# Patient Record
Sex: MALE | Race: WHITE | NOT HISPANIC OR LATINO | Employment: OTHER | URBAN - METROPOLITAN AREA
[De-identification: names, ages, dates, MRNs, and addresses within clinical notes are randomized per-mention and may not be internally consistent; named-entity substitution may affect disease eponyms.]

---

## 2018-12-07 ENCOUNTER — HOSPITAL ENCOUNTER (OUTPATIENT)
Dept: OTHER | Facility: HOSPITAL | Age: 63
Discharge: HOME | End: 2018-12-07
Attending: FAMILY MEDICINE | Admitting: FAMILY MEDICINE

## 2019-03-18 ENCOUNTER — CONVERSION ENCOUNTER (OUTPATIENT)
Dept: FAMILY MEDICINE CLINIC | Facility: CLINIC | Age: 64
End: 2019-03-18

## 2019-03-18 LAB
ALBUMIN SERPL-MCNC: 3.8 G/DL (ref 3.6–5.1)
ALP SERPL-CCNC: 101 U/L (ref 40–115)
ALT SERPL-CCNC: 34 U/L (ref 9–46)
AST SERPL-CCNC: 22 U/L (ref 10–35)
BASOPHILS # BLD AUTO: 96 CELLS/UL (ref 0–200)
BASOPHILS NFR BLD AUTO: 0.8 %
BILIRUB SERPL-MCNC: 0.3 MG/DL (ref 0.2–1.2)
BNP SERPL-MCNC: 44 PG/ML
BUN SERPL-MCNC: 11 MG/DL (ref 7–25)
BUN/CREAT SERPL: ABNORMAL (CALC) (ref 6–22)
CALCIUM SERPL-MCNC: 9.4 MG/DL (ref 8.6–10.3)
CHLORIDE SERPL-SCNC: 101 MMOL/L (ref 98–110)
CHOLEST SERPL-MCNC: 162 MG/DL
CHOLEST/HDLC SERPL: 4.5 (CALC)
CONV CO2: 25 MMOL/L (ref 20–32)
CONV TOTAL PROTEIN: 7.7 G/DL (ref 6.1–8.1)
CREAT UR-MCNC: 0.81 MG/DL (ref 0.7–1.25)
EOSINOPHIL # BLD AUTO: 2.8 %
EOSINOPHIL # BLD AUTO: 336 CELLS/UL (ref 15–500)
ERYTHROCYTE [DISTWIDTH] IN BLOOD BY AUTOMATED COUNT: 12.3 % (ref 11–15)
GLOBULIN UR ELPH-MCNC: 3.9 MG/DL (ref 1.9–3.7)
GLUCOSE UR QL: 92 MG/DL (ref 65–139)
HCT VFR BLD AUTO: 46.1 % (ref 38.5–50)
HCV AB S/CO SERPL IA: 31.8
HCV AB S/CO SERPL IA: REACTIVE
HCV RNA SERPL QL NAA+PROBE: 5.99 LOG IU/ML
HCV RNA SERPL QL NAA+PROBE: ABNORMAL IU/ML
HDLC SERPL-MCNC: 36 MG/DL
HGB BLD-MCNC: 15.8 G/DL (ref 13.2–17.1)
INSULIN SERPL-ACNC: 1 (CALC) (ref 1–2.5)
LDLC SERPL CALC-MCNC: 102 MG/DL
LYMPHOCYTES # BLD AUTO: 2652 CELLS/UL (ref 850–3900)
LYMPHOCYTES NFR BLD AUTO: 22.1 %
MCH RBC QN AUTO: 30.4 PG (ref 27–33)
MCHC RBC AUTO-ENTMCNC: 34.3 G/DL (ref 32–36)
MCV RBC AUTO: 88.7 FL (ref 80–100)
MONOCYTES # BLD AUTO: 912 CELLS/UL (ref 200–950)
MONOCYTES NFR BLD AUTO: 7.6 %
NEUTROPHILS # BLD AUTO: 8004 CELLS/UL (ref 1500–7800)
NEUTROPHILS NFR BLD AUTO: 66.7 %
NONHDLC SERPL-MCNC: 126 MG/DL
PLATELET # BLD AUTO: 574 10*3/UL (ref 140–400)
PMV BLD AUTO: 8.9 FL (ref 7.5–12.5)
POTASSIUM SERPL-SCNC: 5.2 MMOL/L (ref 3.5–5.3)
PSA SERPL-MCNC: 1.1 NG/ML
RBC # BLD AUTO: 5.2 MILLION/UL (ref 4.2–5.8)
SODIUM SERPL-SCNC: 136 MMOL/L (ref 135–146)
TRIGL SERPL-MCNC: 141 MG/DL
TROPONIN I SERPL-MCNC: <0.01 NG/ML
TSH SERPL-ACNC: 0.7 MIU/L (ref 0.4–4.5)
WBC # BLD AUTO: 12 10*3/UL (ref 3.8–10.8)

## 2019-03-21 ENCOUNTER — HOSPITAL ENCOUNTER (OUTPATIENT)
Dept: NUCLEAR MEDICINE | Facility: HOSPITAL | Age: 64
Discharge: HOME | End: 2019-03-21
Attending: FAMILY MEDICINE | Admitting: FAMILY MEDICINE

## 2019-03-26 ENCOUNTER — HOSPITAL ENCOUNTER (OUTPATIENT)
Dept: NUCLEAR MEDICINE | Facility: HOSPITAL | Age: 64
Discharge: HOME | End: 2019-03-26
Attending: FAMILY MEDICINE | Admitting: FAMILY MEDICINE

## 2019-04-03 ENCOUNTER — HOSPITAL ENCOUNTER (OUTPATIENT)
Dept: OTHER | Facility: HOSPITAL | Age: 64
Discharge: HOME | End: 2019-04-03
Attending: FAMILY MEDICINE | Admitting: FAMILY MEDICINE

## 2019-04-17 ENCOUNTER — OFFICE (OUTPATIENT)
Dept: URBAN - METROPOLITAN AREA CLINIC 64 | Facility: CLINIC | Age: 64
End: 2019-04-17

## 2019-04-17 VITALS
SYSTOLIC BLOOD PRESSURE: 137 MMHG | WEIGHT: 215 LBS | HEART RATE: 94 BPM | DIASTOLIC BLOOD PRESSURE: 75 MMHG | HEIGHT: 70 IN

## 2019-04-17 DIAGNOSIS — B18.2 CHRONIC VIRAL HEPATITIS C: ICD-10-CM

## 2019-04-17 PROCEDURE — 99203 OFFICE O/P NEW LOW 30 MIN: CPT | Performed by: NURSE PRACTITIONER

## 2019-04-24 ENCOUNTER — OFFICE (OUTPATIENT)
Dept: URBAN - METROPOLITAN AREA CLINIC 49 | Facility: CLINIC | Age: 64
End: 2019-04-24

## 2019-04-24 VITALS — HEIGHT: 70 IN

## 2019-04-24 DIAGNOSIS — K74.0 HEPATIC FIBROSIS: ICD-10-CM

## 2019-04-24 DIAGNOSIS — B18.2 CHRONIC VIRAL HEPATITIS C: ICD-10-CM

## 2019-04-24 PROCEDURE — 91200 LIVER ELASTOGRAPHY: CPT | Performed by: NURSE PRACTITIONER

## 2019-06-03 ENCOUNTER — OFFICE (OUTPATIENT)
Dept: URBAN - METROPOLITAN AREA CLINIC 64 | Facility: CLINIC | Age: 64
End: 2019-06-03

## 2019-06-03 VITALS
DIASTOLIC BLOOD PRESSURE: 85 MMHG | HEART RATE: 71 BPM | SYSTOLIC BLOOD PRESSURE: 154 MMHG | HEIGHT: 70 IN | WEIGHT: 221 LBS

## 2019-06-03 DIAGNOSIS — B18.2 CHRONIC VIRAL HEPATITIS C: ICD-10-CM

## 2019-06-03 PROCEDURE — 99213 OFFICE O/P EST LOW 20 MIN: CPT | Performed by: NURSE PRACTITIONER

## 2019-06-05 ENCOUNTER — CONVERSION ENCOUNTER (OUTPATIENT)
Dept: FAMILY MEDICINE CLINIC | Facility: CLINIC | Age: 64
End: 2019-06-05

## 2019-06-06 LAB
ALBUMIN SERPL-MCNC: 4.5 G/DL (ref 3.6–5.1)
ALP SERPL-CCNC: 93 U/L (ref 40–115)
ALT SERPL-CCNC: 37 U/L (ref 9–46)
ANA SER QL IA: NEGATIVE
AST SERPL-CCNC: 26 U/L (ref 10–35)
BASOPHILS # BLD AUTO: 80 CELLS/UL (ref 0–200)
BASOPHILS NFR BLD AUTO: 0.7 %
BILIRUB SERPL-MCNC: 0.7 MG/DL (ref 0.2–1.2)
BUN SERPL-MCNC: 24 MG/DL (ref 7–25)
BUN/CREAT SERPL: 18 (CALC) (ref 6–22)
CALCIUM SERPL-MCNC: 9.5 MG/DL (ref 8.6–10.3)
CHLORIDE SERPL-SCNC: 103 MMOL/L (ref 98–110)
CONV CO2: 26 MMOL/L (ref 20–32)
CONV TOTAL PROTEIN: 8.1 G/DL (ref 6.1–8.1)
CREAT UR-MCNC: 1.33 MG/DL (ref 0.7–1.25)
EOSINOPHIL # BLD AUTO: 4.4 %
EOSINOPHIL # BLD AUTO: 502 CELLS/UL (ref 15–500)
ERYTHROCYTE [DISTWIDTH] IN BLOOD BY AUTOMATED COUNT: 13.1 % (ref 11–15)
GLOBULIN UR ELPH-MCNC: 3.6 MG/DL (ref 1.9–3.7)
GLUCOSE UR QL: 98 MG/DL (ref 65–99)
HCT VFR BLD AUTO: 44.2 % (ref 38.5–50)
HGB BLD-MCNC: 15 G/DL (ref 13.2–17.1)
INSULIN SERPL-ACNC: 1.3 (CALC) (ref 1–2.5)
LYMPHOCYTES # BLD AUTO: 3078 CELLS/UL (ref 850–3900)
LYMPHOCYTES NFR BLD AUTO: 27 %
MCH RBC QN AUTO: 30.8 PG (ref 27–33)
MCHC RBC AUTO-ENTMCNC: 33.9 G/DL (ref 32–36)
MCV RBC AUTO: 90.8 FL (ref 80–100)
MONOCYTES # BLD AUTO: 832 CELLS/UL (ref 200–950)
MONOCYTES NFR BLD AUTO: 7.3 %
NEUTROPHILS # BLD AUTO: 6908 CELLS/UL (ref 1500–7800)
NEUTROPHILS NFR BLD AUTO: 60.6 %
PLATELET # BLD AUTO: 324 10*3/UL (ref 140–400)
PMV BLD AUTO: 9 FL (ref 7.5–12.5)
POTASSIUM SERPL-SCNC: 4.1 MMOL/L (ref 3.5–5.3)
RBC # BLD AUTO: 4.87 MILLION/UL (ref 4.2–5.8)
SODIUM SERPL-SCNC: 138 MMOL/L (ref 135–146)
WBC # BLD AUTO: 11.4 10*3/UL (ref 3.8–10.8)

## 2019-07-05 ENCOUNTER — OFFICE VISIT (OUTPATIENT)
Dept: FAMILY MEDICINE CLINIC | Facility: CLINIC | Age: 64
End: 2019-07-05

## 2019-07-05 VITALS
DIASTOLIC BLOOD PRESSURE: 88 MMHG | RESPIRATION RATE: 18 BRPM | HEIGHT: 70 IN | SYSTOLIC BLOOD PRESSURE: 160 MMHG | HEART RATE: 93 BPM | BODY MASS INDEX: 32.21 KG/M2 | OXYGEN SATURATION: 98 % | TEMPERATURE: 97.4 F | WEIGHT: 225 LBS

## 2019-07-05 DIAGNOSIS — I10 BENIGN ESSENTIAL HYPERTENSION: Primary | ICD-10-CM

## 2019-07-05 DIAGNOSIS — N28.9 ACUTE RENAL INSUFFICIENCY: ICD-10-CM

## 2019-07-05 DIAGNOSIS — F17.200 TOBACCO DEPENDENCE SYNDROME: ICD-10-CM

## 2019-07-05 DIAGNOSIS — L03.031 CELLULITIS OF TOE OF RIGHT FOOT: ICD-10-CM

## 2019-07-05 DIAGNOSIS — R14.0 ABDOMINAL BLOATING: ICD-10-CM

## 2019-07-05 PROBLEM — Z00.00 PHYSICAL EXAM: Status: ACTIVE | Noted: 2019-07-05

## 2019-07-05 PROBLEM — Z13.9 ENCOUNTER FOR SCREENING: Status: ACTIVE | Noted: 2019-07-05

## 2019-07-05 PROBLEM — H91.90 HEARING LOSS: Status: ACTIVE | Noted: 2019-07-05

## 2019-07-05 PROBLEM — I73.89 ACROCYANOSIS (HCC): Status: ACTIVE | Noted: 2019-07-05

## 2019-07-05 PROBLEM — Z02.4 ENCOUNTER FOR CDL (COMMERCIAL DRIVING LICENSE) EXAM: Status: ACTIVE | Noted: 2019-07-05

## 2019-07-05 PROBLEM — Z12.5 SCREENING PSA (PROSTATE SPECIFIC ANTIGEN): Status: ACTIVE | Noted: 2019-07-05

## 2019-07-05 PROBLEM — L60.0 INGROWN NAIL: Status: ACTIVE | Noted: 2019-07-05

## 2019-07-05 PROBLEM — E66.3 OVERWEIGHT: Status: ACTIVE | Noted: 2019-07-05

## 2019-07-05 PROBLEM — I25.10 CORONARY ARTERY DISEASE INVOLVING NATIVE CORONARY ARTERY OF NATIVE HEART WITHOUT ANGINA PECTORIS: Status: ACTIVE | Noted: 2019-07-05

## 2019-07-05 PROBLEM — R94.30 ABNORMAL RESULT OF CARDIOVASCULAR FUNCTION STUDY: Status: ACTIVE | Noted: 2019-04-03

## 2019-07-05 PROBLEM — Z23 NEED FOR IMMUNIZATION AGAINST INFLUENZA: Status: ACTIVE | Noted: 2019-07-05

## 2019-07-05 PROBLEM — Z12.11 SCREEN FOR COLON CANCER: Status: ACTIVE | Noted: 2019-07-05

## 2019-07-05 PROBLEM — B18.2 CHRONIC HEPATITIS C (HCC): Status: ACTIVE | Noted: 2019-07-05

## 2019-07-05 PROBLEM — J44.9 CHRONIC OBSTRUCTIVE PULMONARY DISEASE (HCC): Status: ACTIVE | Noted: 2019-04-03

## 2019-07-05 PROBLEM — R07.9 CHEST PAIN: Status: ACTIVE | Noted: 2019-07-05

## 2019-07-05 PROBLEM — Z13.220 SCREENING, LIPID: Status: ACTIVE | Noted: 2019-07-05

## 2019-07-05 PROBLEM — Z98.61 CORONARY ANGIOPLASTY STATUS: Status: ACTIVE | Noted: 2019-04-08

## 2019-07-05 PROCEDURE — 99214 OFFICE O/P EST MOD 30 MIN: CPT | Performed by: FAMILY MEDICINE

## 2019-07-05 RX ORDER — HYDROCODONE BITARTRATE AND ACETAMINOPHEN 5; 325 MG/1; MG/1
1 TABLET ORAL EVERY 6 HOURS PRN
Qty: 12 TABLET | Refills: 0 | Status: SHIPPED | OUTPATIENT
Start: 2019-07-05 | End: 2019-07-08

## 2019-07-05 RX ORDER — METOPROLOL SUCCINATE 25 MG/1
25 TABLET, EXTENDED RELEASE ORAL DAILY
Refills: 2 | COMMUNITY
Start: 2019-06-03 | End: 2020-03-18 | Stop reason: SDUPTHER

## 2019-07-05 RX ORDER — AMOXICILLIN AND CLAVULANATE POTASSIUM 500; 125 MG/1; MG/1
1 TABLET, FILM COATED ORAL 3 TIMES DAILY
Qty: 30 TABLET | Refills: 0 | Status: SHIPPED | OUTPATIENT
Start: 2019-07-05 | End: 2019-07-15

## 2019-07-05 RX ORDER — ASPIRIN 81 MG/1
1 TABLET ORAL EVERY 24 HOURS
COMMUNITY
Start: 2018-12-07

## 2019-07-05 RX ORDER — ATORVASTATIN CALCIUM 20 MG/1
1 TABLET, FILM COATED ORAL DAILY
COMMUNITY
Start: 2019-04-03 | End: 2020-05-18

## 2019-07-05 RX ORDER — IBUPROFEN 200 MG
5 TABLET ORAL 3 TIMES DAILY
COMMUNITY

## 2019-07-05 RX ORDER — CLOPIDOGREL BISULFATE 75 MG/1
1 TABLET ORAL DAILY
COMMUNITY
Start: 2019-04-26 | End: 2020-06-12 | Stop reason: SDUPTHER

## 2019-07-05 RX ORDER — ALBUTEROL SULFATE 90 UG/1
2 AEROSOL, METERED RESPIRATORY (INHALATION) AS NEEDED
COMMUNITY
Start: 2019-04-03 | End: 2020-03-18 | Stop reason: SDUPTHER

## 2019-07-05 NOTE — ASSESSMENT & PLAN NOTE
BP elevated today.  Possibly from pain.  Previous readings not as high as today.  Asymptomatic.  Monitor and adjust next visit if BP elevation continues.  Treat pain.

## 2019-07-05 NOTE — PATIENT INSTRUCTIONS
Smoking Tobacco Information, Adult  Smoking tobacco will very likely harm your health. Tobacco contains a poisonous (toxic), colorless chemical called nicotine. Nicotine affects the brain and makes tobacco addictive. This change in your brain can make it hard to stop smoking. Tobacco also has other toxic chemicals that can hurt your body and raise your risk of many cancers.  How can smoking tobacco affect me?  Smoking tobacco can increase your chances of having serious health conditions, such as:  · Cancer. Smoking is most commonly associated with lung cancer, but can lead to cancer in other parts of the body.  · Chronic obstructive pulmonary disease (COPD). This is a long-term lung condition that makes it hard to breathe. It also gets worse over time.  · High blood pressure (hypertension), heart disease, stroke, or heart attack.  · Lung infections, such as pneumonia.  · Cataracts. This is when the lenses in the eyes become clouded.  · Digestive problems. This may include peptic ulcers, heartburn, and gastroesophageal reflux disease (GERD).  · Oral health problems, such as gum disease and tooth loss.  · Loss of taste and smell.    Smoking can affect your appearance by causing:  · Wrinkles.  · Yellow or stained teeth, fingers, and fingernails.    Smoking tobacco can also affect your social life.  · Many workplaces, restaurants, hotels, and public places are tobacco-free. This means that you may experience challenges in finding places to smoke when away from home.  · The cost of a smoking habit can be expensive. Expenses for someone who smokes come in two ways:  ? You spend money on a regular basis to buy tobacco.  ? Your health care costs in the long-term are higher if you smoke.  · Tobacco smoke can also affect the health of those around you. Children of smokers have greater chances of:  ? Sudden infant death syndrome (SIDS).  ? Ear infections.  ? Lung infections.    What lifestyle changes can be made?  · Do not  start smoking. Quit if you already do.  · To quit smoking:  ? Make a plan to quit smoking and commit yourself to it. Look for programs to help you and ask your health care provider for recommendations and ideas.  ? Talk with your health care provider about using nicotine replacement medicines to help you quit. Medicine replacement medicines include gum, lozenges, patches, sprays, or pills.  ? Do not replace cigarette smoking with electronic cigarettes, which are commonly called e-cigarettes. The safety of e-cigarettes is not known, and some may contain harmful chemicals.  ? Avoid places, people, or situations that tempt you to smoke.  ? If you try to quit but return to smoking, don't give up hope. It is very common for people to try a number of times before they fully succeed. When you feel ready again, give it another try.  · Quitting smoking might affect the way you eat as well as your weight. Be prepared to monitor your eating habits. Get support in planning and following a healthy diet.  · Ask your health care provider about having regular tests (screenings) to check for cancer. This may include blood tests, imaging tests, and other tests.  · Exercise regularly. Consider taking walks, joining a gym, or doing yoga or exercise classes.  · Develop skills to manage your stress. These skills include meditation.  What are the benefits of quitting smoking?  By quitting smoking, you may:  · Lower your risk of getting cancer and other diseases caused by smoking.  · Live longer.  · Breathe better.  · Lower your blood pressure and heart rate.  · Stop your addiction to tobacco.  · Stop creating secondhand smoke that hurts other people.  · Improve your sense of taste and smell.  · Look better over time, due to having fewer wrinkles and less staining.    What can happen if changes are not made?  If you do not stop smoking, you may:  · Get cancer and other diseases.  · Develop COPD or other long-term (chronic) lung  conditions.  · Develop serious problems with your heart and blood vessels (cardiovascular system).  · Need more tests to screen for problems caused by smoking.  · Have higher, long-term healthcare costs from medicines or treatments related to smoking.  · Continue to have worsening changes in your lungs, mouth, and nose.    Where to find support  To get support to quit smoking, consider:  · Asking your health care provider for more information and resources.  · Taking classes to learn more about quitting smoking.  · Looking for local organizations that offer resources about quitting smoking.  · Joining a support group for people who want to quit smoking in your local community.    Where to find more information  You may find more information about quitting smoking from:  · HelpGuide.org: www.helpguide.org/articles/addictions/how-to-quit-smoking.htm  · Smokefree.gov: smokefree.gov  · American Lung Association: www.lung.org    Contact a health care provider if:  · You have problems breathing.  · Your lips, nose, or fingers turn blue.  · You have chest pain.  · You are coughing up blood.  · You feel faint or you pass out.  · You have other noticeable changes that cause you to worry.  Summary  · Smoking tobacco can negatively affect your health, the health of those around you, your finances, and your social life.  · Do not start smoking. Quit if you already do. If you need help quitting, ask your health care provider.  · Think about joining a support group for people who want to quit smoking in your local community. There are many effective programs that will help you to quit this behavior.  This information is not intended to replace advice given to you by your health care provider. Make sure you discuss any questions you have with your health care provider.  Document Released: 01/02/2018 Document Revised: 01/02/2018 Document Reviewed: 01/02/2018  Elsevier Interactive Patient Education © 2019 [employer]

## 2019-07-05 NOTE — PROGRESS NOTES
Chief Complaint   Patient presents with   • Ingrown Toenail   • Follow-up       Subjective   Santos Sullivan is a 63 y.o. male.     Patient is here today for a follow up from an ingrown toenail removal about a month ago.  He saw Dr Vadles and had a nail removal.  He states that he has had significant pain since removal and has been taking high doses of ibuprofen daily.  The nail bed is draining.  The toe is tender.  He has been treating with peroxide.      The lamictal for onychomycosis was discontinued.    Additionally, he is due for repeat labs today since last labs showed a slight decline in his renal function.    He has a new complaint of abdominal bloating after eating.  Symptoms present <1 mo.  Not associated with bloody stools or vomiting.  He admits to a diet high in fat.  He does not eat high fiber foods.  Recent imaging (liver u/s) did not comment on gallstones.      Ingrown Toenail   This is a recurrent problem. The current episode started 1 to 4 weeks ago. The problem occurs constantly. The problem has been unchanged. Associated symptoms include joint swelling (right great toe). Pertinent negatives include no chest pain, chills, fever, rash or vomiting. Nothing aggravates the symptoms. He has tried acetaminophen and NSAIDs for the symptoms. The treatment provided no relief.          I have reviewed and updated his medications, medical history and problem list during today's office visit.     Social History     Tobacco Use   • Smoking status: Current Every Day Smoker     Packs/day: 1.00     Types: Cigarettes   • Smokeless tobacco: Never Used   Substance Use Topics   • Alcohol use: No     Frequency: Never       Review of Systems   Constitutional: Negative for appetite change, chills and fever.   Respiratory: Negative for shortness of breath.    Cardiovascular: Negative for chest pain and leg swelling.   Gastrointestinal: Positive for abdominal distention. Negative for blood in stool, constipation, diarrhea,  "vomiting and indigestion.   Genitourinary: Negative for decreased urine volume.   Musculoskeletal: Positive for joint swelling (right great toe).   Skin: Positive for color change (redness). Negative for rash.        Swelling and redness of right great toe, with drainage - see HPI   Neurological: Negative for dizziness and syncope.   Hematological: Negative for adenopathy. Does not bruise/bleed easily.   Psychiatric/Behavioral: Negative for depressed mood.       Objective   Vitals:    07/05/19 0841   BP: 160/88   Pulse: 93   Resp: 18   Temp: 97.4 °F (36.3 °C)   TempSrc: Oral   SpO2: 98%   Weight: 102 kg (225 lb)   Height: 177.8 cm (70\")     There is no height or weight on file to calculate BMI.  Physical Exam   Constitutional: He is oriented to person, place, and time. He appears well-developed and well-nourished.   HENT:   Head: Normocephalic and atraumatic.   Eyes: Conjunctivae and EOM are normal. Pupils are equal, round, and reactive to light.   Neck: Normal range of motion. Neck supple.   Cardiovascular: Normal rate, regular rhythm and normal heart sounds.   Pulmonary/Chest: Effort normal and breath sounds normal. He has no wheezes. He has no rales.   Abdominal: Soft.   Musculoskeletal: He exhibits no edema.        Neurological: He is alert and oriented to person, place, and time.   Skin: Skin is warm and dry. He is not diaphoretic.   Psychiatric: He has a normal mood and affect.                Lab Results   Component Value Date    BUN 11 04/09/2019    CREATININE 0.9 04/09/2019     04/09/2019    K 4.2 04/09/2019     04/09/2019    CALCIUM 8.9 04/09/2019    WBC 10.0 04/09/2019    RBC 4.96 04/09/2019    HCT 45.9 04/09/2019    MCV 92.5 04/09/2019    MCH 30.4 04/09/2019    INR 1.0 04/08/2019      PHQ-2 Total Score: 0    Assessment/Plan       Diagnoses and all orders for this visit:    1. Benign essential hypertension (Primary)  Assessment & Plan:  BP elevated today.  Possibly from pain.  Previous readings " not as high as today.  Asymptomatic.  Monitor and adjust next visit if BP elevation continues.  Treat pain.      2. Acute renal insufficiency  Assessment & Plan:  Recheck labs.   Reduce ibuprofen usage.    Orders:  -     Comprehensive metabolic panel    3. Cellulitis of toe of right foot  Assessment & Plan:  Start Augmentin.  Take as instructed.  Wound care discussed.  Resume Epsom salt soaks BID, peroxide as needed.  If not improvement in the next 4-5 days, he was advised to call us or call Dr. Epps's office directly to make a follow-up appointment.    Orders:  -     amoxicillin-clavulanate (AUGMENTIN) 500-125 MG per tablet; Take 1 tablet by mouth 3 (Three) Times a Day for 10 days.  Dispense: 30 tablet; Refill: 0  -     HYDROcodone-acetaminophen (NORCO) 5-325 MG per tablet; Take 1 tablet by mouth Every 6 (Six) Hours As Needed for Moderate Pain  for up to 3 days.  Dispense: 12 tablet; Refill: 0    4. Abdominal bloating  Comments:  Reduce fat in diet.  Consider gallbladder u/s if symptoms continue.    5. Tobacco dependence syndrome  Assessment & Plan:  He is not interested in cessation.          Return if symptoms worsen or fail to improve.

## 2019-07-05 NOTE — ASSESSMENT & PLAN NOTE
Start Augmentin.  Take as instructed.  Wound care discussed.  Resume Epsom salt soaks BID, peroxide as needed.  If not improvement in the next 4-5 days, he was advised to call us or call Dr. Epps's office directly to make a follow-up appointment.

## 2019-07-06 LAB
ALBUMIN SERPL-MCNC: 4.4 G/DL (ref 3.6–4.8)
ALBUMIN/GLOB SERPL: 1.2 {RATIO} (ref 1.2–2.2)
ALP SERPL-CCNC: 87 IU/L (ref 39–117)
ALT SERPL-CCNC: 14 IU/L (ref 0–44)
AST SERPL-CCNC: 16 IU/L (ref 0–40)
BILIRUB SERPL-MCNC: 0.3 MG/DL (ref 0–1.2)
BUN SERPL-MCNC: 16 MG/DL (ref 8–27)
BUN/CREAT SERPL: 16 (ref 10–24)
CALCIUM SERPL-MCNC: 9.2 MG/DL (ref 8.6–10.2)
CHLORIDE SERPL-SCNC: 105 MMOL/L (ref 96–106)
CO2 SERPL-SCNC: 20 MMOL/L (ref 20–29)
CREAT SERPL-MCNC: 0.97 MG/DL (ref 0.76–1.27)
GLOBULIN SER CALC-MCNC: 3.6 G/DL (ref 1.5–4.5)
GLUCOSE SERPL-MCNC: 102 MG/DL (ref 65–99)
POTASSIUM SERPL-SCNC: 4.7 MMOL/L (ref 3.5–5.2)
PROT SERPL-MCNC: 8 G/DL (ref 6–8.5)
SODIUM SERPL-SCNC: 139 MMOL/L (ref 134–144)

## 2019-07-18 ENCOUNTER — OFFICE VISIT (OUTPATIENT)
Dept: FAMILY MEDICINE CLINIC | Facility: CLINIC | Age: 64
End: 2019-07-18

## 2019-07-18 VITALS
SYSTOLIC BLOOD PRESSURE: 138 MMHG | HEIGHT: 70 IN | OXYGEN SATURATION: 97 % | BODY MASS INDEX: 32.21 KG/M2 | TEMPERATURE: 98.7 F | HEART RATE: 93 BPM | RESPIRATION RATE: 18 BRPM | DIASTOLIC BLOOD PRESSURE: 88 MMHG | WEIGHT: 225 LBS

## 2019-07-18 DIAGNOSIS — L03.031 CELLULITIS OF TOE OF RIGHT FOOT: Primary | ICD-10-CM

## 2019-07-18 DIAGNOSIS — M79.674 PAIN OF TOE OF RIGHT FOOT: ICD-10-CM

## 2019-07-18 PROBLEM — J44.9 CHRONIC OBSTRUCTIVE PULMONARY DISEASE (HCC): Chronic | Status: ACTIVE | Noted: 2019-04-03

## 2019-07-18 PROBLEM — B18.2 CHRONIC HEPATITIS C (HCC): Chronic | Status: ACTIVE | Noted: 2019-07-05

## 2019-07-18 PROBLEM — I10 BENIGN ESSENTIAL HYPERTENSION: Chronic | Status: ACTIVE | Noted: 2019-07-05

## 2019-07-18 PROBLEM — I25.10 CORONARY ARTERY DISEASE INVOLVING NATIVE CORONARY ARTERY OF NATIVE HEART WITHOUT ANGINA PECTORIS: Chronic | Status: ACTIVE | Noted: 2019-07-05

## 2019-07-18 PROCEDURE — 99213 OFFICE O/P EST LOW 20 MIN: CPT | Performed by: FAMILY MEDICINE

## 2019-07-18 RX ORDER — CLINDAMYCIN HYDROCHLORIDE 300 MG/1
300 CAPSULE ORAL 3 TIMES DAILY
Qty: 30 CAPSULE | Refills: 0 | Status: SHIPPED | OUTPATIENT
Start: 2019-07-18 | End: 2019-07-28

## 2019-07-18 RX ORDER — HYDROCODONE BITARTRATE AND ACETAMINOPHEN 5; 325 MG/1; MG/1
1 TABLET ORAL EVERY 6 HOURS PRN
Qty: 12 TABLET | Refills: 0 | Status: SHIPPED | OUTPATIENT
Start: 2019-07-18 | End: 2020-03-18

## 2019-07-18 NOTE — PROGRESS NOTES
Chief Complaint   Patient presents with   • Nail Problem     toe nail removed 5 weeks ago, appears infected.        Subjective   Santos Sullivan is a 63 y.o. male.     Patient reports that 5 weeks ago, the toe nail from his right great toe was removed by Dr. Valdes. He came in here about 12 days ago and was given antibiotics (augmentin) and pain medicine (hydrocodone apap) to help. He reports that he has finished his antibiotics, which may have helped a little, and has returned to the office because his toe still looks infected and he has a considerable amount of pain.  Other treatments include peroxide and epsom salt soaks.         The following portions of the patient's history were reviewed and updated as appropriate: allergies, current medications, past family history, past medical history, past social history, past surgical history and problem list.      Social History     Tobacco Use   • Smoking status: Current Every Day Smoker     Packs/day: 1.00     Types: Cigarettes   • Smokeless tobacco: Never Used   Substance Use Topics   • Alcohol use: No     Frequency: Never       Review of Systems   Constitutional: Negative for appetite change, chills and fever.   Respiratory: Negative for shortness of breath.    Cardiovascular: Negative for chest pain and leg swelling.   Gastrointestinal: Negative for abdominal distention, blood in stool, constipation, diarrhea, vomiting and indigestion.   Genitourinary: Negative for decreased urine volume.   Musculoskeletal: Positive for joint swelling (right great toe).   Skin: Positive for color change (redness). Negative for rash.        Swelling and redness of right great toe, with drainage - see HPI   Neurological: Negative for dizziness and syncope.   Hematological: Negative for adenopathy. Does not bruise/bleed easily.   Psychiatric/Behavioral: Negative for depressed mood.       Objective   Vitals:    07/18/19 1416   BP: 138/88   Pulse: 93   Resp: 18   Temp: 98.7 °F (37.1 °C)  "  SpO2: 97%   Weight: 102 kg (225 lb)   Height: 177.8 cm (70\")     Body mass index is 32.28 kg/m².  Physical Exam   Constitutional: He is oriented to person, place, and time. He appears well-developed and well-nourished.   HENT:   Head: Normocephalic and atraumatic.   Eyes: Conjunctivae and EOM are normal. Pupils are equal, round, and reactive to light.   Neck: Normal range of motion. Neck supple.   Cardiovascular: Normal rate, regular rhythm and normal heart sounds.   Pulmonary/Chest: Effort normal and breath sounds normal. He has no wheezes. He has no rales.   Abdominal: Soft.   Musculoskeletal: He exhibits no edema.        Neurological: He is alert and oriented to person, place, and time.   Skin: Skin is warm and dry. He is not diaphoretic.   Psychiatric: He has a normal mood and affect.                  Lab Results   Component Value Date     (H) 07/05/2019    BUN 16 07/05/2019    CREATININE 0.97 07/05/2019    EGFRIFNONA 83 07/05/2019    EGFRIFAFRI 96 07/05/2019     07/05/2019    K 4.7 07/05/2019     07/05/2019    CALCIUM 9.2 07/05/2019    ALBUMIN 4.4 07/05/2019    BILITOT 0.3 07/05/2019    ALKPHOS 87 07/05/2019    AST 16 07/05/2019    ALT 14 07/05/2019          Assessment/Plan       Diagnoses and all orders for this visit:    1. Cellulitis of toe of right foot (Primary)  Comments:  Start new antibiotics.  Continue Epsom salt soaks and peroxide.  Take probiotic.  See Dr. Valdes on 7/31/19 at 9:15 am.  Orders:  -     clindamycin (CLEOCIN) 300 MG capsule; Take 1 capsule by mouth 3 (Three) Times a Day for 10 days.  Dispense: 30 capsule; Refill: 0  -     HYDROcodone-acetaminophen (NORCO) 5-325 MG per tablet; Take 1 tablet by mouth Every 6 (Six) Hours As Needed for Severe Pain .  Dispense: 12 tablet; Refill: 0  -     CBC Auto Differential  -     Sedimentation rate, automated  -     C-reactive protein  -     Uric Acid    2. Pain of toe of right foot          Return if symptoms worsen or fail to " improve.

## 2019-07-18 NOTE — PATIENT INSTRUCTIONS
Try Probiotics to help protect gut/intestines from antibiotic usage:    1) One-A-Day TruBiotics      Or    2) [facility]    See Dr. Valdes on July 31, 2019 at 9:15 am (be there by 9:00 am).

## 2019-07-19 NOTE — PROGRESS NOTES
Office Visit     Santos Sullivan  6/5/2019 8:07 AM  Location:  [facility]  Patient #:  [id number]    /  Language:  English  /  Race:  White  Male          History of Present Illness  The patient is a 63 year old male who presents with nail problems. Symptoms include curved nails. Lesion(s) are located in digit(s) of the right foot (Great Toe). Onset was gradual 5 month(s) ago. The nail changes are becoming more painful. Changes have   occurred over the last 2 days. Symptoms are mild. The patient is not currently being treated for this problem.    Additional Reason for Visit:    Transition (No)  is described as the following:  This is a(n) established patient. The patient has not received care from another provider or facility since their last visit.           Problem List/Past Medical:    Consulting Physicians ([id number])  [facility], cardiology (Dr. Watson)  Tobacco Use Disorder (F17.200)    Coronary artery disease involving native coronary artery of native heart without angina pectoris (I25.10)    COPD (chronic obstructive pulmonary disease) (J44.9)    Chronic hepatitis C (B18.2)    Benign essential hypertension (I10)      Allergies:    No Known Drug Allergies  [06/24/2015]:    Family History:    Pelvic cancer (C76.3)  Sister.  Throat cancer (C14.0)  Father.  Lung Cancer  Brother.  Coronary Artery Disease and/or Hypertension (Z82.49)  Mother. mother (NSTEMI, CAD, cardiac stents, afib)    Social History:    Living Situation  Lives alone.  Tobacco Use  Current every day smoker, Heavy tobacco smoker. 1.5 ppd  Caffeine Use  2-3 cups of coffee and 2-3 energy drinks a day  Occupation    Alcohol Use  Non Drinker / No Alcohol Use.    Medication History:    Spiriva HandiHaler  (18MCG Capsule, 1 Inhalation inhaled daily for COPD, Taken starting 06/03/2019) Active.  Ventolin HFA  (108 (90 Base)MCG/ACT Aerosol Soln, 2 (two) Inhalation every 4 - 6 hours as needed for shortness of breath (COPD), Taken starting  05/06/2019) Active. (Max 12 puffs/day.)  Aspirin Adult Low Dose  (81MG Tablet DR, 1 (one) Tablet Oral daily, Taken starting 12/07/2018) Active. (OTC)  Atorvastatin Calcium  (20MG Tablet, 1 (one) Oral at bedtime, Taken starting 05/06/2019) Active.  Metoprolol Succinate ER  (25MG Tablet ER 24HR, 1 (one) Tablet Oral daily, Taken starting 06/03/2019) Active.  Chantix  (1MG Tablet, 1 (one) Tablet Oral two times daily, Taken starting 04/29/2019) Active. (start after completing the 0.5 mg titration)  Clopidogrel Bisulfate  (75MG Tablet, 1 (one) Oral daily, Taken starting 06/03/2019) Active.  Ibuprofen  (200MG Tablet, 3 Oral two times daily as needed) Active.  Medications Reconciled      Past Surgical History:    None  [06/24/2015]:    Diagnostic Studies History:    Cardiovascular Stress Test  Abnormal.    Health Maintenance History:    [facility] Flu Vaccine  [12/07/2018]:  [facility] Pneumovax  [12/07/2018]:  PSA  [03/18/2018]: Normal.  [facility] Colonoscopy, Screening  green packet given 12/7/18           Review of Symptons  General    Not Present-  Chills     -    and  Fever     -    Skin    Present-  Nail Changes     +    Not Present-  Rash     -    HEENT    Not Present-  Headache     -    Neck    Not Present-  Neck Stiffness     -    Respiratory    Not Present-  Dyspnea     -    Cardiovascular    Present-  Blue or purplish discoloration of hands/feet     +    Not Present-  Chest Pain     -    and  Edema     -    Gastrointestinal    Not Present-  Abdominal Pain     -  Constipation     -  Diarrhea     -    and  Vomiting     -    Neurological    Not Present-  Dizziness     -    and  Visual Changes     -    Endocrine    Not Present-  Polydipsia     -    and  Polyuria     -           Physical Exam    General  Mental Status - Alert     Orientation - Oriented X3     Hydration - Well hydrated       Skin  General - Normal     Skin Surface - Right Lower Extremity -          Nails - Inspection -       Head and Neck  Head - Head Shape -      Normocephalic     Atraumatic     Neck - Global Assessment -     tender     No lymphadenopathy     Carotid Arteries -  Bilateral -     No jugular venous distention     Thyroid - Gland Characteristics -     no palpable enlargement         Eye  Sclera/Conjunctiva - Bilateral - clear     Pupil - Bilateral - PEARLA       ENMT  Mouth and Throat -      Oral Cavity/Oropharynx - Oral Mucosa -     dry       Chest and Lung Exam  Inspection - Shape -     Normal     Movements -     Symmetrical     Accessory muscles -     No use of accessory muscles in breathing     Auscultation -      Breath sounds - Decreased -     Right Lower Lobe (Posterior)     Left Lower Lobe (Posterior)            Adventitious sounds -   -     no rhonchi     no rales     no wheezes       Cardiovascular  Auscultation - Rhythm -     Regular     Tachycardic     Heart Sounds -     S1 WNL     S2 WNL     No S3     No S4          Murmurs & Other Heart Sounds - Auscultation of the heart reveals -     No Murmurs       Peripheral Vascular  Lower Extremity - Inspection:     Inspection:          Palpation - Tenderness -  Bilateral -     Non Tender     Edema -  Bilateral -     No edema       Neurologic  Cranial Nerves II - XII - Intact     Mental Status - Affect -     normal     Speech -     Normal     Thought content/perception -     Normal     Coordination - Normal     Gait - Normal       Neuropsychiatric  The patient's mood and affect are described as  - normal                     Assessment and Plan:  Onychomycosis of toenail (B35.1)            Started Terbinafine HCl 250MG, 1 (one) Tablet daily, #84, 84 days starting 06/05/2019, No Refill.  CBC w/ auto Diff (6399) (14545)  CMP (40942) (16540)    Acrocyanosis (I73.89) <[id number]>            Ankle-Brachial Index (MARY)  (90776) (25 Modifier on Office Visit)  ASHLEY (Antinuclear Antibody) Screen (58955)  Referred to Trav Valdes MD (Podiatry).  Routine. Provide referring physician with visit summaries. Assume management  for problem and return to primary caregiver at conclusion of care.  Ingrown nail (L60.0)    Refer.      Acute renal insufficiency (N28.9)    Recheck labs 2 weeks.          Future Plan:   6/21/2019: [facility] (36037) - - one time    Tobacco Use Disorder (F17.200)    He is interested in cessation.  Continue Chantix.        Pt Education - Smoking: Ways to Quit: quit smoking: discussed with patient and provided information.  Education about Importance of not smoking ()    Overweight >25 (E66.3)            BMI Higher than Parameter and Follow-up Plan Documented in Record ()  Pt Education - Weight Loss Diets *: losing weight: discussed with patient and provided information.                        Resulted Orders:        CBC w/ auto Diff (6399) (84022)  results abnormal?         true  11.4  4.87 Million/uL  15.0 g/dL  44.2 %  90.8 fL  30.8 pg  33.9 g/dL  13.1 %  324  9.0 fL  6908 cells/uL  3078 cells/uL  832 cells/uL  502 cells/uL  80 cells/uL  60.6 %  27.0 %  7.3 %  4.4 %  0.7 %    CMP (74863) (93350)  results abnormal?         true  98 mg/dL  24 mg/dL  1.33 mg/dL  57  66  18 (calc)  138 mmol/L  4.1 mmol/L  103 mmol/L  26 mmol/L  9.5 mg/dL  8.1 g/dL  4.5 g/dL  3.6  1.3 (calc)  0.7 mg/dL  93 U/L  26 U/L  37 U/L    ASHLEY (Antinuclear Antibody) Screen (29393)  results abnormal?         false  NEGATIVE      Signed By:      6/30/2019 11:23 PM     Leigh Ayon MD  Signed electronically by Leigh Ayon MD (6/30/2019 11:23 PM)  __________     Disclaimer: Converted Note may not contain all data elements that existed in the [employer] source system. Please see [employer] for the original note message details.

## 2019-07-20 LAB
BASOPHILS # BLD AUTO: 0 X10E3/UL (ref 0–0.2)
BASOPHILS NFR BLD AUTO: 0 %
CRP SERPL-MCNC: <1 MG/L (ref 0–10)
EOSINOPHIL # BLD AUTO: 0.4 X10E3/UL (ref 0–0.4)
EOSINOPHIL NFR BLD AUTO: 4 %
ERYTHROCYTE [DISTWIDTH] IN BLOOD BY AUTOMATED COUNT: 13.6 % (ref 12.3–15.4)
ERYTHROCYTE [SEDIMENTATION RATE] IN BLOOD BY WESTERGREN METHOD: 2 MM/HR (ref 0–30)
HCT VFR BLD AUTO: 41.1 % (ref 37.5–51)
HGB BLD-MCNC: 13.8 G/DL (ref 13–17.7)
IMM GRANULOCYTES # BLD AUTO: 0 X10E3/UL (ref 0–0.1)
IMM GRANULOCYTES NFR BLD AUTO: 0 %
LYMPHOCYTES # BLD AUTO: 2.4 X10E3/UL (ref 0.7–3.1)
LYMPHOCYTES NFR BLD AUTO: 27 %
MCH RBC QN AUTO: 30.7 PG (ref 26.6–33)
MCHC RBC AUTO-ENTMCNC: 33.6 G/DL (ref 31.5–35.7)
MCV RBC AUTO: 92 FL (ref 79–97)
MONOCYTES # BLD AUTO: 0.8 X10E3/UL (ref 0.1–0.9)
MONOCYTES NFR BLD AUTO: 8 %
NEUTROPHILS # BLD AUTO: 5.3 X10E3/UL (ref 1.4–7)
NEUTROPHILS NFR BLD AUTO: 61 %
PLATELET # BLD AUTO: 265 X10E3/UL (ref 150–450)
RBC # BLD AUTO: 4.49 X10E6/UL (ref 4.14–5.8)
URATE SERPL-MCNC: 6.3 MG/DL (ref 3.7–8.6)
WBC # BLD AUTO: 8.9 X10E3/UL (ref 3.4–10.8)

## 2019-07-22 VITALS
HEIGHT: 70 IN | SYSTOLIC BLOOD PRESSURE: 142 MMHG | DIASTOLIC BLOOD PRESSURE: 94 MMHG | OXYGEN SATURATION: 98 % | BODY MASS INDEX: 31.07 KG/M2 | WEIGHT: 217 LBS | HEART RATE: 100 BPM | RESPIRATION RATE: 18 BRPM

## 2019-07-31 ENCOUNTER — OFFICE VISIT (OUTPATIENT)
Dept: PODIATRY | Facility: CLINIC | Age: 64
End: 2019-07-31

## 2019-07-31 VITALS
BODY MASS INDEX: 32.21 KG/M2 | WEIGHT: 225 LBS | SYSTOLIC BLOOD PRESSURE: 162 MMHG | DIASTOLIC BLOOD PRESSURE: 102 MMHG | HEART RATE: 73 BPM | HEIGHT: 70 IN

## 2019-07-31 DIAGNOSIS — M79.674 GREAT TOE PAIN, RIGHT: Primary | ICD-10-CM

## 2019-07-31 PROCEDURE — 99213 OFFICE O/P EST LOW 20 MIN: CPT | Performed by: PODIATRIST

## 2019-07-31 RX ORDER — METHYLPREDNISOLONE 4 MG/1
TABLET ORAL
Qty: 21 TABLET | Refills: 0 | Status: SHIPPED | OUTPATIENT
Start: 2019-07-31 | End: 2020-03-18

## 2019-07-31 NOTE — PROGRESS NOTES
"07/31/2019  Foot and Ankle Surgery - Established Patient/Follow-up  Provider: Dr. Trav Valdes, KWAN  Location: [facility]    Subjective:  Santos Sullivan is a 63 y.o. male.     Chief Complaint   Patient presents with   • Right Foot - Follow-up       HPI: Patient returns for increased discomfort affecting the right great toe.  He states that he has discussed these issues with his primary care provider who has treated him with an oral antibiotic.  He continues to remain symptomatic after hallux nail removal and chemical matrixectomy.  He states that he has not been performing the soakings and continues his normal daily activity level.    No Known Allergies    Current Outpatient Medications on File Prior to Visit   Medication Sig Dispense Refill   • albuterol sulfate HFA (VENTOLIN HFA) 108 (90 Base) MCG/ACT inhaler Inhale 2 puffs As Needed.     • aspirin (ASPIR-LOW) 81 MG EC tablet Take 1 tablet by mouth Daily.     • atorvastatin (LIPITOR) 20 MG tablet Take 1 tablet by mouth Daily.     • clopidogrel (PLAVIX) 75 MG tablet Take 1 tablet by mouth Daily.     • Glecaprevir-Pibrentasvir 100-40 MG tablet Take 3 tablets by mouth Daily.     • HYDROcodone-acetaminophen (NORCO) 5-325 MG per tablet Take 1 tablet by mouth Every 6 (Six) Hours As Needed for Severe Pain . 12 tablet 0   • ibuprofen (ADVIL,MOTRIN) 200 MG tablet Take 5 tablets by mouth 3 (Three) Times a Day.     • metoprolol succinate XL (TOPROL-XL) 25 MG 24 hr tablet Take 25 mg by mouth Daily.  2   • tiotropium (SPIRIVA HANDIHALER) 18 MCG per inhalation capsule Place 1 puff into inhaler and inhale Daily.       No current facility-administered medications on file prior to visit.        Objective   BP (!) 162/102   Pulse 73   Ht 177.8 cm (70\")   Wt 102 kg (225 lb)   BMI 32.28 kg/m²     Podiatry Exam       General Appearance:  well nourished; well hydrated; no acute distress  Mental Status Exam        Judgement and Insight:  Intact       Orientation:  Oriented " to time, place, and person       Memory:  Intact for recent and remote events       Mood and Affect:  No depression, anxiety, or agitation  Cardiovascular (Bilateral)       Dorsalis Pedis Pulse (BL):  2/4       Posterior Tibialis Pulse (BL):  2/4       Capillary Filling Time (BL):  1-3 Seconds       Edema (BL):  No Edema       Telangiectasias (BL):  positive  Dermatological Exam       Temperature:  warm to warm       Skin Elasticity:  decreased elasticity  Skin: Right hallux nailbed is mildly macerated with overlying fibrotic film.  No significant erythema or purulent drainage.  No tunneling ulcer.  Neurological Exam (Bilateral)       Paresthesia (Bl):  negative       Achilles DTRs (Bl):  symmetric       Tinel over Tarsal Tunnel (Bl):  negative  Additional Neurological Findings    Sensation and motor function are intact  Hypertrophic Nail Description (Left)       Thickened:  1       >3mm:  1       Ridged:  1       Onychomycosis:  1       Painful Nail:  1     MusculoSkeletal Exam (Bilateral)       Gait and Stance (Bl):    Nonantalgic.  Unassisted       ROM (Bl):  no crepitus or pain       Muscle Strength (Bl):  symmetrical 5/5       Subluxed Digits (Bl):  no subluxation or laxity of joints       Dislocated Joints (Bl):  no dislocation of joints       Inflammed Joints (Bl):  no inflammation of joints       Hammertoe Deformity (Bl):  none       Claw Toe Deformity (Bl): none       Medial Turbicle Calc (Bl):  no pain       Gastroc soleus equinus (Bl):  Inability to dorsiflex past neutral position both straight legged and bent knee       Post tibial tendon (Bl):  no soreness noted       Peroneal Tendon (Bl):  no soreness noted       Capsulitis of the MPJs (Bl):  no soreness noted    Assessment/Plan   Santos was seen today for follow-up.    Diagnoses and all orders for this visit:    Great toe pain, right    Other orders  -     methylPREDNISolone (MEDROL, BRENDA,) 4 MG tablet; Take as directed    Patient's findings are  consistent with inflammation affecting the nailbed after the chemical matrixectomy.  No concerning features of infection are present.  I did discuss this with him at length.  Given his level of discomfort, I have recommended that we proceed with a Medrol Dosepak for symptom management.  I would like him to resume the soakings and clean the area on a daily basis.  I have recommended that he decrease his overall activity level as well.  Patient is to continue dressing on a daily basis and monitor.  I would like to see him in 4 weeks where I anticipate the wound to be well-healed.  No orders of the defined types were placed in this encounter.         Note is dictated utilizing voice recognition software. Unfortunately this leads to occasional typographical errors. I apologize in advance if the situation occurs. If questions occur please do not hesitate to call our office.

## 2019-08-15 ENCOUNTER — OFFICE (OUTPATIENT)
Dept: URBAN - METROPOLITAN AREA CLINIC 64 | Facility: CLINIC | Age: 64
End: 2019-08-15

## 2019-08-15 VITALS
SYSTOLIC BLOOD PRESSURE: 150 MMHG | HEIGHT: 70 IN | HEART RATE: 102 BPM | WEIGHT: 216 LBS | DIASTOLIC BLOOD PRESSURE: 97 MMHG

## 2019-08-15 DIAGNOSIS — Z12.11 ENCOUNTER FOR SCREENING FOR MALIGNANT NEOPLASM OF COLON: ICD-10-CM

## 2019-08-15 DIAGNOSIS — B18.2 CHRONIC VIRAL HEPATITIS C: ICD-10-CM

## 2019-08-15 DIAGNOSIS — Z79.02 LONG TERM (CURRENT) USE OF ANTITHROMBOTICS/ANTIPLATELETS: ICD-10-CM

## 2019-08-15 DIAGNOSIS — Z72.0 TOBACCO USE: ICD-10-CM

## 2019-08-15 PROCEDURE — 99213 OFFICE O/P EST LOW 20 MIN: CPT | Performed by: NURSE PRACTITIONER

## 2020-03-18 ENCOUNTER — TELEPHONE (OUTPATIENT)
Dept: FAMILY MEDICINE CLINIC | Facility: CLINIC | Age: 65
End: 2020-03-18

## 2020-03-18 DIAGNOSIS — I10 BENIGN ESSENTIAL HYPERTENSION: Chronic | ICD-10-CM

## 2020-03-18 DIAGNOSIS — J44.9 CHRONIC OBSTRUCTIVE PULMONARY DISEASE, UNSPECIFIED COPD TYPE (HCC): Primary | Chronic | ICD-10-CM

## 2020-03-18 RX ORDER — ALBUTEROL SULFATE 90 UG/1
2 AEROSOL, METERED RESPIRATORY (INHALATION) EVERY 4 HOURS PRN
Qty: 1 INHALER | Refills: 5 | Status: SHIPPED | OUTPATIENT
Start: 2020-03-18 | End: 2020-06-05 | Stop reason: SDUPTHER

## 2020-03-18 RX ORDER — METOPROLOL SUCCINATE 25 MG/1
25 TABLET, EXTENDED RELEASE ORAL DAILY
Qty: 90 TABLET | Refills: 2 | Status: SHIPPED | OUTPATIENT
Start: 2020-03-18 | End: 2020-12-16

## 2020-05-18 ENCOUNTER — OFFICE VISIT (OUTPATIENT)
Dept: FAMILY MEDICINE CLINIC | Facility: CLINIC | Age: 65
End: 2020-05-18

## 2020-05-18 ENCOUNTER — HOSPITAL ENCOUNTER (OUTPATIENT)
Dept: GENERAL RADIOLOGY | Facility: HOSPITAL | Age: 65
Discharge: HOME | End: 2020-05-18
Admitting: FAMILY MEDICINE

## 2020-05-18 VITALS
WEIGHT: 241.6 LBS | SYSTOLIC BLOOD PRESSURE: 150 MMHG | RESPIRATION RATE: 20 BRPM | OXYGEN SATURATION: 98 % | TEMPERATURE: 98 F | BODY MASS INDEX: 34.59 KG/M2 | HEART RATE: 90 BPM | DIASTOLIC BLOOD PRESSURE: 92 MMHG | HEIGHT: 70 IN

## 2020-05-18 DIAGNOSIS — I10 BENIGN ESSENTIAL HYPERTENSION: Primary | ICD-10-CM

## 2020-05-18 DIAGNOSIS — J44.9 CHRONIC OBSTRUCTIVE PULMONARY DISEASE, UNSPECIFIED COPD TYPE (HCC): Chronic | ICD-10-CM

## 2020-05-18 DIAGNOSIS — R60.0 LOWER EXTREMITY EDEMA: ICD-10-CM

## 2020-05-18 DIAGNOSIS — F17.200 TOBACCO DEPENDENCE SYNDROME: ICD-10-CM

## 2020-05-18 DIAGNOSIS — I25.10 CORONARY ARTERY DISEASE INVOLVING NATIVE CORONARY ARTERY OF NATIVE HEART WITHOUT ANGINA PECTORIS: ICD-10-CM

## 2020-05-18 DIAGNOSIS — R06.02 SHORT OF BREATH ON EXERTION: ICD-10-CM

## 2020-05-18 PROBLEM — Z23 NEED FOR IMMUNIZATION AGAINST INFLUENZA: Status: RESOLVED | Noted: 2019-07-05 | Resolved: 2020-05-18

## 2020-05-18 PROBLEM — Z13.9 ENCOUNTER FOR SCREENING: Status: RESOLVED | Noted: 2019-07-05 | Resolved: 2020-05-18

## 2020-05-18 PROBLEM — L60.0 INGROWN NAIL: Status: RESOLVED | Noted: 2019-07-05 | Resolved: 2020-05-18

## 2020-05-18 PROBLEM — L03.031 CELLULITIS OF TOE OF RIGHT FOOT: Status: RESOLVED | Noted: 2019-07-05 | Resolved: 2020-05-18

## 2020-05-18 PROCEDURE — 99214 OFFICE O/P EST MOD 30 MIN: CPT | Performed by: FAMILY MEDICINE

## 2020-05-18 PROCEDURE — 71046 X-RAY EXAM CHEST 2 VIEWS: CPT

## 2020-05-18 RX ORDER — TIOTROPIUM BROMIDE 18 UG/1
CAPSULE ORAL; RESPIRATORY (INHALATION)
Qty: 30 CAPSULE | Refills: 5 | Status: SHIPPED | OUTPATIENT
Start: 2020-05-18 | End: 2020-12-04

## 2020-05-18 RX ORDER — ATORVASTATIN CALCIUM 20 MG/1
20 TABLET, FILM COATED ORAL DAILY
Qty: 90 TABLET | Refills: 2 | Status: SHIPPED | OUTPATIENT
Start: 2020-05-18 | End: 2020-06-15 | Stop reason: DRUGHIGH

## 2020-05-18 RX ORDER — POTASSIUM CHLORIDE 20 MEQ/1
20 TABLET, EXTENDED RELEASE ORAL DAILY
Qty: 30 TABLET | Refills: 2 | Status: SHIPPED | OUTPATIENT
Start: 2020-05-18 | End: 2020-06-15 | Stop reason: ALTCHOICE

## 2020-05-18 RX ORDER — FUROSEMIDE 20 MG/1
20 TABLET ORAL DAILY
Qty: 30 TABLET | Refills: 2 | Status: SHIPPED | OUTPATIENT
Start: 2020-05-18 | End: 2020-06-05 | Stop reason: SDUPTHER

## 2020-05-18 NOTE — PROGRESS NOTES
Chief Complaint   Patient presents with   • Edema     bilateral hands and feet   • Shortness of Breath       Subjective   Santos Sullivan is a 64 y.o. male.     Shortness of Breath   This is a chronic problem. The current episode started more than 1 year ago. The problem occurs daily. The problem has been waxing and waning. Associated symptoms include leg swelling and wheezing. Pertinent negatives include no abdominal pain, chest pain, fever or vomiting. The symptoms are aggravated by any activity. Risk factors include smoking. He has tried ipratropium inhalers for the symptoms. The treatment provided moderate relief. His past medical history is significant for COPD.   Leg Swelling   This is a new problem. The current episode started in the past 7 days. The problem occurs daily. The problem has been unchanged. Associated symptoms include weakness (in his legs). Pertinent negatives include no abdominal pain, chest pain, chills, fever or vomiting. The symptoms are aggravated by standing, walking and exertion. He has tried nothing for the symptoms.      Patient had an abnormal stress test one year ago (3/2019) - inferior wall hypokinesis with EF 54%.  He went on to have a heart cath with Dr. Watson in April, showing Mid LAD with 60-70% stenosis with coronary calcification, RCA 40-50% stenosis.  Stent was placed in the LAD.  No cardiology f/u in 1 year (no show for appointment in October 2019).    Patient stopped taking atorvastatin due to leg pain/weakness.  Symptoms improved then returned while off medication.  He is seeking disability for this.    Patient also has COPD.  He reports wheezing and frequent rescue inhaler usage.    I have reviewed and updated his medications, medical history and problem list during today's office visit.       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/2019   • Hearing loss 07/05/2019   • Chronic hepatitis C (CMS/HCC) 07/05/2019   • Screen for colon cancer 07/05/2019   • Overweight 07/05/2019   • Physical exam 07/05/2019   • Screening PSA (prostate specific antigen) 07/05/2019   • Screening, lipid 07/05/2019   • Tobacco dependence syndrome 04/03/2019     Resolved Ambulatory Problems     Diagnosis Date Noted   • Ingrown nail 07/05/2019   • Need for immunization against influenza 07/05/2019   • Encounter for screening 07/05/2019   • Cellulitis of toe of right foot 07/05/2019     Past Medical History:   Diagnosis Date   • Abnormal stress test    • Claudication (CMS/HCC)    • COPD (chronic obstructive pulmonary disease) (CMS/HCC)    • Femur fracture (CMS/HCC)    • History of spinal cord injury    • Hyperlipidemia    • Left against medical advice    • Need for hepatitis C screening test    • Onychomycosis of toenail    • Overweight (BMI 25.0-29.9)    • Screening for depression    • Tobacco use disorder        Medication List:    Current Outpatient Medications:   •  albuterol sulfate HFA (Ventolin HFA) 108 (90 Base) MCG/ACT inhaler, Inhale 2 puffs Every 4 (Four) Hours As Needed for Wheezing., Disp: 1 inhaler, Rfl: 5  •  aspirin (ASPIR-LOW) 81 MG EC tablet, Take 1 tablet by mouth Daily., Disp: , Rfl:   •  atorvastatin (LIPITOR) 20 MG tablet, Take 1 tablet by mouth Daily., Disp: , Rfl:   •  clopidogrel (PLAVIX) 75 MG tablet, Take 1 tablet by mouth Daily., Disp: , Rfl:   •  Glecaprevir-Pibrentasvir 100-40 MG tablet, Take 3 tablets by mouth Daily., Disp: , Rfl:   •  ibuprofen (ADVIL,MOTRIN) 200 MG  "tablet, Take 5 tablets by mouth 3 (Three) Times a Day., Disp: , Rfl:   •  metoprolol succinate XL (TOPROL-XL) 25 MG 24 hr tablet, Take 1 tablet by mouth Daily., Disp: 90 tablet, Rfl: 2  •  tiotropium (SPIRIVA HANDIHALER) 18 MCG per inhalation capsule, Place 1 puff into inhaler and inhale Daily., Disp: , Rfl:       Social History     Tobacco Use   • Smoking status: Current Every Day Smoker     Packs/day: 0.50     Types: Cigarettes   • Smokeless tobacco: Never Used   Substance Use Topics   • Alcohol use: No     Frequency: Never       Review of Systems   Constitutional: Positive for unexpected weight gain (16 lbs since visit in July). Negative for chills and fever.   HENT: Positive for hearing loss (chronic). Negative for trouble swallowing.    Eyes: Negative for visual disturbance.   Respiratory: Positive for shortness of breath and wheezing.    Cardiovascular: Positive for leg swelling. Negative for chest pain and palpitations.   Gastrointestinal: Negative for abdominal pain, constipation, diarrhea and vomiting.   Endocrine: Negative for polydipsia.   Genitourinary: Negative for decreased urine volume and difficulty urinating.   Musculoskeletal: Positive for gait problem (legs feel weak).   Neurological: Positive for weakness (in his legs). Negative for dizziness, tremors and syncope.       I have reviewed and confirmed the accuracy of the ROS as documented by the MA/LPN/RN Leigh Ayon MD      Objective   Vitals:    05/18/20 0807   BP: 150/92   BP Location: Right arm   Patient Position: Sitting   Cuff Size: Large Adult   Pulse: 90   Resp: 20   Temp: 98 °F (36.7 °C)   TempSrc: Oral   SpO2: 98%   Weight: 110 kg (241 lb 9.6 oz)   Height: 177.8 cm (70\")     Body mass index is 34.67 kg/m².    Physical Exam   Constitutional: He is oriented to person, place, and time. He appears well-developed and well-nourished. No distress.   HENT:   Head: Normocephalic and atraumatic.   Mouth/Throat: Oropharynx is clear " and moist.   Eyes: Pupils are equal, round, and reactive to light. Conjunctivae and EOM are normal.   Neck: Normal range of motion. Neck supple. No JVD present.   Cardiovascular: Normal rate, regular rhythm and normal heart sounds.   No murmur heard.  Unable to palpate distal pulses due to LE edema (2+ at ankles).  There is mild swelling of the hands.  Jess sign negative bilaterally.   Pulmonary/Chest: Effort normal. He has wheezes in the left upper field. He has rhonchi in the right lower field.   Abdominal: Soft. Bowel sounds are normal.   Musculoskeletal: Normal range of motion. He exhibits no edema.   Lymphadenopathy:     He has no cervical adenopathy.   Neurological: He is alert and oriented to person, place, and time. No cranial nerve deficit.   Skin: Skin is warm and dry. Capillary refill takes less than 2 seconds. No rash noted.   Psychiatric: He has a normal mood and affect. His behavior is normal. Thought content normal.           Assessment/Plan     Diagnoses and all orders for this visit:    1. Benign essential hypertension (Primary)  Comments:  Uncontrolled. Add furosemide.  Orders:  -     Comprehensive Metabolic Panel  -     TSH  -     CBC & Differential  -     furosemide (LASIX) 20 MG tablet; Take 1 tablet by mouth Daily.  Dispense: 30 tablet; Refill: 2  -     potassium chloride (K-DUR,KLOR-CON) 20 MEQ CR tablet; Take 1 tablet by mouth Daily.  Dispense: 30 tablet; Refill: 2  -     Adult Transthoracic Echo Complete W/ Cont if Necessary Per Protocol    2. Coronary artery disease involving native coronary artery of native heart without angina pectoris  Comments:  S/P stent placement in LAD 1 year ago.  On clopidogrel.  Restart atorvastatin.  Orders:  -     Lipid Panel  -     Adult Transthoracic Echo Complete W/ Cont if Necessary Per Protocol  -     atorvastatin (LIPITOR) 20 MG tablet; Take 1 tablet by mouth Daily.  Dispense: 90 tablet; Refill: 2    3. Chronic obstructive pulmonary disease, unspecified  COPD type (CMS/HCC)  Comments:  Check CXR.    4. Short of breath on exertion  -     BNP  -     XR Chest PA & Lateral  -     furosemide (LASIX) 20 MG tablet; Take 1 tablet by mouth Daily.  Dispense: 30 tablet; Refill: 2  -     Adult Transthoracic Echo Complete W/ Cont if Necessary Per Protocol    5. Lower extremity edema  Comments:  Bilateral.  Cardiac vs pulmonary etiology suspected.  Check labs.  Recheck in office on Friday.    6. Tobacco dependence syndrome  Comments:  Continue to cut back.        Return in about 4 days (around 5/22/2020) for Recheck.         I wore protective equipment throughout this patient encounter to include mask and gloves. Hand hygiene was performed before donning protective equipment and after removal when leaving the room.  Patient also had a N95 mask over his mouth during the visit.

## 2020-05-19 LAB
ALBUMIN SERPL-MCNC: 4.3 G/DL (ref 3.8–4.8)
ALBUMIN/GLOB SERPL: 1.3 {RATIO} (ref 1.2–2.2)
ALP SERPL-CCNC: 97 IU/L (ref 39–117)
ALT SERPL-CCNC: 18 IU/L (ref 0–44)
AST SERPL-CCNC: 19 IU/L (ref 0–40)
BASOPHILS # BLD AUTO: 0.1 X10E3/UL (ref 0–0.2)
BASOPHILS NFR BLD AUTO: 1 %
BILIRUB SERPL-MCNC: 0.4 MG/DL (ref 0–1.2)
BNP SERPL-MCNC: 24.9 PG/ML (ref 0–100)
BUN SERPL-MCNC: 16 MG/DL (ref 8–27)
BUN/CREAT SERPL: 14 (ref 10–24)
CALCIUM SERPL-MCNC: 9.3 MG/DL (ref 8.6–10.2)
CHLORIDE SERPL-SCNC: 101 MMOL/L (ref 96–106)
CHOLEST SERPL-MCNC: 210 MG/DL (ref 100–199)
CO2 SERPL-SCNC: 23 MMOL/L (ref 20–29)
CREAT SERPL-MCNC: 1.18 MG/DL (ref 0.76–1.27)
EOSINOPHIL # BLD AUTO: 0.3 X10E3/UL (ref 0–0.4)
EOSINOPHIL NFR BLD AUTO: 4 %
ERYTHROCYTE [DISTWIDTH] IN BLOOD BY AUTOMATED COUNT: 12.4 % (ref 11.6–15.4)
GLOBULIN SER CALC-MCNC: 3.4 G/DL (ref 1.5–4.5)
GLUCOSE SERPL-MCNC: 102 MG/DL (ref 65–99)
HCT VFR BLD AUTO: 47.2 % (ref 37.5–51)
HDLC SERPL-MCNC: 34 MG/DL
HGB BLD-MCNC: 15.9 G/DL (ref 13–17.7)
IMM GRANULOCYTES # BLD AUTO: 0.1 X10E3/UL (ref 0–0.1)
IMM GRANULOCYTES NFR BLD AUTO: 1 %
LDLC SERPL CALC-MCNC: 154 MG/DL (ref 0–99)
LYMPHOCYTES # BLD AUTO: 2.5 X10E3/UL (ref 0.7–3.1)
LYMPHOCYTES NFR BLD AUTO: 28 %
MCH RBC QN AUTO: 31.2 PG (ref 26.6–33)
MCHC RBC AUTO-ENTMCNC: 33.7 G/DL (ref 31.5–35.7)
MCV RBC AUTO: 93 FL (ref 79–97)
MONOCYTES # BLD AUTO: 0.8 X10E3/UL (ref 0.1–0.9)
MONOCYTES NFR BLD AUTO: 9 %
NEUTROPHILS # BLD AUTO: 5.4 X10E3/UL (ref 1.4–7)
NEUTROPHILS NFR BLD AUTO: 57 %
PLATELET # BLD AUTO: 301 X10E3/UL (ref 150–450)
POTASSIUM SERPL-SCNC: 5.6 MMOL/L (ref 3.5–5.2)
PROT SERPL-MCNC: 7.7 G/DL (ref 6–8.5)
RBC # BLD AUTO: 5.09 X10E6/UL (ref 4.14–5.8)
SODIUM SERPL-SCNC: 139 MMOL/L (ref 134–144)
TRIGL SERPL-MCNC: 110 MG/DL (ref 0–149)
TSH SERPL DL<=0.005 MIU/L-ACNC: 1.37 UIU/ML (ref 0.45–4.5)
VLDLC SERPL CALC-MCNC: 22 MG/DL (ref 5–40)
WBC # BLD AUTO: 9.2 X10E3/UL (ref 3.4–10.8)

## 2020-05-22 ENCOUNTER — OFFICE VISIT (OUTPATIENT)
Dept: FAMILY MEDICINE CLINIC | Facility: CLINIC | Age: 65
End: 2020-05-22

## 2020-05-22 ENCOUNTER — TELEPHONE (OUTPATIENT)
Dept: FAMILY MEDICINE CLINIC | Facility: CLINIC | Age: 65
End: 2020-05-22

## 2020-05-22 VITALS
DIASTOLIC BLOOD PRESSURE: 70 MMHG | RESPIRATION RATE: 18 BRPM | HEART RATE: 84 BPM | OXYGEN SATURATION: 97 % | WEIGHT: 241.8 LBS | TEMPERATURE: 98.1 F | HEIGHT: 70 IN | BODY MASS INDEX: 34.62 KG/M2 | SYSTOLIC BLOOD PRESSURE: 118 MMHG

## 2020-05-22 DIAGNOSIS — F17.200 TOBACCO DEPENDENCE SYNDROME: ICD-10-CM

## 2020-05-22 DIAGNOSIS — J44.1 CHRONIC OBSTRUCTIVE PULMONARY DISEASE WITH ACUTE EXACERBATION (HCC): Primary | ICD-10-CM

## 2020-05-22 DIAGNOSIS — R60.0 LOWER EXTREMITY EDEMA: ICD-10-CM

## 2020-05-22 DIAGNOSIS — I10 BENIGN ESSENTIAL HYPERTENSION: Chronic | ICD-10-CM

## 2020-05-22 PROCEDURE — 99214 OFFICE O/P EST MOD 30 MIN: CPT | Performed by: FAMILY MEDICINE

## 2020-05-22 RX ORDER — BUDESONIDE AND FORMOTEROL FUMARATE DIHYDRATE 160; 4.5 UG/1; UG/1
2 AEROSOL RESPIRATORY (INHALATION)
Qty: 10.2 G | Refills: 5 | Status: SHIPPED | OUTPATIENT
Start: 2020-05-22 | End: 2020-12-16

## 2020-05-22 NOTE — PROGRESS NOTES
Chief Complaint   Patient presents with   • Edema     follow up   • Shortness of Breath     follow up       Subjective   Santos Sullivan is a 64 y.o. male.     Shortness of Breath   This is a chronic problem. The current episode started more than 1 year ago. The problem occurs daily. The problem has been waxing and waning. Associated symptoms include leg swelling (better) and wheezing. Pertinent negatives include no abdominal pain, chest pain, fever or vomiting. The symptoms are aggravated by any activity. Risk factors include smoking. He has tried ipratropium inhalers for the symptoms. The treatment provided moderate relief. His past medical history is significant for COPD.   Leg Swelling   This is a new problem. The current episode started in the past 7 days. The problem occurs daily. The problem has been unchanged. Associated symptoms include weakness (in his legs). Pertinent negatives include no abdominal pain, chest pain, chills, fever or vomiting. The symptoms are aggravated by standing, walking and exertion. He has tried nothing for the symptoms.     Recheck from Monday for LE swelling with shortness of breath.  Started on furosemide.  Labs showing normal BNP.  CXR no edema.  ECHO approved but not yet scheduled.    Patient also has COPD.  He reports wheezing and frequent rescue inhaler usage.    Edema is improved.  BP improved.  He is still SOA.    I have reviewed and updated his medications, medical history and problem list during today's office visit.       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   • Hearing loss 07/05/2019   • Chronic hepatitis C (CMS/HCC) 07/05/2019   • Screen for colon cancer 07/05/2019   • Overweight 07/05/2019   • Physical exam 07/05/2019   • Screening PSA (prostate specific antigen) 07/05/2019   • Screening, lipid 07/05/2019   • Tobacco dependence syndrome 04/03/2019     Resolved Ambulatory Problems     Diagnosis Date Noted   • Ingrown nail 07/05/2019   • Need for immunization against influenza 07/05/2019   • Encounter for screening 07/05/2019   • Cellulitis of toe of right foot 07/05/2019     Past Medical History:   Diagnosis Date   • Abnormal stress test    • Claudication (CMS/HCC)    • COPD (chronic obstructive pulmonary disease) (CMS/HCC)    • Femur fracture (CMS/HCC)    • History of spinal cord injury    • Hyperlipidemia    • Left against medical advice    • Need for hepatitis C screening test    • Onychomycosis of toenail    • Overweight (BMI 25.0-29.9)    • Screening for depression    • Tobacco use disorder        Medication List:    Current Outpatient Medications:   •  albuterol sulfate HFA (Ventolin HFA) 108 (90 Base) MCG/ACT inhaler, Inhale 2 puffs Every 4 (Four) Hours As Needed for Wheezing., Disp: 1 inhaler, Rfl: 5  •  aspirin (ASPIR-LOW) 81 MG EC tablet, Take 1 tablet by mouth Daily., Disp: , Rfl:   •  atorvastatin (LIPITOR) 20 MG tablet, Take 1 tablet by mouth Daily., Disp: 90 tablet, Rfl: 2  •  clopidogrel (PLAVIX) 75 MG tablet, Take 1 tablet by mouth Daily., Disp: , Rfl:   •  furosemide (LASIX) 20 MG tablet, Take 1 tablet by mouth Daily., Disp: 30 tablet, Rfl: 2  •  Glecaprevir-Pibrentasvir 100-40 MG tablet, Take 3 tablets by mouth Daily., Disp: , Rfl:   •  ibuprofen (ADVIL,MOTRIN) 200 MG tablet, Take 5 tablets by mouth 3 (Three) Times a Day., Disp: , Rfl:   •  metoprolol succinate XL (TOPROL-XL) 25 MG 24 hr tablet, Take 1 tablet by mouth Daily., Disp: 90  "tablet, Rfl: 2  •  potassium chloride (K-DUR,KLOR-CON) 20 MEQ CR tablet, Take 1 tablet by mouth Daily., Disp: 30 tablet, Rfl: 2  •  SPIRIVA HANDIHALER 18 MCG per inhalation capsule, INHALE 1 PUFF BY MOUTH ONCE DAILY FOR  COPD, Disp: 30 capsule, Rfl: 5    No Known Allergies      Social History     Tobacco Use   • Smoking status: Current Every Day Smoker     Packs/day: 0.50     Types: Cigarettes   • Smokeless tobacco: Never Used   Substance Use Topics   • Alcohol use: No     Frequency: Never       Review of Systems   Constitutional: Positive for unexpected weight gain (16 lbs since visit in July). Negative for chills and fever.        No change in weight from Monday.   HENT: Positive for hearing loss (chronic). Negative for trouble swallowing.    Eyes: Negative for visual disturbance.   Respiratory: Positive for shortness of breath and wheezing.    Cardiovascular: Positive for leg swelling (better). Negative for chest pain and palpitations.   Gastrointestinal: Negative for abdominal pain, constipation, diarrhea and vomiting.   Endocrine: Negative for polydipsia.   Genitourinary: Negative for decreased urine volume and difficulty urinating.   Musculoskeletal: Positive for gait problem (legs feel weak).   Neurological: Positive for weakness (in his legs). Negative for dizziness, tremors and syncope.       I have reviewed and confirmed the accuracy of the ROS as documented by the MA/LPN/RN Leigh Ayon MD      Objective   Vitals:    05/22/20 0807   BP: 118/70   BP Location: Right arm   Patient Position: Sitting   Cuff Size: Large Adult   Pulse: 84   Resp: 18   Temp: 98.1 °F (36.7 °C)   TempSrc: Oral   SpO2: 97%   Weight: 110 kg (241 lb 12.8 oz)   Height: 177.8 cm (70\")     Body mass index is 34.69 kg/m².    Physical Exam   Constitutional: He is oriented to person, place, and time. He appears well-developed and well-nourished. No distress.   HENT:   Head: Normocephalic and atraumatic.   Mouth/Throat: " Oropharynx is clear and moist.   Eyes: Pupils are equal, round, and reactive to light. Conjunctivae and EOM are normal.   Neck: Normal range of motion. Neck supple. No JVD present.   Cardiovascular: Normal rate, regular rhythm and normal heart sounds.   No murmur heard.  Unable to palpate distal pulses due to LE edema (1+ at left ankle, trace at right ankle).  There is mild swelling of the hands.  Jess sign negative bilaterally.   Pulmonary/Chest: Effort normal. He has no wheezes. He has no rhonchi.   Abdominal: Soft. Bowel sounds are normal.   Musculoskeletal: Normal range of motion. He exhibits no edema.   Neurological: He is alert and oriented to person, place, and time. No cranial nerve deficit.   Skin: Skin is warm and dry. Capillary refill takes less than 2 seconds. No rash noted.   Psychiatric: He has a normal mood and affect. His behavior is normal. Thought content normal.       Xr Chest Pa & Lateral    Result Date: 5/18/2020  Stable probable scarring at the left lung base. No definite acute cardiopulmonary abnormality or significant change.   Electronically Signed By-Lauryn Haile On:5/18/2020 9:33 AM This report was finalized on [id number] by  Lauryn Haile, .    Recent Results (from the past 168 hour(s))   Comprehensive Metabolic Panel    Collection Time: 05/18/20  9:10 AM   Result Value Ref Range    Glucose 102 (H) 65 - 99 mg/dL    BUN 16 8 - 27 mg/dL    Creatinine 1.18 0.76 - 1.27 mg/dL    eGFR Non African Am 65 >59 mL/min/1.73    eGFR African Am 75 >59 mL/min/1.73    BUN/Creatinine Ratio 14 10 - 24    Sodium 139 134 - 144 mmol/L    Potassium 5.6 (H) 3.5 - 5.2 mmol/L    Chloride 101 96 - 106 mmol/L    Total CO2 23 20 - 29 mmol/L    Calcium 9.3 8.6 - 10.2 mg/dL    Total Protein 7.7 6.0 - 8.5 g/dL    Albumin 4.3 3.8 - 4.8 g/dL    Globulin 3.4 1.5 - 4.5 g/dL    A/G Ratio 1.3 1.2 - 2.2    Total Bilirubin 0.4 0.0 - 1.2 mg/dL    Alkaline Phosphatase 97 39 - 117 IU/L    AST (SGOT) 19 0 - 40 IU/L    ALT  (SGPT) 18 0 - 44 IU/L   Lipid Panel    Collection Time: 05/18/20  9:10 AM   Result Value Ref Range    Total Cholesterol 210 (H) 100 - 199 mg/dL    Triglycerides 110 0 - 149 mg/dL    HDL Cholesterol 34 (L) >39 mg/dL    VLDL Cholesterol 22 5 - 40 mg/dL    LDL Cholesterol  154 (H) 0 - 99 mg/dL   TSH    Collection Time: 05/18/20  9:10 AM   Result Value Ref Range    TSH 1.370 0.450 - 4.500 uIU/mL   CBC & Differential    Collection Time: 05/18/20  9:10 AM   Result Value Ref Range    WBC 9.2 3.4 - 10.8 x10E3/uL    RBC 5.09 4.14 - 5.80 x10E6/uL    Hemoglobin 15.9 13.0 - 17.7 g/dL    Hematocrit 47.2 37.5 - 51.0 %    MCV 93 79 - 97 fL    MCH 31.2 26.6 - 33.0 pg    MCHC 33.7 31.5 - 35.7 g/dL    RDW 12.4 11.6 - 15.4 %    Platelets 301 150 - 450 x10E3/uL    Neutrophil Rel % 57 Not Estab. %    Lymphocyte Rel % 28 Not Estab. %    Monocyte Rel % 9 Not Estab. %    Eosinophil Rel % 4 Not Estab. %    Basophil Rel % 1 Not Estab. %    Neutrophils Absolute 5.4 1.4 - 7.0 x10E3/uL    Lymphocytes Absolute 2.5 0.7 - 3.1 x10E3/uL    Monocytes Absolute 0.8 0.1 - 0.9 x10E3/uL    Eosinophils Absolute 0.3 0.0 - 0.4 x10E3/uL    Basophils Absolute 0.1 0.0 - 0.2 x10E3/uL    Immature Granulocyte Rel % 1 Not Estab. %    Immature Grans Absolute 0.1 0.0 - 0.1 x10E3/uL   BNP    Collection Time: 05/18/20  9:10 AM   Result Value Ref Range    BNP 24.9 0.0 - 100.0 pg/mL         Assessment/Plan     Diagnoses and all orders for this visit:    1. Chronic obstructive pulmonary disease with acute exacerbation (CMS/HCC) (Primary)  Comments:  Add additional inhaler.  Recheck 2 weeks.  Needs PFT at future visit.  Orders:  -     budesonide-formoterol (SYMBICORT) 160-4.5 MCG/ACT inhaler; Inhale 2 puffs 2 (Two) Times a Day.  Dispense: 10.2 g; Refill: 5    2. Lower extremity edema  Comments:  Improving.  H/O reduced MARY of left leg on testing done one year ago.  Repeat in 2 weeks at next f/u visit.  Continue diuretic.  Watch for dehydration.    3. Benign essential  hypertension  Comments:  Improved with diuretic.  Continue.  Watch for hypotension/orthostasis.  Recheck labs in 2 weeks.    4. Tobacco dependence syndrome  Comments:  Smoking cessation has been encouraged.  Patient is not ready to quit.        Return in about 2 weeks (around 6/5/2020) for Recheck, MARY, labs.  Proceed with echo to check RV and pulm pressures.       I wore protective equipment throughout this patient encounter to include mask and gloves. Hand hygiene was performed before donning protective equipment and after removal when leaving the room.  Patient also had a N95 mask over his mouth during the visit.

## 2020-05-22 NOTE — TELEPHONE ENCOUNTER
----- Message from Leigh Ayon MD sent at 5/20/2020  4:37 PM EDT -----  Please let patient know that his labs and x-ray are not showing heart failure.  Take medication as prescribed and we'll discuss in more detail at his visit on Friday.

## 2020-05-29 ENCOUNTER — HOSPITAL ENCOUNTER (OUTPATIENT)
Dept: CARDIOLOGY | Facility: HOSPITAL | Age: 65
Discharge: HOME | End: 2020-05-29
Admitting: FAMILY MEDICINE

## 2020-05-29 VITALS — HEIGHT: 70 IN | WEIGHT: 241 LBS | BODY MASS INDEX: 34.5 KG/M2

## 2020-05-29 PROCEDURE — 93306 TTE W/DOPPLER COMPLETE: CPT

## 2020-05-31 LAB
BH CV ECHO MEAS - ACS: 2.1 CM
BH CV ECHO MEAS - AI DEC SLOPE: 165 CM/SEC^2
BH CV ECHO MEAS - AI DEC TIME: 2.4 SEC
BH CV ECHO MEAS - AI MAX PG: 60.5 MMHG
BH CV ECHO MEAS - AI MAX VEL: 388.9 CM/SEC
BH CV ECHO MEAS - AI P1/2T: 690.2 MSEC
BH CV ECHO MEAS - AO MAX PG (FULL): 1.4 MMHG
BH CV ECHO MEAS - AO MAX PG: 7.2 MMHG
BH CV ECHO MEAS - AO MEAN PG (FULL): 0.77 MMHG
BH CV ECHO MEAS - AO MEAN PG: 3.4 MMHG
BH CV ECHO MEAS - AO ROOT AREA (BSA CORRECTED): 1.9
BH CV ECHO MEAS - AO ROOT AREA: 14.9 CM^2
BH CV ECHO MEAS - AO ROOT DIAM: 4.3 CM
BH CV ECHO MEAS - AO V2 MAX: 134.1 CM/SEC
BH CV ECHO MEAS - AO V2 MEAN: 86.1 CM/SEC
BH CV ECHO MEAS - AO V2 VTI: 25.3 CM
BH CV ECHO MEAS - AVA(I,A): 3.8 CM^2
BH CV ECHO MEAS - AVA(I,D): 3.8 CM^2
BH CV ECHO MEAS - AVA(V,A): 3.5 CM^2
BH CV ECHO MEAS - AVA(V,D): 3.5 CM^2
BH CV ECHO MEAS - BSA(HAYCOCK): 2.4 M^2
BH CV ECHO MEAS - BSA: 2.3 M^2
BH CV ECHO MEAS - BZI_BMI: 34.6 KILOGRAMS/M^2
BH CV ECHO MEAS - BZI_METRIC_HEIGHT: 177.8 CM
BH CV ECHO MEAS - BZI_METRIC_WEIGHT: 109.3 KG
BH CV ECHO MEAS - EDV(CUBED): 111.3 ML
BH CV ECHO MEAS - EDV(MOD-SP4): 95.3 ML
BH CV ECHO MEAS - EDV(TEICH): 108 ML
BH CV ECHO MEAS - EF(CUBED): 71.8 %
BH CV ECHO MEAS - EF(MOD-BP): 65 %
BH CV ECHO MEAS - EF(MOD-SP4): 65.4 %
BH CV ECHO MEAS - EF(TEICH): 63.4 %
BH CV ECHO MEAS - ESV(CUBED): 31.4 ML
BH CV ECHO MEAS - ESV(MOD-SP4): 32.9 ML
BH CV ECHO MEAS - ESV(TEICH): 39.6 ML
BH CV ECHO MEAS - FS: 34.4 %
BH CV ECHO MEAS - IVS/LVPW: 0.96
BH CV ECHO MEAS - IVSD: 1.3 CM
BH CV ECHO MEAS - LA DIMENSION: 3 CM
BH CV ECHO MEAS - LA/AO: 0.68
BH CV ECHO MEAS - LV DIASTOLIC VOL/BSA (35-75): 42.2 ML/M^2
BH CV ECHO MEAS - LV MASS(C)D: 243.9 GRAMS
BH CV ECHO MEAS - LV MASS(C)DI: 107.9 GRAMS/M^2
BH CV ECHO MEAS - LV MAX PG: 5.8 MMHG
BH CV ECHO MEAS - LV MEAN PG: 2.6 MMHG
BH CV ECHO MEAS - LV SYSTOLIC VOL/BSA (12-30): 14.6 ML/M^2
BH CV ECHO MEAS - LV V1 MAX: 120.7 CM/SEC
BH CV ECHO MEAS - LV V1 MEAN: 74.7 CM/SEC
BH CV ECHO MEAS - LV V1 VTI: 25 CM
BH CV ECHO MEAS - LVIDD: 4.8 CM
BH CV ECHO MEAS - LVIDS: 3.2 CM
BH CV ECHO MEAS - LVOT AREA: 3.9 CM^2
BH CV ECHO MEAS - LVOT DIAM: 2.2 CM
BH CV ECHO MEAS - LVPWD: 1.3 CM
BH CV ECHO MEAS - MR MAX PG: 41.6 MMHG
BH CV ECHO MEAS - MR MAX VEL: 322.7 CM/SEC
BH CV ECHO MEAS - MV A MAX VEL: 71.2 CM/SEC
BH CV ECHO MEAS - MV E MAX VEL: 73 CM/SEC
BH CV ECHO MEAS - MV E/A: 1
BH CV ECHO MEAS - MV MAX PG: 1.9 MMHG
BH CV ECHO MEAS - MV MEAN PG: 1 MMHG
BH CV ECHO MEAS - MV V2 MAX: 69.1 CM/SEC
BH CV ECHO MEAS - MV V2 MEAN: 48.3 CM/SEC
BH CV ECHO MEAS - MV V2 VTI: 19.1 CM
BH CV ECHO MEAS - MVA(VTI): 5.1 CM^2
BH CV ECHO MEAS - PA ACC TIME: 0.05 SEC
BH CV ECHO MEAS - PA MAX PG: 2.9 MMHG
BH CV ECHO MEAS - PA PR(ACCEL): 54.6 MMHG
BH CV ECHO MEAS - PA V2 MAX: 85.8 CM/SEC
BH CV ECHO MEAS - PI END-D VEL: 96.2 CM/SEC
BH CV ECHO MEAS - RAP SYSTOLE: 10 MMHG
BH CV ECHO MEAS - RVDD(2D): 1.9 CM
BH CV ECHO MEAS - RVDD: 1.9 CM
BH CV ECHO MEAS - RVSP: 37.8 MMHG
BH CV ECHO MEAS - SI(AO): 166.5 ML/M^2
BH CV ECHO MEAS - SI(CUBED): 35.3 ML/M^2
BH CV ECHO MEAS - SI(LVOT): 43.1 ML/M^2
BH CV ECHO MEAS - SI(MOD-SP4): 27.6 ML/M^2
BH CV ECHO MEAS - SI(TEICH): 30.3 ML/M^2
BH CV ECHO MEAS - SV(AO): 376.2 ML
BH CV ECHO MEAS - SV(CUBED): 79.9 ML
BH CV ECHO MEAS - SV(LVOT): 97.3 ML
BH CV ECHO MEAS - SV(MOD-SP4): 62.4 ML
BH CV ECHO MEAS - SV(TEICH): 68.5 ML
BH CV ECHO MEAS - TR MAX VEL: 226 CM/SEC
MAXIMAL PREDICTED HEART RATE: 157 BPM
STRESS TARGET HR: 133 BPM

## 2020-05-31 PROCEDURE — 93306 TTE W/DOPPLER COMPLETE: CPT | Performed by: INTERNAL MEDICINE

## 2020-06-05 ENCOUNTER — PROCEDURE VISIT (OUTPATIENT)
Dept: FAMILY MEDICINE CLINIC | Facility: CLINIC | Age: 65
End: 2020-06-05

## 2020-06-05 VITALS
TEMPERATURE: 97.7 F | HEIGHT: 70 IN | SYSTOLIC BLOOD PRESSURE: 140 MMHG | DIASTOLIC BLOOD PRESSURE: 74 MMHG | RESPIRATION RATE: 20 BRPM | HEART RATE: 93 BPM | BODY MASS INDEX: 35.24 KG/M2 | OXYGEN SATURATION: 94 % | WEIGHT: 246.2 LBS

## 2020-06-05 DIAGNOSIS — I25.10 CORONARY ARTERY DISEASE INVOLVING NATIVE CORONARY ARTERY OF NATIVE HEART WITHOUT ANGINA PECTORIS: Chronic | ICD-10-CM

## 2020-06-05 DIAGNOSIS — I77.810 AORTIC ROOT DILATATION (HCC): ICD-10-CM

## 2020-06-05 DIAGNOSIS — R06.02 SHORT OF BREATH ON EXERTION: ICD-10-CM

## 2020-06-05 DIAGNOSIS — F17.200 TOBACCO DEPENDENCE SYNDROME: ICD-10-CM

## 2020-06-05 DIAGNOSIS — R60.0 LOWER EXTREMITY EDEMA: ICD-10-CM

## 2020-06-05 DIAGNOSIS — I10 BENIGN ESSENTIAL HYPERTENSION: Chronic | ICD-10-CM

## 2020-06-05 DIAGNOSIS — J44.9 CHRONIC OBSTRUCTIVE PULMONARY DISEASE, UNSPECIFIED COPD TYPE (HCC): Primary | Chronic | ICD-10-CM

## 2020-06-05 DIAGNOSIS — R68.89 ABNORMAL ANKLE BRACHIAL INDEX (ABI): ICD-10-CM

## 2020-06-05 PROCEDURE — 99214 OFFICE O/P EST MOD 30 MIN: CPT | Performed by: FAMILY MEDICINE

## 2020-06-05 RX ORDER — NICOTINE 21 MG/24HR
1 PATCH, TRANSDERMAL 24 HOURS TRANSDERMAL EVERY 24 HOURS
Qty: 30 PATCH | Refills: 5 | Status: SHIPPED | OUTPATIENT
Start: 2020-06-05 | End: 2021-06-11

## 2020-06-05 RX ORDER — ALBUTEROL SULFATE 90 UG/1
2 AEROSOL, METERED RESPIRATORY (INHALATION) EVERY 4 HOURS PRN
Qty: 1 INHALER | Refills: 5 | Status: SHIPPED | OUTPATIENT
Start: 2020-06-05 | End: 2021-04-05

## 2020-06-05 RX ORDER — FUROSEMIDE 40 MG/1
40 TABLET ORAL DAILY
Qty: 90 TABLET | Refills: 2 | Status: SHIPPED | OUTPATIENT
Start: 2020-06-05 | End: 2022-03-09 | Stop reason: SDUPTHER

## 2020-06-05 RX ORDER — ALBUTEROL SULFATE 2.5 MG/3ML
2.5 SOLUTION RESPIRATORY (INHALATION) EVERY 6 HOURS PRN
Qty: 150 ML | Refills: 12 | Status: SHIPPED | OUTPATIENT
Start: 2020-06-05 | End: 2021-06-21

## 2020-06-05 NOTE — PATIENT INSTRUCTIONS
"DASH Eating Plan  DASH stands for \"Dietary Approaches to Stop Hypertension.\" The DASH eating plan is a healthy eating plan that has been shown to reduce high blood pressure (hypertension). It may also reduce your risk for type 2 diabetes, heart disease, and stroke. The DASH eating plan may also help with weight loss.  What are tips for following this plan?    General guidelines  · Avoid eating more than 2,300 mg (milligrams) of salt (sodium) a day. If you have hypertension, you may need to reduce your sodium intake to 1,500 mg a day.  · Limit alcohol intake to no more than 1 drink a day for nonpregnant women and 2 drinks a day for men. One drink equals 12 oz of beer, 5 oz of wine, or 1½ oz of hard liquor.  · Work with your health care provider to maintain a healthy body weight or to lose weight. Ask what an ideal weight is for you.  · Get at least 30 minutes of exercise that causes your heart to beat faster (aerobic exercise) most days of the week. Activities may include walking, swimming, or biking.  · Work with your health care provider or diet and nutrition specialist (dietitian) to adjust your eating plan to your individual calorie needs.  Reading food labels    · Check food labels for the amount of sodium per serving. Choose foods with less than 5 percent of the Daily Value of sodium. Generally, foods with less than 300 mg of sodium per serving fit into this eating plan.  · To find whole grains, look for the word \"whole\" as the first word in the ingredient list.  Shopping  · Buy products labeled as \"low-sodium\" or \"no salt added.\"  · Buy fresh foods. Avoid canned foods and premade or frozen meals.  Cooking  · Avoid adding salt when cooking. Use salt-free seasonings or herbs instead of table salt or sea salt. Check with your health care provider or pharmacist before using salt substitutes.  · Do not high foods. Cook foods using healthy methods such as baking, boiling, grilling, and broiling instead.  · Cook with " heart-healthy oils, such as olive, canola, soybean, or sunflower oil.  Meal planning  · Eat a balanced diet that includes:  ? 5 or more servings of fruits and vegetables each day. At each meal, try to fill half of your plate with fruits and vegetables.  ? Up to 6-8 servings of whole grains each day.  ? Less than 6 oz of lean meat, poultry, or fish each day. A 3-oz serving of meat is about the same size as a deck of cards. One egg equals 1 oz.  ? 2 servings of low-fat dairy each day.  ? A serving of nuts, seeds, or beans 5 times each week.  ? Heart-healthy fats. Healthy fats called Omega-3 fatty acids are found in foods such as flaxseeds and coldwater fish, like sardines, salmon, and mackerel.  · Limit how much you eat of the following:  ? Canned or prepackaged foods.  ? Food that is high in trans fat, such as fried foods.  ? Food that is high in saturated fat, such as fatty meat.  ? Sweets, desserts, sugary drinks, and other foods with added sugar.  ? Full-fat dairy products.  · Do not salt foods before eating.  · Try to eat at least 2 vegetarian meals each week.  · Eat more home-cooked food and less restaurant, buffet, and fast food.  · When eating at a restaurant, ask that your food be prepared with less salt or no salt, if possible.  What foods are recommended?  The items listed may not be a complete list. Talk with your dietitian about what dietary choices are best for you.  Grains  Whole-grain or whole-wheat bread. Whole-grain or whole-wheat pasta. Brown rice. Oatmeal. Quinoa. Bulgur. Whole-grain and low-sodium cereals. Perri bread. Low-fat, low-sodium crackers. Whole-wheat flour tortillas.  Vegetables  Fresh or frozen vegetables (raw, steamed, roasted, or grilled). Low-sodium or reduced-sodium tomato and vegetable juice. Low-sodium or reduced-sodium tomato sauce and tomato paste. Low-sodium or reduced-sodium canned vegetables.  Fruits  All fresh, dried, or frozen fruit. Canned fruit in natural juice (without  added sugar).  Meat and other protein foods  Skinless chicken or turkey. Ground chicken or turkey. Pork with fat trimmed off. Fish and seafood. Egg whites. Dried beans, peas, or lentils. Unsalted nuts, nut butters, and seeds. Unsalted canned beans. Lean cuts of beef with fat trimmed off. Low-sodium, lean deli meat.  Dairy  Low-fat (1%) or fat-free (skim) milk. Fat-free, low-fat, or reduced-fat cheeses. Nonfat, low-sodium ricotta or cottage cheese. Low-fat or nonfat yogurt. Low-fat, low-sodium cheese.  Fats and oils  Soft margarine without trans fats. Vegetable oil. Low-fat, reduced-fat, or light mayonnaise and salad dressings (reduced-sodium). Canola, safflower, olive, soybean, and sunflower oils. Avocado.  Seasoning and other foods  Herbs. Spices. Seasoning mixes without salt. Unsalted popcorn and pretzels. Fat-free sweets.  What foods are not recommended?  The items listed may not be a complete list. Talk with your dietitian about what dietary choices are best for you.  Grains  Baked goods made with fat, such as croissants, muffins, or some breads. Dry pasta or rice meal packs.  Vegetables  Creamed or fried vegetables. Vegetables in a cheese sauce. Regular canned vegetables (not low-sodium or reduced-sodium). Regular canned tomato sauce and paste (not low-sodium or reduced-sodium). Regular tomato and vegetable juice (not low-sodium or reduced-sodium). Pickles. Olives.  Fruits  Canned fruit in a light or heavy syrup. Fried fruit. Fruit in cream or butter sauce.  Meat and other protein foods  Fatty cuts of meat. Ribs. Fried meat. Mancilla. Sausage. Bologna and other processed lunch meats. Salami. Fatback. Hotdogs. Bratwurst. Salted nuts and seeds. Canned beans with added salt. Canned or smoked fish. Whole eggs or egg yolks. Chicken or turkey with skin.  Dairy  Whole or 2% milk, cream, and half-and-half. Whole or full-fat cream cheese. Whole-fat or sweetened yogurt. Full-fat cheese. Nondairy creamers. Whipped toppings.  Processed cheese and cheese spreads.  Fats and oils  Butter. Stick margarine. Lard. Shortening. Ghee. Mancilla fat. Tropical oils, such as coconut, palm kernel, or palm oil.  Seasoning and other foods  Salted popcorn and pretzels. Onion salt, garlic salt, seasoned salt, table salt, and sea salt. Worcestershire sauce. Tartar sauce. Barbecue sauce. Teriyaki sauce. Soy sauce, including reduced-sodium. Steak sauce. Canned and packaged gravies. Fish sauce. Oyster sauce. Cocktail sauce. Horseradish that you find on the shelf. Ketchup. Mustard. Meat flavorings and tenderizers. Bouillon cubes. Hot sauce and Tabasco sauce. Premade or packaged marinades. Premade or packaged taco seasonings. Relishes. Regular salad dressings.  Where to find more information:  · National Heart, Lung, and [employer] [place]: www.nhlbi.nih.gov  · American Heart Association: www.heart.org  Summary  · The DASH eating plan is a healthy eating plan that has been shown to reduce high blood pressure (hypertension). It may also reduce your risk for type 2 diabetes, heart disease, and stroke.  · With the DASH eating plan, you should limit salt (sodium) intake to 2,300 mg a day. If you have hypertension, you may need to reduce your sodium intake to 1,500 mg a day.  · When on the DASH eating plan, aim to eat more fresh fruits and vegetables, whole grains, lean proteins, low-fat dairy, and heart-healthy fats.  · Work with your health care provider or diet and nutrition specialist (dietitian) to adjust your eating plan to your individual calorie needs.    This information is not intended to replace advice given to you by your health care provider. Make sure you discuss any questions you have with your health care provider.  Document Released: 12/06/2012 Document Revised: 11/30/2018 Document Reviewed: 12/11/2017  Elsevier Patient Education © 2020 [employer]

## 2020-06-12 ENCOUNTER — OFFICE VISIT (OUTPATIENT)
Dept: CARDIOLOGY | Facility: CLINIC | Age: 65
End: 2020-06-12

## 2020-06-12 VITALS
HEART RATE: 79 BPM | WEIGHT: 245.5 LBS | OXYGEN SATURATION: 96 % | DIASTOLIC BLOOD PRESSURE: 85 MMHG | SYSTOLIC BLOOD PRESSURE: 144 MMHG | HEIGHT: 70 IN | BODY MASS INDEX: 35.15 KG/M2

## 2020-06-12 DIAGNOSIS — I25.10 CORONARY ARTERY DISEASE INVOLVING NATIVE CORONARY ARTERY OF NATIVE HEART WITHOUT ANGINA PECTORIS: Primary | ICD-10-CM

## 2020-06-12 DIAGNOSIS — F17.200 TOBACCO DEPENDENCE SYNDROME: ICD-10-CM

## 2020-06-12 DIAGNOSIS — E78.2 MIXED HYPERLIPIDEMIA: ICD-10-CM

## 2020-06-12 DIAGNOSIS — B18.2 CHRONIC HEPATITIS C WITHOUT HEPATIC COMA (HCC): Chronic | ICD-10-CM

## 2020-06-12 DIAGNOSIS — I10 ESSENTIAL HYPERTENSION: ICD-10-CM

## 2020-06-12 DIAGNOSIS — E87.5 HYPERKALEMIA: ICD-10-CM

## 2020-06-12 PROCEDURE — 99214 OFFICE O/P EST MOD 30 MIN: CPT | Performed by: INTERNAL MEDICINE

## 2020-06-12 PROCEDURE — 93000 ELECTROCARDIOGRAM COMPLETE: CPT | Performed by: INTERNAL MEDICINE

## 2020-06-12 RX ORDER — CLOPIDOGREL BISULFATE 75 MG/1
75 TABLET ORAL DAILY
Qty: 90 TABLET | Refills: 3 | Status: SHIPPED | OUTPATIENT
Start: 2020-06-12 | End: 2021-06-11 | Stop reason: SDUPTHER

## 2020-06-12 NOTE — PROGRESS NOTES
Subjective:     Encounter Date:06/12/2020      Patient ID: Santos Sullivan is a 64 y.o. male.    Visit Type:  Follow-up Visit  Primary Care Provider:  Gabo ALDRIDGE MD     Chief Complaint:  Follow-Up after PCI.     History of Present Illness:      63-year-old white male patient with a known history of hypertension it is smoking COPD history of hepatitis-C now and history of CAD comes back for follow-up         a stress Myoview done recently March 2019 showed large inferior wall defect with inferior wall hypokinesis     advise patient to stop smoking modify cardiac risk factors aggressively  Cardiac catheterization showed LAD stenosis and FFR of 0.74 so patient underwent PCI to the LAD with a 3.5 x 15 mm drug-eluting stent  Patient denies of any active symptoms of chest pain patient EKG today normal sinus rhythm borderline first-degree AV block normal axis no changes compared to the last EKG   patient appears to be stable from the cardiology standpoint I advised him to stop smoking modify cardiac risk factors aggressively   Patient recent labs showed potassium 5.6 so advised him to stop the potassium completely  His LDL is 154  Patient was advised to increase the Lipitor to 40 mg  Follow-up in 3 months with labs  The following portions of the patient's history were reviewed and updated as appropriate: Allergies current medications past family history past medical history past social history past surgical history problem list and review of systems  Past Medical History:   Diagnosis Date   • Abnormal stress test     Impression: He was advised to present to the ER for any additional chest pain.   • Acrocyanosis (CMS/HCC)    • Acute renal insufficiency     Impression: Recheck labs 2 weeks.   • Benign essential hypertension    • Chronic hepatitis C (CMS/HCC)     Impression: Managed by [facility].   • Claudication (CMS/HCC)     Impression: Right leg. MARY showing mild disease on right. Start aspirin 81 mg daily. He will  work on smoking cessation and return for labs.   • COPD (chronic obstructive pulmonary disease) (CMS/[facility])     Impression: PFTs performed at previous visit. Moderate, nonreversible airway obstruction (worse than previous). He is now symptomatic.   • Coronary artery disease involving native coronary artery of native heart without angina pectoris     Impression: Continue asa, plavix and statin.   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 See scanned documents of same date. Vision and hearing screen done. Qualifies for 1 year certificate. Return as needed.   • Femur fracture (CMS/[facility])     30+ years ago   • Hearing loss     Impression: Audiogram adequate for CDL.   • History of spinal cord injury    • Hyperlipidemia    • Ingrown nail     Impression: Refer.   • Left against medical advice     Impression: He reports not being able to stay because he needed to go home to repair a busted water line. I discussed the risk of him leaving. I cannot exclude unstable angina and told patient there was a change this was cardiac and his symptoms could indicate heart disease/MI. He understands this and opts to leave the office. I urged him to present to an emergency department of the symptoms recur   • Need for hepatitis C screening test     Impression: He will return tomorrow for labs.   • Onychomycosis of toenail    • Overweight (BMI 25.0-29.9)    • Physical exam     Impression: Questions and concerns addressed today. Immunizations reviewed and updated today. He will return for fasting labs. Age appropriate screening discussed.   • Screen for colon cancer     Impression: Green packet given.   • Screening for depression    • Screening PSA (prostate specific antigen)     Impression: JYOTSNA normal in Dec. He will return tomorrow for labs.   • Screening, lipid     Impression: He will return tomorrow for labs.   • Tobacco use disorder     Impression: He is interested in cessation.  "Continue Chantix.     Past Surgical History:   Procedure Laterality Date   • CARDIAC CATHETERIZATION       /85 (BP Location: Left arm, Patient Position: Sitting, Cuff Size: Large Adult)   Pulse 79   Ht 177.8 cm (70\")   Wt 111 kg (245 lb 8 oz)   SpO2 96%   BMI 35.23 kg/m²   Family History   Problem Relation Age of Onset   • Hypertension Mother    • Coronary artery disease Mother    • Cancer Father         Throat   • Cancer Sister         pelvic   • Cancer Brother         Lung       Current Outpatient Medications:   •  albuterol (PROVENTIL) (2.5 MG/3ML) 0.083% nebulizer solution, Take 2.5 mg by nebulization Every 6 (Six) Hours As Needed for Wheezing or Shortness of Air., Disp: 150 mL, Rfl: 12  •  albuterol sulfate HFA (Ventolin HFA) 108 (90 Base) MCG/ACT inhaler, Inhale 2 puffs Every 4 (Four) Hours As Needed for Wheezing., Disp: 1 inhaler, Rfl: 5  •  aspirin (ASPIR-LOW) 81 MG EC tablet, Take 1 tablet by mouth Daily., Disp: , Rfl:   •  atorvastatin (LIPITOR) 20 MG tablet, Take 1 tablet by mouth Daily., Disp: 90 tablet, Rfl: 2  •  budesonide-formoterol (SYMBICORT) 160-4.5 MCG/ACT inhaler, Inhale 2 puffs 2 (Two) Times a Day., Disp: 10.2 g, Rfl: 5  •  furosemide (LASIX) 40 MG tablet, Take 1 tablet by mouth Daily., Disp: 90 tablet, Rfl: 2  •  Glecaprevir-Pibrentasvir 100-40 MG tablet, Take 3 tablets by mouth Daily., Disp: , Rfl:   •  ibuprofen (ADVIL,MOTRIN) 200 MG tablet, Take 5 tablets by mouth 3 (Three) Times a Day., Disp: , Rfl:   •  metoprolol succinate XL (TOPROL-XL) 25 MG 24 hr tablet, Take 1 tablet by mouth Daily., Disp: 90 tablet, Rfl: 2  •  nicotine (Nicoderm CQ) 21 MG/24HR patch, Place 1 patch on the skin as directed by provider Daily., Disp: 30 patch, Rfl: 5  •  potassium chloride (K-DUR,KLOR-CON) 20 MEQ CR tablet, Take 1 tablet by mouth Daily., Disp: 30 tablet, Rfl: 2  •  SPIRIVA HANDIHALER 18 MCG per inhalation capsule, INHALE 1 PUFF BY MOUTH ONCE DAILY FOR  COPD, Disp: 30 capsule, Rfl: 5  •  " clopidogrel (PLAVIX) 75 MG tablet, Take 1 tablet by mouth Daily., Disp: 90 tablet, Rfl: 3  Social History     Socioeconomic History   • Marital status:      Spouse name: Not on file   • Number of children: Not on file   • Years of education: Not on file   • Highest education level: Not on file   Tobacco Use   • Smoking status: Current Every Day Smoker     Packs/day: 0.50     Types: Cigarettes   • Smokeless tobacco: Never Used   Substance and Sexual Activity   • Alcohol use: Yes     Frequency: Never     Comment: rare   • Drug use: No   • Sexual activity: Defer     No Known Allergies  Review of Systems   Constitution: Negative for malaise/fatigue.   Cardiovascular: Positive for leg swelling (feet and hands). Negative for chest pain, palpitations and syncope.   Respiratory: Positive for shortness of breath (at times).    Skin: Negative for rash.   Gastrointestinal: Negative for nausea and vomiting.   Neurological: Positive for dizziness (when coughing). Negative for light-headedness and numbness.              Objective:     Physical Exam   Constitutional: He is oriented to person, place, and time. He appears well-developed and well-nourished. He is cooperative.   HENT:   Head: Normocephalic and atraumatic.   Mouth/Throat: Uvula is midline and oropharynx is clear and moist. No oral lesions.   Eyes: Conjunctivae are normal. No scleral icterus.   Neck: Trachea normal. Neck supple. No JVD present. Carotid bruit is not present. No thyromegaly present.   Cardiovascular: Normal rate, regular rhythm, S1 normal, S2 normal, normal heart sounds, intact distal pulses and normal pulses. PMI is not displaced. Exam reveals no gallop and no friction rub.   No murmur heard.  Pulmonary/Chest: Effort normal and breath sounds normal.   Abdominal: Soft. Bowel sounds are normal.   Musculoskeletal: Normal range of motion.   Neurological: He is alert and oriented to person, place, and time. He has normal strength.   No focal deficits    Skin: Skin is warm. No cyanosis.   Psychiatric: He has a normal mood and affect.         ECG 12 Lead  Date/Time: 6/12/2020 3:10 PM  Performed by: Bridger Brizeula MD  Authorized by: Bridger Brizuela MD   Comments:  EKG today normal sinus rhythm borderline first-degree AV block normal axis no changes compared to the last EKG              Lab Review:       Assessment:          Diagnosis Plan   1. Coronary artery disease involving native coronary artery of native heart without angina pectoris  Basic Metabolic Panel   2. Essential hypertension  Basic Metabolic Panel   3. Chronic hepatitis C without hepatic coma (CMS/HCC)     4. Tobacco dependence syndrome     5. Mixed hyperlipidemia     6. Hyperkalemia            Plan:       Will stop the potassium patient was advised to modify cardiac risk factors aggressively stop smoking increase the Lipitor follow-up in 3 months with electrolytes lipids

## 2020-06-14 PROBLEM — E78.2 MIXED HYPERLIPIDEMIA: Status: ACTIVE | Noted: 2020-06-14

## 2020-06-15 ENCOUNTER — TELEPHONE (OUTPATIENT)
Dept: CARDIOLOGY | Facility: CLINIC | Age: 65
End: 2020-06-15

## 2020-06-15 RX ORDER — ATORVASTATIN CALCIUM 40 MG/1
40 TABLET, FILM COATED ORAL DAILY
Qty: 90 TABLET | Refills: 1 | Status: SHIPPED | OUTPATIENT
Start: 2020-06-15 | End: 2021-01-11

## 2020-06-15 NOTE — TELEPHONE ENCOUNTER
Per , patient is to increase Lipitor to 40mg and he is to stop the potassium.     I called the patient to advise of this information but there was no answer and his voicemail has not been set up so I was unable to leave a message.

## 2020-06-16 NOTE — TELEPHONE ENCOUNTER
Pt called, left message that he missed 2 calls and wanted to know what we needed. Attempted to call back, no answer, VM not

## 2020-08-03 ENCOUNTER — TELEPHONE (OUTPATIENT)
Dept: FAMILY MEDICINE CLINIC | Facility: CLINIC | Age: 65
End: 2020-08-03

## 2020-12-04 RX ORDER — TIOTROPIUM BROMIDE 18 UG/1
CAPSULE ORAL; RESPIRATORY (INHALATION)
Qty: 30 CAPSULE | Refills: 5 | Status: SHIPPED | OUTPATIENT
Start: 2020-12-04 | End: 2021-11-05

## 2020-12-15 DIAGNOSIS — I10 BENIGN ESSENTIAL HYPERTENSION: Chronic | ICD-10-CM

## 2020-12-15 DIAGNOSIS — J44.1 CHRONIC OBSTRUCTIVE PULMONARY DISEASE WITH ACUTE EXACERBATION (HCC): ICD-10-CM

## 2020-12-16 RX ORDER — BUDESONIDE AND FORMOTEROL FUMARATE DIHYDRATE 160; 4.5 UG/1; UG/1
AEROSOL RESPIRATORY (INHALATION)
Qty: 11 G | Refills: 5 | Status: SHIPPED | OUTPATIENT
Start: 2020-12-16 | End: 2020-12-17 | Stop reason: SDUPTHER

## 2020-12-16 RX ORDER — METOPROLOL SUCCINATE 25 MG/1
TABLET, EXTENDED RELEASE ORAL
Qty: 90 TABLET | Refills: 2 | Status: SHIPPED | OUTPATIENT
Start: 2020-12-16 | End: 2020-12-17 | Stop reason: SDUPTHER

## 2020-12-17 ENCOUNTER — OFFICE VISIT (OUTPATIENT)
Dept: FAMILY MEDICINE CLINIC | Facility: CLINIC | Age: 65
End: 2020-12-17

## 2020-12-17 VITALS
RESPIRATION RATE: 18 BRPM | BODY MASS INDEX: 35.99 KG/M2 | HEIGHT: 70 IN | DIASTOLIC BLOOD PRESSURE: 88 MMHG | WEIGHT: 251.4 LBS | SYSTOLIC BLOOD PRESSURE: 134 MMHG | TEMPERATURE: 97.7 F | OXYGEN SATURATION: 98 % | HEART RATE: 97 BPM

## 2020-12-17 DIAGNOSIS — E66.01 MORBIDLY OBESE (HCC): ICD-10-CM

## 2020-12-17 DIAGNOSIS — E78.2 MIXED HYPERLIPIDEMIA: ICD-10-CM

## 2020-12-17 DIAGNOSIS — I10 ESSENTIAL HYPERTENSION: Primary | Chronic | ICD-10-CM

## 2020-12-17 DIAGNOSIS — I25.10 CORONARY ARTERY DISEASE INVOLVING NATIVE CORONARY ARTERY OF NATIVE HEART WITHOUT ANGINA PECTORIS: Chronic | ICD-10-CM

## 2020-12-17 DIAGNOSIS — J44.9 CHRONIC OBSTRUCTIVE PULMONARY DISEASE, UNSPECIFIED COPD TYPE (HCC): Chronic | ICD-10-CM

## 2020-12-17 DIAGNOSIS — I73.9 CLAUDICATION (HCC): ICD-10-CM

## 2020-12-17 PROCEDURE — 99214 OFFICE O/P EST MOD 30 MIN: CPT | Performed by: FAMILY MEDICINE

## 2020-12-17 RX ORDER — METOPROLOL SUCCINATE 25 MG/1
25 TABLET, EXTENDED RELEASE ORAL DAILY
Qty: 90 TABLET | Refills: 3 | Status: SHIPPED | OUTPATIENT
Start: 2020-12-17 | End: 2022-01-03

## 2020-12-17 RX ORDER — BUDESONIDE AND FORMOTEROL FUMARATE DIHYDRATE 160; 4.5 UG/1; UG/1
2 AEROSOL RESPIRATORY (INHALATION) 2 TIMES DAILY
Qty: 11 G | Refills: 11 | Status: SHIPPED | OUTPATIENT
Start: 2020-12-17 | End: 2020-12-31

## 2020-12-20 LAB
ALBUMIN SERPL-MCNC: 4.4 G/DL (ref 3.8–4.8)
ALBUMIN/GLOB SERPL: 1.3 {RATIO} (ref 1.2–2.2)
ALP SERPL-CCNC: 114 IU/L (ref 39–117)
ALT SERPL-CCNC: 19 IU/L (ref 0–44)
AST SERPL-CCNC: 16 IU/L (ref 0–40)
BASOPHILS # BLD AUTO: 0.1 X10E3/UL (ref 0–0.2)
BASOPHILS NFR BLD AUTO: 1 %
BILIRUB SERPL-MCNC: 0.3 MG/DL (ref 0–1.2)
BUN SERPL-MCNC: 12 MG/DL (ref 8–27)
BUN/CREAT SERPL: 11 (ref 10–24)
CALCIUM SERPL-MCNC: 9.6 MG/DL (ref 8.6–10.2)
CHLORIDE SERPL-SCNC: 101 MMOL/L (ref 96–106)
CHOLEST SERPL-MCNC: 143 MG/DL (ref 100–199)
CO2 SERPL-SCNC: 23 MMOL/L (ref 20–29)
CREAT SERPL-MCNC: 1.06 MG/DL (ref 0.76–1.27)
EOSINOPHIL # BLD AUTO: 0.4 X10E3/UL (ref 0–0.4)
EOSINOPHIL NFR BLD AUTO: 3 %
ERYTHROCYTE [DISTWIDTH] IN BLOOD BY AUTOMATED COUNT: 12.2 % (ref 11.6–15.4)
GLOBULIN SER CALC-MCNC: 3.4 G/DL (ref 1.5–4.5)
GLUCOSE SERPL-MCNC: 99 MG/DL (ref 65–99)
HCT VFR BLD AUTO: 46.7 % (ref 37.5–51)
HDLC SERPL-MCNC: 27 MG/DL
HGB BLD-MCNC: 16.6 G/DL (ref 13–17.7)
IMM GRANULOCYTES # BLD AUTO: 0.1 X10E3/UL (ref 0–0.1)
IMM GRANULOCYTES NFR BLD AUTO: 1 %
LDLC SERPL CALC-MCNC: 83 MG/DL (ref 0–99)
LYMPHOCYTES # BLD AUTO: 2.6 X10E3/UL (ref 0.7–3.1)
LYMPHOCYTES NFR BLD AUTO: 24 %
MCH RBC QN AUTO: 32.2 PG (ref 26.6–33)
MCHC RBC AUTO-ENTMCNC: 35.5 G/DL (ref 31.5–35.7)
MCV RBC AUTO: 91 FL (ref 79–97)
MONOCYTES # BLD AUTO: 0.9 X10E3/UL (ref 0.1–0.9)
MONOCYTES NFR BLD AUTO: 8 %
NEUTROPHILS # BLD AUTO: 6.9 X10E3/UL (ref 1.4–7)
NEUTROPHILS NFR BLD AUTO: 63 %
PLATELET # BLD AUTO: 347 X10E3/UL (ref 150–450)
POTASSIUM SERPL-SCNC: 5.2 MMOL/L (ref 3.5–5.2)
PROT SERPL-MCNC: 7.8 G/DL (ref 6–8.5)
RBC # BLD AUTO: 5.16 X10E6/UL (ref 4.14–5.8)
SODIUM SERPL-SCNC: 138 MMOL/L (ref 134–144)
TRIGL SERPL-MCNC: 191 MG/DL (ref 0–149)
VLDLC SERPL CALC-MCNC: 33 MG/DL (ref 5–40)
WBC # BLD AUTO: 10.9 X10E3/UL (ref 3.4–10.8)

## 2020-12-21 ENCOUNTER — OFFICE VISIT (OUTPATIENT)
Dept: CARDIOLOGY | Facility: CLINIC | Age: 65
End: 2020-12-21

## 2020-12-21 VITALS
WEIGHT: 248 LBS | SYSTOLIC BLOOD PRESSURE: 136 MMHG | DIASTOLIC BLOOD PRESSURE: 84 MMHG | TEMPERATURE: 97.4 F | BODY MASS INDEX: 35.5 KG/M2 | OXYGEN SATURATION: 97 % | HEIGHT: 70 IN | HEART RATE: 72 BPM

## 2020-12-21 DIAGNOSIS — I25.10 CORONARY ARTERY DISEASE INVOLVING NATIVE CORONARY ARTERY OF NATIVE HEART WITHOUT ANGINA PECTORIS: Chronic | ICD-10-CM

## 2020-12-21 DIAGNOSIS — J44.9 CHRONIC OBSTRUCTIVE PULMONARY DISEASE, UNSPECIFIED COPD TYPE (HCC): Chronic | ICD-10-CM

## 2020-12-21 DIAGNOSIS — I10 ESSENTIAL HYPERTENSION: Primary | Chronic | ICD-10-CM

## 2020-12-21 DIAGNOSIS — E78.2 MIXED HYPERLIPIDEMIA: ICD-10-CM

## 2020-12-21 PROCEDURE — 99214 OFFICE O/P EST MOD 30 MIN: CPT | Performed by: INTERNAL MEDICINE

## 2020-12-21 NOTE — PROGRESS NOTES
Subjective:     Encounter Date:12/21/2020      Patient ID: Santos Sullivan is a 65 y.o. male.    Chief Complaint: Follow-up CAD  History of Present Illness     65-year-old white male patient with a known history of hypertension  smoking COPD history of hepatitis-C , and history of CAD comes back for follow-up          stress Myoview done  March 2019 showed large inferior wall defect with inferior wall hypokinesis      advise patient to stop smoking modify cardiac risk factors aggressively  Cardiac catheterization showed LAD stenosis and FFR of 0.74 so patient underwent PCI to the LAD with a 3.5 x 15 mm drug-eluting stent  Patient tolerating both aspirin and Plavix and would like to continue both of them for now    Patient denies of any active symptoms of chest pain      patient appears to be stable from the cardiology standpoint I advised him to stop smoking modify cardiac risk factors aggressively   Patient started Lipitor recently recent labs showed lipids well controlled  Potassium is down to 5.2  Follow-up in 6 months with labs EKG    The following portions of the patient's history were reviewed and updated as appropriate: Allergies current medications past family history past medical history past social history past surgical history problem list and review of systems  Past Medical History:   Diagnosis Date   • Abnormal stress test     Impression: He was advised to present to the ER for any additional chest pain.   • Acrocyanosis (CMS/HCC)    • Acute renal insufficiency     Impression: Recheck labs 2 weeks.   • Benign essential hypertension    • Chronic hepatitis C (CMS/HCC)     Impression: Managed by [facility].   • Claudication (CMS/HCC)     Impression: Right leg. MARY showing mild disease on right. Start aspirin 81 mg daily. He will work on smoking cessation and return for labs.   • COPD (chronic obstructive pulmonary disease) (CMS/HCC)     Impression: PFTs performed at previous visit. Moderate, nonreversible airway  obstruction (worse than previous). He is now symptomatic.   • Coronary artery disease involving native coronary artery of native heart without angina pectoris     Impression: Continue asa, plavix and statin.   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 See scanned documents of same date. Vision and hearing screen done. Qualifies for 1 year certificate. Return as needed.   • Femur fracture (CMS/HCC)     30+ years ago   • Hearing loss     Impression: Audiogram adequate for CDL.   • History of spinal cord injury    • Hyperlipidemia    • Ingrown nail     Impression: Refer.   • Left against medical advice     Impression: He reports not being able to stay because he needed to go home to repair a busted water line. I discussed the risk of him leaving. I cannot exclude unstable angina and told patient there was a change this was cardiac and his symptoms could indicate heart disease/MI. He understands this and opts to leave the office. I urged him to present to an emergency department of the symptoms recur   • Need for hepatitis C screening test     Impression: He will return tomorrow for labs.   • Onychomycosis of toenail    • Overweight (BMI 25.0-29.9)    • Physical exam     Impression: Questions and concerns addressed today. Immunizations reviewed and updated today. He will return for fasting labs. Age appropriate screening discussed.   • Screen for colon cancer     Impression: Green packet given.   • Screening for depression    • Screening PSA (prostate specific antigen)     Impression: JYOTSNA normal in Dec. He will return tomorrow for labs.   • Screening, lipid     Impression: He will return tomorrow for labs.   • Tobacco use disorder     Impression: He is interested in cessation. Continue Chantix.     Past Surgical History:   Procedure Laterality Date   • CARDIAC CATHETERIZATION       /84 (BP Location: Left arm, Patient Position: Sitting, Cuff Size: Adult)    "Pulse 72   Temp 97.4 °F (36.3 °C) (Infrared)   Ht 177.8 cm (70\")   Wt 112 kg (248 lb)   SpO2 97%   BMI 35.58 kg/m²   Family History   Problem Relation Age of Onset   • Hypertension Mother    • Coronary artery disease Mother    • Cancer Father         Throat   • Cancer Sister         pelvic   • Cancer Brother         Lung       Current Outpatient Medications:   •  albuterol (PROVENTIL) (2.5 MG/3ML) 0.083% nebulizer solution, Take 2.5 mg by nebulization Every 6 (Six) Hours As Needed for Wheezing or Shortness of Air., Disp: 150 mL, Rfl: 12  •  albuterol sulfate HFA (Ventolin HFA) 108 (90 Base) MCG/ACT inhaler, Inhale 2 puffs Every 4 (Four) Hours As Needed for Wheezing., Disp: 1 inhaler, Rfl: 5  •  aspirin (ASPIR-LOW) 81 MG EC tablet, Take 1 tablet by mouth Daily., Disp: , Rfl:   •  atorvastatin (LIPITOR) 40 MG tablet, Take 1 tablet by mouth Daily., Disp: 90 tablet, Rfl: 1  •  budesonide-formoterol (SYMBICORT) 160-4.5 MCG/ACT inhaler, Inhale 2 puffs 2 (Two) Times a Day., Disp: 11 g, Rfl: 11  •  clopidogrel (PLAVIX) 75 MG tablet, Take 1 tablet by mouth Daily., Disp: 90 tablet, Rfl: 3  •  furosemide (LASIX) 40 MG tablet, Take 1 tablet by mouth Daily., Disp: 90 tablet, Rfl: 2  •  ibuprofen (ADVIL,MOTRIN) 200 MG tablet, Take 5 tablets by mouth 3 (Three) Times a Day., Disp: , Rfl:   •  metoprolol succinate XL (TOPROL-XL) 25 MG 24 hr tablet, Take 1 tablet by mouth Daily., Disp: 90 tablet, Rfl: 3  •  nicotine (Nicoderm CQ) 21 MG/24HR patch, Place 1 patch on the skin as directed by provider Daily., Disp: 30 patch, Rfl: 5  •  Spiriva HandiHaler 18 MCG per inhalation capsule, Inhale 1 puff by mouth once daily, Disp: 30 capsule, Rfl: 5  Social History     Socioeconomic History   • Marital status:      Spouse name: Not on file   • Number of children: Not on file   • Years of education: Not on file   • Highest education level: Not on file   Tobacco Use   • Smoking status: Current Every Day Smoker     Packs/day: 0.50     " Types: Cigarettes   • Smokeless tobacco: Never Used   Substance and Sexual Activity   • Alcohol use: Yes     Frequency: Never     Comment: rare   • Drug use: No   • Sexual activity: Defer     No Known Allergies  Review of Systems   Constitution: Negative for fever and malaise/fatigue.   HENT: Negative for congestion and hearing loss.    Eyes: Negative for double vision and visual disturbance.   Cardiovascular: Positive for leg swelling. Negative for chest pain, claudication, dyspnea on exertion and syncope.   Respiratory: Positive for shortness of breath (COPD). Negative for cough.    Endocrine: Negative for cold intolerance.   Skin: Negative for color change and rash.   Musculoskeletal: Negative for arthritis and joint pain.   Gastrointestinal: Negative for abdominal pain and heartburn.   Genitourinary: Negative for hematuria.   Neurological: Positive for numbness (finger). Negative for excessive daytime sleepiness and dizziness.   Psychiatric/Behavioral: Negative for depression. The patient is not nervous/anxious.    All other systems reviewed and are negative.             Objective:     Constitutional:       Appearance: Well-developed.   Eyes:      General: No scleral icterus.     Conjunctiva/sclera: Conjunctivae normal.   HENT:      Head: Normocephalic and atraumatic.    Mouth/Throat:      Mouth: No oral lesions.      Pharynx: Uvula midline.   Neck:      Musculoskeletal: Neck supple.      Thyroid: No thyromegaly.      Vascular: No carotid bruit or JVD.      Trachea: Trachea normal.   Pulmonary:      Effort: Pulmonary effort is normal.      Breath sounds: Normal breath sounds.   Cardiovascular:      Normal rate. Regular rhythm.      No gallop.   Pulses:     Intact distal pulses.   Abdominal:      General: Bowel sounds are normal.      Palpations: Abdomen is soft.   Musculoskeletal: Normal range of motion.   Skin:     General: Skin is warm. There is no cyanosis.   Neurological:      Mental Status: Alert and  oriented to person, place, and time.      Comments: No focal deficits   Psychiatric:         Behavior: Behavior is cooperative.         Procedures    Lab Review:       Assessment:          Diagnosis Plan   1. Essential hypertension  Lipid Panel    Comprehensive Metabolic Panel    CK   2. Coronary artery disease involving native coronary artery of native heart without angina pectoris  Lipid Panel    Comprehensive Metabolic Panel    CK   3. Mixed hyperlipidemia  Lipid Panel    Comprehensive Metabolic Panel    CK   4. Chronic obstructive pulmonary disease, unspecified COPD type (CMS/HCC)  Lipid Panel    Comprehensive Metabolic Panel    CK          Plan:       Continue aggressive control of hypertension dyslipidemia modify cardiac risk factors  COPD with shortness of breath patient needs to stop smoking  CAD status post PCI stable would like to continue both aspirin Plavix stable

## 2020-12-31 DIAGNOSIS — J44.9 CHRONIC OBSTRUCTIVE PULMONARY DISEASE, UNSPECIFIED COPD TYPE (HCC): Primary | ICD-10-CM

## 2020-12-31 RX ORDER — MOMETASONE FUROATE AND FORMOTEROL FUMARATE DIHYDRATE 200; 5 UG/1; UG/1
2 AEROSOL RESPIRATORY (INHALATION)
Qty: 1 INHALER | Refills: 5 | Status: SHIPPED | OUTPATIENT
Start: 2020-12-31 | End: 2021-06-09

## 2021-01-02 PROBLEM — E66.01 MORBIDLY OBESE (HCC): Status: ACTIVE | Noted: 2021-01-02

## 2021-01-11 RX ORDER — ATORVASTATIN CALCIUM 40 MG/1
TABLET, FILM COATED ORAL
Qty: 90 TABLET | Refills: 0 | Status: SHIPPED | OUTPATIENT
Start: 2021-01-11 | End: 2021-04-22

## 2021-04-05 DIAGNOSIS — J44.9 CHRONIC OBSTRUCTIVE PULMONARY DISEASE, UNSPECIFIED COPD TYPE (HCC): Chronic | ICD-10-CM

## 2021-04-05 RX ORDER — ALBUTEROL SULFATE 90 UG/1
AEROSOL, METERED RESPIRATORY (INHALATION)
Qty: 8 G | Refills: 5 | Status: SHIPPED | OUTPATIENT
Start: 2021-04-05 | End: 2022-04-15

## 2021-04-22 RX ORDER — ATORVASTATIN CALCIUM 40 MG/1
TABLET, FILM COATED ORAL
Qty: 90 TABLET | Refills: 0 | Status: SHIPPED | OUTPATIENT
Start: 2021-04-22 | End: 2021-08-02

## 2021-06-03 ENCOUNTER — TELEPHONE (OUTPATIENT)
Dept: FAMILY MEDICINE CLINIC | Facility: CLINIC | Age: 66
End: 2021-06-03

## 2021-06-03 NOTE — TELEPHONE ENCOUNTER
Caller: Santos Sullivan    Relationship: Self    Best call back number: [phone]    What medications are you currently taking:   Current Outpatient Medications on File Prior to Visit   Medication Sig Dispense Refill   • albuterol (PROVENTIL) (2.5 MG/3ML) 0.083% nebulizer solution Take 2.5 mg by nebulization Every 6 (Six) Hours As Needed for Wheezing or Shortness of Air. 150 mL 12   • albuterol sulfate  (90 Base) MCG/ACT inhaler INHALE 2 PUFFS BY MOUTH EVERY 4 HOURS AS NEEDED FOR WHEEZING 8 g 5   • aspirin (ASPIR-LOW) 81 MG EC tablet Take 1 tablet by mouth Daily.     • atorvastatin (LIPITOR) 40 MG tablet Take 1 tablet by mouth once daily 90 tablet 0   • clopidogrel (PLAVIX) 75 MG tablet Take 1 tablet by mouth Daily. 90 tablet 3   • furosemide (LASIX) 40 MG tablet Take 1 tablet by mouth Daily. 90 tablet 2   • ibuprofen (ADVIL,MOTRIN) 200 MG tablet Take 5 tablets by mouth 3 (Three) Times a Day.     • metoprolol succinate XL (TOPROL-XL) 25 MG 24 hr tablet Take 1 tablet by mouth Daily. 90 tablet 3   • mometasone-formoterol (Dulera) 200-5 MCG/ACT inhaler Inhale 2 puffs 2 (Two) Times a Day. 1 inhaler 5   • nicotine (Nicoderm CQ) 21 MG/24HR patch Place 1 patch on the skin as directed by provider Daily. 30 patch 5   • Spiriva HandiHaler 18 MCG per inhalation capsule Inhale 1 puff by mouth once daily 30 capsule 5     No current facility-administered medications on file prior to visit.          Which medication are you concerned about:mometasone-formoterol (Dulera) 200-5 MCG/ACT inhaler    Who prescribed you this medication:      What are your concerns: INSURANCE WILL NOT COVER MEDICATION ANY LONGER.

## 2021-06-11 ENCOUNTER — OFFICE VISIT (OUTPATIENT)
Dept: FAMILY MEDICINE CLINIC | Facility: CLINIC | Age: 66
End: 2021-06-11

## 2021-06-11 ENCOUNTER — HOSPITAL ENCOUNTER (OUTPATIENT)
Dept: GENERAL RADIOLOGY | Facility: HOSPITAL | Age: 66
Discharge: HOME | End: 2021-06-11

## 2021-06-11 ENCOUNTER — TELEPHONE (OUTPATIENT)
Dept: FAMILY MEDICINE CLINIC | Facility: CLINIC | Age: 66
End: 2021-06-11

## 2021-06-11 VITALS
DIASTOLIC BLOOD PRESSURE: 60 MMHG | HEART RATE: 61 BPM | RESPIRATION RATE: 20 BRPM | HEIGHT: 70 IN | SYSTOLIC BLOOD PRESSURE: 138 MMHG | OXYGEN SATURATION: 99 % | WEIGHT: 256.8 LBS | TEMPERATURE: 97.5 F | BODY MASS INDEX: 36.76 KG/M2

## 2021-06-11 DIAGNOSIS — R14.0 ABDOMINAL DISTENSION: ICD-10-CM

## 2021-06-11 DIAGNOSIS — J44.1 CHRONIC OBSTRUCTIVE PULMONARY DISEASE WITH ACUTE EXACERBATION (HCC): Primary | Chronic | ICD-10-CM

## 2021-06-11 DIAGNOSIS — Z13.1 SCREENING FOR DIABETES MELLITUS: ICD-10-CM

## 2021-06-11 DIAGNOSIS — Z12.11 COLON CANCER SCREENING: ICD-10-CM

## 2021-06-11 DIAGNOSIS — E66.01 MORBIDLY OBESE (HCC): ICD-10-CM

## 2021-06-11 DIAGNOSIS — I25.10 CORONARY ARTERY DISEASE INVOLVING NATIVE CORONARY ARTERY OF NATIVE HEART WITHOUT ANGINA PECTORIS: ICD-10-CM

## 2021-06-11 DIAGNOSIS — Z98.61 CORONARY ANGIOPLASTY STATUS: ICD-10-CM

## 2021-06-11 DIAGNOSIS — F17.200 TOBACCO DEPENDENCE SYNDROME: ICD-10-CM

## 2021-06-11 PROCEDURE — 99214 OFFICE O/P EST MOD 30 MIN: CPT | Performed by: FAMILY MEDICINE

## 2021-06-11 PROCEDURE — 74018 RADEX ABDOMEN 1 VIEW: CPT

## 2021-06-11 PROCEDURE — 71046 X-RAY EXAM CHEST 2 VIEWS: CPT

## 2021-06-11 RX ORDER — CLOPIDOGREL BISULFATE 75 MG/1
75 TABLET ORAL DAILY
Qty: 90 TABLET | Refills: 3 | Status: SHIPPED | OUTPATIENT
Start: 2021-06-11 | End: 2022-06-24

## 2021-06-11 RX ORDER — METHYLPREDNISOLONE 4 MG/1
TABLET ORAL
Qty: 21 TABLET | Refills: 0 | Status: SHIPPED | OUTPATIENT
Start: 2021-06-11 | End: 2021-06-17

## 2021-06-11 NOTE — PROGRESS NOTES
Chief Complaint   Patient presents with   • Hypertension   • Hyperlipidemia   • COPD       Subjective   Santos Sullivan is a 65 y.o. male.     Hypertension  This is a chronic problem. The current episode started more than 1 year ago. The problem is unchanged. The problem is controlled. Associated symptoms include chest pain and shortness of breath. Pertinent negatives include no palpitations. There are no associated agents to hypertension. Risk factors for coronary artery disease include male gender, obesity, sedentary lifestyle and smoking/tobacco exposure. Current antihypertension treatment includes beta blockers. The current treatment provides moderate improvement. There are no compliance problems.    Hyperlipidemia  This is a chronic problem. The current episode started more than 1 year ago. The problem is controlled. Recent lipid tests were reviewed and are variable. Exacerbating diseases include liver disease. There are no known factors aggravating his hyperlipidemia. Associated symptoms include chest pain and shortness of breath. Pertinent negatives include no myalgias. Current antihyperlipidemic treatment includes statins. The current treatment provides moderate improvement of lipids. There are no compliance problems.  Risk factors for coronary artery disease include hypertension, male sex, obesity and a sedentary lifestyle.   COPD  He complains of chest tightness, cough (white), difficulty breathing, shortness of breath, sputum production and wheezing. This is a chronic problem. The current episode started more than 1 year ago. The problem occurs constantly. The problem has been gradually worsening. The cough is productive of sputum. Associated symptoms include chest pain and dyspnea on exertion. Pertinent negatives include no myalgias or weight loss. His symptoms are aggravated by any activity. His symptoms are alleviated by ipratropium and rest. He reports moderate improvement on treatment. Risk factors for  lung disease include smoking/tobacco exposure. His past medical history is significant for COPD.   GI Problem  Primary symptoms do not include weight loss, fatigue, nausea, vomiting, diarrhea, melena, hematemesis, hematochezia or myalgias. Primary symptoms comment: abdominal distention. Episode onset: unknown. The onset was gradual. The problem has not changed since onset.  The illness is also significant for bloating. The illness does not include anorexia, dysphagia or constipation. Significant associated medical issues include liver disease. Associated medical issues comments: h/o Hep C (treated).     COPD symptoms worsened after 2nd COVID shot.  He has also been w/o his Dulera (insurance no longer covering).  Breo sent to pharmacy earlier this week, but patient has not yet started/picked-up.    GI symptoms do not seem to be associated with any specific food.       I have reviewed relevant past medical, family, social and surgical history for this patient.  Medications review is done by myself, with patient.      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   • Hearing loss 07/05/2019   • Chronic hepatitis C (CMS/HCC) 07/05/2019   • Screen for colon cancer 07/05/2019   • Overweight 07/05/2019   • Physical exam 07/05/2019   • Screening PSA (prostate specific antigen) 07/05/2019   • Screening, lipid 07/05/2019   • Tobacco dependence syndrome 04/03/2019   • Mixed hyperlipidemia 06/14/2020   • Morbidly  obese (CMS/HCC) 01/02/2021     Resolved Ambulatory Problems     Diagnosis Date Noted   • Ingrown nail 07/05/2019   • Need for immunization against influenza 07/05/2019   • Encounter for screening 07/05/2019   • Cellulitis of toe of right foot 07/05/2019     Past Medical History:   Diagnosis Date   • Abnormal stress test    • Benign essential hypertension    • Claudication (CMS/[facility])    • COPD (chronic obstructive pulmonary disease) (CMS/[facility])    • Femur fracture (CMS/[facility])    • History of spinal cord injury    • Hyperlipidemia    • Left against medical advice    • Need for hepatitis C screening test    • Onychomycosis of toenail    • Overweight (BMI 25.0-29.9)    • Screening for depression    • Tobacco use disorder        Medication List:    Current Outpatient Medications:   •  albuterol (PROVENTIL) (2.5 MG/3ML) 0.083% nebulizer solution, Take 2.5 mg by nebulization Every 6 (Six) Hours As Needed for Wheezing or Shortness of Air., Disp: 150 mL, Rfl: 12  •  albuterol sulfate  (90 Base) MCG/ACT inhaler, INHALE 2 PUFFS BY MOUTH EVERY 4 HOURS AS NEEDED FOR WHEEZING, Disp: 8 g, Rfl: 5  •  aspirin (ASPIR-LOW) 81 MG EC tablet, Take 1 tablet by mouth Daily., Disp: , Rfl:   •  atorvastatin (LIPITOR) 40 MG tablet, Take 1 tablet by mouth once daily, Disp: 90 tablet, Rfl: 0  •  clopidogrel (PLAVIX) 75 MG tablet, Take 1 tablet by mouth Daily., Disp: 90 tablet, Rfl: 3  •  Fluticasone Furoate-Vilanterol (BREO ELLIPTA) 100-25 MCG/INH inhaler, Inhale 1 puff Daily., Disp: 28 each, Rfl: 5 - HE HAS NOT STARTED YET  •  furosemide (LASIX) 40 MG tablet, Take 1 tablet by mouth Daily., Disp: 90 tablet, Rfl: 2  •  ibuprofen (ADVIL,MOTRIN) 200 MG tablet, Take 5 tablets by mouth 3 (Three) Times a Day., Disp: , Rfl:   •  metoprolol succinate XL (TOPROL-XL) 25 MG 24 hr tablet, Take 1 tablet by mouth Daily., Disp: 90 tablet, Rfl: 3  •  Spiriva HandiHaler 18 MCG per inhalation capsule, Inhale 1 puff by mouth once daily, Disp: 30 capsule, Rfl:  "5      No Known Allergies    Social History     Tobacco Use   • Smoking status: Current Every Day Smoker     Packs/day: 0.50     Types: Cigarettes   • Smokeless tobacco: Never Used   Substance Use Topics   • Alcohol use: Yes     Comment: rare     Immunization History   Administered Date(s) Administered   • COVID-19 (MODERNA) 03/26/2021, 04/27/2021   • Flu Vaccine Intradermal Quad 18-64YR 12/07/2018, 09/09/2019   • [place]ock Quad =>18yrs 09/09/2019, 09/17/2020   • Hepatitis B 04/01/2019, 04/29/2019   • Influenza TIV (IM) 10/24/2014   • Pneumococcal Polysaccharide (PPSV23) 12/07/2018         Review of Systems   Constitutional: Negative for fatigue, unexpected weight gain and unexpected weight loss.   Respiratory: Positive for cough (white), sputum production, shortness of breath and wheezing.    Cardiovascular: Positive for chest pain and dyspnea on exertion. Negative for palpitations and leg swelling.   Gastrointestinal: Positive for bloating. Negative for anorexia, constipation, diarrhea, dysphagia, hematemesis, hematochezia, melena, nausea and vomiting.   Musculoskeletal: Negative for myalgias.   Skin: Negative for dry skin.   Neurological: Negative for headache.         Objective   Vitals:    06/11/21 0827   BP: 138/60   BP Location: Right arm   Patient Position: Sitting   Cuff Size: Large Adult   Pulse: 61   Resp: 20   Temp: 97.5 °F (36.4 °C)   TempSrc: Skin   SpO2: 99%   Weight: 116 kg (256 lb 12.8 oz)   Height: 177.8 cm (70\")     Body mass index is 36.85 kg/m².    Physical Exam  Constitutional:       General: He is not in acute distress.     Appearance: Normal appearance. He is well-developed. He is obese. He is not ill-appearing.   HENT:      Head: Normocephalic and atraumatic.   Eyes:      General: No scleral icterus.        Right eye: No discharge.         Left eye: No discharge.      Extraocular Movements: Extraocular movements intact.      Conjunctiva/sclera: Conjunctivae normal.   Cardiovascular:      Rate " and Rhythm: Normal rate and regular rhythm.      Heart sounds: Normal heart sounds. No murmur heard.     Pulmonary:      Effort: Pulmonary effort is normal.      Breath sounds: Wheezing (faint, bilateral upper lobes) and rhonchi (in bases, clears with deep breathing) present.   Abdominal:      General: There is distension.      Palpations: Abdomen is soft.      Tenderness: There is abdominal tenderness (mild, generalized). There is no guarding or rebound.   Musculoskeletal:         General: No swelling.      Cervical back: Neck supple.      Right lower leg: No edema.      Left lower leg: No edema.   Skin:     General: Skin is warm.      Capillary Refill: Capillary refill takes less than 2 seconds.      Coloration: Skin is not jaundiced.      Findings: No rash.   Neurological:      Mental Status: He is alert. Mental status is at baseline.      Cranial Nerves: No cranial nerve deficit.   Psychiatric:         Mood and Affect: Mood normal.         Behavior: Behavior normal.         Thought Content: Thought content normal.         XR Chest 2 View    Result Date: 6/11/2021  Impression: No acute process.  Electronically Signed By-Jeromy Redmond MD On:6/11/2021 10:23 AM This report was finalized on [id number] by  Jeromy Redmond MD.    XR Abdomen KUB    Result Date: 6/11/2021  Impression: Nonspecific, nonobstructive bowel gas pattern.  Electronically Signed By-Jeromy Redmond MD On:6/11/2021 10:24 AM This report was finalized on [id number] by  Jeromy Redmond MD.              Assessment/Plan     Diagnoses and all orders for this visit:    1. Chronic obstructive pulmonary disease with acute exacerbation (CMS/HCC) (Primary)  Comments:  Oral steroids prescribed.  He was asked to follow-up with his pharmacy regarding the prescription for Breo recently sent.  Orders:  -     XR Chest 2 View  -     methylPREDNISolone (MEDROL) 4 MG dose pack; Take as directed on package instructions.  Dispense: 21 tablet; Refill: 0    2. Coronary  artery disease involving native coronary artery of native heart without angina pectoris  -     clopidogrel (PLAVIX) 75 MG tablet; Take 1 tablet by mouth Daily.  Dispense: 90 tablet; Refill: 3  -     Comprehensive Metabolic Panel  -     Lipid Panel  -     CBC & Differential    3. Coronary angioplasty status    4. Abdominal distension  Comments:  Proceed with KUB.  He was encouraged to look for foods in his diet that may be aggravating symptoms.  Consider ultrasound or CT if symptoms persist.  Orders:  -     XR Abdomen KUB    5. Colon cancer screening  Comments:  He has not had colon cancer screening.  He is not currently interested in colonoscopy but is agreeable to a Cologuard.  Orders:  -     Cologuard - Stool, Per Rectum; Future    6. Screening for diabetes mellitus  -     Hemoglobin A1c    7. Tobacco dependence syndrome  Comments:  He is not interested in smoking cessation aids at this time.  He is working to cut back on cigarette use.    8. Morbidly obese (CMS/HCC)  -     Vitamin D 25 hydroxy    Medication and medication adverse effects discussed.  Drug education given and explained to patient. Patient verbalized understanding.  All questions presented by patient addressed.    Form for handicap placard completed.    Form for PCP change completed.    Return in about 6 months (around 12/11/2021) for Recheck.       Patient was given instructions and counseling regarding her condition or for health maintenance advice. Please see specific information pulled into the AVS if appropriate.     I wore protective equipment throughout this patient encounter to include mask. Hand hygiene was performed before donning protective equipment and after removal when leaving the room.  Patient did not wear a mask.

## 2021-06-11 NOTE — PATIENT INSTRUCTIONS
Smoking Tobacco Information, Adult    Smoking tobacco can be harmful to your health. Tobacco contains a poisonous (toxic), colorless chemical called nicotine. Nicotine is addictive. It changes the brain and can make it hard to stop smoking. Tobacco also has other toxic chemicals that can hurt your body and raise your risk of many cancers.  How can smoking tobacco affect me?  Smoking tobacco puts you at risk for:  · Cancer. Smoking is most commonly associated with lung cancer, but can also lead to cancer in other parts of the body.  · Chronic obstructive pulmonary disease (COPD). This is a long-term lung condition that makes it hard to breathe. It also gets worse over time.  · High blood pressure (hypertension), heart disease, stroke, or heart attack.  · Lung infections, such as pneumonia.  · Cataracts. This is when the lenses in the eyes become clouded.  · Digestive problems. This may include peptic ulcers, heartburn, and gastroesophageal reflux disease (GERD).  · Oral health problems, such as gum disease and tooth loss.  · Loss of taste and smell.  Smoking can affect your appearance by causing:  · Wrinkles.  · Yellow or stained teeth, fingers, and fingernails.  Smoking tobacco can also affect your social life, because:  · It may be challenging to find places to smoke when away from home. Many workplaces, restaurants, hotels, and public places are tobacco-free.  · Smoking is expensive. This is due to the cost of tobacco and the long-term costs of treating health problems from smoking.  · Secondhand smoke may affect those around you. Secondhand smoke can cause lung cancer, breathing problems, and heart disease. Children of smokers have a higher risk for:  ? Sudden infant death syndrome (SIDS).  ? Ear infections.  ? Lung infections.  If you currently smoke tobacco, quitting now can help you:  · Lead a longer and healthier life.  · Look, smell, breathe, and feel better over time.  · Save money.  · Protect others from  the harms of secondhand smoke.  What actions can I take to prevent health problems?  Quit smoking    · Do not start smoking. Quit if you already do.  · Make a plan to quit smoking and commit to it. Look for programs to help you and ask your health care provider for recommendations and ideas.  · Set a date and write down all the reasons you want to quit.  · Let your friends and family know you are quitting so they can help and support you. Consider finding friends who also want to quit. It can be easier to quit with someone else, so that you can support each other.  · Talk with your health care provider about using nicotine replacement medicines to help you quit, such as gum, lozenges, patches, sprays, or pills.  · Do not replace cigarette smoking with electronic cigarettes, which are commonly called e-cigarettes. The safety of e-cigarettes is not known, and some may contain harmful chemicals.  · If you try to quit but return to smoking, stay positive. It is common to slip up when you first quit, so take it one day at a time.  · Be prepared for cravings. When you feel the urge to smoke, chew gum or suck on hard candy.  Lifestyle  · Stay busy and take care of your body.  · Drink enough fluid to keep your urine pale yellow.  · Get plenty of exercise and eat a healthy diet. This can help prevent weight gain after quitting.  · Monitor your eating habits. Quitting smoking can cause you to have a larger appetite than when you smoke.  · Find ways to relax. Go out with friends or family to a movie or a restaurant where people do not smoke.  · Ask your health care provider about having regular tests (screenings) to check for cancer. This may include blood tests, imaging tests, and other tests.  · Find ways to manage your stress, such as meditation, yoga, or exercise.  Where to find support  To get support to quit smoking, consider:  · Asking your health care provider for more information and resources.  · Taking classes to  learn more about quitting smoking.  · Looking for local organizations that offer resources about quitting smoking.  · Joining a support group for people who want to quit smoking in your local community.  · Calling the smokefree.gov counselor helpline: 1-800-Quit-Now (1-502.511.1291)  Where to find more information  You may find more information about quitting smoking from:  · HelpGuide.org: www.helpguide.org  · Smokefree.gov: smokefree.gov  · American Lung Association: www.lung.org  Contact a health care provider if you:  · Have problems breathing.  · Notice that your lips, nose, or fingers turn blue.  · Have chest pain.  · Are coughing up blood.  · Feel faint or you pass out.  · Have other health changes that cause you to worry.  Summary  · Smoking tobacco can negatively affect your health, the health of those around you, your finances, and your social life.  · Do not start smoking. Quit if you already do. If you need help quitting, ask your health care provider.  · Think about joining a support group for people who want to quit smoking in your local community. There are many effective programs that will help you to quit this behavior.  This information is not intended to replace advice given to you by your health care provider. Make sure you discuss any questions you have with your health care provider.  Document Revised: 09/11/2020 Document Reviewed: 01/02/2018  Elsevier Patient Education © 2021 [employer]

## 2021-06-13 LAB
25(OH)D3+25(OH)D2 SERPL-MCNC: 10 NG/ML (ref 30–100)
ALBUMIN SERPL-MCNC: 4.8 G/DL (ref 3.8–4.8)
ALBUMIN/GLOB SERPL: 1.4 {RATIO} (ref 1.2–2.2)
ALP SERPL-CCNC: 132 IU/L (ref 48–121)
ALT SERPL-CCNC: 15 IU/L (ref 0–44)
AST SERPL-CCNC: 16 IU/L (ref 0–40)
BASOPHILS # BLD AUTO: 0 X10E3/UL (ref 0–0.2)
BASOPHILS NFR BLD AUTO: 0 %
BILIRUB SERPL-MCNC: 0.2 MG/DL (ref 0–1.2)
BUN SERPL-MCNC: 16 MG/DL (ref 8–27)
BUN/CREAT SERPL: 15 (ref 10–24)
CALCIUM SERPL-MCNC: 9.6 MG/DL (ref 8.6–10.2)
CHLORIDE SERPL-SCNC: 99 MMOL/L (ref 96–106)
CHOLEST SERPL-MCNC: 156 MG/DL (ref 100–199)
CO2 SERPL-SCNC: 22 MMOL/L (ref 20–29)
CREAT SERPL-MCNC: 1.05 MG/DL (ref 0.76–1.27)
EOSINOPHIL # BLD AUTO: 0 X10E3/UL (ref 0–0.4)
EOSINOPHIL NFR BLD AUTO: 0 %
ERYTHROCYTE [DISTWIDTH] IN BLOOD BY AUTOMATED COUNT: 12.6 % (ref 11.6–15.4)
GLOBULIN SER CALC-MCNC: 3.5 G/DL (ref 1.5–4.5)
GLUCOSE SERPL-MCNC: 126 MG/DL (ref 65–99)
HBA1C MFR BLD: 6.4 % (ref 4.8–5.6)
HCT VFR BLD AUTO: 48.4 % (ref 37.5–51)
HDLC SERPL-MCNC: 35 MG/DL
HGB BLD-MCNC: 16.1 G/DL (ref 13–17.7)
IMM GRANULOCYTES # BLD AUTO: 0.1 X10E3/UL (ref 0–0.1)
IMM GRANULOCYTES NFR BLD AUTO: 1 %
LDLC SERPL CALC-MCNC: 102 MG/DL (ref 0–99)
LYMPHOCYTES # BLD AUTO: 1.3 X10E3/UL (ref 0.7–3.1)
LYMPHOCYTES NFR BLD AUTO: 9 %
MCH RBC QN AUTO: 31.1 PG (ref 26.6–33)
MCHC RBC AUTO-ENTMCNC: 33.3 G/DL (ref 31.5–35.7)
MCV RBC AUTO: 94 FL (ref 79–97)
MONOCYTES # BLD AUTO: 0.9 X10E3/UL (ref 0.1–0.9)
MONOCYTES NFR BLD AUTO: 6 %
NEUTROPHILS # BLD AUTO: 12.7 X10E3/UL (ref 1.4–7)
NEUTROPHILS NFR BLD AUTO: 84 %
PLATELET # BLD AUTO: 354 X10E3/UL (ref 150–450)
POTASSIUM SERPL-SCNC: 5.3 MMOL/L (ref 3.5–5.2)
PROT SERPL-MCNC: 8.3 G/DL (ref 6–8.5)
RBC # BLD AUTO: 5.17 X10E6/UL (ref 4.14–5.8)
SODIUM SERPL-SCNC: 136 MMOL/L (ref 134–144)
TRIGL SERPL-MCNC: 103 MG/DL (ref 0–149)
VLDLC SERPL CALC-MCNC: 19 MG/DL (ref 5–40)
WBC # BLD AUTO: 15 X10E3/UL (ref 3.4–10.8)

## 2021-06-14 ENCOUNTER — TELEPHONE (OUTPATIENT)
Dept: FAMILY MEDICINE CLINIC | Facility: CLINIC | Age: 66
End: 2021-06-14

## 2021-06-14 NOTE — TELEPHONE ENCOUNTER
----- Message from Leigh Ayon MD sent at 6/13/2021 10:08 AM EDT -----  Please let patient know that his labs are showing that his blood sugars are in the prediabetic range.  He needs to closely watch his intake of sugar and simple carbohydrates.  His cholesterol looks okay.  His vitamin D level is very low.  He needs to start taking OTC Vitamin D 2000 units once daily with a meal.      I will also type up a letter for him so that he can seen his numbers.

## 2021-06-14 NOTE — TELEPHONE ENCOUNTER
Spoke to pt 06/14/2021, 5:10am advised pt of lab results per Dr. Ayon and to purchase OTC Vitamin D 2000 units daily.

## 2021-06-14 NOTE — TELEPHONE ENCOUNTER
----- Message from Leigh Ayon MD sent at 6/12/2021  1:16 PM EDT -----  Clarification:.  His chest x-ray is normal.  His abdominal x-ray did show some stool in his colon that may need to be cleared out.  He had a complaint of abdominal distention.  The distention could be due to him needing to have better bowel movements.  I would recommend that he increase the amount of fiber he is getting in his diet.I had talked to him about doing additional imaging if his abdominal distention continues.  Let us have him try a fiber supplement like Metamucil or Benefiber for 2 to 3 weeks (he can follow the directions on the bottle/canister).  He also needs to note if there are any foods that make his symptoms worse.      If the distended abdomen continues after 2 to 3 weeks or fiber supplementation, I had like him to call me to let me know.  We will get an abdominal ultrasound set up.  ----- Message -----  From: Leigh Ayon MD  Sent: 6/12/2021   1:11 PM EDT  To: Tiffany Yeh MA    Please let Mr. Sullivan know that both his chest and abdominal x-rays are normal.

## 2021-06-14 NOTE — TELEPHONE ENCOUNTER
Spoke to pt 06/14/2021, 5:10pm advised pt per Dr. Ayon, of chest x-ray, abdominal x-ray results.  Advised to try OTC fiber supplement.  If no improvement follow up in 3 weeks.

## 2021-06-21 ENCOUNTER — TELEPHONE (OUTPATIENT)
Dept: FAMILY MEDICINE CLINIC | Facility: CLINIC | Age: 66
End: 2021-06-21

## 2021-06-21 DIAGNOSIS — J44.9 CHRONIC OBSTRUCTIVE PULMONARY DISEASE, UNSPECIFIED COPD TYPE (HCC): Chronic | ICD-10-CM

## 2021-06-21 RX ORDER — ALBUTEROL SULFATE 2.5 MG/3ML
SOLUTION RESPIRATORY (INHALATION)
Qty: 150 ML | Refills: 0 | Status: SHIPPED | OUTPATIENT
Start: 2021-06-21 | End: 2021-08-02

## 2021-06-21 NOTE — TELEPHONE ENCOUNTER
Caller: Santos Sullivan    Relationship: Self    Best call back number: [phone]    Medication needed:   Requested Prescriptions     Pending Prescriptions Disp Refills   • Fluticasone Furoate-Vilanterol (BREO ELLIPTA) 100-25 MCG/INH inhaler 28 each 5     Sig: Inhale 1 puff Daily.       When do you need the refill by: ASAP    What additional details did the patient provide when requesting the medication: [facility] DIDN'T HAVE THIS ON Friday WHEN HE WENT TO     Does the patient have less than a 3 day supply:  [x] Yes  [] No    What is the patient's preferred pharmacy: [facility] [address] [facility] [place] [facility] [address]  - [phone] [facility] [phone] FX

## 2021-06-22 ENCOUNTER — OFFICE VISIT (OUTPATIENT)
Dept: CARDIOLOGY | Facility: CLINIC | Age: 66
End: 2021-06-22

## 2021-06-22 VITALS
HEART RATE: 82 BPM | OXYGEN SATURATION: 98 % | WEIGHT: 261 LBS | HEIGHT: 70 IN | DIASTOLIC BLOOD PRESSURE: 95 MMHG | BODY MASS INDEX: 37.37 KG/M2 | SYSTOLIC BLOOD PRESSURE: 164 MMHG

## 2021-06-22 DIAGNOSIS — I25.10 CORONARY ARTERY DISEASE INVOLVING NATIVE CORONARY ARTERY OF NATIVE HEART WITHOUT ANGINA PECTORIS: Chronic | ICD-10-CM

## 2021-06-22 DIAGNOSIS — I10 ESSENTIAL HYPERTENSION: Primary | Chronic | ICD-10-CM

## 2021-06-22 DIAGNOSIS — N28.9 ACUTE RENAL INSUFFICIENCY: ICD-10-CM

## 2021-06-22 DIAGNOSIS — F17.200 TOBACCO DEPENDENCE SYNDROME: ICD-10-CM

## 2021-06-22 PROCEDURE — 99214 OFFICE O/P EST MOD 30 MIN: CPT | Performed by: INTERNAL MEDICINE

## 2021-06-22 PROCEDURE — 93000 ELECTROCARDIOGRAM COMPLETE: CPT | Performed by: INTERNAL MEDICINE

## 2021-06-22 RX ORDER — CHLORTHALIDONE 25 MG/1
25 TABLET ORAL DAILY
Qty: 30 TABLET | Refills: 5 | Status: SHIPPED | OUTPATIENT
Start: 2021-06-22 | End: 2022-04-15 | Stop reason: SDUPTHER

## 2021-06-22 NOTE — PROGRESS NOTES
Subjective:     Encounter Date:06/22/2021      Patient ID: Santos Sullivan is a 65 y.o. male.    Chief Complaint: 6 MO F/U HTN, CAD    History of Present Illness     65-year-old white male patient with a known history of hypertension  smoking COPD history of hepatitis-C , and history of CAD comes back for follow-up             stress Myoview done  March 2019 showed large inferior wall defect with inferior wall hypokinesis      advise patient to stop smoking modify cardiac risk factors aggressively  Cardiac catheterization showed LAD stenosis and FFR of 0.74 so patient underwent PCI to the LAD with a 3.5 x 15 mm drug-eluting stent  Patient tolerating both aspirin and Plavix and would like to continue both of them for now     Patient denies of any active symptoms of chest pain       patient appears to be stable from the cardiology standpoint I advised him to stop smoking modify cardiac risk factors aggressively   Patient started Lipitor recently recent labs showed lipids well controlled  Potassium is down to 5.2  Follow-up in 6 months with labs EKG      Patient comes back for follow-up EKG showed sinus rhythm first-degree AV block compared to the last EKG first-degree AV block now present  Patient blood pressure is elevated will start chlorthalidone 12.5 mg every day follow-up electrolytes in 3 months    The following portions of the patient's history were reviewed and updated as appropriate: Allergies current medications past family history past medical history past social history past surgical history problem list and review of systems  Past Medical History:   Diagnosis Date   • Abnormal stress test     Impression: He was advised to present to the ER for any additional chest pain.   • Acrocyanosis (CMS/HCC)    • Acute renal insufficiency     Impression: Recheck labs 2 weeks.   • Benign essential hypertension    • Chronic hepatitis C (CMS/HCC)     Impression: Managed by [facility].   • Claudication (CMS/HCC)     Impression:  Right leg. MARY showing mild disease on right. Start aspirin 81 mg daily. He will work on smoking cessation and return for labs.   • COPD (chronic obstructive pulmonary disease) (CMS/[facility])     Impression: PFTs performed at previous visit. Moderate, nonreversible airway obstruction (worse than previous). He is now symptomatic.   • Coronary artery disease involving native coronary artery of native heart without angina pectoris     Impression: Continue asa, plavix and statin.   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 See scanned documents of same date. Vision and hearing screen done. Qualifies for 1 year certificate. Return as needed.   • Femur fracture (CMS/[facility])     30+ years ago   • Hearing loss     Impression: Audiogram adequate for CDL.   • History of spinal cord injury    • Hyperlipidemia    • Ingrown nail     Impression: Refer.   • Left against medical advice     Impression: He reports not being able to stay because he needed to go home to repair a busted water line. I discussed the risk of him leaving. I cannot exclude unstable angina and told patient there was a change this was cardiac and his symptoms could indicate heart disease/MI. He understands this and opts to leave the office. I urged him to present to an emergency department of the symptoms recur   • Need for hepatitis C screening test     Impression: He will return tomorrow for labs.   • Onychomycosis of toenail    • Overweight (BMI 25.0-29.9)    • Physical exam     Impression: Questions and concerns addressed today. Immunizations reviewed and updated today. He will return for fasting labs. Age appropriate screening discussed.   • Screen for colon cancer     Impression: Green packet given.   • Screening for depression    • Screening PSA (prostate specific antigen)     Impression: JYOTSNA normal in Dec. He will return tomorrow for labs.   • Screening, lipid     Impression: He will return tomorrow  "for labs.   • Tobacco use disorder     Impression: He is interested in cessation. Continue Chantix.     Past Surgical History:   Procedure Laterality Date   • CARDIAC CATHETERIZATION       /95 (BP Location: Left arm, Patient Position: Sitting, Cuff Size: Adult)   Pulse 82   Ht 177.8 cm (70\")   Wt 118 kg (261 lb)   SpO2 98%   BMI 37.45 kg/m²   Family History   Problem Relation Age of Onset   • Hypertension Mother    • Coronary artery disease Mother    • Cancer Father         Throat   • Cancer Sister         pelvic   • Cancer Brother         Lung       Current Outpatient Medications:   •  albuterol (PROVENTIL) (2.5 MG/3ML) 0.083% nebulizer solution, USE 1 VIAL IN NEBULIZER EVERY 6 HOURS AS NEEDED FOR WHEEZING OR SHORTNESS OF BREATH, Disp: 150 mL, Rfl: 0  •  albuterol sulfate  (90 Base) MCG/ACT inhaler, INHALE 2 PUFFS BY MOUTH EVERY 4 HOURS AS NEEDED FOR WHEEZING, Disp: 8 g, Rfl: 5  •  aspirin (ASPIR-LOW) 81 MG EC tablet, Take 1 tablet by mouth Daily., Disp: , Rfl:   •  atorvastatin (LIPITOR) 40 MG tablet, Take 1 tablet by mouth once daily, Disp: 90 tablet, Rfl: 0  •  clopidogrel (PLAVIX) 75 MG tablet, Take 1 tablet by mouth Daily., Disp: 90 tablet, Rfl: 3  •  Fluticasone Furoate-Vilanterol (BREO ELLIPTA) 100-25 MCG/INH inhaler, Inhale 1 puff Daily., Disp: 28 each, Rfl: 5  •  furosemide (LASIX) 40 MG tablet, Take 1 tablet by mouth Daily. (Patient taking differently: Take 40 mg by mouth Daily As Needed (swelling).), Disp: 90 tablet, Rfl: 2  •  ibuprofen (ADVIL,MOTRIN) 200 MG tablet, Take 5 tablets by mouth 3 (Three) Times a Day., Disp: , Rfl:   •  metoprolol succinate XL (TOPROL-XL) 25 MG 24 hr tablet, Take 1 tablet by mouth Daily., Disp: 90 tablet, Rfl: 3  •  Spiriva HandiHaler 18 MCG per inhalation capsule, Inhale 1 puff by mouth once daily, Disp: 30 capsule, Rfl: 5  •  chlorthalidone (HYGROTON) 25 MG tablet, Take 1 tablet by mouth Daily., Disp: 30 tablet, Rfl: 5  Social History     Socioeconomic " History   • Marital status:      Spouse name: Not on file   • Number of children: Not on file   • Years of education: Not on file   • Highest education level: Not on file   Tobacco Use   • Smoking status: Current Every Day Smoker     Packs/day: 0.50     Types: Cigarettes   • Smokeless tobacco: Never Used   Vaping Use   • Vaping Use: Never used   Substance and Sexual Activity   • Alcohol use: Yes     Comment: rare   • Drug use: No   • Sexual activity: Defer     No Known Allergies  Review of Systems   Constitutional: Negative for chills, fever and malaise/fatigue.   Cardiovascular: Positive for dyspnea on exertion and leg swelling. Negative for chest pain, palpitations and syncope.   Respiratory: Positive for shortness of breath.    Skin: Negative for rash.   Neurological: Negative for dizziness, light-headedness and numbness.              Objective:     Physical Exam  Vitals reviewed blood pressure is elevated neck no JVP elevation lungs but and mostly clear heart sounds S1-S2 regular extremities no edema bilateral pulses present equal    ECG 12 Lead    Date/Time: 6/22/2021 1:15 PM  Performed by: Bridger Brizuela MD  Authorized by: Bridger Brizuela MD   Comments: Sinus rhythm with first-degree AV block compared to the last EKG first-degree AV block now present            Lab Review:       Assessment:          Diagnosis Plan   1. Essential hypertension  Basic Metabolic Panel   2. Coronary artery disease involving native coronary artery of native heart without angina pectoris  Basic Metabolic Panel   3. Acute renal insufficiency  Basic Metabolic Panel   4. Tobacco dependence syndrome  Basic Metabolic Panel          Plan:       MDM  Number of Diagnoses or Management Options  Acute renal insufficiency: established, improving  Coronary artery disease involving native coronary artery of native heart without angina pectoris: established, improving  Essential hypertension: established,  worsening  Tobacco dependence syndrome: established, worsening     Amount and/or Complexity of Data Reviewed  Clinical lab tests: ordered and reviewed  Review and summarize past medical records: yes    Risk of Complications, Morbidity, and/or Mortality  Presenting problems: moderate  Management options: moderate

## 2021-06-25 ENCOUNTER — TELEPHONE (OUTPATIENT)
Dept: FAMILY MEDICINE CLINIC | Facility: CLINIC | Age: 66
End: 2021-06-25

## 2021-06-25 NOTE — TELEPHONE ENCOUNTER
Pt's mother called office 0625/2021,  3:28pm advising pt's feet are swollen and purple, advised her I will talk to Dr. Ayon.    LM 06/25/2021, 5:09pm advising her per Dr. Ayon if diuretic is not working he needs to be evaluated in the ER.  Attempted to contact pt, no answer, VM not set up.

## 2021-06-30 ENCOUNTER — TELEPHONE (OUTPATIENT)
Dept: FAMILY MEDICINE CLINIC | Facility: CLINIC | Age: 66
End: 2021-06-30

## 2021-06-30 ENCOUNTER — OFFICE VISIT (OUTPATIENT)
Dept: FAMILY MEDICINE CLINIC | Facility: CLINIC | Age: 66
End: 2021-06-30

## 2021-06-30 VITALS
SYSTOLIC BLOOD PRESSURE: 136 MMHG | OXYGEN SATURATION: 99 % | TEMPERATURE: 98.2 F | BODY MASS INDEX: 35.19 KG/M2 | DIASTOLIC BLOOD PRESSURE: 80 MMHG | WEIGHT: 245.8 LBS | HEIGHT: 70 IN | HEART RATE: 72 BPM

## 2021-06-30 DIAGNOSIS — E55.9 VITAMIN D DEFICIENCY: ICD-10-CM

## 2021-06-30 DIAGNOSIS — F17.200 TOBACCO DEPENDENCE SYNDROME: ICD-10-CM

## 2021-06-30 DIAGNOSIS — R14.0 ABDOMINAL DISTENSION: ICD-10-CM

## 2021-06-30 DIAGNOSIS — J44.1 CHRONIC OBSTRUCTIVE PULMONARY DISEASE WITH ACUTE EXACERBATION (HCC): ICD-10-CM

## 2021-06-30 DIAGNOSIS — Z00.01 ENCOUNTER FOR ANNUAL GENERAL MEDICAL EXAMINATION WITH ABNORMAL FINDINGS IN ADULT: Primary | ICD-10-CM

## 2021-06-30 DIAGNOSIS — E78.2 MIXED HYPERLIPIDEMIA: ICD-10-CM

## 2021-06-30 DIAGNOSIS — I10 ESSENTIAL HYPERTENSION: Chronic | ICD-10-CM

## 2021-06-30 DIAGNOSIS — E66.01 MORBIDLY OBESE (HCC): ICD-10-CM

## 2021-06-30 DIAGNOSIS — R73.03 PREDIABETES: ICD-10-CM

## 2021-06-30 DIAGNOSIS — R19.5 POSITIVE COLORECTAL CANCER SCREENING USING COLOGUARD TEST: ICD-10-CM

## 2021-06-30 PROCEDURE — 99396 PREV VISIT EST AGE 40-64: CPT | Performed by: FAMILY MEDICINE

## 2021-06-30 PROCEDURE — 99214 OFFICE O/P EST MOD 30 MIN: CPT | Performed by: FAMILY MEDICINE

## 2021-06-30 RX ORDER — METHYLPREDNISOLONE 4 MG/1
TABLET ORAL
Qty: 21 TABLET | Refills: 0 | Status: SHIPPED | OUTPATIENT
Start: 2021-06-30 | End: 2021-07-06

## 2021-07-06 ENCOUNTER — TELEPHONE (OUTPATIENT)
Dept: CARDIOLOGY | Facility: CLINIC | Age: 66
End: 2021-07-06

## 2021-07-06 NOTE — TELEPHONE ENCOUNTER
DR. MEIER  PHONE [phone]  [phone] CONI  COLONOSCOPY 8/18/21  OK TO HOLD PLAVIX ?  LOV 6/22/21

## 2021-07-19 ENCOUNTER — TELEPHONE (OUTPATIENT)
Dept: FAMILY MEDICINE CLINIC | Facility: CLINIC | Age: 66
End: 2021-07-19

## 2021-07-19 NOTE — TELEPHONE ENCOUNTER
----- Message from Leigh Ayon MD sent at 7/16/2021  1:53 PM EDT -----  Can you please let patient know CT results.  He has some arthritis in his hips and a very small hernia at his belly button.  Otherwise, the scan looks good.

## 2021-08-02 DIAGNOSIS — J44.9 CHRONIC OBSTRUCTIVE PULMONARY DISEASE, UNSPECIFIED COPD TYPE (HCC): Chronic | ICD-10-CM

## 2021-08-02 RX ORDER — ALBUTEROL SULFATE 2.5 MG/3ML
SOLUTION RESPIRATORY (INHALATION)
Qty: 150 ML | Refills: 3 | Status: SHIPPED | OUTPATIENT
Start: 2021-08-02 | End: 2022-01-03

## 2021-08-02 RX ORDER — ATORVASTATIN CALCIUM 40 MG/1
TABLET, FILM COATED ORAL
Qty: 90 TABLET | Refills: 0 | Status: SHIPPED | OUTPATIENT
Start: 2021-08-02 | End: 2022-01-03 | Stop reason: SDUPTHER

## 2021-08-18 ENCOUNTER — ON CAMPUS - OUTPATIENT (OUTPATIENT)
Dept: RURAL HOSPITAL 3 | Facility: HOSPITAL | Age: 66
End: 2021-08-18

## 2021-08-18 DIAGNOSIS — D12.3 BENIGN NEOPLASM OF TRANSVERSE COLON: ICD-10-CM

## 2021-08-18 DIAGNOSIS — D12.0 BENIGN NEOPLASM OF CECUM: ICD-10-CM

## 2021-08-18 DIAGNOSIS — K64.1 SECOND DEGREE HEMORRHOIDS: ICD-10-CM

## 2021-08-18 DIAGNOSIS — K57.30 DIVERTICULOSIS OF LARGE INTESTINE WITHOUT PERFORATION OR ABS: ICD-10-CM

## 2021-08-18 DIAGNOSIS — D12.2 BENIGN NEOPLASM OF ASCENDING COLON: ICD-10-CM

## 2021-08-18 DIAGNOSIS — K63.5 POLYP OF COLON: ICD-10-CM

## 2021-08-18 DIAGNOSIS — R19.5 OTHER FECAL ABNORMALITIES: ICD-10-CM

## 2021-08-18 PROCEDURE — 45385 COLONOSCOPY W/LESION REMOVAL: CPT | Mod: PT | Performed by: INTERNAL MEDICINE

## 2021-08-18 PROCEDURE — 45380 COLONOSCOPY AND BIOPSY: CPT | Mod: 33,PT | Performed by: INTERNAL MEDICINE

## 2021-11-05 RX ORDER — TIOTROPIUM BROMIDE 18 UG/1
CAPSULE ORAL; RESPIRATORY (INHALATION)
Qty: 30 CAPSULE | Refills: 5 | Status: SHIPPED | OUTPATIENT
Start: 2021-11-05 | End: 2022-03-18

## 2022-01-03 DIAGNOSIS — J44.9 CHRONIC OBSTRUCTIVE PULMONARY DISEASE, UNSPECIFIED COPD TYPE: Chronic | ICD-10-CM

## 2022-01-03 DIAGNOSIS — I10 ESSENTIAL HYPERTENSION: Chronic | ICD-10-CM

## 2022-01-03 RX ORDER — ALBUTEROL SULFATE 2.5 MG/3ML
SOLUTION RESPIRATORY (INHALATION)
Qty: 180 ML | Refills: 5 | Status: SHIPPED | OUTPATIENT
Start: 2022-01-03 | End: 2022-03-09 | Stop reason: SDUPTHER

## 2022-01-03 RX ORDER — METOPROLOL SUCCINATE 25 MG/1
TABLET, EXTENDED RELEASE ORAL
Qty: 90 TABLET | Refills: 1 | Status: SHIPPED | OUTPATIENT
Start: 2022-01-03 | End: 2022-07-07 | Stop reason: SDUPTHER

## 2022-01-03 RX ORDER — ATORVASTATIN CALCIUM 40 MG/1
40 TABLET, FILM COATED ORAL DAILY
Qty: 90 TABLET | Refills: 1 | Status: SHIPPED | OUTPATIENT
Start: 2022-01-03 | End: 2022-07-08 | Stop reason: SDUPTHER

## 2022-01-20 ENCOUNTER — TELEPHONE (OUTPATIENT)
Dept: FAMILY MEDICINE CLINIC | Facility: CLINIC | Age: 67
End: 2022-01-20

## 2022-01-20 ENCOUNTER — OFFICE VISIT (OUTPATIENT)
Dept: FAMILY MEDICINE CLINIC | Facility: CLINIC | Age: 67
End: 2022-01-20

## 2022-01-20 VITALS
DIASTOLIC BLOOD PRESSURE: 70 MMHG | HEIGHT: 70 IN | BODY MASS INDEX: 38.2 KG/M2 | WEIGHT: 266.8 LBS | HEART RATE: 70 BPM | TEMPERATURE: 97.5 F | SYSTOLIC BLOOD PRESSURE: 134 MMHG | RESPIRATION RATE: 20 BRPM | OXYGEN SATURATION: 97 %

## 2022-01-20 DIAGNOSIS — F17.200 TOBACCO DEPENDENCE SYNDROME: ICD-10-CM

## 2022-01-20 DIAGNOSIS — R06.02 SHORTNESS OF BREATH: ICD-10-CM

## 2022-01-20 DIAGNOSIS — I25.10 CORONARY ARTERY DISEASE INVOLVING NATIVE CORONARY ARTERY OF NATIVE HEART WITHOUT ANGINA PECTORIS: Chronic | ICD-10-CM

## 2022-01-20 DIAGNOSIS — M79.89 LEG SWELLING: Primary | ICD-10-CM

## 2022-01-20 DIAGNOSIS — I80.01 THROMBOPHLEBITIS OF SUPERFICIAL VEINS OF RIGHT LOWER EXTREMITY: ICD-10-CM

## 2022-01-20 DIAGNOSIS — E66.01 MORBIDLY OBESE: ICD-10-CM

## 2022-01-20 DIAGNOSIS — J44.1 CHRONIC OBSTRUCTIVE PULMONARY DISEASE WITH ACUTE EXACERBATION: ICD-10-CM

## 2022-01-20 PROCEDURE — 99214 OFFICE O/P EST MOD 30 MIN: CPT | Performed by: FAMILY MEDICINE

## 2022-01-20 RX ORDER — PREDNISONE 20 MG/1
TABLET ORAL
Qty: 20 TABLET | Refills: 0 | Status: SHIPPED | OUTPATIENT
Start: 2022-01-20 | End: 2022-03-09

## 2022-01-20 NOTE — PROGRESS NOTES
Please let patient know that he does not have a blood clot in his deep vein.  The clot is confined to a superficial vessel.  His labs are negative for heart failure and his chest x-ray is clear.     I want him to increase his aspirin to 325 mg once to twice daily.  Watch for bleeding.  Apply warm compresses.  Take steroid for his lungs.  We'll discuss in more detail tomorrow.

## 2022-01-20 NOTE — TELEPHONE ENCOUNTER
----- Message from Leigh Ayon MD sent at 1/20/2022  4:39 PM EST -----  Please let patient know that he does not have a blood clot in his deep vein.  The clot is confined to a superficial vessel.  His labs are negative for heart failure and his chest x-ray is clear.     I want him to increase his aspirin to 325 mg once to twice daily.  Watch for bleeding.  Apply warm compresses.  Take steroid for his lungs.  We'll discuss in more detail tomorrow.

## 2022-01-20 NOTE — PROGRESS NOTES
Chief Complaint   Patient presents with   • Cough   • Mass     right inner thigh       Subjective   Santos Sullivan is a 66 y.o. male.     Mass:  Right inner thigh.  Symptoms started about 1 month ago.  No clear trigger.  No leg trauma.  Pt states mass is the size of a dime, slightly raised above the skin, tender upon palpation. Pain radiates to calf and groin.  No prior h/o DVT.    Cough  This is a new problem. Episode onset: about a month ago, when he ran out of [employer]. The problem has been gradually worsening. The problem occurs constantly. Cough characteristics: clear, yellow. Associated symptoms include shortness of breath and wheezing. Pertinent negatives include no chest pain, fever, heartburn, hemoptysis, nasal congestion or sore throat. Nothing aggravates the symptoms. He has tried a beta-agonist inhaler for the symptoms. The treatment provided mild (does not last long) relief. His past medical history is significant for COPD.   Leg Swelling  This is a new problem. The current episode started 1 to 4 weeks ago. The problem occurs constantly. The problem has been unchanged. Associated symptoms include coughing. Pertinent negatives include no chest pain, fever or sore throat. He has tried nothing for the symptoms. The treatment provided no relief.      I have reviewed relevant past medical, family, social and surgical history for this patient.  Medications review is done by myself, with patient.    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   • Hearing loss 07/05/2019   • Chronic hepatitis C (HCC) 07/05/2019   • Screen for colon cancer 07/05/2019   • Overweight 07/05/2019   • Physical exam 07/05/2019   • Screening PSA (prostate specific antigen) 07/05/2019   • Screening, lipid 07/05/2019   • Tobacco dependence syndrome 04/03/2019   • Mixed hyperlipidemia 06/14/2020   • Morbidly obese (HCC) 01/02/2021     Resolved Ambulatory Problems     Diagnosis Date Noted   • Ingrown nail 07/05/2019   • Need for immunization against influenza 07/05/2019   • Encounter for screening 07/05/2019   • Cellulitis of toe of right foot 07/05/2019     Past Medical History:   Diagnosis Date   • Abnormal stress test    • Benign essential hypertension    • Claudication (HCC)    • COPD (chronic obstructive pulmonary disease) (HCC)    • Femur fracture (HCC)    • History of spinal cord injury    • Hyperlipidemia    • Left against medical advice    • Need for hepatitis C screening test    • Onychomycosis of toenail    • Overweight (BMI 25.0-29.9)    • Screening for depression    • Tobacco use disorder        Medication List:    Current Outpatient Medications:   •  albuterol (PROVENTIL) (2.5 MG/3ML) 0.083% nebulizer solution, USE 1 VIAL IN NEBULIZER EVERY 6 HOURS AS NEEDED FOR WHEEZING FOR SHORTNESS OF BREATH, Disp: 180 mL, Rfl: 5  •  albuterol sulfate  (90 Base) MCG/ACT inhaler, INHALE 2 PUFFS BY MOUTH EVERY 4 HOURS AS NEEDED FOR WHEEZING, Disp: 8 g, Rfl: 5  •  aspirin (ASPIR-LOW) 81 MG EC tablet, Take 1 tablet by mouth Daily., Disp: , Rfl:   •  atorvastatin (LIPITOR) 40 MG tablet, Take 1 tablet by mouth Daily., Disp: 90 tablet, Rfl: 1  •  chlorthalidone (HYGROTON) 25 MG tablet, Take 1 tablet by mouth Daily., Disp: 30 tablet, Rfl: 5  •  clopidogrel (PLAVIX) 75 MG tablet, Take 1 tablet by mouth Daily., Disp: 90 tablet, Rfl: 3  •  fluticasone-salmeterol (Advair Diskus) 250-50 MCG/DOSE DISKUS, Inhale 1 puff 2 (Two) Times a Day., Disp: 30 each,  "Rfl: 12  •  furosemide (LASIX) 40 MG tablet, Take 1 tablet by mouth Daily. (Patient taking differently: Take 40 mg by mouth Daily As Needed (swelling).), Disp: 90 tablet, Rfl: 2  •  ibuprofen (ADVIL,MOTRIN) 200 MG tablet, Take 5 tablets by mouth 3 (Three) Times a Day., Disp: , Rfl:   •  metoprolol succinate XL (TOPROL-XL) 25 MG 24 hr tablet, Take 1 tablet by mouth once daily, Disp: 90 tablet, Rfl: 1  •  Spiriva HandiHaler 18 MCG per inhalation capsule, Inhale 1 puff by mouth once daily, Disp: 30 capsule, Rfl: 5    No Known Allergies    Social History     Tobacco Use   • Smoking status: Current Every Day Smoker     Packs/day: 0.50     Years: 25.00     Pack years: 12.50     Types: Cigarettes   • Smokeless tobacco: Never Used   Substance Use Topics   • Alcohol use: Yes     Comment: rare     Review of Systems   Constitutional: Negative for fever.   HENT: Negative for sore throat.    Respiratory: Positive for cough, chest tightness, shortness of breath and wheezing. Negative for hemoptysis.    Cardiovascular: Positive for leg swelling. Negative for chest pain and palpitations.   Gastrointestinal: Negative for blood in stool.   Genitourinary: Negative for decreased urine volume.         Objective   Vitals:    01/20/22 1042   BP: 134/70   BP Location: Right arm   Patient Position: Sitting   Cuff Size: Large Adult   Pulse: 70   Resp: 20   Temp: 97.5 °F (36.4 °C)   TempSrc: Skin   SpO2: 97%   Weight: 121 kg (266 lb 12.8 oz)   Height: 177.8 cm (70\")     Body mass index is 38.28 kg/m².    Physical Exam  Constitutional:       General: He is not in acute distress.     Appearance: Normal appearance. He is well-developed. He is obese. He is not ill-appearing.      Comments: 20 lb weight gain since last visit   HENT:      Head: Normocephalic and atraumatic.   Eyes:      General: No scleral icterus.        Right eye: No discharge.         Left eye: No discharge.      Extraocular Movements: Extraocular movements intact.      " Conjunctiva/sclera: Conjunctivae normal.   Cardiovascular:      Rate and Rhythm: Normal rate and regular rhythm.      Heart sounds: Normal heart sounds. No murmur heard.      Pulmonary:      Effort: Pulmonary effort is normal.      Breath sounds: Wheezing (faint) and rales present.      Comments: Short of breath with speaking  Musculoskeletal:         General: Swelling and tenderness present.      Cervical back: Neck supple.      Right lower leg: Edema (2+ to just below knee) present.      Left lower leg: Edema (2+ to just below knee) present.   Skin:     General: Skin is warm.      Capillary Refill: Capillary refill takes less than 2 seconds.      Coloration: Skin is not jaundiced.      Findings: No rash.             Comments: Many varicose veins of leg, most notable of the right inner thigh and calf.  Superficial thrombophlebitis noted right inner leg and calf.  No overlying erythema.  Mildly tender to palpation.  Jess sign negative bilaterally.   Neurological:      Mental Status: He is alert. Mental status is at baseline.      Cranial Nerves: No cranial nerve deficit.   Psychiatric:         Mood and Affect: Mood normal.         Behavior: Behavior normal.         Thought Content: Thought content normal.         Assessment/Plan     Diagnoses and all orders for this visit:    1. Leg swelling (Primary)  Comments:  Check doppler study to r/o DVT.  Orders:  -     XR Chest 2 View  -     US Venous Doppler Lower Extremity Right (duplex)    2. Thrombophlebitis of superficial veins of right lower extremity  Comments:  Apply warm compresses.  Continue aspirin.    Orders:  -     US Venous Doppler Lower Extremity Right (duplex)    3. Chronic obstructive pulmonary disease with acute exacerbation (HCC)  Comments:  Refill Advair.  Oral steroids.  Orders:  -     fluticasone-salmeterol (Advair Diskus) 250-50 MCG/DOSE DISKUS; Inhale 1 puff 2 (Two) Times a Day.  Dispense: 60 each; Refill: 5  -     predniSONE (DELTASONE) 20 MG  tablet; TID x 3 days, BID x 3 days, QD x 3 days, 1/2 tab daily x 4 days  Dispense: 20 tablet; Refill: 0  -     XR Chest 2 View    4. Shortness of breath  -     BNP  -     CBC w AUTO Differential  -     Basic metabolic panel    5. Coronary artery disease involving native coronary artery of native heart without angina pectoris  Comments:  He denies chest pain.  Labs and imaging negative for CHF (BNP normal, CXR clear).    6. Tobacco dependence syndrome    7. Morbidly obese (HCC)    He was sent to [facility] for doppler, CXR and labs.  W/U neg for CHF, RLE DVT.  Recheck in office tomorrow.    We were unable to reach patient by phone this evening with his test results.    Return in about 1 day (around 1/21/2022) for Recheck.       Patient was given instructions and counseling regarding his/her condition or for health maintenance advice. Please see specific information pulled into the AVS if appropriate.     I wore protective equipment throughout this patient encounter to include N95 mask. Hand hygiene was performed before donning protective equipment and after removal when leaving the room.

## 2022-01-21 ENCOUNTER — TELEPHONE (OUTPATIENT)
Dept: FAMILY MEDICINE CLINIC | Facility: CLINIC | Age: 67
End: 2022-01-21

## 2022-01-21 ENCOUNTER — OFFICE VISIT (OUTPATIENT)
Dept: FAMILY MEDICINE CLINIC | Facility: CLINIC | Age: 67
End: 2022-01-21

## 2022-01-21 VITALS
WEIGHT: 265.5 LBS | BODY MASS INDEX: 38.1 KG/M2 | SYSTOLIC BLOOD PRESSURE: 132 MMHG | DIASTOLIC BLOOD PRESSURE: 80 MMHG | TEMPERATURE: 97.8 F | HEART RATE: 103 BPM

## 2022-01-21 DIAGNOSIS — I80.01 THROMBOPHLEBITIS OF SUPERFICIAL VEINS OF RIGHT LOWER EXTREMITY: ICD-10-CM

## 2022-01-21 DIAGNOSIS — J44.1 CHRONIC OBSTRUCTIVE PULMONARY DISEASE WITH ACUTE EXACERBATION: Primary | Chronic | ICD-10-CM

## 2022-01-21 DIAGNOSIS — F17.200 TOBACCO DEPENDENCE SYNDROME: ICD-10-CM

## 2022-01-21 DIAGNOSIS — E66.01 MORBIDLY OBESE: ICD-10-CM

## 2022-01-21 PROCEDURE — 99214 OFFICE O/P EST MOD 30 MIN: CPT | Performed by: FAMILY MEDICINE

## 2022-01-21 NOTE — TELEPHONE ENCOUNTER
SENT LETTER TO MR CEDENO  PFTS  ARE SCHEDULED AT [facility] FOR  2/4/2022 AT 9;00am

## 2022-03-09 ENCOUNTER — OFFICE VISIT (OUTPATIENT)
Dept: FAMILY MEDICINE CLINIC | Facility: CLINIC | Age: 67
End: 2022-03-09

## 2022-03-09 VITALS
WEIGHT: 280.5 LBS | HEART RATE: 106 BPM | SYSTOLIC BLOOD PRESSURE: 162 MMHG | DIASTOLIC BLOOD PRESSURE: 102 MMHG | TEMPERATURE: 97.8 F | HEIGHT: 70 IN | OXYGEN SATURATION: 97 % | BODY MASS INDEX: 40.16 KG/M2 | RESPIRATION RATE: 16 BRPM

## 2022-03-09 DIAGNOSIS — I10 BENIGN ESSENTIAL HYPERTENSION: Chronic | ICD-10-CM

## 2022-03-09 DIAGNOSIS — J44.1 CHRONIC OBSTRUCTIVE PULMONARY DISEASE WITH ACUTE EXACERBATION: Primary | ICD-10-CM

## 2022-03-09 DIAGNOSIS — E66.01 MORBIDLY OBESE: ICD-10-CM

## 2022-03-09 DIAGNOSIS — R60.0 LOWER EXTREMITY EDEMA: ICD-10-CM

## 2022-03-09 DIAGNOSIS — R60.1 GENERALIZED EDEMA: ICD-10-CM

## 2022-03-09 DIAGNOSIS — R06.02 SHORTNESS OF BREATH: ICD-10-CM

## 2022-03-09 PROCEDURE — 96372 THER/PROPH/DIAG INJ SC/IM: CPT | Performed by: FAMILY MEDICINE

## 2022-03-09 PROCEDURE — 99214 OFFICE O/P EST MOD 30 MIN: CPT | Performed by: FAMILY MEDICINE

## 2022-03-09 RX ORDER — PREDNISONE 20 MG/1
TABLET ORAL
Qty: 20 TABLET | Refills: 0 | Status: SHIPPED | OUTPATIENT
Start: 2022-03-09 | End: 2022-05-04

## 2022-03-09 RX ORDER — DOXYCYCLINE HYCLATE 100 MG/1
100 CAPSULE ORAL 2 TIMES DAILY
Qty: 20 CAPSULE | Refills: 0 | Status: SHIPPED | OUTPATIENT
Start: 2022-03-09 | End: 2022-03-19

## 2022-03-09 RX ORDER — FUROSEMIDE 40 MG/1
40 TABLET ORAL DAILY
Qty: 90 TABLET | Refills: 2 | Status: SHIPPED | OUTPATIENT
Start: 2022-03-09 | End: 2022-07-08 | Stop reason: SDUPTHER

## 2022-03-09 RX ORDER — ALBUTEROL SULFATE 2.5 MG/3ML
2.5 SOLUTION RESPIRATORY (INHALATION) EVERY 4 HOURS PRN
Qty: 180 ML | Refills: 5 | Status: SHIPPED | OUTPATIENT
Start: 2022-03-09

## 2022-03-09 RX ORDER — IPRATROPIUM/ALBUTEROL SULFATE 20-100 MCG
1 MIST INHALER (GRAM) INHALATION 4 TIMES DAILY PRN
Qty: 4 G | Refills: 5 | Status: SHIPPED | OUTPATIENT
Start: 2022-03-09 | End: 2022-04-15 | Stop reason: SDUPTHER

## 2022-03-09 RX ORDER — DEXAMETHASONE SODIUM PHOSPHATE 10 MG/ML
10 INJECTION INTRAMUSCULAR; INTRAVENOUS ONCE
Status: COMPLETED | OUTPATIENT
Start: 2022-03-09 | End: 2022-03-09

## 2022-03-09 RX ADMIN — DEXAMETHASONE SODIUM PHOSPHATE 10 MG: 10 INJECTION INTRAMUSCULAR; INTRAVENOUS at 17:46

## 2022-03-09 NOTE — PROGRESS NOTES
Chief Complaint   Patient presents with   • Shortness of Breath       Subjective   Santos Sullivan is a 66 y.o. male.     Shortness of Breath  This is a new problem. The current episode started 1 to 4 weeks ago. The problem occurs daily. The problem has been unchanged. Associated symptoms include leg swelling and wheezing. Pertinent negatives include no abdominal pain, chest pain or fever. The symptoms are aggravated by any activity, smoke, exercise and lying flat. Associated symptoms comments: 15lb weight gain. Risk factors include smoking. He has tried body position changes, steroid inhalers and cool air for the symptoms. His past medical history is significant for COPD.        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   • Hearing loss 07/05/2019   • Chronic hepatitis C (HCC) 07/05/2019   • Screen for colon cancer 07/05/2019   • Overweight 07/05/2019   • Physical exam 07/05/2019   • Screening PSA (prostate specific antigen) 07/05/2019   • Screening, lipid 07/05/2019   • Tobacco dependence syndrome 04/03/2019   • Mixed hyperlipidemia 06/14/2020   • Morbidly obese (HCC) 01/02/2021     Resolved Ambulatory Problems     Diagnosis Date Noted   • Acrocyanosis (HCC) 07/05/2019   • Ingrown nail 07/05/2019   • Need for immunization against influenza 07/05/2019   • Encounter for screening 07/05/2019   • Cellulitis of toe of right foot 07/05/2019     Past Medical History:   Diagnosis Date   • Abnormal stress test    • Benign essential hypertension    •  Claudication (HCC)    • COPD (chronic obstructive pulmonary disease) (HCC)    • Femur fracture (HCC)    • History of spinal cord injury    • Hyperlipidemia    • Left against medical advice    • Need for hepatitis C screening test    • Onychomycosis of toenail    • Overweight (BMI 25.0-29.9)    • Screening for depression    • Tobacco use disorder        Medication List:    Current Outpatient Medications:   •  albuterol (PROVENTIL) (2.5 MG/3ML) 0.083% nebulizer solution, USE 1 VIAL IN NEBULIZER EVERY 6 HOURS AS NEEDED FOR WHEEZING FOR SHORTNESS OF BREATH, Disp: 180 mL, Rfl: 5  •  albuterol sulfate  (90 Base) MCG/ACT inhaler, INHALE 2 PUFFS BY MOUTH EVERY 4 HOURS AS NEEDED FOR WHEEZING, Disp: 8 g, Rfl: 5  •  aspirin (aspirin) 81 MG EC tablet, Take 1 tablet by mouth Daily., Disp: , Rfl:   •  atorvastatin (LIPITOR) 40 MG tablet, Take 1 tablet by mouth Daily., Disp: 90 tablet, Rfl: 1  •  chlorthalidone (HYGROTON) 25 MG tablet, Take 1 tablet by mouth Daily., Disp: 30 tablet, Rfl: 5  •  clopidogrel (PLAVIX) 75 MG tablet, Take 1 tablet by mouth Daily., Disp: 90 tablet, Rfl: 3  •  fluticasone-salmeterol (Advair Diskus) 250-50 MCG/DOSE DISKUS, Inhale 1 puff 2 (Two) Times a Day., Disp: 60 each, Rfl: 5  •  furosemide (LASIX) 40 MG tablet, Take 1 tablet by mouth Daily. (Patient taking differently: Take 40 mg by mouth Daily As Needed (swelling).), Disp: 90 tablet, Rfl: 2  •  ibuprofen (ADVIL,MOTRIN) 200 MG tablet, Take 5 tablets by mouth 3 (Three) Times a Day., Disp: , Rfl:   •  metoprolol succinate XL (TOPROL-XL) 25 MG 24 hr tablet, Take 1 tablet by mouth once daily, Disp: 90 tablet, Rfl: 1  •  predniSONE (DELTASONE) 20 MG tablet, TID x 3 days, BID x 3 days, QD x 3 days, 1/2 tab daily x 4 days, Disp: 20 tablet, Rfl: 0  •  Spiriva HandiHaler 18 MCG per inhalation capsule, Inhale 1 puff by mouth once daily, Disp: 30 capsule, Rfl: 5    No Known Allergies    Social History     Tobacco Use   • Smoking status: Current Every Day  "Smoker     Packs/day: 0.50     Years: 25.00     Pack years: 12.50     Types: Cigarettes   • Smokeless tobacco: Never Used   Substance Use Topics   • Alcohol use: Yes     Comment: rare       Review of Systems   Constitutional: Negative for fever.   Respiratory: Positive for chest tightness, shortness of breath and wheezing.    Cardiovascular: Positive for leg swelling. Negative for chest pain and palpitations.   Gastrointestinal: Negative for abdominal pain.   Neurological: Negative for syncope.         Objective   Vitals:    03/09/22 1632   BP: (!) 162/102   BP Location: Right arm   Patient Position: Sitting   Cuff Size: Adult   Pulse: 106   Resp: 16   Temp: 97.8 °F (36.6 °C)   TempSrc: Temporal   SpO2: 97%   Weight: 127 kg (280 lb 8 oz)   Height: 177.8 cm (70\")     Body mass index is 40.25 kg/m².    Physical Exam  Constitutional:       General: He is not in acute distress.     Appearance: Normal appearance. He is well-developed. He is obese. He is not ill-appearing.      Comments: 20 lb weight gain since last visit   HENT:      Head: Normocephalic and atraumatic.   Eyes:      General: No scleral icterus.        Right eye: No discharge.         Left eye: No discharge.      Extraocular Movements: Extraocular movements intact.      Conjunctiva/sclera: Conjunctivae normal.   Cardiovascular:      Rate and Rhythm: Normal rate and regular rhythm.      Heart sounds: Normal heart sounds. No murmur heard.  Pulmonary:      Effort: Pulmonary effort is normal.      Breath sounds: Wheezing and rales present.      Comments: Short of breath with speaking  Musculoskeletal:         General: Swelling and tenderness present.      Cervical back: Neck supple.      Right lower leg: Edema (2+ to just below knee) present.      Left lower leg: Edema (2+ to just below knee) present.   Skin:     General: Skin is warm.      Capillary Refill: Capillary refill takes less than 2 seconds.      Coloration: Skin is not jaundiced.      Findings: No " rash.      Comments: Many varicose veins of leg, most notable of the right inner thigh and calf.     Neurological:      Mental Status: He is alert. Mental status is at baseline.      Cranial Nerves: No cranial nerve deficit.   Psychiatric:         Mood and Affect: Mood normal.         Behavior: Behavior normal.         Thought Content: Thought content normal.         Lab Results   Component Value Date    BUN 15 03/09/2022    CREATININE 1.05 03/09/2022     03/09/2022    K 5.0 03/09/2022     03/09/2022    CALCIUM 8.7 03/09/2022    WBC 12.6 (H) 03/09/2022    RBC 4.82 03/09/2022    HCT 44.9 03/09/2022    MCV 93 03/09/2022    MCH 32.0 03/09/2022    TSH 1.690 03/09/2022          Assessment/Plan     Diagnoses and all orders for this visit:    1. Chronic obstructive pulmonary disease with acute exacerbation (HCC) (Primary)  -     albuterol (PROVENTIL) (2.5 MG/3ML) 0.083% nebulizer solution; Take 2.5 mg by nebulization Every 4 (Four) Hours As Needed for Wheezing.  Dispense: 180 mL; Refill: 5  -     ipratropium-albuterol (Combivent Respimat)  MCG/ACT inhaler; Inhale 1 puff 4 (Four) Times a Day As Needed for Wheezing or Shortness of Air.  Dispense: 4 g; Refill: 5  -     dexamethasone (DECADRON) injection 10 mg  -     predniSONE (DELTASONE) 20 MG tablet; TID x 3 days, BID x 3 days, QD x 3 days, 1/2 tab daily x 4 days  Dispense: 20 tablet; Refill: 0  -     doxycycline (VIBRAMYCIN) 100 MG capsule; Take 1 capsule by mouth 2 (Two) Times a Day for 10 days.  Dispense: 20 capsule; Refill: 0    2. Shortness of breath  -     Adult Transthoracic Echo Complete W/ Cont if Necessary Per Protocol    3. Generalized edema  -     CBC w AUTO Differential  -     TSH  -     Basic metabolic panel  -     Adult Transthoracic Echo Complete W/ Cont if Necessary Per Protocol    4. Benign essential hypertension  -     furosemide (LASIX) 40 MG tablet; Take 1 tablet by mouth Daily.  Dispense: 90 tablet; Refill: 2    5. Lower extremity  edema  -     furosemide (LASIX) 40 MG tablet; Take 1 tablet by mouth Daily.  Dispense: 90 tablet; Refill: 2    6. Morbidly obese (HCC)        Return in about 2 days (around 3/11/2022).       Patient was given instructions and counseling regarding his/her condition or for health maintenance advice. Please see specific information pulled into the AVS if appropriate.     I wore protective equipment throughout this patient encounter to include mask. Hand hygiene was performed before donning protective equipment and after removal when leaving the room.

## 2022-03-10 LAB
BASOPHILS # BLD AUTO: 0.1 X10E3/UL (ref 0–0.2)
BASOPHILS NFR BLD AUTO: 1 %
BUN SERPL-MCNC: 15 MG/DL (ref 8–27)
BUN/CREAT SERPL: 14 (ref 10–24)
CALCIUM SERPL-MCNC: 8.7 MG/DL (ref 8.6–10.2)
CHLORIDE SERPL-SCNC: 103 MMOL/L (ref 96–106)
CO2 SERPL-SCNC: 19 MMOL/L (ref 20–29)
CREAT SERPL-MCNC: 1.05 MG/DL (ref 0.76–1.27)
EGFR GENE MUT ANL BLD/T: 79 ML/MIN/1.73
EOSINOPHIL # BLD AUTO: 0.6 X10E3/UL (ref 0–0.4)
EOSINOPHIL NFR BLD AUTO: 5 %
ERYTHROCYTE [DISTWIDTH] IN BLOOD BY AUTOMATED COUNT: 13.1 % (ref 11.6–15.4)
GLUCOSE SERPL-MCNC: 91 MG/DL (ref 65–99)
HCT VFR BLD AUTO: 44.9 % (ref 37.5–51)
HGB BLD-MCNC: 15.4 G/DL (ref 13–17.7)
IMM GRANULOCYTES # BLD AUTO: 0.1 X10E3/UL (ref 0–0.1)
IMM GRANULOCYTES NFR BLD AUTO: 1 %
LYMPHOCYTES # BLD AUTO: 2.4 X10E3/UL (ref 0.7–3.1)
LYMPHOCYTES NFR BLD AUTO: 19 %
MCH RBC QN AUTO: 32 PG (ref 26.6–33)
MCHC RBC AUTO-ENTMCNC: 34.3 G/DL (ref 31.5–35.7)
MCV RBC AUTO: 93 FL (ref 79–97)
MONOCYTES # BLD AUTO: 1.1 X10E3/UL (ref 0.1–0.9)
MONOCYTES NFR BLD AUTO: 9 %
NEUTROPHILS # BLD AUTO: 8.3 X10E3/UL (ref 1.4–7)
NEUTROPHILS NFR BLD AUTO: 65 %
PLATELET # BLD AUTO: 323 X10E3/UL (ref 150–450)
POTASSIUM SERPL-SCNC: 5 MMOL/L (ref 3.5–5.2)
RBC # BLD AUTO: 4.82 X10E6/UL (ref 4.14–5.8)
SODIUM SERPL-SCNC: 138 MMOL/L (ref 134–144)
TSH SERPL DL<=0.005 MIU/L-ACNC: 1.69 UIU/ML (ref 0.45–4.5)
WBC # BLD AUTO: 12.6 X10E3/UL (ref 3.4–10.8)

## 2022-03-10 NOTE — PROGRESS NOTES
Chief Complaint   Patient presents with   • COPD       Subjective   Santos Sullivan is a 66 y.o. male.     COPD  He complains of chest tightness, cough, difficulty breathing, frequent throat clearing, hoarse voice, shortness of breath and wheezing. This is a chronic problem. The current episode started more than 1 year ago. The problem occurs intermittently. The cough is non-productive, hoarse and hacking. His symptoms are aggravated by any activity, climbing stairs, lying down, minimal activity and strenuous activity. His symptoms are alleviated by change in position, ipratropium, diuretics and oral steroids (doxycycline). He reports moderate improvement on treatment.      F/U from 3/9/22 for AECOPD, emphysema.  Treated with IM dexamethasone in office, started on oral steroids, doxycyline and furosemide.  Labs stable/normal.  PFTs at [facility] showing moderate obstruction, emphysema.  He is feeling better.  Cough nonproductive.  He continues to be swollen.    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/2019   • Hearing loss 07/05/2019   • Chronic hepatitis C (HCC) 07/05/2019   • Screen for colon cancer 07/05/2019   • Overweight 07/05/2019   • Physical exam 07/05/2019   • Screening PSA (prostate specific antigen) 07/05/2019   • Screening, lipid 07/05/2019   • Tobacco dependence syndrome 04/03/2019   • Mixed hyperlipidemia 06/14/2020   • Morbidly obese (HCC) 01/02/2021     Resolved Ambulatory Problems     Diagnosis Date Noted    • Ingrown nail 07/05/2019   • Need for immunization against influenza 07/05/2019   • Encounter for screening 07/05/2019   • Cellulitis of toe of right foot 07/05/2019     Past Medical History:   Diagnosis Date   • Abnormal stress test    • Benign essential hypertension    • Claudication (HCC)    • COPD (chronic obstructive pulmonary disease) (HCC)    • Femur fracture (HCC)    • History of spinal cord injury    • Hyperlipidemia    • Left against medical advice    • Need for hepatitis C screening test    • Onychomycosis of toenail    • Overweight (BMI 25.0-29.9)    • Screening for depression    • Tobacco use disorder        Medication List:    Current Outpatient Medications:   •  albuterol (PROVENTIL) (2.5 MG/3ML) 0.083% nebulizer solution, Take 2.5 mg by nebulization Every 4 (Four) Hours As Needed for Wheezing., Disp: 180 mL, Rfl: 5  •  albuterol sulfate  (90 Base) MCG/ACT inhaler, INHALE 2 PUFFS BY MOUTH EVERY 4 HOURS AS NEEDED FOR WHEEZING, Disp: 8 g, Rfl: 5  •  aspirin (aspirin) 81 MG EC tablet, Take 1 tablet by mouth Daily., Disp: , Rfl:   •  atorvastatin (LIPITOR) 40 MG tablet, Take 1 tablet by mouth Daily., Disp: 90 tablet, Rfl: 1  •  chlorthalidone (HYGROTON) 25 MG tablet, Take 1 tablet by mouth Daily., Disp: 30 tablet, Rfl: 5  •  clopidogrel (PLAVIX) 75 MG tablet, Take 1 tablet by mouth Daily., Disp: 90 tablet, Rfl: 3  •  doxycycline (VIBRAMYCIN) 100 MG capsule, Take 1 capsule by mouth 2 (Two) Times a Day for 10 days., Disp: 20 capsule, Rfl: 0  •  fluticasone-salmeterol (Advair Diskus) 250-50 MCG/DOSE DISKUS, Inhale 1 puff 2 (Two) Times a Day., Disp: 60 each, Rfl: 5  •  furosemide (LASIX) 40 MG tablet, Take 1 tablet by mouth Daily., Disp: 90 tablet, Rfl: 2  •  ibuprofen (ADVIL,MOTRIN) 200 MG tablet, Take 5 tablets by mouth 3 (Three) Times a Day., Disp: , Rfl:   •  ipratropium-albuterol (Combivent Respimat)  MCG/ACT inhaler, Inhale 1 puff 4 (Four) Times a Day As Needed for Wheezing or Shortness  "of Air., Disp: 4 g, Rfl: 5  •  metoprolol succinate XL (TOPROL-XL) 25 MG 24 hr tablet, Take 1 tablet by mouth once daily, Disp: 90 tablet, Rfl: 1  •  predniSONE (DELTASONE) 20 MG tablet, TID x 3 days, BID x 3 days, QD x 3 days, 1/2 tab daily x 4 days, Disp: 20 tablet, Rfl: 0  •  Spiriva HandiHaler 18 MCG per inhalation capsule, Inhale 1 puff by mouth once daily, Disp: 30 capsule, Rfl: 5    No Known Allergies    Social History     Tobacco Use   • Smoking status: Current Every Day Smoker     Packs/day: 0.50     Years: 25.00     Pack years: 12.50     Types: Cigarettes   • Smokeless tobacco: Never Used   Substance Use Topics   • Alcohol use: Yes     Comment: rare       Review of Systems   Constitutional: Positive for unexpected weight gain (now down 1 lb from visit 2 days ago).   HENT: Positive for hoarse voice.    Eyes: Negative for visual disturbance.   Respiratory: Positive for cough, chest tightness, shortness of breath and wheezing.    Cardiovascular: Positive for leg swelling (body swelling).   Gastrointestinal: Negative for diarrhea and vomiting.   Genitourinary: Negative for decreased urine volume and difficulty urinating.   Neurological: Negative for syncope.         Objective   Vitals:    03/11/22 0905   BP: 140/82   BP Location: Right arm   Patient Position: Sitting   Cuff Size: Adult   Pulse: 104   Resp: 16   Temp: 97.7 °F (36.5 °C)   TempSrc: Temporal   SpO2: 98%   Weight: 127 kg (279 lb)   Height: 177.8 cm (70\")     Body mass index is 40.03 kg/m².    Physical Exam  Constitutional:       General: He is not in acute distress.     Appearance: Normal appearance. He is well-developed. He is obese. He is not ill-appearing.      Comments: 20 lb weight gain since onset of COPD symptoms, down 1 lb   HENT:      Head: Normocephalic and atraumatic.   Eyes:      General: No scleral icterus.        Right eye: No discharge.         Left eye: No discharge.      Extraocular Movements: Extraocular movements intact.      " Conjunctiva/sclera: Conjunctivae normal.   Cardiovascular:      Rate and Rhythm: Normal rate and regular rhythm.      Heart sounds: No murmur heard.     Comments: Heart sounds distant  Pulmonary:      Effort: Pulmonary effort is normal.      Breath sounds: Wheezing and rhonchi present. No rales.      Comments: No longer short of breath with speaking.  More wheezing/rhonchi today but better air movement.  Musculoskeletal:         General: No swelling or tenderness.      Cervical back: Neck supple.      Right lower leg: Edema (2+ to just below knee) present.      Left lower leg: Edema (2+ to just below knee) present.   Skin:     General: Skin is warm.      Capillary Refill: Capillary refill takes less than 2 seconds.      Coloration: Skin is not jaundiced.      Findings: No erythema or rash.   Neurological:      Mental Status: He is alert. Mental status is at baseline.      Cranial Nerves: No cranial nerve deficit.   Psychiatric:         Mood and Affect: Mood normal.         Behavior: Behavior normal.         Thought Content: Thought content normal.           Lab Results   Component Value Date    BUN 15 03/09/2022    CREATININE 1.05 03/09/2022     03/09/2022    K 5.0 03/09/2022     03/09/2022    CALCIUM 8.7 03/09/2022    WBC 12.6 (H) 03/09/2022    RBC 4.82 03/09/2022    HCT 44.9 03/09/2022    MCV 93 03/09/2022    MCH 32.0 03/09/2022    TSH 1.690 03/09/2022          Assessment/Plan     Diagnoses and all orders for this visit:    1. Chronic obstructive pulmonary disease with acute exacerbation (HCC) (Primary)    2. Generalized edema    3. Benign essential hypertension    4. Tobacco dependence syndrome    5. Morbidly obese (HCC)    COPD, emphysema, acute exacerbation.  Continue with oral steroids, doxycycline.    PFTs 2/2022 [facility].  Last exacerbation 6 wks ago.  Refer to pulmonology.  He was given a sample Breo 200 inhaler due to increased use of Advair this month to avoid him running out of medication.    He  was scheduled for an ECHO on Monday.  Continue furosemide.  ? Pulmonary hypertension.    BP improved.  Continue furosemide 40 mg daily.  Potassium normal on labs 3/9.    Patient not interested in smoking cessation.    Return in about 1 week (around 3/18/2022) for Recheck.       Patient was given instructions and counseling regarding his/her condition or for health maintenance advice. Please see specific information pulled into the AVS if appropriate.     I wore protective equipment throughout this patient encounter to include mask. Hand hygiene was performed before donning protective equipment and after removal when leaving the room.

## 2022-03-11 ENCOUNTER — OFFICE VISIT (OUTPATIENT)
Dept: FAMILY MEDICINE CLINIC | Facility: CLINIC | Age: 67
End: 2022-03-11

## 2022-03-11 VITALS
BODY MASS INDEX: 39.94 KG/M2 | WEIGHT: 279 LBS | OXYGEN SATURATION: 98 % | DIASTOLIC BLOOD PRESSURE: 82 MMHG | SYSTOLIC BLOOD PRESSURE: 140 MMHG | HEIGHT: 70 IN | TEMPERATURE: 97.7 F | HEART RATE: 104 BPM | RESPIRATION RATE: 16 BRPM

## 2022-03-11 DIAGNOSIS — E66.01 MORBIDLY OBESE: ICD-10-CM

## 2022-03-11 DIAGNOSIS — J44.1 CHRONIC OBSTRUCTIVE PULMONARY DISEASE WITH ACUTE EXACERBATION: Primary | ICD-10-CM

## 2022-03-11 DIAGNOSIS — I10 BENIGN ESSENTIAL HYPERTENSION: ICD-10-CM

## 2022-03-11 DIAGNOSIS — R60.1 GENERALIZED EDEMA: ICD-10-CM

## 2022-03-11 DIAGNOSIS — F17.200 TOBACCO DEPENDENCE SYNDROME: ICD-10-CM

## 2022-03-11 PROCEDURE — 99214 OFFICE O/P EST MOD 30 MIN: CPT | Performed by: FAMILY MEDICINE

## 2022-03-14 ENCOUNTER — HOSPITAL ENCOUNTER (OUTPATIENT)
Dept: CARDIOLOGY | Facility: HOSPITAL | Age: 67
Discharge: HOME | End: 2022-03-14
Admitting: FAMILY MEDICINE

## 2022-03-14 VITALS
DIASTOLIC BLOOD PRESSURE: 100 MMHG | WEIGHT: 279.98 LBS | BODY MASS INDEX: 40.08 KG/M2 | HEIGHT: 70 IN | SYSTOLIC BLOOD PRESSURE: 163 MMHG

## 2022-03-14 PROCEDURE — 93306 TTE W/DOPPLER COMPLETE: CPT | Performed by: INTERNAL MEDICINE

## 2022-03-14 PROCEDURE — 93306 TTE W/DOPPLER COMPLETE: CPT

## 2022-03-18 ENCOUNTER — OFFICE VISIT (OUTPATIENT)
Dept: FAMILY MEDICINE CLINIC | Facility: CLINIC | Age: 67
End: 2022-03-18

## 2022-03-18 VITALS
RESPIRATION RATE: 16 BRPM | TEMPERATURE: 97.1 F | BODY MASS INDEX: 39.71 KG/M2 | HEART RATE: 99 BPM | DIASTOLIC BLOOD PRESSURE: 86 MMHG | SYSTOLIC BLOOD PRESSURE: 148 MMHG | OXYGEN SATURATION: 98 % | WEIGHT: 277.38 LBS | HEIGHT: 70 IN

## 2022-03-18 DIAGNOSIS — J44.1 CHRONIC OBSTRUCTIVE PULMONARY DISEASE WITH ACUTE EXACERBATION: Primary | ICD-10-CM

## 2022-03-18 DIAGNOSIS — F17.200 TOBACCO DEPENDENCE SYNDROME: ICD-10-CM

## 2022-03-18 DIAGNOSIS — I10 ESSENTIAL HYPERTENSION: Chronic | ICD-10-CM

## 2022-03-18 DIAGNOSIS — R60.1 GENERALIZED EDEMA: ICD-10-CM

## 2022-03-18 DIAGNOSIS — R06.02 SHORT OF BREATH ON EXERTION: ICD-10-CM

## 2022-03-18 DIAGNOSIS — E66.01 MORBIDLY OBESE: ICD-10-CM

## 2022-03-18 PROBLEM — I73.89 ACROCYANOSIS (HCC): Status: RESOLVED | Noted: 2019-07-05 | Resolved: 2022-03-18

## 2022-03-18 PROCEDURE — 99214 OFFICE O/P EST MOD 30 MIN: CPT | Performed by: FAMILY MEDICINE

## 2022-03-18 NOTE — PROGRESS NOTES
Chief Complaint   Patient presents with   • COPD       Subjective   Santos Sullivan is a 66 y.o. male.     COPD  He complains of chest tightness, cough, difficulty breathing, frequent throat clearing, hoarse voice and shortness of breath. This is a chronic problem. The current episode started more than 1 year ago. The problem occurs constantly. The problem has been gradually improving. The cough is productive. Associated symptoms include dyspnea on exertion. Pertinent negatives include no fever. His symptoms are aggravated by any activity, exercise and minimal activity. His symptoms are alleviated by oral steroids, diuretics, ipratropium, change in position and rest. He reports moderate improvement on treatment. Risk factors for lung disease include smoking/tobacco exposure. His past medical history is significant for COPD.      One week recheck of COPD exacerbation.  He reports he is breathing a little better.  He finds the Combivent more effective and has reduced nebulizer treatments.      ECHO done but results pending.  He continues to retain fluid.  Pulmonology consultation pending.      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   • Hearing loss 07/05/2019   • Chronic hepatitis C (HCC) 07/05/2019   • Screen for colon cancer 07/05/2019   • Overweight 07/05/2019   • Physical exam 07/05/2019   • Screening PSA (prostate specific antigen) 07/05/2019   • Screening, lipid 07/05/2019   • Tobacco dependence syndrome 04/03/2019   • Mixed  hyperlipidemia 06/14/2020   • Morbidly obese (HCC) 01/02/2021     Resolved Ambulatory Problems     Diagnosis Date Noted   • Acrocyanosis (HCC) 07/05/2019   • Ingrown nail 07/05/2019   • Need for immunization against influenza 07/05/2019   • Encounter for screening 07/05/2019   • Cellulitis of toe of right foot 07/05/2019     Past Medical History:   Diagnosis Date   • Abnormal stress test    • Benign essential hypertension    • Claudication (HCC)    • COPD (chronic obstructive pulmonary disease) (HCC)    • Femur fracture (HCC)    • History of spinal cord injury    • Hyperlipidemia    • Left against medical advice    • Need for hepatitis C screening test    • Onychomycosis of toenail    • Overweight (BMI 25.0-29.9)    • Screening for depression    • Tobacco use disorder        Medication List:    Current Outpatient Medications:   •  albuterol (PROVENTIL) (2.5 MG/3ML) 0.083% nebulizer solution, Take 2.5 mg by nebulization Every 4 (Four) Hours As Needed for Wheezing., Disp: 180 mL, Rfl: 5  •  albuterol sulfate  (90 Base) MCG/ACT inhaler, INHALE 2 PUFFS BY MOUTH EVERY 4 HOURS AS NEEDED FOR WHEEZING, Disp: 8 g, Rfl: 5  •  aspirin (aspirin) 81 MG EC tablet, Take 1 tablet by mouth Daily., Disp: , Rfl:   •  atorvastatin (LIPITOR) 40 MG tablet, Take 1 tablet by mouth Daily., Disp: 90 tablet, Rfl: 1  •  chlorthalidone (HYGROTON) 25 MG tablet, Take 1 tablet by mouth Daily., Disp: 30 tablet, Rfl: 5  •  clopidogrel (PLAVIX) 75 MG tablet, Take 1 tablet by mouth Daily., Disp: 90 tablet, Rfl: 3  •  doxycycline (VIBRAMYCIN) 100 MG capsule, Take 1 capsule by mouth 2 (Two) Times a Day for 10 days., Disp: 20 capsule, Rfl: 0  •  fluticasone-salmeterol (Advair Diskus) 250-50 MCG/DOSE DISKUS, Inhale 1 puff 2 (Two) Times a Day., Disp: 60 each, Rfl: 5  •  furosemide (LASIX) 40 MG tablet, Take 1 tablet by mouth Daily., Disp: 90 tablet, Rfl: 2  •  ibuprofen (ADVIL,MOTRIN) 200 MG tablet, Take 5 tablets by mouth 3 (Three) Times a Day.,  "Disp: , Rfl:   •  ipratropium-albuterol (Combivent Respimat)  MCG/ACT inhaler, Inhale 1 puff 4 (Four) Times a Day As Needed for Wheezing or Shortness of Air., Disp: 4 g, Rfl: 5  •  metoprolol succinate XL (TOPROL-XL) 25 MG 24 hr tablet, Take 1 tablet by mouth once daily, Disp: 90 tablet, Rfl: 1  •  predniSONE (DELTASONE) 20 MG tablet, TID x 3 days, BID x 3 days, QD x 3 days, 1/2 tab daily x 4 days, Disp: 20 tablet, Rfl: 0  •  Spiriva HandiHaler 18 MCG per inhalation capsule, Inhale 1 puff by mouth once daily, Disp: 30 capsule, Rfl: 5    No Known Allergies    Social History     Tobacco Use   • Smoking status: Current Every Day Smoker     Packs/day: 0.50     Years: 25.00     Pack years: 12.50     Types: Cigarettes   • Smokeless tobacco: Never Used   Substance Use Topics   • Alcohol use: Yes     Comment: rare           Review of Systems   Constitutional: Negative for fever and unexpected weight gain (down 2 lbs).   HENT: Positive for hoarse voice.    Respiratory: Positive for cough and shortness of breath.    Cardiovascular: Positive for dyspnea on exertion and leg swelling.         Objective   Vitals:    03/18/22 0829   BP: 148/86   BP Location: Right arm   Patient Position: Sitting   Cuff Size: Adult   Pulse: 99   Resp: 16   Temp: 97.1 °F (36.2 °C)   TempSrc: Temporal   SpO2: 98%   Weight: 126 kg (277 lb 6 oz)   Height: 177.8 cm (70\")     Body mass index is 39.8 kg/m².    Physical Exam  Constitutional:       General: He is not in acute distress.     Appearance: Normal appearance. He is well-developed. He is obese. He is not ill-appearing.      Comments: 20 lb weight gain since onset of COPD symptoms, down 1 lb   HENT:      Head: Normocephalic and atraumatic.   Eyes:      General: No scleral icterus.        Right eye: No discharge.         Left eye: No discharge.      Extraocular Movements: Extraocular movements intact.      Conjunctiva/sclera: Conjunctivae normal.   Cardiovascular:      Rate and Rhythm: Normal " rate and regular rhythm.      Comments: Barrel-chested, Heart sounds distant  Pulmonary:      Effort: Pulmonary effort is normal.      Breath sounds: Rhonchi (faint, diffuse) present. No wheezing or rales.      Comments: No longer short of breath with speaking.  More wheezing/rhonchi today but better air movement.  Musculoskeletal:         General: No swelling or tenderness.      Cervical back: Neck supple.      Right lower leg: Edema (2+ to just below knee) present.      Left lower leg: Edema (2+ to just below knee) present.   Skin:     General: Skin is warm.      Capillary Refill: Capillary refill takes less than 2 seconds.      Coloration: Skin is not jaundiced.      Findings: No erythema or rash.   Neurological:      Mental Status: He is alert. Mental status is at baseline.      Cranial Nerves: No cranial nerve deficit.   Psychiatric:         Mood and Affect: Mood normal.         Behavior: Behavior normal.         Thought Content: Thought content normal.         Lab Results   Component Value Date    BUN 15 03/09/2022    CREATININE 1.05 03/09/2022     03/09/2022    K 5.0 03/09/2022     03/09/2022    CALCIUM 8.7 03/09/2022    WBC 12.6 (H) 03/09/2022    RBC 4.82 03/09/2022    HCT 44.9 03/09/2022    MCV 93 03/09/2022    MCH 32.0 03/09/2022    TSH 1.690 03/09/2022          Assessment/Plan     Diagnoses and all orders for this visit:    1. Chronic obstructive pulmonary disease with acute exacerbation (HCC) (Primary)  -     Ambulatory Referral to Pulmonology  -     Fluticasone-Umeclidin-Vilant (Trelegy Ellipta) 200-62.5-25 MCG/INH inhaler; Inhale 1 puff Daily.  Dispense: 1 each; Refill: 5    2. Short of breath on exertion    3. Generalized edema    4. Essential hypertension    5. Tobacco dependence syndrome    6. Morbidly obese (HCC)      Change from Advair + Spiriva to Trelegy, if insurance allows.  Continue Combivent, neb treatments.  Consider theophylline or Daliresp.  Proceed with pulmonology referral  (Dr. Belcher).  Await ECHO results.  Continue furosemide 40 mg daily.  Consider increase if ECHO suggests pulmonary hypertension.    BP a little better today.  Recent labs stable (no ARF or hypokalemia).  Recheck 1 mo.    Return in about 4 weeks (around 4/15/2022) for Recheck.       Patient was given instructions and counseling regarding his/her condition or for health maintenance advice. Please see specific information pulled into the AVS if appropriate.     I wore protective equipment throughout this patient encounter to include mask. Hand hygiene was performed before donning protective equipment and after removal when leaving the room.

## 2022-03-20 LAB
BH CV ECHO MEAS - EF(MOD-BP): 57 %
BH CV ECHO MEAS - FS: 34 %
BH CV ECHO MEAS - IVSD: 1.3 CM
BH CV ECHO MEAS - LA DIMENSION: 3.1 CM
BH CV ECHO MEAS - LVIDD: 5 CM
BH CV ECHO MEAS - LVIDS: 3.3 CM
BH CV ECHO MEAS - LVOT DIAM: 2 CM
BH CV ECHO MEAS - LVPWD: 1.3 CM
BH CV ECHO MEAS - MV MAX PG: 2 MMHG
BH CV ECHO MEAS - MV MEAN PG: 1 MMHG
BH CV ECHO MEAS - MV P1/2T: 85 MSEC
BH CV ECHO MEAS - MV V2 VTI: 19 CM
BH CV ECHO MEAS - MVA(P1/2T): 2.6 CM2
BH CV ECHO MEAS - PAPD(PI EDV): 5 MMHG
BH CV ECHO MEAS - PULM S/D: 1.4
BH CV ECHO MEAS - RVSP: 27 MMHG
BH CV ECHO MEAS - TAPSE (>1.6): 2.3 CM
BH CV XLRA - RV BASE: 3.9 CM
BH CV XLRA - RV MID: 2.3 CM
LV EF 2D ECHO EST: 60 %
MAXIMAL PREDICTED HEART RATE: 155 BPM
STRESS TARGET HR: 132 BPM

## 2022-03-21 NOTE — PROGRESS NOTES
Please let patient know that his ECHO looks good.  We'll discuss the report in more detail at his visit on 4/15.  Needs to see pulmonology.

## 2022-04-11 ENCOUNTER — TELEPHONE (OUTPATIENT)
Dept: FAMILY MEDICINE CLINIC | Facility: CLINIC | Age: 67
End: 2022-04-11

## 2022-04-11 NOTE — TELEPHONE ENCOUNTER
Dr Belcher/[facility] office called, stated that they are unable to contact the patient (no answer/voicemail)    Please advise.

## 2022-04-15 ENCOUNTER — OFFICE VISIT (OUTPATIENT)
Dept: FAMILY MEDICINE CLINIC | Facility: CLINIC | Age: 67
End: 2022-04-15

## 2022-04-15 ENCOUNTER — HOSPITAL ENCOUNTER (OUTPATIENT)
Dept: GENERAL RADIOLOGY | Facility: HOSPITAL | Age: 67
Discharge: HOME | End: 2022-04-15
Admitting: INTERNAL MEDICINE

## 2022-04-15 VITALS
DIASTOLIC BLOOD PRESSURE: 70 MMHG | SYSTOLIC BLOOD PRESSURE: 132 MMHG | RESPIRATION RATE: 22 BRPM | BODY MASS INDEX: 39.37 KG/M2 | HEIGHT: 70 IN | TEMPERATURE: 97.2 F | HEART RATE: 86 BPM | OXYGEN SATURATION: 97 % | WEIGHT: 275 LBS

## 2022-04-15 DIAGNOSIS — J44.1 CHRONIC OBSTRUCTIVE PULMONARY DISEASE WITH ACUTE EXACERBATION: Primary | ICD-10-CM

## 2022-04-15 DIAGNOSIS — E66.01 MORBIDLY OBESE: ICD-10-CM

## 2022-04-15 DIAGNOSIS — R06.02 SHORT OF BREATH ON EXERTION: ICD-10-CM

## 2022-04-15 DIAGNOSIS — I10 ESSENTIAL HYPERTENSION: ICD-10-CM

## 2022-04-15 DIAGNOSIS — F17.200 TOBACCO DEPENDENCE SYNDROME: ICD-10-CM

## 2022-04-15 DIAGNOSIS — I25.10 CORONARY ARTERY DISEASE INVOLVING NATIVE CORONARY ARTERY OF NATIVE HEART WITHOUT ANGINA PECTORIS: Chronic | ICD-10-CM

## 2022-04-15 DIAGNOSIS — R60.1 GENERALIZED EDEMA: ICD-10-CM

## 2022-04-15 DIAGNOSIS — R05.3 CHRONIC COUGH: ICD-10-CM

## 2022-04-15 PROCEDURE — 71046 X-RAY EXAM CHEST 2 VIEWS: CPT

## 2022-04-15 PROCEDURE — 99214 OFFICE O/P EST MOD 30 MIN: CPT | Performed by: FAMILY MEDICINE

## 2022-04-15 RX ORDER — CEFUROXIME AXETIL 500 MG/1
TABLET ORAL
COMMUNITY
Start: 2022-04-14 | End: 2022-05-04

## 2022-04-15 RX ORDER — IPRATROPIUM/ALBUTEROL SULFATE 20-100 MCG
1 MIST INHALER (GRAM) INHALATION 4 TIMES DAILY PRN
Qty: 4 G | Refills: 5 | Status: SHIPPED | OUTPATIENT
Start: 2022-04-15

## 2022-04-15 RX ORDER — CHLORTHALIDONE 25 MG/1
25 TABLET ORAL DAILY
Qty: 30 TABLET | Refills: 5 | Status: SHIPPED | OUTPATIENT
Start: 2022-04-15 | End: 2022-05-04

## 2022-05-04 ENCOUNTER — OFFICE VISIT (OUTPATIENT)
Dept: FAMILY MEDICINE CLINIC | Facility: CLINIC | Age: 67
End: 2022-05-04

## 2022-05-04 VITALS
TEMPERATURE: 98 F | RESPIRATION RATE: 16 BRPM | HEART RATE: 105 BPM | WEIGHT: 278.13 LBS | BODY MASS INDEX: 39.82 KG/M2 | SYSTOLIC BLOOD PRESSURE: 130 MMHG | DIASTOLIC BLOOD PRESSURE: 78 MMHG | HEIGHT: 70 IN | OXYGEN SATURATION: 98 %

## 2022-05-04 DIAGNOSIS — J44.1 CHRONIC OBSTRUCTIVE PULMONARY DISEASE WITH ACUTE EXACERBATION: Primary | Chronic | ICD-10-CM

## 2022-05-04 DIAGNOSIS — R73.9 HYPERGLYCEMIA: ICD-10-CM

## 2022-05-04 DIAGNOSIS — I10 BENIGN ESSENTIAL HYPERTENSION: ICD-10-CM

## 2022-05-04 DIAGNOSIS — R60.1 GENERALIZED EDEMA: ICD-10-CM

## 2022-05-04 DIAGNOSIS — R06.02 SHORT OF BREATH ON EXERTION: ICD-10-CM

## 2022-05-04 DIAGNOSIS — F17.200 TOBACCO DEPENDENCE SYNDROME: ICD-10-CM

## 2022-05-04 DIAGNOSIS — I25.10 CORONARY ARTERY DISEASE INVOLVING NATIVE CORONARY ARTERY OF NATIVE HEART WITHOUT ANGINA PECTORIS: Chronic | ICD-10-CM

## 2022-05-04 DIAGNOSIS — B18.2 CHRONIC HEPATITIS C WITHOUT HEPATIC COMA: ICD-10-CM

## 2022-05-04 DIAGNOSIS — E78.2 MIXED HYPERLIPIDEMIA: ICD-10-CM

## 2022-05-04 DIAGNOSIS — E66.01 MORBIDLY OBESE: ICD-10-CM

## 2022-05-04 PROCEDURE — 99214 OFFICE O/P EST MOD 30 MIN: CPT | Performed by: FAMILY MEDICINE

## 2022-05-04 RX ORDER — PREDNISONE 1 MG/1
5 TABLET ORAL DAILY
Qty: 30 TABLET | Refills: 0 | Status: SHIPPED | OUTPATIENT
Start: 2022-05-04 | End: 2022-06-06

## 2022-05-04 NOTE — PROGRESS NOTES
Chief Complaint   Patient presents with   • COPD       Subjective   Santos Sullivan is a 66 y.o. male.     COPD  He complains of chest tightness, cough, difficulty breathing, frequent throat clearing, hoarse voice, shortness of breath and sputum production. This is a chronic problem. The current episode started more than 1 year ago. The problem occurs constantly. The problem has been unchanged. The cough is productive, croupy and productive of sputum. Associated symptoms include dyspnea on exertion and orthopnea. Pertinent negatives include no fever. His symptoms are aggravated by change in weather, any activity, minimal activity, exposure to smoke, occupational exposure, strenuous activity and exercise. His symptoms are alleviated by oral steroids, diuretics, change in position and beta-agonist. He reports minimal improvement on treatment. Risk factors for lung disease include smoking/tobacco exposure. His past medical history is significant for COPD.      Patient is here for f/u.  Last visit 4/15/22 for COPD exacerbation.  On oral steroids and abx at that visit.  He was referred to cardiology by Dr. Belcher.  He has yet to hear about his cardiology referral.  ECHO recently done due to concern for CHF.  Taking furosemide (not taking chlorthalidone - he reports BP at home has been good).    Mr. Sullivan reports compliance with inhalers.  He feels improved when using the rescue inhaler.  He reports that he feels good when on steroids.    H/O Hep C, treated about 2 years ago with antiviral medications.  He reports that he never followed up with GI after treatment.    I have reviewed relevant past medical, family, social and surgical history for this patient.  Medications review is done by myself, with patient.    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/2019   • Hearing loss 07/05/2019   • Chronic hepatitis C (HCC) 07/05/2019   • Screen for colon cancer 07/05/2019   • Overweight 07/05/2019   • Physical exam 07/05/2019   • Screening PSA (prostate specific antigen) 07/05/2019   • Screening, lipid 07/05/2019   • Tobacco dependence syndrome 04/03/2019   • Mixed hyperlipidemia 06/14/2020   • Morbidly obese (HCC) 01/02/2021     Resolved Ambulatory Problems     Diagnosis Date Noted   • Acrocyanosis (HCC) 07/05/2019   • Ingrown nail 07/05/2019   • Need for immunization against influenza 07/05/2019   • Encounter for screening 07/05/2019   • Cellulitis of toe of right foot 07/05/2019     Past Medical History:   Diagnosis Date   • Abnormal stress test    • Benign essential hypertension    • Claudication (HCC)    • COPD (chronic obstructive pulmonary disease) (HCC)    • Femur fracture (HCC)    • History of spinal cord injury    • Hyperlipidemia    • Left against medical advice    • Need for hepatitis C screening test    • Onychomycosis of toenail    • Overweight (BMI 25.0-29.9)    • Screening for depression    • Tobacco use disorder        Medication List:    Current Outpatient Medications:   •  albuterol (PROVENTIL) (2.5 MG/3ML) 0.083% nebulizer solution, Take 2.5 mg by nebulization Every 4 (Four) Hours As Needed for Wheezing., Disp: 180 mL, Rfl: 5  •  aspirin (aspirin) 81 MG EC tablet, Take 1 tablet by mouth Daily., Disp: , Rfl:   •  atorvastatin (LIPITOR) 40 MG tablet, Take 1 tablet by mouth Daily., Disp: 90 tablet, Rfl: 1  •  clopidogrel (PLAVIX) 75 MG tablet, Take 1 tablet by mouth Daily., Disp: 90 tablet, Rfl: 3  •  Fluticasone-Umeclidin-Vilant (Trelegy Ellipta) 200-62.5-25  "MCG/INH inhaler, Inhale 1 puff Daily., Disp: 1 each, Rfl: 5  •  furosemide (LASIX) 40 MG tablet, Take 1 tablet by mouth Daily., Disp: 90 tablet, Rfl: 2  •  ibuprofen (ADVIL,MOTRIN) 200 MG tablet, Take 5 tablets by mouth 3 (Three) Times a Day., Disp: , Rfl:   •  ipratropium-albuterol (Combivent Respimat)  MCG/ACT inhaler, Inhale 1 puff 4 (Four) Times a Day As Needed for Wheezing or Shortness of Air., Disp: 4 g, Rfl: 5  •  metoprolol succinate XL (TOPROL-XL) 25 MG 24 hr tablet, Take 1 tablet by mouth once daily, Disp: 90 tablet, Rfl: 1      No Known Allergies    Social History     Tobacco Use   • Smoking status: Current Every Day Smoker     Packs/day: 0.50     Years: 25.00     Pack years: 12.50     Types: Cigarettes   • Smokeless tobacco: Never Used   Substance Use Topics   • Alcohol use: Yes     Comment: rare       PHQ-2 Depression Screening  Little interest or pleasure in doing things? 0-->not at all   Feeling down, depressed, or hopeless? 0-->not at all   PHQ-2 Total Score 0         Review of Systems   Constitutional: Positive for fatigue and unexpected weight gain (3 lbs). Negative for fever.   HENT: Positive for hoarse voice.    Respiratory: Positive for cough, sputum production and shortness of breath.    Cardiovascular: Positive for dyspnea on exertion and leg swelling.        Generalized edema   Gastrointestinal: Positive for abdominal distention.         Objective   Vitals:    05/04/22 0825   BP: 130/78   BP Location: Right arm   Patient Position: Sitting   Cuff Size: Adult   Pulse: 105   Resp: 16   Temp: 98 °F (36.7 °C)   TempSrc: Temporal   SpO2: 98%   Weight: 126 kg (278 lb 2 oz)   Height: 177.8 cm (70\")     Body mass index is 39.91 kg/m².    Physical Exam  Constitutional:       General: He is not in acute distress.     Appearance: Normal appearance. He is well-developed. He is obese. He is not ill-appearing.      Comments: Generalized body edema.   HENT:      Head: Normocephalic and atraumatic. "   Eyes:      General: No scleral icterus.        Right eye: No discharge.         Left eye: No discharge.      Extraocular Movements: Extraocular movements intact.      Conjunctiva/sclera: Conjunctivae normal.   Cardiovascular:      Rate and Rhythm: Normal rate and regular rhythm.      Comments: Barrel-chested, Heart sounds distant  Pulmonary:      Effort: Pulmonary effort is normal.      Breath sounds: No wheezing, rhonchi (none today) or rales.      Comments: He is short of breath with speaking today.  Fair air movement today (used rescue inhaler before appt).  Abdominal:      General: There is distension (generalized).   Musculoskeletal:         General: No swelling or tenderness.      Cervical back: Neck supple.      Right lower leg: Edema (1+ to just below knee) present.      Left lower leg: Edema (1+ to just below knee) present.   Skin:     General: Skin is warm.      Capillary Refill: Capillary refill takes less than 2 seconds.      Coloration: Skin is not jaundiced.      Findings: No erythema or rash.   Neurological:      Mental Status: He is alert. Mental status is at baseline.      Cranial Nerves: No cranial nerve deficit.   Psychiatric:         Mood and Affect: Mood normal.         Behavior: Behavior normal.         Thought Content: Thought content normal.       XR Chest 2 View    Result Date: 4/15/2022  Impression:  1. Emphysema. 2. No active pulmonary disease.  Electronically Signed By-Weston Vinson MD On:4/15/2022 10:18 AM This report was finalized on [id number] by  Weston Vinson MD.      Lab Results   Component Value Date    BUN 15 03/09/2022    CREATININE 1.05 03/09/2022     03/09/2022    K 5.0 03/09/2022     03/09/2022    CALCIUM 8.7 03/09/2022    WBC 12.6 (H) 03/09/2022    RBC 4.82 03/09/2022    HCT 44.9 03/09/2022    MCV 93 03/09/2022    MCH 32.0 03/09/2022    TSH 1.690 03/09/2022          Assessment/Plan     Diagnoses and all orders for this visit:    1. Chronic obstructive pulmonary  disease with acute exacerbation (HCC) (Primary)  Comments:  Add once daily prednisone 5 mg.  Will discuss with Dr. Belcher.  Would he benefit from daily prednisone vs theophylline?  Orders:  -     predniSONE (DELTASONE) 5 MG tablet; Take 1 tablet by mouth Daily.  Dispense: 30 tablet; Refill: 0  -     Cancel: CBC w AUTO Differential  -     CBC w AUTO Differential    2. Coronary artery disease involving native coronary artery of native heart without angina pectoris  Comments:  Will f/u on cardiology referral.  H/O CAD.    3. Short of breath on exertion    4. Generalized edema    5. Chronic hepatitis C without hepatic coma (HCC)  Comments:  Check viral load for cure.  Orders:  -     Cancel: Hepatitis C RNA, Quantitative, PCR (graph)  -     Cancel: Comprehensive metabolic panel  -     Comprehensive metabolic panel  -     Hepatitis C RNA, Quantitative, PCR (graph)    6. Hyperglycemia  Comments:  Recheck A1c prior to restarting steroids.  Orders:  -     Cancel: Hemoglobin A1c  -     Hemoglobin A1c    7. Mixed hyperlipidemia  Comments:  Continue high dosage statin.  Orders:  -     Lipid Panel With / Chol / HDL Ratio    8. Benign essential hypertension  Comments:  Chlorthalidone discontinued from medication list.    9. Tobacco dependence syndrome  Comments:  He declines smoking cessation.    10. Morbidly obese (HCC)          Return in about 1 month (around 6/6/2022) for Recheck.       Patient was given instructions and counseling regarding his/her condition or for health maintenance advice. Please see specific information pulled into the AVS if appropriate.     I wore protective equipment throughout this patient encounter to include mask. Hand hygiene was performed before donning protective equipment and after removal when leaving the room.

## 2022-05-06 LAB
ALBUMIN SERPL-MCNC: 4.8 G/DL (ref 3.8–4.8)
ALBUMIN/GLOB SERPL: 1.5 {RATIO} (ref 1.2–2.2)
ALP SERPL-CCNC: 106 IU/L (ref 44–121)
ALT SERPL-CCNC: 37 IU/L (ref 0–44)
AST SERPL-CCNC: 24 IU/L (ref 0–40)
BASOPHILS # BLD AUTO: 0.1 X10E3/UL (ref 0–0.2)
BASOPHILS NFR BLD AUTO: 1 %
BILIRUB SERPL-MCNC: 0.5 MG/DL (ref 0–1.2)
BUN SERPL-MCNC: 13 MG/DL (ref 8–27)
BUN/CREAT SERPL: 12 (ref 10–24)
CALCIUM SERPL-MCNC: 9.7 MG/DL (ref 8.6–10.2)
CHLORIDE SERPL-SCNC: 100 MMOL/L (ref 96–106)
CHOLEST SERPL-MCNC: 153 MG/DL (ref 100–199)
CHOLEST/HDLC SERPL: 4.5 RATIO (ref 0–5)
CO2 SERPL-SCNC: 22 MMOL/L (ref 20–29)
CREAT SERPL-MCNC: 1.07 MG/DL (ref 0.76–1.27)
EGFRCR SERPLBLD CKD-EPI 2021: 77 ML/MIN/1.73
EOSINOPHIL # BLD AUTO: 0.3 X10E3/UL (ref 0–0.4)
EOSINOPHIL NFR BLD AUTO: 3 %
ERYTHROCYTE [DISTWIDTH] IN BLOOD BY AUTOMATED COUNT: 13 % (ref 11.6–15.4)
GLOBULIN SER CALC-MCNC: 3.2 G/DL (ref 1.5–4.5)
GLUCOSE SERPL-MCNC: 115 MG/DL (ref 65–99)
HBA1C MFR BLD: 6.8 % (ref 4.8–5.6)
HCT VFR BLD AUTO: 47.8 % (ref 37.5–51)
HDLC SERPL-MCNC: 34 MG/DL
HGB BLD-MCNC: 16.1 G/DL (ref 13–17.7)
IMM GRANULOCYTES # BLD AUTO: 0 X10E3/UL (ref 0–0.1)
IMM GRANULOCYTES NFR BLD AUTO: 0 %
LDLC SERPL CALC-MCNC: 91 MG/DL (ref 0–99)
LYMPHOCYTES # BLD AUTO: 1.6 X10E3/UL (ref 0.7–3.1)
LYMPHOCYTES NFR BLD AUTO: 15 %
MCH RBC QN AUTO: 31.3 PG (ref 26.6–33)
MCHC RBC AUTO-ENTMCNC: 33.7 G/DL (ref 31.5–35.7)
MCV RBC AUTO: 93 FL (ref 79–97)
MONOCYTES # BLD AUTO: 1 X10E3/UL (ref 0.1–0.9)
MONOCYTES NFR BLD AUTO: 9 %
NEUTROPHILS # BLD AUTO: 7.8 X10E3/UL (ref 1.4–7)
NEUTROPHILS NFR BLD AUTO: 72 %
PLATELET # BLD AUTO: 374 X10E3/UL (ref 150–450)
POTASSIUM SERPL-SCNC: 4.8 MMOL/L (ref 3.5–5.2)
PROT SERPL-MCNC: 8 G/DL (ref 6–8.5)
RBC # BLD AUTO: 5.15 X10E6/UL (ref 4.14–5.8)
SODIUM SERPL-SCNC: 140 MMOL/L (ref 134–144)
TRIGL SERPL-MCNC: 160 MG/DL (ref 0–149)
VLDLC SERPL CALC-MCNC: 28 MG/DL (ref 5–40)
WBC # BLD AUTO: 10.7 X10E3/UL (ref 3.4–10.8)

## 2022-05-11 LAB
HCV RNA SERPL NAA+PROBE-ACNC: NORMAL IU/ML
TEST INFORMATION: NORMAL

## 2022-06-03 NOTE — PROGRESS NOTES
"Chief Complaint   Patient presents with   • COPD       Subjective   Santos Sullivan is a 66 y.o. male.     COPD  He complains of chest tightness, cough, difficulty breathing, hoarse voice, shortness of breath and wheezing. This is a recurrent problem. The current episode started more than 1 year ago. The problem occurs constantly. The problem has been gradually improving. The cough is productive. Associated symptoms include weight loss (12 lbs). Pertinent negatives include no chest pain or fever. His symptoms are aggravated by any activity, strenuous activity, occupational exposure, minimal activity and climbing stairs. His symptoms are alleviated by rest and diuretics. He reports moderate improvement on treatment. His past medical history is significant for COPD.      Last visit to the office 5/4/22.  He was started on prednisone 5 mg daily by me and referred back to Dr. Belcher for re-evaluation.  Patient reports that he did see a provider at Dr. Belcher's office, who started him on theophylline 300 mg daily.  Patient finished the last of the prednisone prescription 2 days ago.  Overall, he is feeling \"pretty good.\"  Still SOA but less so.  Leg swelling is decreased.  Weight down 12 lbs.  He still has not seen cardiology.    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   • Hearing loss 07/05/2019   • Chronic hepatitis C (HCC) 07/05/2019   • Screen for colon cancer 07/05/2019   • Overweight 07/05/2019   • Physical exam 07/05/2019   • Screening PSA " (prostate specific antigen) 07/05/2019   • Screening, lipid 07/05/2019   • Tobacco dependence syndrome 04/03/2019   • Mixed hyperlipidemia 06/14/2020   • Morbidly obese (HCC) 01/02/2021     Resolved Ambulatory Problems     Diagnosis Date Noted   • Acrocyanosis (HCC) 07/05/2019   • Ingrown nail 07/05/2019   • Need for immunization against influenza 07/05/2019   • Encounter for screening 07/05/2019   • Cellulitis of toe of right foot 07/05/2019     Past Medical History:   Diagnosis Date   • Abnormal stress test    • Benign essential hypertension    • Claudication (HCC)    • COPD (chronic obstructive pulmonary disease) (HCC)    • Femur fracture (HCC)    • History of spinal cord injury    • Hyperlipidemia    • Left against medical advice    • Need for hepatitis C screening test    • Onychomycosis of toenail    • Tobacco use disorder        Medication List:    Current Outpatient Medications:   •  albuterol (PROVENTIL) (2.5 MG/3ML) 0.083% nebulizer solution, Take 2.5 mg by nebulization Every 4 (Four) Hours As Needed for Wheezing., Disp: 180 mL, Rfl: 5  •  aspirin (aspirin) 81 MG EC tablet, Take 1 tablet by mouth Daily., Disp: , Rfl:   •  atorvastatin (LIPITOR) 40 MG tablet, Take 1 tablet by mouth Daily., Disp: 90 tablet, Rfl: 1  •  clopidogrel (PLAVIX) 75 MG tablet, Take 1 tablet by mouth Daily., Disp: 90 tablet, Rfl: 3  •  Fluticasone-Umeclidin-Vilant (Trelegy Ellipta) 200-62.5-25 MCG/INH inhaler, Inhale 1 puff Daily., Disp: 1 each, Rfl: 5  •  furosemide (LASIX) 40 MG tablet, Take 1 tablet by mouth Daily., Disp: 90 tablet, Rfl: 2  •  ibuprofen (ADVIL,MOTRIN) 200 MG tablet, Take 5 tablets by mouth 3 (Three) Times a Day., Disp: , Rfl:   •  ipratropium-albuterol (Combivent Respimat)  MCG/ACT inhaler, Inhale 1 puff 4 (Four) Times a Day As Needed for Wheezing or Shortness of Air., Disp: 4 g, Rfl: 5  •  metoprolol succinate XL (TOPROL-XL) 25 MG 24 hr tablet, Take 1 tablet by mouth once daily, Disp: 90 tablet, Rfl: 1  •   "theophylline (THEODUR) 300 MG 12 hr tablet, Take 300 mg by mouth Daily., Disp: , Rfl:     No Known Allergies    Social History     Tobacco Use   • Smoking status: Current Every Day Smoker     Packs/day: 0.50     Years: 25.00     Pack years: 12.50     Types: Cigarettes   • Smokeless tobacco: Never Used   Substance Use Topics   • Alcohol use: Yes     Comment: rare       Review of Systems   Constitutional: Positive for fatigue and unexpected weight loss (12 lbs). Negative for fever and unexpected weight gain.   HENT: Positive for hoarse voice and voice change.    Eyes: Negative for visual disturbance.   Respiratory: Positive for cough, shortness of breath and wheezing.    Cardiovascular: Positive for leg swelling. Negative for chest pain and palpitations.        Generalized edema   Gastrointestinal: Positive for abdominal distention. Negative for diarrhea and vomiting.   Endocrine: Negative for polydipsia and polyuria.   Genitourinary: Negative for difficulty urinating.         Objective   Vitals:    06/06/22 0825   BP: 136/82   BP Location: Right arm   Patient Position: Sitting   Cuff Size: Adult   Pulse: 74   Resp: 18   Temp: 98 °F (36.7 °C)   TempSrc: Temporal   SpO2: 100%   Weight: 120 kg (264 lb 6 oz)   Height: 177.8 cm (70\")     Body mass index is 37.93 kg/m².    Physical Exam  Constitutional:       General: He is not in acute distress.     Appearance: Normal appearance. He is well-developed. He is obese. He is not ill-appearing.      Comments: Generalized body edema.   HENT:      Head: Normocephalic and atraumatic.   Eyes:      General: No scleral icterus.        Right eye: No discharge.         Left eye: No discharge.      Extraocular Movements: Extraocular movements intact.      Conjunctiva/sclera: Conjunctivae normal.   Cardiovascular:      Rate and Rhythm: Normal rate and regular rhythm.      Comments: Barrel-chested, Heart sounds distant  Pulmonary:      Effort: Pulmonary effort is normal.      Breath " sounds: No wheezing, rhonchi (none today) or rales.      Comments: He is short of breath with speaking today.  Fair air movement today (used rescue inhaler before appt).  Abdominal:      General: There is distension (generalized).   Musculoskeletal:         General: Swelling present. No tenderness.      Cervical back: Neck supple.      Right lower leg: Edema (1+ to just below knee) present.      Left lower leg: Edema (1+ to just below knee) present.      Comments: Some generalized swelling of both the hands and the legs - slightly improved from prior visit   Skin:     General: Skin is warm.      Capillary Refill: Capillary refill takes less than 2 seconds.      Coloration: Skin is not jaundiced.      Findings: No erythema or rash.   Neurological:      Mental Status: He is alert. Mental status is at baseline.      Cranial Nerves: No cranial nerve deficit.   Psychiatric:         Mood and Affect: Mood normal.         Behavior: Behavior normal.         Thought Content: Thought content normal.           Lab Results   Component Value Date    BUN 13 05/05/2022    CREATININE 1.07 05/05/2022     05/05/2022    K 4.8 05/05/2022     05/05/2022    CALCIUM 9.7 05/05/2022    ALBUMIN 4.8 05/05/2022    BILITOT 0.5 05/05/2022    ALKPHOS 106 05/05/2022    AST 24 05/05/2022    ALT 37 05/05/2022    CHLPL 153 05/05/2022    TRIG 160 (H) 05/05/2022    HDL 34 (L) 05/05/2022    VLDL 28 05/05/2022    LDL 91 05/05/2022    WBC 10.7 05/05/2022    RBC 5.15 05/05/2022    HCT 47.8 05/05/2022    MCV 93 05/05/2022    MCH 31.3 05/05/2022    TSH 1.690 03/09/2022        Lab Results   Component Value Date    HGBA1C 6.8 (H) 05/05/2022    HGBA1C 6.4 (H) 06/12/2021         Assessment & Plan     Diagnoses and all orders for this visit:    1. Chronic obstructive pulmonary disease, unspecified COPD type (HCC) (Primary)    2. Type 2 diabetes mellitus with other specified complication, without long-term current use of insulin (HCC)  -     metFORMIN  ER (GLUCOPHAGE-XR) 500 MG 24 hr tablet; Take 1 tablet by mouth Daily With Breakfast.  Dispense: 180 tablet; Refill: 1  -     glucose blood (Accu-Chek Guide) test strip; Use to test blood sugar once daily (fasting, in the AM).  Dispense: 100 each; Refill: 1  -     Accu-Chek FastClix Lancets misc; 1 each Take As Directed. Use to test blood sugar once daily (fasting, in the AM).  Dispense: 102 each; Refill: 1  -     Blood Glucose Monitoring Suppl (Accu-Chek Guide) w/Device kit; 1 each Take As Directed. Use to test blood sugar once daily (fasting, in the AM).  Dispense: 1 kit; Refill: 0    3. Coronary artery disease involving native coronary artery of native heart without angina pectoris    4. Essential hypertension    5. Tobacco dependence syndrome    6. Morbidly obese (HCC)    COPD - overall improve from one month ago with additional of theophylline + steroid.  Now off oral steroid.  Advised recheck in office in 1 mo to reassess stability.    HTN stable.  Continue current medications.  Switched from chlorthalidone to furosemide due to LE edema.  Echo recently done.  No indication of CHF on echo, CXR or labs (BNP normal).  Awaiting cardiology recommendations (Dr. Belcher's office was setting him up with new cardiologist).    He continues to smoke.  Strongly encouraged to stop smoking today due to its continued contribute to his worsening disease state.  He is not interested in smoking cessations aids.      Return in about 1 month (around 7/8/2022) for Medicare Wellness, Recheck.       Patient was given instructions and counseling regarding his/her condition or for health maintenance advice. Please see specific information pulled into the AVS if appropriate.     I wore protective equipment throughout this patient encounter to include mask. Hand hygiene was performed before donning protective equipment and after removal when leaving the room.

## 2022-06-06 ENCOUNTER — OFFICE VISIT (OUTPATIENT)
Dept: FAMILY MEDICINE CLINIC | Facility: CLINIC | Age: 67
End: 2022-06-06

## 2022-06-06 VITALS
SYSTOLIC BLOOD PRESSURE: 136 MMHG | TEMPERATURE: 98 F | RESPIRATION RATE: 18 BRPM | HEIGHT: 70 IN | HEART RATE: 74 BPM | WEIGHT: 264.38 LBS | OXYGEN SATURATION: 100 % | DIASTOLIC BLOOD PRESSURE: 82 MMHG | BODY MASS INDEX: 37.85 KG/M2

## 2022-06-06 DIAGNOSIS — E66.01 MORBIDLY OBESE: ICD-10-CM

## 2022-06-06 DIAGNOSIS — E11.69 TYPE 2 DIABETES MELLITUS WITH OTHER SPECIFIED COMPLICATION, WITHOUT LONG-TERM CURRENT USE OF INSULIN: ICD-10-CM

## 2022-06-06 DIAGNOSIS — F17.200 TOBACCO DEPENDENCE SYNDROME: ICD-10-CM

## 2022-06-06 DIAGNOSIS — R60.0 LOWER EXTREMITY EDEMA: ICD-10-CM

## 2022-06-06 DIAGNOSIS — I25.10 CORONARY ARTERY DISEASE INVOLVING NATIVE CORONARY ARTERY OF NATIVE HEART WITHOUT ANGINA PECTORIS: Chronic | ICD-10-CM

## 2022-06-06 DIAGNOSIS — I10 ESSENTIAL HYPERTENSION: Chronic | ICD-10-CM

## 2022-06-06 DIAGNOSIS — J44.9 CHRONIC OBSTRUCTIVE PULMONARY DISEASE, UNSPECIFIED COPD TYPE: Primary | ICD-10-CM

## 2022-06-06 PROCEDURE — 99214 OFFICE O/P EST MOD 30 MIN: CPT | Performed by: FAMILY MEDICINE

## 2022-06-06 RX ORDER — THEOPHYLLINE 300 MG/1
300 TABLET, EXTENDED RELEASE ORAL DAILY
COMMUNITY
Start: 2022-05-17

## 2022-06-06 RX ORDER — BLOOD SUGAR DIAGNOSTIC
STRIP MISCELLANEOUS
Qty: 100 EACH | Refills: 1 | Status: SHIPPED | OUTPATIENT
Start: 2022-06-06

## 2022-06-06 RX ORDER — METFORMIN HYDROCHLORIDE 500 MG/1
500 TABLET, EXTENDED RELEASE ORAL
Qty: 180 TABLET | Refills: 1 | Status: SHIPPED | OUTPATIENT
Start: 2022-06-06

## 2022-06-06 RX ORDER — BLOOD-GLUCOSE METER
1 EACH MISCELLANEOUS TAKE AS DIRECTED
Qty: 1 KIT | Refills: 0 | Status: SHIPPED | OUTPATIENT
Start: 2022-06-06

## 2022-06-06 RX ORDER — LANCETS
1 EACH MISCELLANEOUS TAKE AS DIRECTED
Qty: 102 EACH | Refills: 1 | Status: SHIPPED | OUTPATIENT
Start: 2022-06-06

## 2022-06-24 DIAGNOSIS — I25.10 CORONARY ARTERY DISEASE INVOLVING NATIVE CORONARY ARTERY OF NATIVE HEART WITHOUT ANGINA PECTORIS: ICD-10-CM

## 2022-06-24 RX ORDER — CLOPIDOGREL BISULFATE 75 MG/1
TABLET ORAL
Qty: 90 TABLET | Refills: 1 | Status: SHIPPED | OUTPATIENT
Start: 2022-06-24

## 2022-07-07 ENCOUNTER — OFFICE VISIT (OUTPATIENT)
Dept: CARDIOLOGY | Facility: CLINIC | Age: 67
End: 2022-07-07

## 2022-07-07 VITALS
HEART RATE: 103 BPM | WEIGHT: 270 LBS | BODY MASS INDEX: 38.65 KG/M2 | SYSTOLIC BLOOD PRESSURE: 142 MMHG | HEIGHT: 70 IN | OXYGEN SATURATION: 98 % | DIASTOLIC BLOOD PRESSURE: 80 MMHG

## 2022-07-07 DIAGNOSIS — I25.10 CORONARY ARTERY DISEASE INVOLVING NATIVE CORONARY ARTERY OF NATIVE HEART WITHOUT ANGINA PECTORIS: Primary | Chronic | ICD-10-CM

## 2022-07-07 DIAGNOSIS — I10 ESSENTIAL HYPERTENSION: Chronic | ICD-10-CM

## 2022-07-07 DIAGNOSIS — E78.2 MIXED HYPERLIPIDEMIA: ICD-10-CM

## 2022-07-07 DIAGNOSIS — R06.02 SOB (SHORTNESS OF BREATH): ICD-10-CM

## 2022-07-07 PROCEDURE — 99204 OFFICE O/P NEW MOD 45 MIN: CPT | Performed by: INTERNAL MEDICINE

## 2022-07-07 PROCEDURE — 93000 ELECTROCARDIOGRAM COMPLETE: CPT | Performed by: INTERNAL MEDICINE

## 2022-07-07 RX ORDER — METOPROLOL SUCCINATE 50 MG/1
50 TABLET, EXTENDED RELEASE ORAL DAILY
Qty: 90 TABLET | Refills: 1 | Status: SHIPPED | OUTPATIENT
Start: 2022-07-07

## 2022-07-07 NOTE — PROGRESS NOTES
Date of Office Visit: 2022  Encounter Provider: Dr. Ian Major  Place of Service: [facility] CARDIOLOGY [place]  Patient Name: Santos Sullivan  :1955  Leigh Ayon MD    Chief Complaint   Patient presents with   • Coronary Artery Disease   • Shortness of Breath   • Hyperlipidemia   • Hypertension   • Consult     History of Present Illness:    I am pleased to see Mr. Sullivan in my office today as a new consultation.    As you know, patient is 66-year-old white gentleman whose past medical history significant for hypertension, hyperlipidemia, diabetes mellitus, COPD, CAD, coronary artery stenting, who came today for symptom of shortness of breath.  Chest discomfort.  Previously patient has seen Dr. Watson.    In 2019, patient underwent stress test which was abnormal.  Patient underwent cardiac catheterization which showed intermediate lesion of LAD patient underwent FFR assessment of LAD which was significant and patient underwent LAD stenting subsequently.    Patient reports that he is extremely short of breath.  Patient cannot walk for half a block before he gets short of breath.  Patient also complain of chest heaviness or tightness.  Patient denies any orthopnea, PND, syncope or presyncope.  No leg edema noted.    EKG showed normal sinus rhythm heart rate of 103 bpm.    At this stage, I would recommend to start Lopressor 50 mg daily.  I will proceed with stress test with Cardiolite imaging.  Patient is advised to monitor the blood pressure.        Past Medical History:   Diagnosis Date   • Abnormal stress test    • Acrocyanosis (HCC)    • Acute renal insufficiency    • Benign essential hypertension    • Chronic hepatitis C (HCC)     Managed by I.  Cured of infection.   • Claudication (HCC)     Right leg. MARY showing mild disease on right.   • COPD (chronic obstructive pulmonary disease) (HCC)     Moderate, nonreversible airway obstruction (worse than previous).   • Coronary artery  disease involving native coronary artery of native heart without angina pectoris    • Femur fracture (HCC)     30+ years ago   • Hearing loss    • History of spinal cord injury    • Hyperlipidemia    • Left against medical advice    • Need for hepatitis C screening test    • Onychomycosis of toenail    • Tobacco use disorder          Past Surgical History:   Procedure Laterality Date   • CARDIAC CATHETERIZATION     • CORONARY STENT PLACEMENT             Current Outpatient Medications:   •  Accu-Chek FastClix Lancets misc, 1 each Take As Directed. Use to test blood sugar once daily (fasting, in the AM)., Disp: 102 each, Rfl: 1  •  albuterol (PROVENTIL) (2.5 MG/3ML) 0.083% nebulizer solution, Take 2.5 mg by nebulization Every 4 (Four) Hours As Needed for Wheezing., Disp: 180 mL, Rfl: 5  •  aspirin (aspirin) 81 MG EC tablet, Take 1 tablet by mouth Daily., Disp: , Rfl:   •  atorvastatin (LIPITOR) 40 MG tablet, Take 1 tablet by mouth Daily., Disp: 90 tablet, Rfl: 1  •  Blood Glucose Monitoring Suppl (Accu-Chek Guide) w/Device kit, 1 each Take As Directed. Use to test blood sugar once daily (fasting, in the AM)., Disp: 1 kit, Rfl: 0  •  clopidogrel (PLAVIX) 75 MG tablet, Take 1 tablet by mouth once daily, Disp: 90 tablet, Rfl: 1  •  Fluticasone-Umeclidin-Vilant (Trelegy Ellipta) 200-62.5-25 MCG/INH inhaler, Inhale 1 puff Daily., Disp: 1 each, Rfl: 5  •  furosemide (LASIX) 40 MG tablet, Take 1 tablet by mouth Daily., Disp: 90 tablet, Rfl: 2  •  glucose blood (Accu-Chek Guide) test strip, Use to test blood sugar once daily (fasting, in the AM)., Disp: 100 each, Rfl: 1  •  ibuprofen (ADVIL,MOTRIN) 200 MG tablet, Take 5 tablets by mouth 3 (Three) Times a Day., Disp: , Rfl:   •  ipratropium-albuterol (Combivent Respimat)  MCG/ACT inhaler, Inhale 1 puff 4 (Four) Times a Day As Needed for Wheezing or Shortness of Air., Disp: 4 g, Rfl: 5  •  metFORMIN ER (GLUCOPHAGE-XR) 500 MG 24 hr tablet, Take 1 tablet by mouth Daily With  Breakfast., Disp: 180 tablet, Rfl: 1  •  metoprolol succinate XL (TOPROL-XL) 50 MG 24 hr tablet, Take 1 tablet by mouth Daily., Disp: 90 tablet, Rfl: 1  •  theophylline (THEODUR) 300 MG 12 hr tablet, Take 300 mg by mouth Daily., Disp: , Rfl:       Social History     Socioeconomic History   • Marital status:    Tobacco Use   • Smoking status: Current Every Day Smoker     Packs/day: 0.50     Years: 25.00     Pack years: 12.50     Types: Cigarettes   • Smokeless tobacco: Never Used   Vaping Use   • Vaping Use: Never used   Substance and Sexual Activity   • Alcohol use: Not Currently     Comment: rare   • Drug use: Not Currently   • Sexual activity: Defer         Review of Systems   Constitutional: Negative for chills and fever.   HENT: Negative for ear discharge and nosebleeds.    Eyes: Negative for discharge and redness.   Cardiovascular: Positive for chest pain. Negative for orthopnea, palpitations, paroxysmal nocturnal dyspnea and syncope.   Respiratory: Positive for shortness of breath. Negative for cough and wheezing.    Endocrine: Negative for heat intolerance.   Skin: Negative for rash.   Musculoskeletal: Positive for arthritis. Negative for myalgias.   Gastrointestinal: Negative for abdominal pain, melena, nausea and vomiting.   Genitourinary: Negative for dysuria and hematuria.   Neurological: Negative for dizziness, light-headedness, numbness and tremors.   Psychiatric/Behavioral: Negative for depression. The patient is not nervous/anxious.        Procedures      ECG 12 Lead    Date/Time: 7/7/2022 5:22 PM  Performed by: Ian Major MD  Authorized by: Ian Major MD   Comparison: compared with previous ECG   Similar to previous ECG  Rhythm: sinus rhythm  Other findings: non-specific ST-T wave changes    Clinical impression: normal ECG            ECG 12 Lead    (Results Pending)           Objective:    /80 (BP Location: Left arm, Patient Position: Sitting, Cuff Size: Large Adult)   Pulse 103   " Ht 177.8 cm (70\")   Wt 122 kg (270 lb)   SpO2 98%   BMI 38.74 kg/m²         Constitutional:       Appearance: Well-developed.   Eyes:      General: No scleral icterus.        Right eye: No discharge.   HENT:      Head: Normocephalic and atraumatic.   Neck:      Thyroid: No thyromegaly.      Lymphadenopathy: No cervical adenopathy.   Pulmonary:      Effort: Pulmonary effort is normal. No respiratory distress.      Breath sounds: Normal breath sounds. No wheezing. No rales.   Cardiovascular:      Normal rate. Regular rhythm.      No gallop.   Abdominal:      Tenderness: There is no abdominal tenderness.   Skin:     Findings: No erythema or rash.   Neurological:      Mental Status: Alert and oriented to person, place, and time.             Assessment:       Diagnosis Plan   1. Coronary artery disease involving native coronary artery of native heart without angina pectoris  ECG 12 Lead    Stress Test With Myocardial Perfusion One Day    metoprolol succinate XL (TOPROL-XL) 50 MG 24 hr tablet   2. Essential hypertension  ECG 12 Lead    Stress Test With Myocardial Perfusion One Day    metoprolol succinate XL (TOPROL-XL) 50 MG 24 hr tablet   3. Mixed hyperlipidemia  ECG 12 Lead    Stress Test With Myocardial Perfusion One Day    metoprolol succinate XL (TOPROL-XL) 50 MG 24 hr tablet   4. SOB (shortness of breath)  ECG 12 Lead    Stress Test With Myocardial Perfusion One Day    metoprolol succinate XL (TOPROL-XL) 50 MG 24 hr tablet            Plan:       MDM:    1.  Shortness of breath:    I would recommend to proceed with echocardiogram and stress test.    2.  CAD:    Stress test will be done.    3.  Hypertension:    I would increase Lopressor ER to 50 mg daily blood pressure monitoring is recommended    4.  Hyperlipidemia:    Patient is on Lipitor.  Repeat lipid panel testing in future.  Recent LDL is 91 which is desirable.  "

## 2022-07-07 NOTE — PROGRESS NOTES
Chief Complaint   Patient presents with   • Medicare Wellness-subsequent     Welcome to Medicare   • COPD       Subjective   Santos Sullivan is a 66 y.o. male.     COPD  He complains of chest tightness, cough, difficulty breathing, frequent throat clearing, hoarse voice, shortness of breath and sputum production. This is a chronic problem. The current episode started more than 1 year ago. The problem occurs constantly. The problem has been unchanged. The cough is non-productive. His symptoms are aggravated by any activity. His symptoms are alleviated by rest, diuretics and beta-agonist. He reports moderate improvement on treatment. Risk factors for lung disease include smoking/tobacco exposure. His past medical history is significant for COPD.        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   • Hearing loss 07/05/2019   • Chronic hepatitis C (HCC) 07/05/2019   • Screen for colon cancer 07/05/2019   • Overweight 07/05/2019   • Physical exam 07/05/2019   • Screening PSA (prostate specific antigen) 07/05/2019   • Screening, lipid 07/05/2019   • Tobacco dependence syndrome 04/03/2019   • Mixed hyperlipidemia 06/14/2020   • Morbidly obese (HCC) 01/02/2021   • SOB (shortness of breath) 07/07/2022     Resolved Ambulatory Problems     Diagnosis Date Noted   • Acrocyanosis (HCC) 07/05/2019   • Ingrown nail 07/05/2019   • Need for immunization against influenza 07/05/2019   • Encounter for screening 07/05/2019   • Cellulitis of toe of right foot  07/05/2019     Past Medical History:   Diagnosis Date   • Abnormal stress test    • Benign essential hypertension    • Claudication (HCC)    • COPD (chronic obstructive pulmonary disease) (HCC)    • Femur fracture (HCC)    • History of spinal cord injury    • Hyperlipidemia    • Left against medical advice    • Need for hepatitis C screening test    • Onychomycosis of toenail    • Tobacco use disorder        Medication List:    Current Outpatient Medications:   •  Accu-Chek FastClix Lancets misc, 1 each Take As Directed. Use to test blood sugar once daily (fasting, in the AM)., Disp: 102 each, Rfl: 1  •  albuterol (PROVENTIL) (2.5 MG/3ML) 0.083% nebulizer solution, Take 2.5 mg by nebulization Every 4 (Four) Hours As Needed for Wheezing., Disp: 180 mL, Rfl: 5  •  aspirin (aspirin) 81 MG EC tablet, Take 1 tablet by mouth Daily., Disp: , Rfl:   •  atorvastatin (LIPITOR) 40 MG tablet, Take 1 tablet by mouth Daily., Disp: 90 tablet, Rfl: 1  •  Blood Glucose Monitoring Suppl (Accu-Chek Guide) w/Device kit, 1 each Take As Directed. Use to test blood sugar once daily (fasting, in the AM)., Disp: 1 kit, Rfl: 0  •  clopidogrel (PLAVIX) 75 MG tablet, Take 1 tablet by mouth once daily, Disp: 90 tablet, Rfl: 1  •  Fluticasone-Umeclidin-Vilant (Trelegy Ellipta) 200-62.5-25 MCG/INH inhaler, Inhale 1 puff Daily., Disp: 1 each, Rfl: 5  •  furosemide (LASIX) 40 MG tablet, Take 1 tablet by mouth Daily., Disp: 90 tablet, Rfl: 1  •  glucose blood (Accu-Chek Guide) test strip, Use to test blood sugar once daily (fasting, in the AM)., Disp: 100 each, Rfl: 1  •  ibuprofen (ADVIL,MOTRIN) 200 MG tablet, Take 5 tablets by mouth 3 (Three) Times a Day., Disp: , Rfl:   •  ipratropium-albuterol (Combivent Respimat)  MCG/ACT inhaler, Inhale 1 puff 4 (Four) Times a Day As Needed for Wheezing or Shortness of Air., Disp: 4 g, Rfl: 5  •  metFORMIN ER (GLUCOPHAGE-XR) 500 MG 24 hr tablet, Take 1 tablet by mouth Daily With Breakfast., Disp: 180  "tablet, Rfl: 1  •  metoprolol succinate XL (TOPROL-XL) 50 MG 24 hr tablet, Take 1 tablet by mouth Daily., Disp: 90 tablet, Rfl: 1  •  theophylline (THEODUR) 300 MG 12 hr tablet, Take 300 mg by mouth Daily., Disp: , Rfl:   •  nitroglycerin (Nitrostat) 0.4 MG SL tablet, Place 1 tablet under the tongue Every 5 (Five) Minutes As Needed for Chest Pain. Take no more than 3 doses in 15 minutes., Disp: 25 tablet, Rfl: 0    No Known Allergies    Social History     Tobacco Use   • Smoking status: Current Every Day Smoker     Packs/day: 0.50     Years: 25.00     Pack years: 12.50     Types: Cigarettes   • Smokeless tobacco: Never Used   • Tobacco comment: Patient is advised to quit smoking   Substance Use Topics   • Alcohol use: Not Currently     Comment: rare       PHQ-2 Depression Screening  Little interest or pleasure in doing things?     Feeling down, depressed, or hopeless?     PHQ-2 Total Score           Review of Systems   HENT: Positive for hoarse voice.    Respiratory: Positive for cough, sputum production and shortness of breath.          Objective   Vitals:    07/08/22 0810   BP: 132/80   BP Location: Right arm   Patient Position: Sitting   Cuff Size: Adult   Pulse: 107   Resp: 16   Temp: 97.5 °F (36.4 °C)   TempSrc: Temporal   SpO2: 96%   Weight: 122 kg (269 lb 6 oz)   Height: 177.8 cm (70\")     Body mass index is 38.65 kg/m².    Physical Exam  Feet:      Right foot:      Protective Sensation: 10 sites tested. 10 sites sensed.      Skin integrity: Callus and dry skin present.      Left foot:      Protective Sensation: 10 sites tested. 10 sites sensed.      Skin integrity: Callus and dry skin present.                       Lab Results   Component Value Date    BUN 13 05/05/2022    CREATININE 1.07 05/05/2022     05/05/2022    K 4.8 05/05/2022     05/05/2022    CALCIUM 9.7 05/05/2022    ALBUMIN 4.8 05/05/2022    BILITOT 0.5 05/05/2022    ALKPHOS 106 05/05/2022    AST 24 05/05/2022    ALT 37 05/05/2022    " CHLPL 153 05/05/2022    TRIG 160 (H) 05/05/2022    HDL 34 (L) 05/05/2022    VLDL 28 05/05/2022    LDL 91 05/05/2022    WBC 10.7 05/05/2022    RBC 5.15 05/05/2022    HCT 47.8 05/05/2022    MCV 93 05/05/2022    MCH 31.3 05/05/2022          Assessment & Plan     Diagnoses and all orders for this visit:    1. Welcome to Medicare preventive visit (Primary)    2. Coronary artery disease involving native coronary artery of native heart without angina pectoris  -     atorvastatin (LIPITOR) 40 MG tablet; Take 1 tablet by mouth Daily.  Dispense: 90 tablet; Refill: 1  -     nitroglycerin (Nitrostat) 0.4 MG SL tablet; Place 1 tablet under the tongue Every 5 (Five) Minutes As Needed for Chest Pain. Take no more than 3 doses in 15 minutes.  Dispense: 25 tablet; Refill: 0    3. Benign essential hypertension  -     furosemide (LASIX) 40 MG tablet; Take 1 tablet by mouth Daily.  Dispense: 90 tablet; Refill: 1    4. Lower extremity edema  -     furosemide (LASIX) 40 MG tablet; Take 1 tablet by mouth Daily.  Dispense: 90 tablet; Refill: 1    5. Angina pectoris (HCC)  -     nitroglycerin (Nitrostat) 0.4 MG SL tablet; Place 1 tablet under the tongue Every 5 (Five) Minutes As Needed for Chest Pain. Take no more than 3 doses in 15 minutes.  Dispense: 25 tablet; Refill: 0        Medication and medication adverse effects discussed.  Drug education given and explained to patient. Patient verbalized understanding.  All questions presented by patient addressed.    No follow-ups on file.       Patient was given instructions and counseling regarding his/her condition or for health maintenance advice. Please see specific information pulled into the AVS if appropriate.     I wore protective equipment throughout this patient encounter to include mask. Hand hygiene was performed before donning protective equipment and after removal when leaving the room.

## 2022-07-08 ENCOUNTER — OFFICE VISIT (OUTPATIENT)
Dept: FAMILY MEDICINE CLINIC | Facility: CLINIC | Age: 67
End: 2022-07-08

## 2022-07-08 VITALS
OXYGEN SATURATION: 96 % | BODY MASS INDEX: 38.57 KG/M2 | HEIGHT: 70 IN | RESPIRATION RATE: 16 BRPM | SYSTOLIC BLOOD PRESSURE: 132 MMHG | TEMPERATURE: 97.5 F | HEART RATE: 107 BPM | WEIGHT: 269.38 LBS | DIASTOLIC BLOOD PRESSURE: 80 MMHG

## 2022-07-08 DIAGNOSIS — R60.0 LOWER EXTREMITY EDEMA: ICD-10-CM

## 2022-07-08 DIAGNOSIS — I25.10 CORONARY ARTERY DISEASE INVOLVING NATIVE CORONARY ARTERY OF NATIVE HEART WITHOUT ANGINA PECTORIS: ICD-10-CM

## 2022-07-08 DIAGNOSIS — Z00.00 WELCOME TO MEDICARE PREVENTIVE VISIT: Primary | ICD-10-CM

## 2022-07-08 DIAGNOSIS — I20.9 ANGINA PECTORIS: ICD-10-CM

## 2022-07-08 DIAGNOSIS — I10 BENIGN ESSENTIAL HYPERTENSION: Chronic | ICD-10-CM

## 2022-07-08 PROCEDURE — G0402 INITIAL PREVENTIVE EXAM: HCPCS | Performed by: FAMILY MEDICINE

## 2022-07-08 PROCEDURE — 1170F FXNL STATUS ASSESSED: CPT | Performed by: FAMILY MEDICINE

## 2022-07-08 PROCEDURE — 1159F MED LIST DOCD IN RCRD: CPT | Performed by: FAMILY MEDICINE

## 2022-07-08 RX ORDER — FUROSEMIDE 40 MG/1
40 TABLET ORAL DAILY
Qty: 90 TABLET | Refills: 1 | Status: SHIPPED | OUTPATIENT
Start: 2022-07-08

## 2022-07-08 RX ORDER — NITROGLYCERIN 0.4 MG/1
0.4 TABLET SUBLINGUAL
Qty: 25 TABLET | Refills: 0 | Status: SHIPPED | OUTPATIENT
Start: 2022-07-08

## 2022-07-08 RX ORDER — ATORVASTATIN CALCIUM 40 MG/1
40 TABLET, FILM COATED ORAL DAILY
Qty: 90 TABLET | Refills: 1 | Status: SHIPPED | OUTPATIENT
Start: 2022-07-08

## 2022-07-08 NOTE — PROGRESS NOTES
The ABCs of the Annual Wellness Visit  Welcome to Medicare Visit    Chief Complaint   Patient presents with   • Medicare Wellness-subsequent     Welcome to Medicare   • COPD     Subjective {   History of Present Illness:  Santos Sullivan is a 66 y.o. male who presents for a  Welcome to Medicare Visit.    The following portions of the patient's history were reviewed and updated as appropriate: allergies, current medications, past family history, past medical history, past social history, past surgical history and problem list.     Compared to one year ago, the patient feels his physical health is worse.    Compared to one year ago, the patient feels his mental health is worse.    Recent Hospitalizations:  He was not admitted to the hospital during the last year.       Current Medical Providers:  Patient Care Team:  Leigh Ayon MD as PCP - General (Family Medicine)  Ian Major MD as Consulting Physician (Cardiology)    Outpatient Medications Prior to Visit   Medication Sig Dispense Refill   • Accu-Chek FastClix Lancets misc 1 each Take As Directed. Use to test blood sugar once daily (fasting, in the AM). 102 each 1   • albuterol (PROVENTIL) (2.5 MG/3ML) 0.083% nebulizer solution Take 2.5 mg by nebulization Every 4 (Four) Hours As Needed for Wheezing. 180 mL 5   • aspirin (aspirin) 81 MG EC tablet Take 1 tablet by mouth Daily.     • Blood Glucose Monitoring Suppl (Accu-Chek Guide) w/Device kit 1 each Take As Directed. Use to test blood sugar once daily (fasting, in the AM). 1 kit 0   • clopidogrel (PLAVIX) 75 MG tablet Take 1 tablet by mouth once daily 90 tablet 1   • Fluticasone-Umeclidin-Vilant (Trelegy Ellipta) 200-62.5-25 MCG/INH inhaler Inhale 1 puff Daily. 1 each 5   • glucose blood (Accu-Chek Guide) test strip Use to test blood sugar once daily (fasting, in the AM). 100 each 1   • ibuprofen (ADVIL,MOTRIN) 200 MG tablet Take 5 tablets by mouth 3 (Three) Times a Day.     • ipratropium-albuterol  (Combivent Respimat)  MCG/ACT inhaler Inhale 1 puff 4 (Four) Times a Day As Needed for Wheezing or Shortness of Air. 4 g 5   • metFORMIN ER (GLUCOPHAGE-XR) 500 MG 24 hr tablet Take 1 tablet by mouth Daily With Breakfast. 180 tablet 1   • metoprolol succinate XL (TOPROL-XL) 50 MG 24 hr tablet Take 1 tablet by mouth Daily. 90 tablet 1   • theophylline (THEODUR) 300 MG 12 hr tablet Take 300 mg by mouth Daily.     • atorvastatin (LIPITOR) 40 MG tablet Take 1 tablet by mouth Daily. 90 tablet 1   • furosemide (LASIX) 40 MG tablet Take 1 tablet by mouth Daily. 90 tablet 2     No facility-administered medications prior to visit.       No opioid medication identified on active medication list. I have reviewed chart for other potential  high risk medication/s and harmful drug interactions in the elderly.        Aspirin is on active medication list. Aspirin use is indicated based on review of current medical condition/s. Pros and cons of this therapy have been discussed today. Benefits of this medication outweigh potential harm.  Patient has been encouraged to continue taking this medication.  .      Patient Active Problem List   Diagnosis   • Abnormal result of cardiovascular function study   • Acute renal insufficiency   • Essential hypertension   • Chest pain   • Chronic obstructive pulmonary disease (HCC)   • Coronary angioplasty status   • Coronary artery disease involving native coronary artery of native heart without angina pectoris   • Encounter for CDL (commercial driving license) exam   • Hearing loss   • Chronic hepatitis C (HCC)   • Screen for colon cancer   • Overweight   • Physical exam   • Screening PSA (prostate specific antigen)   • Screening, lipid   • Tobacco dependence syndrome   • Mixed hyperlipidemia   • Morbidly obese (HCC)   • SOB (shortness of breath)     Advance Care Planning  Advance Directive is not on file.  ACP discussion was held with the patient during this visit. Patient does not have an  "advance directive, information provided.          Objective      Vitals:    07/08/22 0810   BP: 132/80   BP Location: Right arm   Patient Position: Sitting   Cuff Size: Adult   Pulse: 107   Resp: 16   Temp: 97.5 °F (36.4 °C)   TempSrc: Temporal   SpO2: 96%  Comment: room air   Weight: 122 kg (269 lb 6 oz)   Height: 177.8 cm (70\")     Estimated body mass index is 38.65 kg/m² as calculated from the following:    Height as of this encounter: 177.8 cm (70\").    Weight as of this encounter: 122 kg (269 lb 6 oz).    Class 2 Severe Obesity (BMI >=35 and <=39.9). Obesity-related health conditions include the following: coronary heart disease. Obesity is unchanged. BMI is is above average; BMI management plan is completed. We discussed portion control and increasing exercise.      Does the patient have evidence of cognitive impairment? No    Physical Exam    Lab Results   Component Value Date    CHLPL 153 05/05/2022    TRIG 160 (H) 05/05/2022    HDL 34 (L) 05/05/2022    LDL 91 05/05/2022    VLDL 28 05/05/2022    HGBA1C 6.8 (H) 05/05/2022       Procedures       Diabetic Foot Exam Performed and Monofilament Test Performed      HEALTH RISK ASSESSMENT    Smoking Status:  Social History     Tobacco Use   Smoking Status Current Every Day Smoker   • Packs/day: 0.50   • Years: 25.00   • Pack years: 12.50   • Types: Cigarettes   Smokeless Tobacco Never Used   Tobacco Comment    Patient is advised to quit smoking     Alcohol Consumption:  Social History     Substance and Sexual Activity   Alcohol Use Not Currently    Comment: rare       Fall Risk Screen:    [facility].    Depression Screen:   PHQ-2/PHQ-9 Depression Screening 5/4/2022   Retired PHQ-9 Total Score -   Retired Total Score -   Little Interest or Pleasure in Doing Things 0-->not at all   Feeling Down, Depressed or Hopeless 0-->not at all   PHQ-9: Brief Depression Severity Measure Score 0       Health Habits and Functional and " Cognitive Screening:  Functional & Cognitive Status 7/8/2022   Do you have difficulty preparing food and eating? No   Do you have difficulty bathing yourself, getting dressed or grooming yourself? No   Do you have difficulty using the toilet? No   Do you have difficulty moving around from place to place? No   Do you have trouble with steps or getting out of a bed or a chair? Yes   Current Diet Unhealthy Diet   Dental Exam Not up to date   Eye Exam Not up to date   Exercise (times per week) 0 times per week   Current Exercises Include No Regular Exercise   Do you need help using the phone?  No   Are you deaf or do you have serious difficulty hearing?  Yes   Do you need help with transportation? No   Do you need help shopping? Yes   Do you need help preparing meals?  No   Do you need help with housework?  No   Do you need help with laundry? No   Do you need help taking your medications? No   Do you need help managing money? No   Do you ever drive or ride in a car without wearing a seat belt? No   Have you felt unusual stress, anger or loneliness in the last month? No   Who do you live with? Alone   If you need help, do you have trouble finding someone available to you? No   Have you been bothered in the last four weeks by sexual problems? No   Do you have difficulty concentrating, remembering or making decisions? No       Visual Acuity:     Visual Acuity Screening    Right eye Left eye Both eyes   Without correction: 20/70 20/25 20/25   With correction:          Age-appropriate Screening Schedule:  Refer to the list below for future screening recommendations based on patient's age, sex and/or medical conditions. Orders for these recommended tests are listed in the plan section. The patient has been provided with a written plan.    Health Maintenance   Topic Date Due   • URINE MICROALBUMIN  Never done   • TDAP/TD VACCINES (1 - Tdap) Never done   • DIABETIC FOOT EXAM  Never done   • DIABETIC EYE EXAM  Never done   •  ZOSTER VACCINE (2 of 2) 06/10/2022   • INFLUENZA VACCINE  10/01/2022   • HEMOGLOBIN A1C  11/05/2022   • LIPID PANEL  05/05/2023          Assessment & Plan   CMS Preventative Services Quick Reference  Risk Factors Identified During Encounter  Cardiovascular Disease  Chronic Pain   Dementia/Memory   Depression/Dysphoria  Immunizations Discussed/Encouraged (specific Immunizations; Tdap, Hepatitis B Vaccine/Series, Prevnar 20 (Pneumococcal 20-valent conjugate), Shingrix and COVID19  Inactivity/Sedentary  Obesity/Overweight   Polypharmacy  Tobacco Use/Dependance (use dotphrase .tobaccocessation for documentation)  The above risks/problems have been discussed with the patient.  Pertinent information has been shared with the patient in the After Visit Summary.  Follow up plans and orders are seen below in the Assessment/Plan Section.    Diagnoses and all orders for this visit:    1. Welcome to Medicare preventive visit (Primary)    2. Coronary artery disease involving native coronary artery of native heart without angina pectoris  -     atorvastatin (LIPITOR) 40 MG tablet; Take 1 tablet by mouth Daily.  Dispense: 90 tablet; Refill: 1  -     nitroglycerin (Nitrostat) 0.4 MG SL tablet; Place 1 tablet under the tongue Every 5 (Five) Minutes As Needed for Chest Pain. Take no more than 3 doses in 15 minutes.  Dispense: 25 tablet; Refill: 0    3. Benign essential hypertension  -     furosemide (LASIX) 40 MG tablet; Take 1 tablet by mouth Daily.  Dispense: 90 tablet; Refill: 1    4. Lower extremity edema  -     furosemide (LASIX) 40 MG tablet; Take 1 tablet by mouth Daily.  Dispense: 90 tablet; Refill: 1    5. Angina pectoris (HCC)  -     nitroglycerin (Nitrostat) 0.4 MG SL tablet; Place 1 tablet under the tongue Every 5 (Five) Minutes As Needed for Chest Pain. Take no more than 3 doses in 15 minutes.  Dispense: 25 tablet; Refill: 0        Follow Up:   No follow-ups on file.     An After Visit Summary and PPPS were made available  to the patient.         I wore protective equipment throughout this patient encounter to include mask. Hand hygiene was performed before donning protective equipment and after removal when leaving the room.

## 2022-07-08 NOTE — PATIENT INSTRUCTIONS
Health Maintenance Due   Topic Date Due    URINE MICROALBUMIN  Never done    TDAP/TD VACCINES (1 - Tdap) Never done    DIABETIC FOOT EXAM  Never done    DIABETIC EYE EXAM  Never done    Hepatitis B (3 of 3 - Risk 3-dose series) 08/29/2019    Pneumococcal Vaccine 65+ (2 - PCV) 12/07/2019    COVID-19 Vaccine (3 - Booster for Moderna series) 09/27/2021    ZOSTER VACCINE (2 of 2) 06/10/2022

## 2023-06-07 ENCOUNTER — OFFICE (OUTPATIENT)
Dept: URBAN - METROPOLITAN AREA CLINIC 64 | Facility: CLINIC | Age: 68
End: 2023-06-07

## 2023-06-07 VITALS
DIASTOLIC BLOOD PRESSURE: 88 MMHG | HEIGHT: 70 IN | SYSTOLIC BLOOD PRESSURE: 164 MMHG | HEART RATE: 98 BPM | WEIGHT: 277 LBS

## 2023-06-07 DIAGNOSIS — K76.0 FATTY (CHANGE OF) LIVER, NOT ELSEWHERE CLASSIFIED: ICD-10-CM

## 2023-06-07 DIAGNOSIS — R10.30 LOWER ABDOMINAL PAIN, UNSPECIFIED: ICD-10-CM

## 2023-06-07 DIAGNOSIS — R14.0 ABDOMINAL DISTENSION (GASEOUS): ICD-10-CM

## 2023-06-07 PROCEDURE — 99213 OFFICE O/P EST LOW 20 MIN: CPT | Performed by: NURSE PRACTITIONER

## 2025-06-22 ENCOUNTER — HOSPITAL ENCOUNTER (INPATIENT)
Facility: HOSPITAL | Age: 70
LOS: 9 days | Discharge: NURSING, REHAB OR LONG-TERM CARE | End: 2025-07-01
Attending: EMERGENCY MEDICINE | Admitting: STUDENT IN AN ORGANIZED HEALTH CARE EDUCATION/TRAINING PROGRAM
Payer: MEDICARE

## 2025-06-22 ENCOUNTER — APPOINTMENT (OUTPATIENT)
Dept: CARDIOLOGY | Facility: HOSPITAL | Age: 70
End: 2025-06-22
Payer: MEDICARE

## 2025-06-22 ENCOUNTER — APPOINTMENT (OUTPATIENT)
Dept: GENERAL RADIOLOGY | Facility: HOSPITAL | Age: 70
End: 2025-06-22
Payer: MEDICARE

## 2025-06-22 DIAGNOSIS — J96.02 ACUTE RESPIRATORY FAILURE WITH HYPERCAPNIA: Primary | ICD-10-CM

## 2025-06-22 DIAGNOSIS — I50.33 ACUTE ON CHRONIC DIASTOLIC CONGESTIVE HEART FAILURE: ICD-10-CM

## 2025-06-22 DIAGNOSIS — J44.9 CHRONIC OBSTRUCTIVE PULMONARY DISEASE, UNSPECIFIED COPD TYPE: Chronic | ICD-10-CM

## 2025-06-22 PROBLEM — J96.01 ACUTE HYPOXIC RESPIRATORY FAILURE: Status: ACTIVE | Noted: 2025-06-22

## 2025-06-22 PROBLEM — J96.01 ACUTE RESPIRATORY FAILURE WITH HYPOXIA AND HYPERCAPNIA: Status: ACTIVE | Noted: 2025-06-22

## 2025-06-22 LAB
ALBUMIN SERPL-MCNC: 4 G/DL (ref 3.5–5.2)
ALBUMIN/GLOB SERPL: 1.7 G/DL
ALP SERPL-CCNC: 55 U/L (ref 39–117)
ALT SERPL W P-5'-P-CCNC: 43 U/L (ref 1–41)
ANION GAP SERPL CALCULATED.3IONS-SCNC: 13.4 MMOL/L (ref 5–15)
AORTIC DIMENSIONLESS INDEX: 0.63 (DI)
ARTERIAL PATENCY WRIST A: POSITIVE
ARTERIAL PATENCY WRIST A: POSITIVE
AST SERPL-CCNC: 16 U/L (ref 1–40)
ATMOSPHERIC PRESS: ABNORMAL MM[HG]
ATMOSPHERIC PRESS: ABNORMAL MM[HG]
AV MEAN PRESS GRAD SYS DOP V1V2: 7.6 MMHG
AV VMAX SYS DOP: 178.2 CM/SEC
B PARAPERT DNA SPEC QL NAA+PROBE: NOT DETECTED
B PERT DNA SPEC QL NAA+PROBE: NOT DETECTED
BASE EXCESS BLDA CALC-SCNC: 3.2 MMOL/L (ref 0–3)
BASE EXCESS BLDA CALC-SCNC: 6.4 MMOL/L (ref 0–3)
BASOPHILS # BLD AUTO: 0.12 10*3/MM3 (ref 0–0.2)
BASOPHILS NFR BLD AUTO: 0.9 % (ref 0–1.5)
BDY SITE: ABNORMAL
BDY SITE: ABNORMAL
BH CV ECHO MEAS - AO MAX PG: 12.7 MMHG
BH CV ECHO MEAS - AO V2 VTI: 32.2 CM
BH CV ECHO MEAS - EDV(MOD-SP4): 139 ML
BH CV ECHO MEAS - EF(MOD-SP4): 63.7 %
BH CV ECHO MEAS - ESV(MOD-SP4): 50.4 ML
BH CV ECHO MEAS - LAT PEAK E' VEL: 12.6 CM/SEC
BH CV ECHO MEAS - LV DIASTOLIC VOL/BSA (35-75): 56.2 CM2
BH CV ECHO MEAS - LV MAX PG: 7.7 MMHG
BH CV ECHO MEAS - LV MEAN PG: 3.9 MMHG
BH CV ECHO MEAS - LV SYSTOLIC VOL/BSA (12-30): 20.4 CM2
BH CV ECHO MEAS - LV V1 MAX: 138.9 CM/SEC
BH CV ECHO MEAS - LV V1 VTI: 20.4 CM
BH CV ECHO MEAS - MED PEAK E' VEL: 8.4 CM/SEC
BH CV ECHO MEAS - MV A MAX VEL: 68.1 CM/SEC
BH CV ECHO MEAS - MV DEC SLOPE: 418 CM/SEC2
BH CV ECHO MEAS - MV DEC TIME: 0.15 SEC
BH CV ECHO MEAS - MV E MAX VEL: 63.9 CM/SEC
BH CV ECHO MEAS - MV E/A: 0.94
BH CV ECHO MEAS - MV MAX PG: 3.3 MMHG
BH CV ECHO MEAS - MV MEAN PG: 1.79 MMHG
BH CV ECHO MEAS - MV P1/2T: 49 MSEC
BH CV ECHO MEAS - MV V2 VTI: 15.5 CM
BH CV ECHO MEAS - MVA(P1/2T): 4.5 CM2
BH CV ECHO MEAS - RAP SYSTOLE: 8 MMHG
BH CV ECHO MEAS - RVDD: 5 CM
BH CV ECHO MEAS - SV(MOD-SP4): 88.6 ML
BH CV ECHO MEAS - SVI(MOD-SP4): 35.8 ML/M2
BH CV ECHO MEAS - TAPSE (>1.6): 2.2 CM
BH CV ECHO MEASUREMENTS AVERAGE E/E' RATIO: 6.09
BH CV XLRA - TDI S': 14.7 CM/SEC
BILIRUB SERPL-MCNC: 0.2 MG/DL (ref 0–1.2)
BUN SERPL-MCNC: 32.2 MG/DL (ref 8–23)
BUN/CREAT SERPL: 32.2 (ref 7–25)
C PNEUM DNA NPH QL NAA+NON-PROBE: NOT DETECTED
CALCIUM SPEC-SCNC: 8.7 MG/DL (ref 8.6–10.5)
CHLORIDE SERPL-SCNC: 102 MMOL/L (ref 98–107)
CO2 BLDA-SCNC: 35.9 MMOL/L (ref 22–29)
CO2 SERPL-SCNC: 25.6 MMOL/L (ref 22–29)
CREAT SERPL-MCNC: 1 MG/DL (ref 0.76–1.27)
D DIMER PPP FEU-MCNC: <0.27 MCGFEU/ML (ref 0–0.68)
D-LACTATE SERPL-SCNC: 1.6 MMOL/L (ref 0.2–2)
DEPRECATED RDW RBC AUTO: 58.2 FL (ref 37–54)
EGFRCR SERPLBLD CKD-EPI 2021: 82 ML/MIN/1.73
EOSINOPHIL # BLD AUTO: 0 10*3/MM3 (ref 0–0.4)
EOSINOPHIL NFR BLD AUTO: 0 % (ref 0.3–6.2)
ERYTHROCYTE [DISTWIDTH] IN BLOOD BY AUTOMATED COUNT: 15.6 % (ref 12.3–15.4)
FLUAV SUBTYP SPEC NAA+PROBE: NOT DETECTED
FLUBV RNA ISLT QL NAA+PROBE: NOT DETECTED
GEN 5 1HR TROPONIN T REFLEX: 33 NG/L
GLOBULIN UR ELPH-MCNC: 2.3 GM/DL
GLUCOSE BLDC GLUCOMTR-MCNC: 192 MG/DL (ref 70–105)
GLUCOSE BLDC GLUCOMTR-MCNC: 306 MG/DL (ref 70–105)
GLUCOSE BLDC GLUCOMTR-MCNC: 334 MG/DL (ref 70–105)
GLUCOSE BLDC GLUCOMTR-MCNC: 459 MG/DL (ref 70–105)
GLUCOSE SERPL-MCNC: 243 MG/DL (ref 65–99)
HADV DNA SPEC NAA+PROBE: NOT DETECTED
HCO3 BLDA-SCNC: 31.4 MMOL/L (ref 21–28)
HCO3 BLDA-SCNC: 34.1 MMOL/L (ref 21–28)
HCOV 229E RNA SPEC QL NAA+PROBE: NOT DETECTED
HCOV HKU1 RNA SPEC QL NAA+PROBE: NOT DETECTED
HCOV NL63 RNA SPEC QL NAA+PROBE: NOT DETECTED
HCOV OC43 RNA SPEC QL NAA+PROBE: NOT DETECTED
HCT VFR BLD AUTO: 47.1 % (ref 37.5–51)
HEMODILUTION: NO
HEMODILUTION: NO
HGB BLD-MCNC: 14.1 G/DL (ref 13–17.7)
HMPV RNA NPH QL NAA+NON-PROBE: NOT DETECTED
HOLD SPECIMEN: NORMAL
HOLD SPECIMEN: NORMAL
HPIV1 RNA ISLT QL NAA+PROBE: NOT DETECTED
HPIV2 RNA SPEC QL NAA+PROBE: NOT DETECTED
HPIV3 RNA NPH QL NAA+PROBE: NOT DETECTED
HPIV4 P GENE NPH QL NAA+PROBE: NOT DETECTED
IMM GRANULOCYTES # BLD AUTO: 0.62 10*3/MM3 (ref 0–0.05)
IMM GRANULOCYTES NFR BLD AUTO: 4.4 % (ref 0–0.5)
INHALED O2 CONCENTRATION: 32 %
INHALED O2 CONCENTRATION: 36 %
LV EF BIPLANE MOD: 64 %
LYMPHOCYTES # BLD AUTO: 0.51 10*3/MM3 (ref 0.7–3.1)
LYMPHOCYTES NFR BLD AUTO: 3.7 % (ref 19.6–45.3)
M PNEUMO IGG SER IA-ACNC: NOT DETECTED
MCH RBC QN AUTO: 30.1 PG (ref 26.6–33)
MCHC RBC AUTO-ENTMCNC: 29.9 G/DL (ref 31.5–35.7)
MCV RBC AUTO: 100.4 FL (ref 79–97)
MODALITY: ABNORMAL
MODALITY: ABNORMAL
MONOCYTES # BLD AUTO: 0.66 10*3/MM3 (ref 0.1–0.9)
MONOCYTES NFR BLD AUTO: 4.7 % (ref 5–12)
NEUTROPHILS NFR BLD AUTO: 12.05 10*3/MM3 (ref 1.7–7)
NEUTROPHILS NFR BLD AUTO: 86.3 % (ref 42.7–76)
NRBC BLD AUTO-RTO: 0.1 /100 WBC (ref 0–0.2)
NT-PROBNP SERPL-MCNC: 175 PG/ML (ref 0–900)
PCO2 BLDA: 60.4 MM HG (ref 35–48)
PCO2 BLDA: 62 MM HG (ref 35–48)
PEEP RESPIRATORY: 5 CM[H2O]
PH BLDA: 7.31 PH UNITS (ref 7.35–7.45)
PH BLDA: 7.36 PH UNITS (ref 7.35–7.45)
PLATELET # BLD AUTO: 232 10*3/MM3 (ref 140–450)
PMV BLD AUTO: 9.1 FL (ref 6–12)
PO2 BLD: 214 MM[HG] (ref 0–500)
PO2 BLD: 282 MM[HG] (ref 0–500)
PO2 BLDA: 77 MM HG (ref 83–108)
PO2 BLDA: 90.2 MM HG (ref 83–108)
POTASSIUM SERPL-SCNC: 5 MMOL/L (ref 3.5–5.2)
PROCALCITONIN SERPL-MCNC: 0.06 NG/ML (ref 0–0.25)
PROT SERPL-MCNC: 6.3 G/DL (ref 6–8.5)
PSV: 20 CMH2O
QT INTERVAL: 343 MS
QTC INTERVAL: 446 MS
RBC # BLD AUTO: 4.69 10*6/MM3 (ref 4.14–5.8)
RESPIRATORY RATE: 20
RHINOVIRUS RNA SPEC NAA+PROBE: NOT DETECTED
RSV RNA NPH QL NAA+NON-PROBE: NOT DETECTED
SAO2 % BLDCOA: 93.4 % (ref 94–98)
SAO2 % BLDCOA: 96.3 % (ref 94–98)
SARS-COV-2 RNA RESP QL NAA+PROBE: NOT DETECTED
SODIUM SERPL-SCNC: 141 MMOL/L (ref 136–145)
THEOPHYLLINE SERPL-MCNC: 1.2 MCG/ML (ref 10–20)
TROPONIN T % DELTA: -8
TROPONIN T NUMERIC DELTA: -3 NG/L
TROPONIN T SERPL HS-MCNC: 36 NG/L
VENTILATOR MODE: ABNORMAL
WBC NRBC COR # BLD AUTO: 13.96 10*3/MM3 (ref 3.4–10.8)
WHOLE BLOOD HOLD COAG: NORMAL
WHOLE BLOOD HOLD SPECIMEN: NORMAL

## 2025-06-22 PROCEDURE — 99285 EMERGENCY DEPT VISIT HI MDM: CPT

## 2025-06-22 PROCEDURE — 93306 TTE W/DOPPLER COMPLETE: CPT

## 2025-06-22 PROCEDURE — 93005 ELECTROCARDIOGRAM TRACING: CPT

## 2025-06-22 PROCEDURE — 25010000002 SULFUR HEXAFLUORIDE MICROSPH 60.7-25 MG RECONSTITUTED SUSPENSION: Performed by: STUDENT IN AN ORGANIZED HEALTH CARE EDUCATION/TRAINING PROGRAM

## 2025-06-22 PROCEDURE — 63710000001 INSULIN LISPRO (HUMAN) PER 5 UNITS: Performed by: STUDENT IN AN ORGANIZED HEALTH CARE EDUCATION/TRAINING PROGRAM

## 2025-06-22 PROCEDURE — 63710000001 INSULIN LISPRO (HUMAN) PER 5 UNITS: Performed by: NURSE PRACTITIONER

## 2025-06-22 PROCEDURE — 36415 COLL VENOUS BLD VENIPUNCTURE: CPT

## 2025-06-22 PROCEDURE — 25010000002 FUROSEMIDE PER 20 MG: Performed by: EMERGENCY MEDICINE

## 2025-06-22 PROCEDURE — 82948 REAGENT STRIP/BLOOD GLUCOSE: CPT | Performed by: NURSE PRACTITIONER

## 2025-06-22 PROCEDURE — 87040 BLOOD CULTURE FOR BACTERIA: CPT | Performed by: EMERGENCY MEDICINE

## 2025-06-22 PROCEDURE — 94799 UNLISTED PULMONARY SVC/PX: CPT

## 2025-06-22 PROCEDURE — 80198 ASSAY OF THEOPHYLLINE: CPT | Performed by: EMERGENCY MEDICINE

## 2025-06-22 PROCEDURE — 85025 COMPLETE CBC W/AUTO DIFF WBC: CPT | Performed by: EMERGENCY MEDICINE

## 2025-06-22 PROCEDURE — 82803 BLOOD GASES ANY COMBINATION: CPT

## 2025-06-22 PROCEDURE — 63710000001 INSULIN GLARGINE PER 5 UNITS: Performed by: NURSE PRACTITIONER

## 2025-06-22 PROCEDURE — 83880 ASSAY OF NATRIURETIC PEPTIDE: CPT | Performed by: EMERGENCY MEDICINE

## 2025-06-22 PROCEDURE — 36600 WITHDRAWAL OF ARTERIAL BLOOD: CPT

## 2025-06-22 PROCEDURE — 36600 WITHDRAWAL OF ARTERIAL BLOOD: CPT | Performed by: EMERGENCY MEDICINE

## 2025-06-22 PROCEDURE — 85379 FIBRIN DEGRADATION QUANT: CPT | Performed by: STUDENT IN AN ORGANIZED HEALTH CARE EDUCATION/TRAINING PROGRAM

## 2025-06-22 PROCEDURE — 82803 BLOOD GASES ANY COMBINATION: CPT | Performed by: EMERGENCY MEDICINE

## 2025-06-22 PROCEDURE — 83605 ASSAY OF LACTIC ACID: CPT | Performed by: EMERGENCY MEDICINE

## 2025-06-22 PROCEDURE — 93306 TTE W/DOPPLER COMPLETE: CPT | Performed by: INTERNAL MEDICINE

## 2025-06-22 PROCEDURE — 25010000002 METHYLPREDNISOLONE PER 40 MG: Performed by: STUDENT IN AN ORGANIZED HEALTH CARE EDUCATION/TRAINING PROGRAM

## 2025-06-22 PROCEDURE — 71045 X-RAY EXAM CHEST 1 VIEW: CPT

## 2025-06-22 PROCEDURE — 94761 N-INVAS EAR/PLS OXIMETRY MLT: CPT

## 2025-06-22 PROCEDURE — 84145 PROCALCITONIN (PCT): CPT | Performed by: EMERGENCY MEDICINE

## 2025-06-22 PROCEDURE — 82948 REAGENT STRIP/BLOOD GLUCOSE: CPT

## 2025-06-22 PROCEDURE — 84484 ASSAY OF TROPONIN QUANT: CPT | Performed by: EMERGENCY MEDICINE

## 2025-06-22 PROCEDURE — 94664 DEMO&/EVAL PT USE INHALER: CPT

## 2025-06-22 PROCEDURE — 80053 COMPREHEN METABOLIC PANEL: CPT | Performed by: EMERGENCY MEDICINE

## 2025-06-22 PROCEDURE — 93005 ELECTROCARDIOGRAM TRACING: CPT | Performed by: EMERGENCY MEDICINE

## 2025-06-22 PROCEDURE — 94660 CPAP INITIATION&MGMT: CPT

## 2025-06-22 PROCEDURE — 94640 AIRWAY INHALATION TREATMENT: CPT

## 2025-06-22 PROCEDURE — 0202U NFCT DS 22 TRGT SARS-COV-2: CPT | Performed by: EMERGENCY MEDICINE

## 2025-06-22 RX ORDER — LOSARTAN POTASSIUM 50 MG/1
50 TABLET ORAL DAILY
COMMUNITY
End: 2025-07-01 | Stop reason: HOSPADM

## 2025-06-22 RX ORDER — DEXTROSE MONOHYDRATE 25 G/50ML
25 INJECTION, SOLUTION INTRAVENOUS
Status: DISCONTINUED | OUTPATIENT
Start: 2025-06-22 | End: 2025-07-01 | Stop reason: HOSPADM

## 2025-06-22 RX ORDER — POLYETHYLENE GLYCOL 3350 17 G/17G
17 POWDER, FOR SOLUTION ORAL DAILY PRN
Status: DISCONTINUED | OUTPATIENT
Start: 2025-06-22 | End: 2025-07-01 | Stop reason: HOSPADM

## 2025-06-22 RX ORDER — ROSUVASTATIN CALCIUM 10 MG/1
20 TABLET, COATED ORAL DAILY
Status: DISCONTINUED | OUTPATIENT
Start: 2025-06-22 | End: 2025-07-01 | Stop reason: HOSPADM

## 2025-06-22 RX ORDER — BUDESONIDE 0.5 MG/2ML
0.5 INHALANT ORAL
COMMUNITY

## 2025-06-22 RX ORDER — BISACODYL 10 MG
10 SUPPOSITORY, RECTAL RECTAL DAILY PRN
Status: DISCONTINUED | OUTPATIENT
Start: 2025-06-22 | End: 2025-07-01 | Stop reason: HOSPADM

## 2025-06-22 RX ORDER — SODIUM CHLORIDE 0.9 % (FLUSH) 0.9 %
10 SYRINGE (ML) INJECTION AS NEEDED
Status: DISCONTINUED | OUTPATIENT
Start: 2025-06-22 | End: 2025-07-01 | Stop reason: HOSPADM

## 2025-06-22 RX ORDER — PIOGLITAZONE 45 MG/1
45 TABLET ORAL DAILY
COMMUNITY
End: 2025-07-01 | Stop reason: HOSPADM

## 2025-06-22 RX ORDER — FUROSEMIDE 10 MG/ML
40 INJECTION INTRAMUSCULAR; INTRAVENOUS EVERY 12 HOURS
Status: DISCONTINUED | OUTPATIENT
Start: 2025-06-23 | End: 2025-06-22

## 2025-06-22 RX ORDER — SEMAGLUTIDE 0.68 MG/ML
0.25 INJECTION, SOLUTION SUBCUTANEOUS WEEKLY
COMMUNITY

## 2025-06-22 RX ORDER — SPIRONOLACTONE 25 MG/1
25 TABLET ORAL DAILY
COMMUNITY
End: 2025-07-01 | Stop reason: HOSPADM

## 2025-06-22 RX ORDER — METHYLPREDNISOLONE SODIUM SUCCINATE 40 MG/ML
40 INJECTION, POWDER, LYOPHILIZED, FOR SOLUTION INTRAMUSCULAR; INTRAVENOUS EVERY 8 HOURS
Status: DISCONTINUED | OUTPATIENT
Start: 2025-06-22 | End: 2025-06-22

## 2025-06-22 RX ORDER — GLIMEPIRIDE 2 MG/1
2 TABLET ORAL 2 TIMES DAILY
COMMUNITY
End: 2025-07-01 | Stop reason: HOSPADM

## 2025-06-22 RX ORDER — BUMETANIDE 2 MG/1
2 TABLET ORAL DAILY
COMMUNITY
End: 2025-07-01 | Stop reason: HOSPADM

## 2025-06-22 RX ORDER — ALBUTEROL SULFATE 90 UG/1
2 INHALANT RESPIRATORY (INHALATION) EVERY 4 HOURS PRN
COMMUNITY

## 2025-06-22 RX ORDER — FUROSEMIDE 10 MG/ML
40 INJECTION INTRAMUSCULAR; INTRAVENOUS DAILY
Status: DISCONTINUED | OUTPATIENT
Start: 2025-06-23 | End: 2025-06-23

## 2025-06-22 RX ORDER — BUDESONIDE AND FORMOTEROL FUMARATE DIHYDRATE 160; 4.5 UG/1; UG/1
2 AEROSOL RESPIRATORY (INHALATION)
Status: DISCONTINUED | OUTPATIENT
Start: 2025-06-22 | End: 2025-06-22

## 2025-06-22 RX ORDER — DILTIAZEM HYDROCHLORIDE 240 MG/1
240 CAPSULE, COATED, EXTENDED RELEASE ORAL DAILY
Status: DISCONTINUED | OUTPATIENT
Start: 2025-06-22 | End: 2025-07-01 | Stop reason: HOSPADM

## 2025-06-22 RX ORDER — PREDNISONE 10 MG/1
10-40 TABLET ORAL DAILY
COMMUNITY
End: 2025-07-01 | Stop reason: HOSPADM

## 2025-06-22 RX ORDER — ARFORMOTEROL TARTRATE 15 UG/2ML
15 SOLUTION RESPIRATORY (INHALATION)
Status: DISCONTINUED | OUTPATIENT
Start: 2025-06-22 | End: 2025-07-01 | Stop reason: HOSPADM

## 2025-06-22 RX ORDER — ACETAMINOPHEN 160 MG/5ML
650 SOLUTION ORAL EVERY 4 HOURS PRN
Status: DISCONTINUED | OUTPATIENT
Start: 2025-06-22 | End: 2025-07-01 | Stop reason: HOSPADM

## 2025-06-22 RX ORDER — ACETAMINOPHEN 650 MG/1
650 SUPPOSITORY RECTAL EVERY 4 HOURS PRN
Status: DISCONTINUED | OUTPATIENT
Start: 2025-06-22 | End: 2025-07-01 | Stop reason: HOSPADM

## 2025-06-22 RX ORDER — DILTIAZEM HYDROCHLORIDE 240 MG/1
240 CAPSULE, COATED, EXTENDED RELEASE ORAL DAILY
COMMUNITY

## 2025-06-22 RX ORDER — ONDANSETRON 2 MG/ML
4 INJECTION INTRAMUSCULAR; INTRAVENOUS EVERY 6 HOURS PRN
Status: DISCONTINUED | OUTPATIENT
Start: 2025-06-22 | End: 2025-07-01 | Stop reason: HOSPADM

## 2025-06-22 RX ORDER — LOSARTAN POTASSIUM 50 MG/1
50 TABLET ORAL DAILY
Status: DISCONTINUED | OUTPATIENT
Start: 2025-06-22 | End: 2025-06-27

## 2025-06-22 RX ORDER — METFORMIN HYDROCHLORIDE 500 MG/1
1000 TABLET, EXTENDED RELEASE ORAL 2 TIMES DAILY
COMMUNITY

## 2025-06-22 RX ORDER — CLOPIDOGREL BISULFATE 75 MG/1
75 TABLET ORAL DAILY
Status: DISCONTINUED | OUTPATIENT
Start: 2025-06-22 | End: 2025-07-01 | Stop reason: HOSPADM

## 2025-06-22 RX ORDER — IPRATROPIUM BROMIDE AND ALBUTEROL SULFATE 2.5; .5 MG/3ML; MG/3ML
3 SOLUTION RESPIRATORY (INHALATION)
Status: DISCONTINUED | OUTPATIENT
Start: 2025-06-22 | End: 2025-07-01 | Stop reason: HOSPADM

## 2025-06-22 RX ORDER — INSULIN GLARGINE 100 [IU]/ML
8 INJECTION, SOLUTION SUBCUTANEOUS NIGHTLY
Status: ON HOLD | COMMUNITY
End: 2025-07-01

## 2025-06-22 RX ORDER — INSULIN GLARGINE 100 [IU]/ML
8 INJECTION, SOLUTION SUBCUTANEOUS NIGHTLY
Status: DISCONTINUED | OUTPATIENT
Start: 2025-06-22 | End: 2025-06-23

## 2025-06-22 RX ORDER — INSULIN LISPRO 100 [IU]/ML
2-9 INJECTION, SOLUTION INTRAVENOUS; SUBCUTANEOUS EVERY 6 HOURS SCHEDULED
Status: DISCONTINUED | OUTPATIENT
Start: 2025-06-22 | End: 2025-06-22

## 2025-06-22 RX ORDER — FUROSEMIDE 40 MG/1
40 TABLET ORAL 2 TIMES DAILY
COMMUNITY
End: 2025-07-01 | Stop reason: HOSPADM

## 2025-06-22 RX ORDER — METHYLPREDNISOLONE SODIUM SUCCINATE 40 MG/ML
40 INJECTION, POWDER, LYOPHILIZED, FOR SOLUTION INTRAMUSCULAR; INTRAVENOUS EVERY 12 HOURS
Status: DISCONTINUED | OUTPATIENT
Start: 2025-06-22 | End: 2025-06-22

## 2025-06-22 RX ORDER — AMOXICILLIN 250 MG
2 CAPSULE ORAL 2 TIMES DAILY PRN
Status: DISCONTINUED | OUTPATIENT
Start: 2025-06-22 | End: 2025-07-01 | Stop reason: HOSPADM

## 2025-06-22 RX ORDER — NICOTINE 21 MG/24HR
1 PATCH, TRANSDERMAL 24 HOURS TRANSDERMAL DAILY
Status: DISCONTINUED | OUTPATIENT
Start: 2025-06-22 | End: 2025-07-01 | Stop reason: HOSPADM

## 2025-06-22 RX ORDER — NITROGLYCERIN 0.4 MG/1
0.4 TABLET SUBLINGUAL
Status: DISCONTINUED | OUTPATIENT
Start: 2025-06-22 | End: 2025-07-01 | Stop reason: HOSPADM

## 2025-06-22 RX ORDER — IBUPROFEN 600 MG/1
1 TABLET ORAL
Status: DISCONTINUED | OUTPATIENT
Start: 2025-06-22 | End: 2025-07-01 | Stop reason: HOSPADM

## 2025-06-22 RX ORDER — SODIUM CHLORIDE 0.9 % (FLUSH) 0.9 %
10 SYRINGE (ML) INJECTION EVERY 12 HOURS SCHEDULED
Status: DISCONTINUED | OUTPATIENT
Start: 2025-06-22 | End: 2025-07-01 | Stop reason: HOSPADM

## 2025-06-22 RX ORDER — ACETAMINOPHEN 325 MG/1
650 TABLET ORAL EVERY 4 HOURS PRN
Status: DISCONTINUED | OUTPATIENT
Start: 2025-06-22 | End: 2025-07-01 | Stop reason: HOSPADM

## 2025-06-22 RX ORDER — ONDANSETRON 4 MG/1
4 TABLET, ORALLY DISINTEGRATING ORAL EVERY 6 HOURS PRN
Status: DISCONTINUED | OUTPATIENT
Start: 2025-06-22 | End: 2025-07-01 | Stop reason: HOSPADM

## 2025-06-22 RX ORDER — INSULIN LISPRO 100 [IU]/ML
4-24 INJECTION, SOLUTION INTRAVENOUS; SUBCUTANEOUS
Status: DISCONTINUED | OUTPATIENT
Start: 2025-06-22 | End: 2025-07-01 | Stop reason: HOSPADM

## 2025-06-22 RX ORDER — IPRATROPIUM BROMIDE AND ALBUTEROL SULFATE 2.5; .5 MG/3ML; MG/3ML
3 SOLUTION RESPIRATORY (INHALATION) EVERY 4 HOURS PRN
Status: DISCONTINUED | OUTPATIENT
Start: 2025-06-22 | End: 2025-06-22

## 2025-06-22 RX ORDER — ALUMINA, MAGNESIA, AND SIMETHICONE 2400; 2400; 240 MG/30ML; MG/30ML; MG/30ML
15 SUSPENSION ORAL EVERY 6 HOURS PRN
Status: DISCONTINUED | OUTPATIENT
Start: 2025-06-22 | End: 2025-07-01 | Stop reason: HOSPADM

## 2025-06-22 RX ORDER — FUROSEMIDE 10 MG/ML
80 INJECTION INTRAMUSCULAR; INTRAVENOUS ONCE
Status: COMPLETED | OUTPATIENT
Start: 2025-06-22 | End: 2025-06-22

## 2025-06-22 RX ORDER — ROSUVASTATIN CALCIUM 20 MG/1
20 TABLET, COATED ORAL DAILY
COMMUNITY

## 2025-06-22 RX ORDER — FLUTICASONE PROPIONATE 50 MCG
1 SPRAY, SUSPENSION (ML) NASAL 2 TIMES DAILY
COMMUNITY

## 2025-06-22 RX ORDER — BISACODYL 5 MG/1
5 TABLET, DELAYED RELEASE ORAL DAILY PRN
Status: DISCONTINUED | OUTPATIENT
Start: 2025-06-22 | End: 2025-07-01 | Stop reason: HOSPADM

## 2025-06-22 RX ORDER — GUAIFENESIN 600 MG/1
1200 TABLET, EXTENDED RELEASE ORAL EVERY 12 HOURS SCHEDULED
Status: DISCONTINUED | OUTPATIENT
Start: 2025-06-22 | End: 2025-07-01 | Stop reason: HOSPADM

## 2025-06-22 RX ORDER — ASPIRIN 81 MG/1
81 TABLET ORAL DAILY
Status: DISCONTINUED | OUTPATIENT
Start: 2025-06-22 | End: 2025-07-01 | Stop reason: HOSPADM

## 2025-06-22 RX ORDER — SODIUM CHLORIDE 9 MG/ML
40 INJECTION, SOLUTION INTRAVENOUS AS NEEDED
Status: DISCONTINUED | OUTPATIENT
Start: 2025-06-22 | End: 2025-07-01 | Stop reason: HOSPADM

## 2025-06-22 RX ORDER — BUDESONIDE 0.5 MG/2ML
0.5 INHALANT ORAL
Status: DISCONTINUED | OUTPATIENT
Start: 2025-06-22 | End: 2025-07-01 | Stop reason: HOSPADM

## 2025-06-22 RX ORDER — METHYLPREDNISOLONE SODIUM SUCCINATE 40 MG/ML
40 INJECTION, POWDER, LYOPHILIZED, FOR SOLUTION INTRAMUSCULAR; INTRAVENOUS EVERY 12 HOURS
Status: DISCONTINUED | OUTPATIENT
Start: 2025-06-22 | End: 2025-06-23

## 2025-06-22 RX ORDER — NICOTINE POLACRILEX 4 MG
15 LOZENGE BUCCAL
Status: DISCONTINUED | OUTPATIENT
Start: 2025-06-22 | End: 2025-07-01 | Stop reason: HOSPADM

## 2025-06-22 RX ORDER — BUDESONIDE AND FORMOTEROL FUMARATE DIHYDRATE 160; 4.5 UG/1; UG/1
2 AEROSOL RESPIRATORY (INHALATION)
COMMUNITY

## 2025-06-22 RX ADMIN — ROSUVASTATIN CALCIUM 20 MG: 10 TABLET, FILM COATED ORAL at 08:50

## 2025-06-22 RX ADMIN — GUAIFENESIN 1200 MG: 600 TABLET, EXTENDED RELEASE ORAL at 20:57

## 2025-06-22 RX ADMIN — INSULIN LISPRO 9 UNITS: 100 INJECTION, SOLUTION INTRAVENOUS; SUBCUTANEOUS at 12:16

## 2025-06-22 RX ADMIN — BUDESONIDE INHALATION 0.5 MG: 0.5 SUSPENSION RESPIRATORY (INHALATION) at 19:42

## 2025-06-22 RX ADMIN — NICOTINE 1 PATCH: 21 PATCH, EXTENDED RELEASE TRANSDERMAL at 08:49

## 2025-06-22 RX ADMIN — SULFUR HEXAFLUORIDE 2 ML: KIT at 14:03

## 2025-06-22 RX ADMIN — INSULIN LISPRO 4 UNITS: 100 INJECTION, SOLUTION INTRAVENOUS; SUBCUTANEOUS at 20:57

## 2025-06-22 RX ADMIN — Medication 5 MG: at 20:57

## 2025-06-22 RX ADMIN — ASPIRIN 81 MG: 81 TABLET, COATED ORAL at 08:50

## 2025-06-22 RX ADMIN — INSULIN LISPRO 16 UNITS: 100 INJECTION, SOLUTION INTRAVENOUS; SUBCUTANEOUS at 18:02

## 2025-06-22 RX ADMIN — ARFORMOTEROL TARTRATE 15 MCG: 15 SOLUTION RESPIRATORY (INHALATION) at 19:42

## 2025-06-22 RX ADMIN — CLOPIDOGREL BISULFATE 75 MG: 75 TABLET, FILM COATED ORAL at 08:50

## 2025-06-22 RX ADMIN — INSULIN GLARGINE 8 UNITS: 100 INJECTION, SOLUTION SUBCUTANEOUS at 20:57

## 2025-06-22 RX ADMIN — INSULIN LISPRO 7 UNITS: 100 INJECTION, SOLUTION INTRAVENOUS; SUBCUTANEOUS at 08:50

## 2025-06-22 RX ADMIN — IPRATROPIUM BROMIDE AND ALBUTEROL SULFATE 3 ML: .5; 3 SOLUTION RESPIRATORY (INHALATION) at 22:46

## 2025-06-22 RX ADMIN — DILTIAZEM HYDROCHLORIDE 240 MG: 240 CAPSULE, COATED, EXTENDED RELEASE ORAL at 08:50

## 2025-06-22 RX ADMIN — IPRATROPIUM BROMIDE AND ALBUTEROL SULFATE 3 ML: .5; 3 SOLUTION RESPIRATORY (INHALATION) at 14:47

## 2025-06-22 RX ADMIN — FUROSEMIDE 80 MG: 10 INJECTION, SOLUTION INTRAMUSCULAR; INTRAVENOUS at 03:52

## 2025-06-22 RX ADMIN — Medication 10 ML: at 09:16

## 2025-06-22 RX ADMIN — Medication 10 ML: at 20:57

## 2025-06-22 RX ADMIN — METHYLPREDNISOLONE SODIUM SUCCINATE 40 MG: 40 INJECTION, POWDER, FOR SOLUTION INTRAMUSCULAR; INTRAVENOUS at 20:57

## 2025-06-22 RX ADMIN — METHYLPREDNISOLONE SODIUM SUCCINATE 40 MG: 40 INJECTION, POWDER, FOR SOLUTION INTRAMUSCULAR; INTRAVENOUS at 08:50

## 2025-06-22 RX ADMIN — LOSARTAN POTASSIUM 50 MG: 50 TABLET, FILM COATED ORAL at 08:50

## 2025-06-22 RX ADMIN — GUAIFENESIN 1200 MG: 600 TABLET, EXTENDED RELEASE ORAL at 12:17

## 2025-06-22 NOTE — CONSULTS
[facility]: [facility]         CONSULT NOTE    Patient Identification:  Santos Sullivan  69 y.o.  male  1955  [id number]            Requesting physician: Attending physician    Reason for Consultation: Respiratory failure      History of Present Illness:  69-year-old male with history of COPD, CAD status post PCI, HFpEF, DM, HLD and chronic hypoxia on 3 L of oxygen at home who presented 6/22/2025 with complaints of 1 week of progressive shortness of breath, cough and wheezing.    XR Chest 1 View  Result Date: 6/22/2025  No clearly acute findings in the chest. Chronic changes as above, including cardiomegaly. Electronically Signed: Norris Chavarria MD  6/22/2025 2:19 AM EDT  Workstation ID: [id number]    Results for orders placed in visit on 03/09/22    Adult Transthoracic Echo Complete W/ Cont if Necessary Per Protocol    Interpretation Summary  · Estimated right ventricular systolic pressure from tricuspid regurgitation is normal (<35 mmHg).  · Left ventricular wall thickness is consistent with mild concentric hypertrophy.  · Estimated left ventricular EF = 60% Estimated left ventricular EF was in agreement with the calculated left ventricular EF. Left ventricular systolic function is normal.  · There is calcification of the aortic valve mainly affecting the right coronary cusp(s).  · Left ventricular diastolic function is consistent with (grade I) impaired relaxation.  · Mild dilation of the aortic root is present.      Assessment:  Acute respiratory failure with hypoxia and hypercapnia: ABGs on admission 7.3 1/62/75 while on nasal cannula, repeated on BiPAP 7.35/60.4/90.2  Acute bronchitis due to unspecified pathogen, negative respiratory panel    Acute on chronic HFpEF  COPD without acute exacerbation  CAD status post PCI  HTN  HLD  DM  Chronic hepatitis C  Tobacco smoking  Obesity with BMI of 42 I    PFTs 2022: Moderate obstruction with air trapping and decreased oxygen diffusion capacity, FEV1/FVC 65%,  FVC 72%, FEV1 63%, %, %, DLCO 58%        Recommendations:  IV Solu-Medrol 40 mg twice daily    Symbicort  Mucinex  Oxygen supplement and titration to maintain saturation 90 to 95% currently 4 L per nasal cannula alternating with BiPAP as needed  Bronchodilators    Smoking cessation counseling, nicotine patch  Aspirin, Plavix, Crestor  Diltiazem, losartan  Insulin Lantus and sliding scale    I personally reviewed the radiological studies        Review of Sytems:  Constitutional: Negative for chills, and fever and positive for malaise/fatigue.   HENT: Negative.    Eyes: Negative.    Cardiovascular: Negative.    Respiratory: Positive for cough and shortness of breath.    Skin: Negative.    Musculoskeletal: Negative.    Gastrointestinal: Negative.    Genitourinary: Negative.    Neurological: Generalized weakness      Past Medical History:  Past Medical History:   Diagnosis Date    Abnormal stress test     Acrocyanosis     Acute renal insufficiency     Benign essential hypertension     Chronic hepatitis C     Managed by I.  Cured of infection.    Claudication     Right leg. MARY showing mild disease on right.    COPD (chronic obstructive pulmonary disease)     Moderate, nonreversible airway obstruction (worse than previous).    Coronary artery disease involving native coronary artery of native heart without angina pectoris     Femur fracture     30+ years ago    Hearing loss     History of spinal cord injury     Hyperlipidemia     Left against medical advice     Need for hepatitis C screening test     Onychomycosis of toenail     Tobacco use disorder        Past Surgical History:  Past Surgical History:   Procedure Laterality Date    CARDIAC CATHETERIZATION      CORONARY STENT PLACEMENT          Home Meds:  Medications Prior to Admission   Medication Sig Dispense Refill Last Dose/Taking    albuterol (PROVENTIL) (2.5 MG/3ML) 0.083% nebulizer solution Take 2.5 mg by nebulization Every 4 (Four) Hours As Needed  for Wheezing. 180 mL 5 Taking As Needed    albuterol sulfate  (90 Base) MCG/ACT inhaler Inhale 2 puffs Every 4 (Four) Hours As Needed for Wheezing.   Taking As Needed    budesonide (PULMICORT) 0.5 MG/2ML nebulizer solution Take 2 mL by nebulization 2 (Two) Times a Day.   Taking    budesonide-formoterol (SYMBICORT) 160-4.5 MCG/ACT inhaler Inhale 2 puffs 2 (Two) Times a Day.   Taking    bumetanide (BUMEX) 2 MG tablet Take 1 tablet by mouth Daily.   Taking    clopidogrel (PLAVIX) 75 MG tablet Take 1 tablet by mouth once daily 90 tablet 1 Taking    dilTIAZem CD (CARDIZEM CD) 240 MG 24 hr capsule Take 1 capsule by mouth Daily.   Taking    fluticasone (FLONASE) 50 MCG/ACT nasal spray Administer 1 spray into the nostril(s) as directed by provider 2 (Two) Times a Day.   Taking    furosemide (LASIX) 40 MG tablet Take 1 tablet by mouth 2 (Two) Times a Day.   Taking    glimepiride (AMARYL) 2 MG tablet Take 1 tablet by mouth 2 (Two) Times a Day.   Taking    insulin glargine (LANTUS, SEMGLEE) 100 UNIT/ML injection Inject 8 Units under the skin into the appropriate area as directed Every Night.   Taking    losartan (COZAAR) 50 MG tablet Take 1 tablet by mouth Daily.   Taking    metFORMIN ER (GLUCOPHAGE-XR) 500 MG 24 hr tablet Take 2 tablets by mouth 2 (Two) Times a Day.   Taking    pioglitazone (ACTOS) 45 MG tablet Take 1 tablet by mouth Daily.   Taking    predniSONE (DELTASONE) 10 MG tablet Take 1-4 tablets by mouth Daily.   Taking    rosuvastatin (CRESTOR) 20 MG tablet Take 1 tablet by mouth Daily.   Taking    Semaglutide,0.25 or 0.5MG/DOS, (Ozempic, 0.25 or 0.5 MG/DOSE,) 2 MG/3ML solution pen-injector Inject 0.25 mg under the skin into the appropriate area as directed 1 (One) Time Per Week.   Taking    spironolactone (ALDACTONE) 25 MG tablet Take 1 tablet by mouth Daily.   Taking    aspirin (aspirin) 81 MG EC tablet Take 1 tablet by mouth Daily.       nitroglycerin (Nitrostat) 0.4 MG SL tablet Place 1 tablet under the  "tongue Every 5 (Five) Minutes As Needed for Chest Pain. Take no more than 3 doses in 15 minutes. 25 tablet 0        Allergies:  No Known Allergies    Social History:   Social History     Socioeconomic History    Marital status:    Tobacco Use    Smoking status: Every Day     Current packs/day: 0.50     Average packs/day: 0.5 packs/day for 25.0 years (12.5 ttl pk-yrs)     Types: Cigarettes    Smokeless tobacco: Never    Tobacco comments:     Patient is advised to quit smoking   Vaping Use    Vaping status: Never Used   Substance and Sexual Activity    Alcohol use: Not Currently     Comment: rare    Drug use: Not Currently    Sexual activity: Defer       Family History:  Family History   Problem Relation Age of Onset    Heart disease Mother     Hypertension Mother     Coronary artery disease Mother     Cancer Father         Throat    Cancer Sister         pelvic    Cancer Brother         Lung       Physical Exam:  /71   Pulse 94   Temp 98 °F (36.7 °C) (Oral)   Resp 14   Ht 177.8 cm (70\")   Wt 135 kg (298 lb 8.1 oz)   SpO2 90%   BMI 42.83 kg/m²  Body mass index is 42.83 kg/m². 90% 135 kg (298 lb 8.1 oz)  General Appearance:  Alert   HEENT:  Normocephalic, without obvious abnormality, Conjunctiva/corneas clear,.   Nares normal, no drainage     Neck:  Supple, symmetrical, trachea midline.   Lungs /Chest wall:   Wheezing bilateral basal rhonchi, respirations unlabored, symmetrical wall movement.     Heart:  Regular rate and rhythm, S1 S2 normal  Abdomen: Soft, non-tender, no masses, no organomegaly.    Extremities: No edema, no clubbing or cyanosis    LABS:  Lab Results   Component Value Date    CALCIUM 8.7 06/22/2025     Results from last 7 days   Lab Units 06/22/25  0151   SODIUM mmol/L 141   POTASSIUM mmol/L 5.0   CHLORIDE mmol/L 102   CO2 mmol/L 25.6   BUN mg/dL 32.2*   CREATININE mg/dL 1.00   GLUCOSE mg/dL 243*   CALCIUM mg/dL 8.7   WBC 10*3/mm3 13.96*   HEMOGLOBIN g/dL 14.1   PLATELETS 10*3/mm3 " 232   ALT (SGPT) U/L 43*   AST (SGOT) U/L 16   PROBNP pg/mL 175.0   PROCALCITONIN ng/mL 0.06     Lab Results   Component Value Date    TROPONINI <0.01 03/18/2019    TROPONINT 33 (H) 06/22/2025     Results from last 7 days   Lab Units 06/22/25  0323 06/22/25  0151   HSTROP T ng/L 33* 36*         Results from last 7 days   Lab Units 06/22/25  0151 06/22/25  0145   PROCALCITONIN ng/mL 0.06  --    LACTATE mmol/L  --  1.6     Results from last 7 days   Lab Units 06/22/25  0318 06/22/25  0145   PH, ARTERIAL pH units 7.359 7.312*   PCO2, ARTERIAL mm Hg 60.4* 62.0*   PO2 ART mm Hg 90.2 77.0*   O2 SATURATION ART % 96.3 93.4*   MODALITY  BiPap Cannula     Results from last 7 days   Lab Units 06/22/25 0229   ADENOVIRUS DETECTION BY PCR  Not Detected   CORONAVIRUS 229E  Not Detected   CORONAVIRUS HKU1  Not Detected   CORONAVIRUS NL63  Not Detected   CORONAVIRUS OC43  Not Detected   HUMAN METAPNEUMOVIRUS  Not Detected   HUMAN RHINOVIRUS/ENTEROVIRUS  Not Detected   INFLUENZA B PCR  Not Detected   PARAINFLUENZA 1  Not Detected   PARAINFLUENZA VIRUS 2  Not Detected   PARAINFLUENZA VIRUS 3  Not Detected   PARAINFLUENZA VIRUS 4  Not Detected   BORDETELLA PERTUSSIS PCR  Not Detected   CHLAMYDOPHILA PNEUMONIAE PCR  Not Detected   MYCOPLAMA PNEUMO PCR  Not Detected   INFLUENZA A PCR  Not Detected   RSV, PCR  Not Detected             Lab Results   Component Value Date    TSH 1.690 03/09/2022     Estimated Creatinine Clearance: 97.8 mL/min (by C-G formula based on SCr of 1 mg/dL).         Imaging:  Imaging Results (Last 24 Hours)       Procedure Component Value Units Date/Time    XR Chest 1 View [[id number]] Collected: 06/22/25 0216     Updated: 06/22/25 0221    Narrative:      XR CHEST 1 VW    Date of Exam: 6/22/2025 2:00 AM EDT    Indication: soa    Comparison: 5/29/2025    Findings:  Heart remains enlarged. Lipomatous pleural thickening is again seen bilaterally as seen on chest CT 4/28/2025. Pulmonary vascularity is normal. There is  some chronic scarring in the lingula. Some of the opacity at the left lung base is due to a prominent   epicardial fat pad. No definite acute pulmonary infiltrates. No pneumothorax.      Impression:      No clearly acute findings in the chest. Chronic changes as above, including cardiomegaly.        Electronically Signed: Norris Chavarria MD    6/22/2025 2:19 AM EDT    Workstation ID: [id number]              Current Meds:   SCHEDULE  aspirin, 81 mg, Oral, Daily  clopidogrel, 75 mg, Oral, Daily  dilTIAZem CD, 240 mg, Oral, Daily  [START ON 6/23/2025] furosemide, 40 mg, Intravenous, Q12H  insulin glargine, 8 Units, Subcutaneous, Nightly  insulin lispro, 2-9 Units, Subcutaneous, Q6H  losartan, 50 mg, Oral, Daily  methylPREDNISolone sodium succinate, 40 mg, Intravenous, Q12H  nicotine, 1 patch, Transdermal, Daily  rosuvastatin, 20 mg, Oral, Daily  sodium chloride, 10 mL, Intravenous, Q12H      Infusions     PRNs    acetaminophen **OR** acetaminophen **OR** acetaminophen    aluminum-magnesium hydroxide-simethicone    senna-docusate sodium **AND** polyethylene glycol **AND** bisacodyl **AND** bisacodyl    Calcium Replacement - Follow Nurse / BPA Driven Protocol    dextrose    dextrose    glucagon (human recombinant)    ipratropium-albuterol    Magnesium Standard Dose Replacement - Follow Nurse / BPA Driven Protocol    melatonin    nitroglycerin    ondansetron ODT **OR** ondansetron    Phosphorus Replacement - Follow Nurse / BPA Driven Protocol    Potassium Replacement - Follow Nurse / BPA Driven Protocol    sodium chloride    sodium chloride    sodium chloride        Elijah Lynch MD  6/22/2025  09:37 EDT      Much of this encounter note is an electronic transcription/translation of spoken language to printed text using Dragon Software.

## 2025-06-22 NOTE — PROGRESS NOTES
[facility] MEDICINE SERVICE  DAILY PROGRESS NOTE    NAME: Santos Sullivan  : 1955  MRN: [id number]      LOS: 0 days     PROVIDER OF SERVICE: Kulwant Haynes MD    Chief Complaint: Acute respiratory failure with hypoxia and hypercapnia    Subjective:     Interval History:  History taken from: patient  Patient off BiPAP complaining of persistent cough  Diet started        Review of Systems:   Review of Systems  14 point review of system unremarkable except mentioned above  Objective:     Vital Signs  Temp:  [98.1 °F (36.7 °C)-98.7 °F (37.1 °C)] 98.1 °F (36.7 °C)  Heart Rate:  [] 106  Resp:  [20-25] 25  BP: (137-166)/() 155/100  Flow (L/min) (Oxygen Therapy):  [4] 4   Body mass index is 42.83 kg/m².    Physical Exam  Physical Exam  Constitutional:       Appearance: He is obese.   HENT:      Head: Normocephalic.      Mouth/Throat:      Mouth: Mucous membranes are moist.   Eyes:      Pupils: Pupils are equal, round, and reactive to light.   Cardiovascular:      Rate and Rhythm: Normal rate and regular rhythm.   Pulmonary:      Effort: Pulmonary effort is normal.      Breath sounds: Wheezing present.   Abdominal:      Comments: obese   Musculoskeletal:      Right lower leg: Edema present.      Left lower leg: Edema present.   Skin:     General: Skin is warm.   Neurological:      General: No focal deficit present.      Mental Status: He is alert and oriented to person, place, and time.   Psychiatric:         Mood and Affect: Mood normal.            Diagnostic Data    Results from last 7 days   Lab Units 25  0151   WBC 10*3/mm3 13.96*   HEMOGLOBIN g/dL 14.1   HEMATOCRIT % 47.1   PLATELETS 10*3/mm3 232   GLUCOSE mg/dL 243*   CREATININE mg/dL 1.00   BUN mg/dL 32.2*   SODIUM mmol/L 141   POTASSIUM mmol/L 5.0   AST (SGOT) U/L 16   ALT (SGPT) U/L 43*   ALK PHOS U/L 55   BILIRUBIN mg/dL 0.2   ANION GAP mmol/L 13.4       XR Chest 1 View  Result Date: 2025  No clearly acute findings in the chest.  Chronic changes as above, including cardiomegaly. Electronically Signed: Norris Chavarria MD  6/22/2025 2:19 AM EDT  Workstation ID: [id number]        I reviewed the patient's new clinical results.    Assessment/Plan:     Active and Resolved Problems  Active Hospital Problems    Diagnosis  POA    **Acute respiratory failure with hypoxia and hypercapnia [J96.01, J96.02]  Yes    Acute on chronic diastolic heart failure [I50.33]  Yes    Mixed hyperlipidemia [E78.2]  Yes    Essential hypertension [I10]  Yes    Overweight [E66.3]  Yes    Chronic hepatitis C [B18.2]  Yes    Coronary angioplasty status [Z98.61]  Not Applicable    Chronic obstructive pulmonary disease [J44.9]  Yes    Tobacco dependence syndrome [F17.200]  Yes      Resolved Hospital Problems   No resolved problems to display.   Patient is 69-year-old male on home oxygen 3 LPM who presented with shortness of breath cough and wheezing as well as fatigue.  Patient required to be on BiPAP in the ER    Acute hypoxic and hypercapnic respiratory failure  Acute exacerbation of HFpEF HFpEF  On presentation to have hypercarbia on ABG requiring BiPAP  Mild bilateral lower extremity edema  CXR showed no clearly acute findings in the chest. Chronic changes as above including cardiomegaly  - lasix 80mg IV x1 given in the ER, continue lasix 40mg q24 hrs  hours   -Will repeat echocardiogram  # ABG as needed  -Respiratory panel is negative  - Pulmonary team has been consulted as patient was on BiPAP now transition to room air  CPAP at bedtime  -Will consult cardiology based on echocardiogram finding patient follows with Dr. Major as outpatient    CAD s/p PCI  Hypertension  Hyperlipidemia  - cont home aspirin, plavix, losartan, cardizem, statin-and monitor their side effects     DM type II  Hemoglobin A1c  -Monitor serum glucose ACHS and correct with low-dose of insulin alcoholism and monitor for hypoglycemia  Continue home basal insulin       COPD  - duonebs q4 hs   - IV  Solu-Medrol     Chronic hepatitis C     Tobacco abuse  - encourage cessation  - nicotine patch      Morbid obesity  - BMI 41  - complicates all aspects of care    VTE Prophylaxis:  Mechanical VTE prophylaxis orders are present.             Disposition Planning:     Barriers to Discharge: Acute exacerbation of COPD requiring IV Solu-Medrol and bronchodilators  Anticipated Date of Discharge: 56/24  Place of Discharge: D      Time: 35 minutes     Code Status and Medical Interventions: CPR (Attempt to Resuscitate); Full Support   Ordered at: 06/22/25 0543     Code Status (Patient has no pulse and is not breathing):    CPR (Attempt to Resuscitate)     Medical Interventions (Patient has pulse or is breathing):    Full Support     Level Of Support Discussed With:    Patient       Signature: Electronically signed by Kulwant Haynes MD, 06/22/25, 07:40 EDT.  [facility] Hospitalist Team

## 2025-06-22 NOTE — H&P
[facility] Medicine Services    Hospitalist History and Physical     Santos Sullivan : 1955 MRN:[id number] LOS:0 ROOM:      Reason for admission: Acute respiratory failure with hypoxia and hypercapnia     Assessment / Plan     Acute respiratory failure with hypoxia and hypercapnia  Acute diastolic CHF exacerbation  - initial ABG pH 7.31, low CO2 62, pO2 75, HCO3 31.4 on 36% FiO2  - Placed on Bipap with repeat ABG 7.359, CO2 60.4, pO2 90.2, HCO3 34.1  - pt with 2+ bilateral lower extremity edema   - WBC 13.96  - HS troponin 36 with repeat 33  - proBNP 175.   - RVP negative  - CXR showed no clearly acute findings in the chest. Chronic changes as above including cardiomegaly  - lasix 80mg IV x1 given in the ER, continue lasix 40mg q12 hours   - check echo as previous echo was  and showed normal EF w/ diastolic failure grade 1  - NPO other than ice chips/liquis until Bipap able to be weaned  - consult pulmonary to help wean off bipap, consider cardiology consult (sees Dr. Major outpatient)    CAD s/p PCI  Hypertension  Hyperlipidemia  - cont home aspirin, plavix, losartan, cardizem, statin     DM type II  - cont home lantus, add SSI    COPD  - duonebs prn    Chronic hepatitis C    Tobacco abuse  - encourage cessation  - nicotine patch     Morbid obesity  - BMI 41  - complicates all aspects of care    Nutrition:   NPO Diet NPO Type: Sips with Meds, Ice Chips     VTE Prophylaxis:  Mechanical VTE prophylaxis orders are present.         History of Present illness     Santos Sullivan is a 69 y.o. male with PMH of CAD s/p PCI, HFpEF, HTN, HLD, COPD, DM2 presented to [facility] ED 2025 with complaints of shortness of breath, cough, wheezing for the last week. He does wear home oxygen 3L O2. He denied any fever, no chest pain. He feels like his legs are more swollen. He feels fatigued.      In the ED  ABG pH 7.31, low CO2 62, pO2 75, HCO3 31.4 on 36% FiO2. He was placed on Bipap with ABG 7.359, CO2 60.4, pO2  90.2, HCO3 34.1  WBC 13.96, HS troponin 36 with repeat 33, proBNP 175.   RVP negative.   CXR showed no clearly acute findings in the chest. Chronic changes as above including cardiomegaly. , afebrile, BP 150s-160s/80s.   He was given lasix 80mg IV x1.   He was admitted for further treatment of acute respiratory failure with hypoxia and hypercapnia thought to be due to CHF exacerbation     Subjective / Review of systems     Review of Systems   Constitutional: Negative.  Negative for fever.   HENT: Negative.     Eyes: Negative.    Respiratory:  Positive for shortness of breath and wheezing.    Cardiovascular:  Positive for leg swelling.   Gastrointestinal: Negative.    Genitourinary: Negative.    Musculoskeletal: Negative.    Skin: Negative.    Neurological: Negative.    Psychiatric/Behavioral: Negative.          Past Medical/Surgical/Social/Family History & Allergies     Past Medical History:   Diagnosis Date    Abnormal stress test     Acrocyanosis     Acute renal insufficiency     Benign essential hypertension     Chronic hepatitis C     Managed by [facility].  Cured of infection.    Claudication     Right leg. MARY showing mild disease on right.    COPD (chronic obstructive pulmonary disease)     Moderate, nonreversible airway obstruction (worse than previous).    Coronary artery disease involving native coronary artery of native heart without angina pectoris     Femur fracture     30+ years ago    Hearing loss     History of spinal cord injury     Hyperlipidemia     Left against medical advice     Need for hepatitis C screening test     Onychomycosis of toenail     Tobacco use disorder       Past Surgical History:   Procedure Laterality Date    CARDIAC CATHETERIZATION      CORONARY STENT PLACEMENT        Social History     Socioeconomic History    Marital status:    Tobacco Use    Smoking status: Every Day     Current packs/day: 0.50     Average packs/day: 0.5 packs/day for 25.0 years (12.5 ttl pk-yrs)      Types: Cigarettes    Smokeless tobacco: Never    Tobacco comments:     Patient is advised to quit smoking   Vaping Use    Vaping status: Never Used   Substance and Sexual Activity    Alcohol use: Not Currently     Comment: rare    Drug use: Not Currently    Sexual activity: Defer      Family History   Problem Relation Age of Onset    Heart disease Mother     Hypertension Mother     Coronary artery disease Mother     Cancer Father         Throat    Cancer Sister         pelvic    Cancer Brother         Lung      No Known Allergies   Social Drivers of Health     Tobacco Use: High Risk (6/12/2023)    Received from [facility]    Patient History     Smoking Tobacco Use: Every Day     Smokeless Tobacco Use: Never     Passive Exposure: Not on file   Alcohol Use: Not At Risk (12/21/2020)    AUDIT-C     Frequency of Alcohol Consumption: Never     Average Number of Drinks: Not on file     Frequency of Binge Drinking: Not on file   Financial Resource Strain: Not on file   Food Insecurity: Not on file   Transportation Needs: Not on file   Physical Activity: Not on file   Stress: Not on file   Social Connections: Not on file   Interpersonal Safety: Not At Risk (6/22/2025)    Abuse Screen     Unsafe at Home or Work/School: no     Feels Threatened by Someone?: no     Does Anyone Keep You from Contacting Others or Doint Things Outside the Home?: no     Physical Sign of Abuse Present: no   Depression: Not at risk (5/4/2022)    PHQ-2     PHQ-2 Score: 0   Housing Stability: Not on file   Utilities: Not on file   Health Literacy: Not on file   Employment: Not on file   Disabilities: Not on file        Home Medications     Prior to Admission medications    Medication Sig Start Date End Date Taking? Authorizing Provider   albuterol (PROVENTIL) (2.5 MG/3ML) 0.083% nebulizer solution Take 2.5 mg by nebulization Every 4 (Four) Hours As Needed for Wheezing. 3/9/22  Yes Leigh Ayon MD   albuterol sulfate  (90  Base) MCG/ACT inhaler Inhale 2 puffs Every 4 (Four) Hours As Needed for Wheezing.   Yes Kem Castañeda MD   budesonide (PULMICORT) 0.5 MG/2ML nebulizer solution Take 2 mL by nebulization 2 (Two) Times a Day.   Yes Kem Castañeda MD   budesonide-formoterol (SYMBICORT) 160-4.5 MCG/ACT inhaler Inhale 2 puffs 2 (Two) Times a Day.   Yes Kem Castañeda MD   bumetanide (BUMEX) 2 MG tablet Take 1 tablet by mouth Daily.   Yes eKm Castañeda MD   clopidogrel (PLAVIX) 75 MG tablet Take 1 tablet by mouth once daily 6/24/22  Yes Leigh Ayon MD   dilTIAZem CD (CARDIZEM CD) 240 MG 24 hr capsule Take 1 capsule by mouth Daily.   Yes Kem Castañeda MD   fluticasone (FLONASE) 50 MCG/ACT nasal spray Administer 1 spray into the nostril(s) as directed by provider 2 (Two) Times a Day.   Yes Kem Castañeda MD   furosemide (LASIX) 40 MG tablet Take 1 tablet by mouth 2 (Two) Times a Day.   Yes Kem Castañeda MD   glimepiride (AMARYL) 2 MG tablet Take 1 tablet by mouth 2 (Two) Times a Day.   Yes Kem Castañeda MD   insulin glargine (LANTUS, SEMGLEE) 100 UNIT/ML injection Inject 8 Units under the skin into the appropriate area as directed Every Night.   Yes Kem Castañeda MD   losartan (COZAAR) 50 MG tablet Take 1 tablet by mouth Daily.   Yes Kem Castañeda MD   metFORMIN ER (GLUCOPHAGE-XR) 500 MG 24 hr tablet Take 2 tablets by mouth 2 (Two) Times a Day.   Yes Kem Castañeda MD   pioglitazone (ACTOS) 45 MG tablet Take 1 tablet by mouth Daily.   Yes Kem Castañeda MD   predniSONE (DELTASONE) 10 MG tablet Take 1-4 tablets by mouth Daily.   Yes Kem Castañeda MD   rosuvastatin (CRESTOR) 20 MG tablet Take 1 tablet by mouth Daily.   Yes Kem Castañeda MD   Semaglutide,0.25 or 0.5MG/DOS, (Ozempic, 0.25 or 0.5 MG/DOSE,) 2 MG/3ML solution pen-injector Inject 0.25 mg under the skin into the appropriate area as directed 1 (One) Time Per  Week.   Yes Kem Castañeda MD   spironolactone (ALDACTONE) 25 MG tablet Take 1 tablet by mouth Daily.   Yes Kem Castañeda MD   aspirin (aspirin) 81 MG EC tablet Take 1 tablet by mouth Daily. 12/7/18   Kem Castañeda MD   nitroglycerin (Nitrostat) 0.4 MG SL tablet Place 1 tablet under the tongue Every 5 (Five) Minutes As Needed for Chest Pain. Take no more than 3 doses in 15 minutes. 7/8/22   Leigh Ayon MD   Accu-Chek FastClix Lancets misc 1 each Take As Directed. Use to test blood sugar once daily (fasting, in the AM). 6/6/22 6/22/25  Leigh Ayon MD   atorvastatin (LIPITOR) 40 MG tablet Take 1 tablet by mouth Daily. 7/8/22 6/22/25  Leigh Ayon MD   Blood Glucose Monitoring Suppl (Accu-Chek Guide) w/Device kit 1 each Take As Directed. Use to test blood sugar once daily (fasting, in the AM). 6/6/22 6/22/25  Leigh Ayon MD   chlorthalidone (HYGROTON) 25 MG tablet Take 1 tablet by mouth Daily. 8/9/22 6/22/25  Kem Castañeda MD   Fluticasone-Umeclidin-Vilant (Trelegy Ellipta) 200-62.5-25 MCG/INH inhaler Inhale 1 puff Daily. 4/15/22 6/22/25  Leigh Ayon MD   furosemide (LASIX) 40 MG tablet Take 1 tablet by mouth Daily. 7/8/22 6/22/25  Leigh Ayon MD   glucose blood (Accu-Chek Guide) test strip Use to test blood sugar once daily (fasting, in the AM). 6/6/22 6/22/25  Leigh Ayon MD   ibuprofen (ADVIL,MOTRIN) 200 MG tablet Take 5 tablets by mouth 3 (Three) Times a Day.  6/22/25  Kem Castañeda MD   ipratropium-albuterol (Combivent Respimat)  MCG/ACT inhaler Inhale 1 puff 4 (Four) Times a Day As Needed for Wheezing or Shortness of Air. 4/15/22 6/22/25  Leigh Ayon MD   metFORMIN ER (GLUCOPHAGE-XR) 500 MG 24 hr tablet Take 1 tablet by mouth Daily With Breakfast. 6/6/22 6/22/25  Leigh Ayon MD   metoprolol succinate XL (TOPROL-XL) 50  MG 24 hr tablet Take 1 tablet by mouth Daily. 7/7/22 6/22/25  Ian Major MD   theophylline (THEODUR) 300 MG 12 hr tablet Take 300 mg by mouth Daily. 5/17/22 6/22/25  Provider, MD Kem        Objective / Physical Exam     Vital signs:  Temp: 98.7 °F (37.1 °C)  BP: 150/84  Heart Rate: 99  Resp: 20  SpO2: 96 %  Weight: 131 kg (288 lb 12.8 oz)    Admission Weight: Weight: 131 kg (288 lb 12.8 oz)    Physical Exam  Vitals and nursing note reviewed.   HENT:      Head: Normocephalic and atraumatic.   Eyes:      Extraocular Movements: Extraocular movements intact.      Pupils: Pupils are equal, round, and reactive to light.   Cardiovascular:      Rate and Rhythm: Normal rate and regular rhythm.      Pulses: Normal pulses.      Heart sounds: Murmur heard.   Pulmonary:      Effort: Pulmonary effort is normal. No respiratory distress.      Breath sounds: Examination of the right-upper field reveals wheezing and rhonchi. Examination of the left-upper field reveals wheezing and rhonchi. Examination of the right-lower field reveals decreased breath sounds and wheezing. Examination of the left-lower field reveals decreased breath sounds and wheezing. Decreased breath sounds, wheezing and rhonchi present.      Comments:  Bipap mask in place  Abdominal:      General: Bowel sounds are normal.      Palpations: Abdomen is soft.      Tenderness: There is no abdominal tenderness.   Musculoskeletal:      Right lower leg: Edema present.      Left lower leg: Edema present.      Comments: Chronic left leg weakness per patient otherwise moves all extremities   Skin:     General: Skin is warm and dry.   Neurological:      Mental Status: He is alert and oriented to person, place, and time.   Psychiatric:         Mood and Affect: Mood normal.         Behavior: Behavior normal.          Labs     Results from last 7 days   Lab Units 06/22/25  0151   WBC 10*3/mm3 13.96*   HEMOGLOBIN g/dL 14.1   HEMATOCRIT % 47.1   PLATELETS 10*3/mm3 232       Results from last 7 days   Lab Units 06/22/25  0151   ALK PHOS U/L 55   AST (SGOT) U/L 16   ALT (SGPT) U/L 43*           Results from last 7 days   Lab Units 06/22/25  0151   SODIUM mmol/L 141   POTASSIUM mmol/L 5.0   CHLORIDE mmol/L 102   CO2 mmol/L 25.6   BUN mg/dL 32.2*   CREATININE mg/dL 1.00   GLUCOSE mg/dL 243*        Imaging     XR Chest 1 View  Result Date: 6/22/2025  XR CHEST 1 VW Date of Exam: 6/22/2025 2:00 AM EDT Indication: soa Comparison: 5/29/2025 Findings: Heart remains enlarged. Lipomatous pleural thickening is again seen bilaterally as seen on chest CT 4/28/2025. Pulmonary vascularity is normal. There is some chronic scarring in the lingula. Some of the opacity at the left lung base is due to a prominent  epicardial fat pad. No definite acute pulmonary infiltrates. No pneumothorax.     No clearly acute findings in the chest. Chronic changes as above, including cardiomegaly. Electronically Signed: Norris Chavarria MD  6/22/2025 2:19 AM EDT  Workstation ID: [id number]     Current Medications     Scheduled Meds:  aspirin, 81 mg, Oral, Daily  clopidogrel, 75 mg, Oral, Daily  dilTIAZem CD, 240 mg, Oral, Daily  [START ON 6/23/2025] furosemide, 40 mg, Intravenous, Q12H  insulin glargine, 8 Units, Subcutaneous, Nightly  insulin lispro, 2-9 Units, Subcutaneous, Q6H  losartan, 50 mg, Oral, Daily  nicotine, 1 patch, Transdermal, Daily  rosuvastatin, 20 mg, Oral, Daily  sodium chloride, 10 mL, Intravenous, Q12H         [facility]  06/22/25   05:55 EDT

## 2025-06-22 NOTE — ED PROVIDER NOTES
Subjective   History of Present Illness  69-year-old male with a history of CHF and COPD transferred from home for worsening dyspnea over the past 1 week.  Patient has a mild associated cough, no fever.  Patient admits noncompliance with his Bumex over the past 2 days because he states he did not have the energy to get up to use the bathroom.      Review of Systems   Constitutional:  Positive for fatigue.   Respiratory:  Positive for cough and shortness of breath.    Cardiovascular:  Positive for leg swelling.       Past Medical History:   Diagnosis Date    Abnormal stress test     Acrocyanosis     Acute renal insufficiency     Benign essential hypertension     Chronic hepatitis C     Managed by [facility].  Cured of infection.    Claudication     Right leg. MARY showing mild disease on right.    COPD (chronic obstructive pulmonary disease)     Moderate, nonreversible airway obstruction (worse than previous).    Coronary artery disease involving native coronary artery of native heart without angina pectoris     Femur fracture     30+ years ago    Hearing loss     History of spinal cord injury     Hyperlipidemia     Left against medical advice     Need for hepatitis C screening test     Onychomycosis of toenail     Tobacco use disorder        No Known Allergies    Past Surgical History:   Procedure Laterality Date    CARDIAC CATHETERIZATION      CORONARY STENT PLACEMENT         Family History   Problem Relation Age of Onset    Heart disease Mother     Hypertension Mother     Coronary artery disease Mother     Cancer Father         Throat    Cancer Sister         pelvic    Cancer Brother         Lung       Social History     Socioeconomic History    Marital status:    Tobacco Use    Smoking status: Every Day     Current packs/day: 0.50     Average packs/day: 0.5 packs/day for 25.0 years (12.5 ttl pk-yrs)     Types: Cigarettes    Smokeless tobacco: Never    Tobacco comments:     Patient is advised to quit smoking    Vaping Use    Vaping status: Never Used   Substance and Sexual Activity    Alcohol use: Not Currently     Comment: rare    Drug use: Not Currently    Sexual activity: Defer           Objective   Physical Exam  Constitutional:       Appearance: He is obese.   HENT:      Head: Normocephalic and atraumatic.      Mouth/Throat:      Mouth: Mucous membranes are moist.      Pharynx: Oropharynx is clear.   Cardiovascular:      Rate and Rhythm: Tachycardia present.      Heart sounds: Normal heart sounds.   Pulmonary:      Effort: Pulmonary effort is normal.      Comments: No wheezes appreciated, mild rhonchi left base  Musculoskeletal:      Comments: 2+ symmetric lower extremity edema   Skin:     General: Skin is warm and dry.      Capillary Refill: Capillary refill takes less than 2 seconds.   Neurological:      General: No focal deficit present.      Mental Status: He is alert and oriented to person, place, and time.   Psychiatric:         Mood and Affect: Mood normal.         Behavior: Behavior normal.         Procedures           ED Course                                                       Medical Decision Making  69-year-old male found to have hypercapnic respiratory failure presumably from CHF exacerbation as patient has no significant wheezing on exam, no pneumonia with increased lower extremity edema with noncompliance with his diuretics, will continue BiPAP, diuresis, admit, case discussed with [place] with hospital service, agrees to plan    Problems Addressed:  Acute on chronic diastolic congestive heart failure: complicated acute illness or injury  Acute respiratory failure with hypercapnia: complicated acute illness or injury    Amount and/or Complexity of Data Reviewed  External Data Reviewed: radiology.     Details: Echo March 2022 with diastolic dysfunction  Labs: ordered.     Details: Negative PCR panel  Radiology: ordered and independent interpretation performed.     Details: No acute findings on  chest x-ray  ECG/medicine tests: ordered and independent interpretation performed.     Details: EKG interpretation: Sinus tachycardia, rate 103, no acute ST changes seen    Risk  Prescription drug management.  Decision regarding hospitalization.        Final diagnoses:   Acute respiratory failure with hypercapnia   Acute on chronic diastolic congestive heart failure       ED Disposition  ED Disposition       ED Disposition   Decision to Admit    Condition   --    Comment   Level of Care: Telemetry [5]   Admitting Physician: NIDA JAIME [[id number]]   Bed Request Comments: PCU                 No follow-up provider specified.       Medication List      No changes were made to your prescriptions during this visit.            Shankar Mcnulty MD  06/22/25 0348

## 2025-06-23 LAB
ANION GAP SERPL CALCULATED.3IONS-SCNC: 8.7 MMOL/L (ref 5–15)
BASOPHILS # BLD AUTO: 0.08 10*3/MM3 (ref 0–0.2)
BASOPHILS NFR BLD AUTO: 0.5 % (ref 0–1.5)
BUN SERPL-MCNC: 42.1 MG/DL (ref 8–23)
BUN/CREAT SERPL: 40.5 (ref 7–25)
CALCIUM SPEC-SCNC: 8.3 MG/DL (ref 8.6–10.5)
CHLORIDE SERPL-SCNC: 99 MMOL/L (ref 98–107)
CO2 SERPL-SCNC: 28.3 MMOL/L (ref 22–29)
CREAT SERPL-MCNC: 1.04 MG/DL (ref 0.76–1.27)
DEPRECATED RDW RBC AUTO: 55.4 FL (ref 37–54)
EGFRCR SERPLBLD CKD-EPI 2021: 78.2 ML/MIN/1.73
EOSINOPHIL # BLD AUTO: 0 10*3/MM3 (ref 0–0.4)
EOSINOPHIL NFR BLD AUTO: 0 % (ref 0.3–6.2)
ERYTHROCYTE [DISTWIDTH] IN BLOOD BY AUTOMATED COUNT: 15.2 % (ref 12.3–15.4)
GLUCOSE BLDC GLUCOMTR-MCNC: 295 MG/DL (ref 70–105)
GLUCOSE BLDC GLUCOMTR-MCNC: 320 MG/DL (ref 70–105)
GLUCOSE BLDC GLUCOMTR-MCNC: 328 MG/DL (ref 70–105)
GLUCOSE BLDC GLUCOMTR-MCNC: 359 MG/DL (ref 70–105)
GLUCOSE SERPL-MCNC: 248 MG/DL (ref 65–99)
HBA1C MFR BLD: 9.31 % (ref 4.8–5.6)
HCT VFR BLD AUTO: 44.2 % (ref 37.5–51)
HGB BLD-MCNC: 13.6 G/DL (ref 13–17.7)
IMM GRANULOCYTES # BLD AUTO: 0.37 10*3/MM3 (ref 0–0.05)
IMM GRANULOCYTES NFR BLD AUTO: 2.4 % (ref 0–0.5)
LYMPHOCYTES # BLD AUTO: 0.52 10*3/MM3 (ref 0.7–3.1)
LYMPHOCYTES NFR BLD AUTO: 3.4 % (ref 19.6–45.3)
MCH RBC QN AUTO: 30.2 PG (ref 26.6–33)
MCHC RBC AUTO-ENTMCNC: 30.8 G/DL (ref 31.5–35.7)
MCV RBC AUTO: 98.2 FL (ref 79–97)
MONOCYTES # BLD AUTO: 0.61 10*3/MM3 (ref 0.1–0.9)
MONOCYTES NFR BLD AUTO: 4 % (ref 5–12)
NEUTROPHILS NFR BLD AUTO: 13.73 10*3/MM3 (ref 1.7–7)
NEUTROPHILS NFR BLD AUTO: 89.7 % (ref 42.7–76)
NRBC BLD AUTO-RTO: 0.1 /100 WBC (ref 0–0.2)
PLATELET # BLD AUTO: 231 10*3/MM3 (ref 140–450)
PMV BLD AUTO: 9.3 FL (ref 6–12)
POTASSIUM SERPL-SCNC: 5 MMOL/L (ref 3.5–5.2)
RBC # BLD AUTO: 4.5 10*6/MM3 (ref 4.14–5.8)
SODIUM SERPL-SCNC: 136 MMOL/L (ref 136–145)
WBC NRBC COR # BLD AUTO: 15.31 10*3/MM3 (ref 3.4–10.8)

## 2025-06-23 PROCEDURE — 94761 N-INVAS EAR/PLS OXIMETRY MLT: CPT

## 2025-06-23 PROCEDURE — 25010000002 FUROSEMIDE PER 20 MG: Performed by: STUDENT IN AN ORGANIZED HEALTH CARE EDUCATION/TRAINING PROGRAM

## 2025-06-23 PROCEDURE — 97162 PT EVAL MOD COMPLEX 30 MIN: CPT

## 2025-06-23 PROCEDURE — 83036 HEMOGLOBIN GLYCOSYLATED A1C: CPT | Performed by: INTERNAL MEDICINE

## 2025-06-23 PROCEDURE — 80048 BASIC METABOLIC PNL TOTAL CA: CPT | Performed by: NURSE PRACTITIONER

## 2025-06-23 PROCEDURE — 94664 DEMO&/EVAL PT USE INHALER: CPT

## 2025-06-23 PROCEDURE — 94799 UNLISTED PULMONARY SVC/PX: CPT

## 2025-06-23 PROCEDURE — 94660 CPAP INITIATION&MGMT: CPT

## 2025-06-23 PROCEDURE — 82948 REAGENT STRIP/BLOOD GLUCOSE: CPT | Performed by: STUDENT IN AN ORGANIZED HEALTH CARE EDUCATION/TRAINING PROGRAM

## 2025-06-23 PROCEDURE — 25010000002 METHYLPREDNISOLONE PER 40 MG: Performed by: STUDENT IN AN ORGANIZED HEALTH CARE EDUCATION/TRAINING PROGRAM

## 2025-06-23 PROCEDURE — 85025 COMPLETE CBC W/AUTO DIFF WBC: CPT | Performed by: NURSE PRACTITIONER

## 2025-06-23 PROCEDURE — 97166 OT EVAL MOD COMPLEX 45 MIN: CPT

## 2025-06-23 PROCEDURE — 63710000001 INSULIN LISPRO (HUMAN) PER 5 UNITS: Performed by: STUDENT IN AN ORGANIZED HEALTH CARE EDUCATION/TRAINING PROGRAM

## 2025-06-23 PROCEDURE — 63710000001 INSULIN GLARGINE PER 5 UNITS: Performed by: INTERNAL MEDICINE

## 2025-06-23 PROCEDURE — 25010000002 FUROSEMIDE PER 20 MG: Performed by: INTERNAL MEDICINE

## 2025-06-23 PROCEDURE — 82948 REAGENT STRIP/BLOOD GLUCOSE: CPT

## 2025-06-23 PROCEDURE — 94760 N-INVAS EAR/PLS OXIMETRY 1: CPT

## 2025-06-23 RX ORDER — INSULIN GLARGINE 100 [IU]/ML
15 INJECTION, SOLUTION SUBCUTANEOUS NIGHTLY
Status: DISCONTINUED | OUTPATIENT
Start: 2025-06-23 | End: 2025-06-24

## 2025-06-23 RX ORDER — FUROSEMIDE 10 MG/ML
40 INJECTION INTRAMUSCULAR; INTRAVENOUS 2 TIMES DAILY
Status: DISCONTINUED | OUTPATIENT
Start: 2025-06-23 | End: 2025-06-27

## 2025-06-23 RX ORDER — SPIRONOLACTONE 25 MG/1
25 TABLET ORAL DAILY
Status: DISCONTINUED | OUTPATIENT
Start: 2025-06-23 | End: 2025-07-01 | Stop reason: HOSPADM

## 2025-06-23 RX ORDER — PREDNISONE 10 MG/1
10 TABLET ORAL DAILY
Status: COMPLETED | OUTPATIENT
Start: 2025-06-28 | End: 2025-06-29

## 2025-06-23 RX ORDER — PREDNISONE 20 MG/1
20 TABLET ORAL DAILY
Status: COMPLETED | OUTPATIENT
Start: 2025-06-26 | End: 2025-06-27

## 2025-06-23 RX ADMIN — CLOPIDOGREL BISULFATE 75 MG: 75 TABLET, FILM COATED ORAL at 08:11

## 2025-06-23 RX ADMIN — IPRATROPIUM BROMIDE AND ALBUTEROL SULFATE 3 ML: .5; 3 SOLUTION RESPIRATORY (INHALATION) at 07:30

## 2025-06-23 RX ADMIN — GUAIFENESIN 1200 MG: 600 TABLET, EXTENDED RELEASE ORAL at 21:54

## 2025-06-23 RX ADMIN — IPRATROPIUM BROMIDE AND ALBUTEROL SULFATE 3 ML: .5; 3 SOLUTION RESPIRATORY (INHALATION) at 04:10

## 2025-06-23 RX ADMIN — Medication 10 ML: at 08:11

## 2025-06-23 RX ADMIN — BUDESONIDE INHALATION 0.5 MG: 0.5 SUSPENSION RESPIRATORY (INHALATION) at 20:07

## 2025-06-23 RX ADMIN — INSULIN LISPRO 20 UNITS: 100 INJECTION, SOLUTION INTRAVENOUS; SUBCUTANEOUS at 21:54

## 2025-06-23 RX ADMIN — GUAIFENESIN 1200 MG: 600 TABLET, EXTENDED RELEASE ORAL at 08:11

## 2025-06-23 RX ADMIN — DILTIAZEM HYDROCHLORIDE 240 MG: 240 CAPSULE, COATED, EXTENDED RELEASE ORAL at 08:11

## 2025-06-23 RX ADMIN — NICOTINE 1 PATCH: 21 PATCH, EXTENDED RELEASE TRANSDERMAL at 08:11

## 2025-06-23 RX ADMIN — IPRATROPIUM BROMIDE AND ALBUTEROL SULFATE 3 ML: .5; 3 SOLUTION RESPIRATORY (INHALATION) at 22:45

## 2025-06-23 RX ADMIN — ROSUVASTATIN CALCIUM 20 MG: 10 TABLET, FILM COATED ORAL at 08:11

## 2025-06-23 RX ADMIN — ASPIRIN 81 MG: 81 TABLET, COATED ORAL at 08:11

## 2025-06-23 RX ADMIN — INSULIN LISPRO 16 UNITS: 100 INJECTION, SOLUTION INTRAVENOUS; SUBCUTANEOUS at 18:01

## 2025-06-23 RX ADMIN — ARFORMOTEROL TARTRATE 15 MCG: 15 SOLUTION RESPIRATORY (INHALATION) at 20:03

## 2025-06-23 RX ADMIN — METHYLPREDNISOLONE SODIUM SUCCINATE 40 MG: 40 INJECTION, POWDER, FOR SOLUTION INTRAMUSCULAR; INTRAVENOUS at 08:11

## 2025-06-23 RX ADMIN — LOSARTAN POTASSIUM 50 MG: 50 TABLET, FILM COATED ORAL at 08:11

## 2025-06-23 RX ADMIN — INSULIN LISPRO 12 UNITS: 100 INJECTION, SOLUTION INTRAVENOUS; SUBCUTANEOUS at 08:10

## 2025-06-23 RX ADMIN — FUROSEMIDE 40 MG: 10 INJECTION, SOLUTION INTRAMUSCULAR; INTRAVENOUS at 08:10

## 2025-06-23 RX ADMIN — Medication 10 ML: at 21:55

## 2025-06-23 RX ADMIN — INSULIN LISPRO 16 UNITS: 100 INJECTION, SOLUTION INTRAVENOUS; SUBCUTANEOUS at 13:10

## 2025-06-23 RX ADMIN — INSULIN GLARGINE 15 UNITS: 100 INJECTION, SOLUTION SUBCUTANEOUS at 21:54

## 2025-06-23 RX ADMIN — BUDESONIDE INHALATION 0.5 MG: 0.5 SUSPENSION RESPIRATORY (INHALATION) at 07:35

## 2025-06-23 RX ADMIN — FUROSEMIDE 40 MG: 10 INJECTION, SOLUTION INTRAMUSCULAR; INTRAVENOUS at 21:54

## 2025-06-23 RX ADMIN — IPRATROPIUM BROMIDE AND ALBUTEROL SULFATE 3 ML: .5; 3 SOLUTION RESPIRATORY (INHALATION) at 14:55

## 2025-06-23 RX ADMIN — SPIRONOLACTONE 25 MG: 25 TABLET, FILM COATED ORAL at 13:11

## 2025-06-23 NOTE — PLAN OF CARE
Goal Outcome Evaluation:  Plan of Care Reviewed With: patient           Outcome Evaluation: 69 y.o. male with PMH of CAD s/p PCI, HFpEF, HTN, HLD, COPD, DM2 presented to [facility] ED 6/22/2025 with complaints of shortness of breath, cough, wheezing for the last week. He does wear home oxygen 3L O2.   At baseline, pt lives alone in [facility] (single level home) w/ 5 stairs and handrail to enter. Pt reports multiple exacerbations of his copd/chf this year. Pt ambulates household distances w/ cane, but reports he is having difficulty even standing to fix meals, and is severely limited due to continual exacerbations.This date pt requires assist lower body ADLs.  He was able to mobilize with CGA, though impulsive and required cues to slow down due to cords from monitor.  He is not safe to discharge home alone at this time.  Recommend SNF.    Anticipated Discharge Disposition (OT): skilled nursing facility

## 2025-06-23 NOTE — PROGRESS NOTES
[facility] MEDICINE SERVICE  DAILY PROGRESS NOTE    NAME: Santos Sullivan  : 1955  MRN: [id number]      LOS: 1 day     PROVIDER OF SERVICE: Dawood Lombardo DO    Chief Complaint: Acute respiratory failure with hypoxia and hypercapnia    Subjective:     Interval History:  History taken from: patient      Patient seen and examined this morning.  Feeling somewhat better compared to yesterday.  Sitting up in chair today, remains with lower extremity edema which is chronic.  Wound care consulted for some weeping wounds.  Agrees to rehab placement if needed.  No other complaints.      Review of Systems:   Pertinent positives as noted in HPI/subjective.  All other systems were reviewed and are negative.    Objective:     Vital Signs  Temp:  [97.6 °F (36.4 °C)-98.1 °F (36.7 °C)] 97.9 °F (36.6 °C)  Heart Rate:  [] 99  Resp:  [12-23] 17  BP: (105-157)/(65-91) 141/74  Flow (L/min) (Oxygen Therapy):  [2-4] 3   Body mass index is 42.7 kg/m².    Physical Exam  General: Awake, alert, morbidly obese male, NAD  Eyes: PERRL, EOMI, conjunctivae are clear  Cardiovascular: Regular rate and rhythm, no murmurs  Respiratory: decreased breath sounds at the base bilaterally, no wheezing or rales, unlabored breathing  Abdomen: Soft, nontender, positive bowel sounds, no guarding  Neurologic: A&O, CN grossly intact, moves all extremities spontaneously  Musculoskeletal: Normal range of motion, no other gross deformities  Skin: Warm, moderate to severe bilateral lower extremity pitting edema noted         Diagnostic Data    Results from last 7 days   Lab Units 25  0203 25  0151   WBC 10*3/mm3 15.31* 13.96*   HEMOGLOBIN g/dL 13.6 14.1   HEMATOCRIT % 44.2 47.1   PLATELETS 10*3/mm3 231 232   GLUCOSE mg/dL 248* 243*   CREATININE mg/dL 1.04 1.00   BUN mg/dL 42.1* 32.2*   SODIUM mmol/L 136 141   POTASSIUM mmol/L 5.0 5.0   AST (SGOT) U/L  --  16   ALT (SGPT) U/L  --  43*   ALK PHOS U/L  --  55   BILIRUBIN mg/dL  --  0.2    ANION GAP mmol/L 8.7 13.4       XR Chest 1 View  Result Date: 6/22/2025  No clearly acute findings in the chest. Chronic changes as above, including cardiomegaly. Electronically Signed: Norris Chavarria MD  6/22/2025 2:19 AM EDT  Workstation ID: [id number]        I reviewed the patient's new clinical results.    Assessment/Plan:     Active and Resolved Problems  Active Hospital Problems    Diagnosis  POA    **Acute respiratory failure with hypoxia and hypercapnia [J96.01, J96.02]  Yes    Acute on chronic diastolic heart failure [I50.33]  Yes    Acute hypoxic respiratory failure [J96.01]  Yes    Mixed hyperlipidemia [E78.2]  Yes    Essential hypertension [I10]  Yes    Overweight [E66.3]  Yes    Chronic hepatitis C [B18.2]  Yes    Coronary angioplasty status [Z98.61]  Not Applicable    Chronic obstructive pulmonary disease [J44.9]  Yes    Tobacco dependence syndrome [F17.200]  Yes      Resolved Hospital Problems   No resolved problems to display.   Patient is 69-year-old male on home oxygen 3 LPM who presented with shortness of breath cough and wheezing as well as fatigue.  Patient required to be on BiPAP in the ER    Acute hypoxic and hypercapnic respiratory failure  Acute exacerbation of HFpEF  Volume overload with bilateral lower extremity edema  COPD with mild exacerbation  - ABGs and imaging reports reviewed  - Respiratory status improving, down to nasal cannula now  - Echo this admission showed preserved EF   - patient remains volume overloaded  - Continue diuresis with IV Lasix, monitor I's and O's  - Continue taper steroid dosing, continue bronchodilators  - Pulmonology recs appreciated    CAD s/p PCI  Hypertension  Hyperlipidemia  - cont home meds as tolerated, monitor    DM type II with hyperglycemia  -Hemoglobin A1c is 9.3  - Continue basal insulin, SSI  -Monitor blood glucose and adjust insulin therapy as needed    Tobacco abuse  - encourage cessation  - nicotine patch      Morbid obesity  - BMI 41  -  complicates all aspects of care    VTE Prophylaxis:  Mechanical VTE prophylaxis orders are present.    Disposition Planning:     Barriers to Discharge: Volume overload, respiratory failure  Anticipated Date of Discharge: 6/26  Place of Discharge: Rehab    Code Status and Medical Interventions: CPR (Attempt to Resuscitate); Full Support   Ordered at: 06/22/25 0543     Code Status (Patient has no pulse and is not breathing):    CPR (Attempt to Resuscitate)     Medical Interventions (Patient has pulse or is breathing):    Full Support     Level Of Support Discussed With:    Patient       Signature: Electronically signed by Dawood Lombardo DO, 06/23/25, 10:58 EDT.  [facility]ist Team     Part of this note may be an electronic transcription/translation of spoken language to printed text using the Dragon Dictation System.

## 2025-06-23 NOTE — THERAPY EVALUATION
Patient Name: Santos Sullivan  : 1955    MRN: [id number]                              Today's Date: 2025       Admit Date: 2025    Visit Dx:     ICD-10-CM ICD-9-CM   1. Acute respiratory failure with hypercapnia  J96.02 518.81   2. Acute on chronic diastolic congestive heart failure  I50.33 428.33     428.0     Patient Active Problem List   Diagnosis    Abnormal result of cardiovascular function study    Acute renal insufficiency    Essential hypertension    Chest pain    Chronic obstructive pulmonary disease    Coronary angioplasty status    Coronary artery disease involving native coronary artery of native heart without angina pectoris    Encounter for CDL (commercial driving license) exam    Hearing loss    Chronic hepatitis C    Screen for colon cancer    Overweight    Physical exam    Screening PSA (prostate specific antigen)    Screening, lipid    Tobacco dependence syndrome    Mixed hyperlipidemia    Morbidly obese    SOB (shortness of breath)    Acute respiratory failure with hypoxia and hypercapnia    Acute on chronic diastolic heart failure    Acute hypoxic respiratory failure     Past Medical History:   Diagnosis Date    Abnormal stress test     Acrocyanosis     Acute renal insufficiency     Benign essential hypertension     Chronic hepatitis C     Managed by [facility].  Cured of infection.    Claudication     Right leg. MARY showing mild disease on right.    COPD (chronic obstructive pulmonary disease)     Moderate, nonreversible airway obstruction (worse than previous).    Coronary artery disease involving native coronary artery of native heart without angina pectoris     Femur fracture     30+ years ago    Hearing loss     History of spinal cord injury     Hyperlipidemia     Left against medical advice     Need for hepatitis C screening test     Onychomycosis of toenail     Tobacco use disorder      Past Surgical History:   Procedure Laterality Date    CARDIAC CATHETERIZATION      CORONARY STENT  PLACEMENT        General Information       Row Name 06/23/25 1553          OT Time and Intention    Document Type evaluation  -SR     Mode of Treatment occupational therapy  -SR       Row Name 06/23/25 1551          Occupational Profile    Reason for Services/Referral (Occupational Profile) 69 y.o. male with PMH of CAD s/p PCI, HFpEF, HTN, HLD, COPD, DM2 presented to [facility] ED 6/22/2025 with complaints of shortness of breath, cough, wheezing for the last week. He does wear home oxygen 3L O2.   At baseline, pt lives alone in [facility] (single level home) w/ 5 stairs and handrail to enter. Pt reports multiple exacerbations of his copd/chf this year. Pt ambulates household distances w/ cane, but reports he is having difficulty even standing to fix meals, and is severely limited due to continual exacerbations.  -SR       Row Name 06/23/25 1556          Living Environment    Current Living Arrangements home  -SR       Row Name 06/23/25 1553          Home Main Entrance    Number of Stairs, Main Entrance five  -SR       Row Name 06/23/25 1806          Cognition    Orientation Status (Cognition) oriented x 4  -SR               User Key  (r) = Recorded By, (t) = Taken By, (c) = Cosigned By      Initials Name Provider Type    SR Denice Berrios, OT Occupational Therapist                     Mobility/ADL's       Row Name 06/23/25 1605          Bed Mobility    All Activities, [place] (Bed Mobility) not tested  -SR       Row Name 06/23/25 1605          Sit-Stand Transfer    Sit-Stand [place] (Transfers) contact guard  -SR     Assistive Device (Sit-Stand Transfers) walker, front-wheeled  -SR       Row Name 06/23/25 1605          Functional Mobility    Functional Mobility- Ind. Level contact guard assist  -SR     Functional Mobility- Device walker, front-wheeled  -SR       Row Name 06/23/25 1605          Activities of Daily Living    BADL Assessment/Intervention lower body dressing  -SR       Row Name 06/23/25 1605           Lower Body Dressing Assessment/Training    [place] Level (Lower Body Dressing) don;socks;dependent (less than 25% patient effort)  -SR               User Key  (r) = Recorded By, (t) = Taken By, (c) = Cosigned By      Initials Name Provider Type    SR Denice Berrios OT Occupational Therapist                   Obj/Interventions       Row Name 06/23/25 1607          Range of Motion Comprehensive    General Range of Motion no range of motion deficits identified  -SR       Row Name 06/23/25 1607          Strength Comprehensive (MMT)    Comment, General Manual Muscle Testing (MMT) Assessment BUE 4-/5  -SR       Row Name 06/23/25 1607          Balance    Balance Assessment sitting static balance;sitting dynamic balance;standing static balance;standing dynamic balance  -SR     Static Sitting Balance supervision  -SR     Dynamic Sitting Balance supervision  -SR     Static Standing Balance contact guard  -SR     Dynamic Standing Balance minimal assist  -SR     Position/Device Used, Standing Balance walker, front-wheeled  -SR     Balance Interventions sitting;standing;sit to stand;supported;dynamic;minimal challenge;static  -SR               User Key  (r) = Recorded By, (t) = Taken By, (c) = Cosigned By      Initials Name Provider Type    SR Denice Berrios OT Occupational Therapist                   Goals/Plan       Row Name 06/23/25 1620          Bathing Goal 1 (OT)    Activity/Device (Bathing Goal 1, OT) bathing skills, all  -SR     [place] Level/Cues Needed (Bathing Goal 1, OT) modified independence  -SR     Time Frame (Bathing Goal 1, OT) 2 weeks  -SR       Row Name 06/23/25 1620          Toileting Goal 1 (OT)    Activity/Device (Toileting Goal 1, OT) toileting skills, all  -SR     [place] Level/Cues Needed (Toileting Goal 1, OT) modified independence  -SR     Time Frame (Toileting Goal 1, OT) 2 weeks  -SR       [facility] Name 06/23/25 1620          Therapy Assessment/Plan (OT)    Planned  Therapy Interventions (OT) activity tolerance training;BADL retraining;IADL retraining;functional balance retraining;neuromuscular control/coordination retraining;ROM/therapeutic exercise;transfer/mobility retraining;strengthening exercise;occupation/activity based interventions  -SR               User Key  (r) = Recorded By, (t) = Taken By, (c) = Cosigned By      Initials Name Provider Type    SR Denice Berrios P, OT Occupational Therapist                   Clinical Impression       Row Name 06/23/25 1609          Pain Assessment    Pretreatment Pain Rating 0/10 - no pain  -SR     Posttreatment Pain Rating 5/10  -SR     Pain Side/Orientation generalized  -SR       Row Name 06/23/25 1604          Plan of Care Review    Plan of Care Reviewed With patient  -SR     Outcome Evaluation 69 y.o. male with PMH of CAD s/p PCI, HFpEF, HTN, HLD, COPD, DM2 presented to [facility] ED 6/22/2025 with complaints of shortness of breath, cough, wheezing for the last week. He does wear home oxygen 3L O2.   At baseline, pt lives alone in [facility] (single level home) w/ 5 stairs and handrail to enter. Pt reports multiple exacerbations of his copd/chf this year. Pt ambulates household distances w/ cane, but reports he is having difficulty even standing to fix meals, and is severely limited due to continual exacerbations.This date pt requires assist lower body ADLs.  He was able to mobilize with CGA, though impulsive and required cues to slow down due to cords from monitor.  He is not safe to discharge home alone at this time.  Recommend SNF.  -SR       Row Name 06/23/25 1605          Therapy Assessment/Plan (OT)    Rehab Potential (OT) good  -SR     Criteria for Skilled Therapeutic Interventions Met (OT) yes  -SR     Therapy Frequency (OT) 5 times/wk  -SR       Row Name 06/23/25 1601          Therapy Plan Review/Discharge Plan (OT)    Anticipated Discharge Disposition (OT) skilled nursing facility  -SR               User Key  (r) = Recorded  By, (t) = Taken By, (c) = Cosigned By      Initials Name Provider Type    SR Denice Berrios OT Occupational Therapist                   Outcome Measures       Row Name 06/23/25 1337 06/23/25 0800       How much help from another person do you currently need...    Turning from your back to your side while in flat bed without using bedrails? 4  -CM 4  -JE    Moving from lying on back to sitting on the side of a flat bed without bedrails? 4  -CM 4  -JE    Moving to and from a bed to a chair (including a wheelchair)? 3  -CM 4  -JE    Standing up from a chair using your arms (e.g., wheelchair, bedside chair)? 3  -CM 3  -JE    Climbing 3-5 steps with a railing? 2  -CM 3  -JE    To walk in hospital room? 3  -CM 3  -JE    AM-PAC 6 Clicks Score (PT) 19  -CM 21  -JE              User Key  (r) = Recorded By, (t) = Taken By, (c) = Cosigned By      Initials Name Provider Type    Sylvia Thurston, PT Physical Therapist    Vicky Romero RN Registered Nurse                    Occupational Therapy Education       Title: PT OT SLP Therapies (In Progress)       Topic: Occupational Therapy (In Progress)       Point: ADL training (In Progress)       Learning Progress Summary            Patient Acceptance, E,TB, NR by SR at 6/23/2025 1620                      Point: Body mechanics (In Progress)       Learning Progress Summary            Patient Acceptance, E,TB, NR by SR at 6/23/2025 1620                                      User Key       Initials Effective Dates Name Provider Type Discipline     06/16/21 -  Denice Berrios OT Occupational Therapist OT                  OT Recommendation and Plan  Planned Therapy Interventions (OT): activity tolerance training, BADL retraining, IADL retraining, functional balance retraining, neuromuscular control/coordination retraining, ROM/therapeutic exercise, transfer/mobility retraining, strengthening exercise, occupation/activity based interventions  Therapy  Frequency (OT): 5 times/wk  Plan of Care Review  Plan of Care Reviewed With: patient  Outcome Evaluation: 69 y.o. male with PMH of CAD s/p PCI, HFpEF, HTN, HLD, COPD, DM2 presented to [facility] ED 6/22/2025 with complaints of shortness of breath, cough, wheezing for the last week. He does wear home oxygen 3L O2.   At baseline, pt lives alone in [facility] (single level home) w/ 5 stairs and handrail to enter. Pt reports multiple exacerbations of his copd/chf this year. Pt ambulates household distances w/ cane, but reports he is having difficulty even standing to fix meals, and is severely limited due to continual exacerbations.This date pt requires assist lower body ADLs.  He was able to mobilize with CGA, though impulsive and required cues to slow down due to cords from monitor.  He is not safe to discharge home alone at this time.  Recommend SNF.     Time Calculation:         Time Calculation- OT       Row Name 06/23/25 1621             Time Calculation- OT    OT Start Time 0905  -SR      OT Stop Time 0934  -SR      OT Time Calculation (min) 29 min  -SR      Total Timed Code Minutes- OT 0 minute(s)  -SR      OT Received On 06/23/25  -SR      OT - Next Appointment 06/24/25  -SR      OT Goal Re-Cert Due Date 07/07/25  -SR                User Key  (r) = Recorded By, (t) = Taken By, (c) = Cosigned By      Initials Name Provider Type    SR Denice Berrios OT Occupational Therapist                  Therapy Charges for Today       Code Description Service Date Service Provider Modifiers Qty    [id number]  OT EVAL MOD COMPLEXITY 4 6/23/2025 Denice Berrios OT GO 1                 Denice Berrios OT  6/23/2025

## 2025-06-23 NOTE — PLAN OF CARE
Goal Outcome Evaluation:              Outcome Evaluation: patient alert and oriented x4, pain controlled with PRN pain medications. patient still complains about shortness of air with movement , on room air. plan of going to rehab. paitine shifting weight every 2 hours and as needed. call light in reach

## 2025-06-23 NOTE — THERAPY EVALUATION
Patient Name: Santos Sullivan  : 1955    MRN: [id number]                              Today's Date: 2025       Admit Date: 2025    Visit Dx:     ICD-10-CM ICD-9-CM   1. Acute respiratory failure with hypercapnia  J96.02 518.81   2. Acute on chronic diastolic congestive heart failure  I50.33 428.33     428.0     Patient Active Problem List   Diagnosis    Abnormal result of cardiovascular function study    Acute renal insufficiency    Essential hypertension    Chest pain    Chronic obstructive pulmonary disease    Coronary angioplasty status    Coronary artery disease involving native coronary artery of native heart without angina pectoris    Encounter for CDL (commercial driving license) exam    Hearing loss    Chronic hepatitis C    Screen for colon cancer    Overweight    Physical exam    Screening PSA (prostate specific antigen)    Screening, lipid    Tobacco dependence syndrome    Mixed hyperlipidemia    Morbidly obese    SOB (shortness of breath)    Acute respiratory failure with hypoxia and hypercapnia    Acute on chronic diastolic heart failure    Acute hypoxic respiratory failure     Past Medical History:   Diagnosis Date    Abnormal stress test     Acrocyanosis     Acute renal insufficiency     Benign essential hypertension     Chronic hepatitis C     Managed by [facility].  Cured of infection.    Claudication     Right leg. MARY showing mild disease on right.    COPD (chronic obstructive pulmonary disease)     Moderate, nonreversible airway obstruction (worse than previous).    Coronary artery disease involving native coronary artery of native heart without angina pectoris     Femur fracture     30+ years ago    Hearing loss     History of spinal cord injury     Hyperlipidemia     Left against medical advice     Need for hepatitis C screening test     Onychomycosis of toenail     Tobacco use disorder      Past Surgical History:   Procedure Laterality Date    CARDIAC CATHETERIZATION      CORONARY STENT  PLACEMENT        General Information       Row Name 06/23/25 1316          Physical Therapy Time and Intention    Document Type evaluation  -CM     Mode of Treatment physical therapy  -CM       Row Name 06/23/25 1316          General Information    Patient Profile Reviewed yes  -CM     Prior Level of Function independent:;all household mobility;gait  reports having difficulty w/ IADLs at home, such as cooking, etc, due to fatigue; uses home O2 prn  -CM     Existing Precautions/Restrictions no known precautions/restrictions  -CM     Barriers to Rehab medically complex;previous functional deficit  -CM       Row Name 06/23/25 1316          Living Environment    Current Living Arrangements home  -CM     People in Home alone  -CM       Row Name 06/23/25 1316          Home Main Entrance    Number of Stairs, Main Entrance five  lives in camper  -CM     Stair Railings, Main Entrance railings safe and in good condition  -CM       Row Name 06/23/25 1316          Stairs Within Home, Primary    Number of Stairs, Within Home, Primary none  -CM       Row Name 06/23/25 1316          Cognition    Orientation Status (Cognition) oriented x 4  -CM       Row Name 06/23/25 1316          Safety Issues/Impairments Affecting Functional Mobility    Impairments Affecting Function (Mobility) endurance/activity tolerance;strength;pain;sensation/sensory awareness;balance  -CM               User Key  (r) = Recorded By, (t) = Taken By, (c) = Cosigned By      Initials Name Provider Type    CM Sylvia Jackson, PT Physical Therapist                   Mobility       Row Name 06/23/25 1318          Bed Mobility    Bed Mobility bed mobility (all) activities  -CM     All Activities, [place] (Bed Mobility) not tested  -CM     Comment, (Bed Mobility) in chair upon PT arrival  -       Row Name 06/23/25 1318          Sit-Stand Transfer    Sit-Stand [place] (Transfers) contact guard  -CM     Assistive Device (Sit-Stand Transfers) walker,  front-wheeled  -CM       Row Name 06/23/25 1321 06/23/25 1318       Gait/Stairs (Locomotion)    [place] Level (Gait) -- minimum assist (75% patient effort);contact guard  -CM    Assistive Device (Gait) -- walker, front-wheeled  -CM    Patient was able to Ambulate -- yes  -CM    Distance in Feet (Gait) --  impulsive pace; wide base of support; forward flexed posture; mod soa w/ gait.  -CM 40  impulsive pace; wide base of support; forward flexed posture  -CM              User Key  (r) = Recorded By, (t) = Taken By, (c) = Cosigned By      Initials Name Provider Type    Sylvia Thurston, PT Physical Therapist                   Obj/Interventions       Row Name 06/23/25 1319          Range of Motion Comprehensive    General Range of Motion bilateral lower extremity ROM WFL  -CM     Comment, General Range of Motion noted to have urine spilled on floor; pt sitting w/ bare feet in same area; PT/OT cleaned floor w/ antibacterial wipes and dried w/ towel. Got pt non skid socks to wear.  -CM       Row Name 06/23/25 1319          Strength Comprehensive (MMT)    General Manual Muscle Testing (MMT) Assessment lower extremity strength deficits identified  -CM     Comment, General Manual Muscle Testing (MMT) Assessment BLE's 4-/5  -CM       Row Name 06/23/25 1321          Balance    Balance Assessment sitting static balance;sitting dynamic balance;standing static balance;standing dynamic balance  -CM       Row Name 06/23/25 1322          Sensory Assessment (Somatosensory)    Sensory Assessment (Somatosensory) other (see comments)  sensation present but markedly diminished in BLE's distally  -CM               User Key  (r) = Recorded By, (t) = Taken By, (c) = Cosigned By      Initials Name Provider Type    Sylvia Thurston, PT Physical Therapist                   Goals/Plan       Row Name 06/23/25 1336          Bed Mobility Goal 1 (PT)    Activity/Assistive Device (Bed Mobility Goal 1, PT) bed mobility activities, all   -CM     [place] Level/Cues Needed (Bed Mobility Goal 1, PT) independent  -CM     Time Frame (Bed Mobility Goal 1, PT) 2 weeks  -CM       Row Name 06/23/25 1336          Transfer Goal 1 (PT)    Activity/Assistive Device (Transfer Goal 1, PT) transfers, all;walker, rolling  -CM     [place] Level/Cues Needed (Transfer Goal 1, PT) modified independence  -CM     Time Frame (Transfer Goal 1, PT) 2 weeks  -CM       Row Name 06/23/25 1336          Gait Training Goal 1 (PT)    Activity/Assistive Device (Gait Training Goal 1, PT) gait (walking locomotion);walker, rolling  -CM     [place] Level (Gait Training Goal 1, PT) modified independence  -CM     Distance (Gait Training Goal 1, PT) 100 ft, no soa, O2 sats > 92%  -CM     Time Frame (Gait Training Goal 1, PT) 2 weeks  -CM       Row Name 06/23/25 1336          Therapy Assessment/Plan (PT)    Planned Therapy Interventions (PT) balance training;bed mobility training;gait training;home exercise program;postural re-education;patient/family education;transfer training;strengthening;neuromuscular re-education;motor coordination training;ROM (range of motion)  -CM               User Key  (r) = Recorded By, (t) = Taken By, (c) = Cosigned By      Initials Name Provider Type    Sylvia Thurston, PT Physical Therapist                   Clinical Impression       Row Name 06/23/25 1324          Pain    Pretreatment Pain Rating 0/10 - no pain  -CM     Posttreatment Pain Rating 5/10  -CM     Pain Location back  -CM     Pain Management Interventions activity modification encouraged;exercise or physical activity utilized  -CM     Response to Pain Interventions activity participation with increased pain;functional ability improved;activity level improved  -CM       Row Name 06/23/25 1325 06/23/25 1324       Plan of Care Review    Plan of Care Reviewed With -- patient  -CM    Outcome Evaluation 70 yo male adm 6/22/25 for acute hypoxic/hypercapnic respiratory failure, as well  as ae chf. PMH: cad, chronic hepatitis C, HFpEF, [place], obesity (BMI 43). At baseline, pt lives alone in [facility] (single level home) w/ 5 stairs and handrail to enter. Pt reports multiple exacerbations of his copd/chf this year. Pt ambulates household distances w/ cane, but reports he is having difficulty even standing to fix meals, and is severely limited due to continual exacerbations. Today, pt is able to come to stand w/ cga and ambulate w/ cga and rw x 40 ft. However, he has poor judgment for endurance and uses an impulsive pace, despite cues to slow down. Also has some mild LE weakness. Pt has decreased O2 level w/ ambulation, then has moderate SOA. He is not safe for home at d/c, as he is likely to rebound into another episode for respiratory failure. He needs slow progression of mobility and strengthening to avoid another relapse. Recommend SNF at d/c. Will follow.  -CM --      Row Name 06/23/25 1336          Therapy Assessment/Plan (PT)    Rehab Potential (PT) good  -CM     Criteria for Skilled Interventions Met (PT) yes;meets criteria;skilled treatment is necessary  -CM     Therapy Frequency (PT) 3 times/wk  -CM     Predicted Duration of Therapy Intervention (PT) until d/c  -CM       Row Name 06/23/25 1336          Vital Signs    Pre SpO2 (%) 95  -CM     O2 Delivery Pre Treatment supplemental O2  2L  -CM     Intra SpO2 (%) 90  -CM     O2 Delivery Intra Treatment room air  -CM     Post SpO2 (%) 95  -CM     O2 Delivery Post Treatment room air  ok to leave on room air per dr. huston  -CM       Row Name 06/23/25 1336          Positioning and Restraints    Pre-Treatment Position sitting in chair/recliner  -CM     Post Treatment Position chair  -CM     In Chair notified nsg;sitting;call light within reach;encouraged to call for assist  -CM               User Key  (r) = Recorded By, (t) = Taken By, (c) = Cosigned By      Initials Name Provider Type    Sylvia Thurston, PT Physical Therapist                    Outcome Measures       Row Name 06/23/25 1337 06/23/25 0800       How much help from another person do you currently need...    Turning from your back to your side while in flat bed without using bedrails? 4  -CM 4  -JE    Moving from lying on back to sitting on the side of a flat bed without bedrails? 4  -CM 4  -JE    Moving to and from a bed to a chair (including a wheelchair)? 3  -CM 4  -JE    Standing up from a chair using your arms (e.g., wheelchair, bedside chair)? 3  -CM 3  -JE    Climbing 3-5 steps with a railing? 2  -CM 3  -JE    To walk in hospital room? 3  -CM 3  -JE    AM-PAC 6 Clicks Score (PT) 19  -CM 21  -JE              User Key  (r) = Recorded By, (t) = Taken By, (c) = Cosigned By      Initials Name Provider Type    Sylvia Thurston, PT Physical Therapist    Vicky Romero RN Registered Nurse                                 Physical Therapy Education       Title: PT OT SLP Therapies (Done)       Topic: Physical Therapy (Done)       Point: Mobility training (Done)       Learning Progress Summary            Patient Acceptance, E,TB, VU,NR by  at 6/23/2025 1338                      Point: Home exercise program (Done)       Learning Progress Summary            Patient Acceptance, E,TB, VU,NR by CM at 6/23/2025 1338                      Point: Body mechanics (Done)       Learning Progress Summary            Patient Acceptance, E,TB, VU,NR by CM at 6/23/2025 1338                      Point: Precautions (Done)       Learning Progress Summary            Patient Acceptance, E,TB, VU,NR by CM at 6/23/2025 1338                                      User Key       Initials Effective Dates Name Provider Type Discipline     06/16/21 -  Sylvia Jackson, PT Physical Therapist PT                  PT Recommendation and Plan  Planned Therapy Interventions (PT): balance training, bed mobility training, gait training, home exercise program, postural re-education, patient/family education, transfer  training, strengthening, neuromuscular re-education, motor coordination training, ROM (range of motion)  Outcome Evaluation: 70 yo male adm 6/22/25 for acute hypoxic/hypercapnic respiratory failure, as well as ae chf. PMH: cad, chronic hepatitis C, HFpEF, [place], obesity (BMI 43). At baseline, pt lives alone in [facility] (single level home) w/ 5 stairs and handrail to enter. Pt reports multiple exacerbations of his copd/chf this year. Pt ambulates household distances w/ cane, but reports he is having difficulty even standing to fix meals, and is severely limited due to continual exacerbations. Today, pt is able to come to stand w/ cga and ambulate w/ cga and rw x 40 ft. However, he has poor judgment for endurance and uses an impulsive pace, despite cues to slow down. Also has some mild LE weakness. Pt has decreased O2 level w/ ambulation, then has moderate SOA. He is not safe for home at d/c, as he is likely to rebound into another episode for respiratory failure. He needs slow progression of mobility and strengthening to avoid another relapse. Recommend SNF at d/c. Will follow.     Time Calculation:   PT Evaluation Complexity  History, PT Evaluation Complexity: 1-2 personal factors and/or comorbidities  Examination of Body Systems (PT Eval Complexity): total of 4 or more elements  Clinical Presentation (PT Evaluation Complexity): evolving  Clinical Decision Making (PT Evaluation Complexity): moderate complexity  Overall Complexity (PT Evaluation Complexity): moderate complexity     PT Charges       Row Name 06/23/25 1338             Time Calculation    Start Time 0859  -CM      Stop Time 0916  -CM      Time Calculation (min) 17 min  -CM      PT Received On 06/23/25  -CM      PT - Next Appointment 06/25/25  -CM      PT Goal Re-Cert Due Date 07/07/25  -CM         Time Calculation- PT    Total Timed Code Minutes- PT 0 minute(s)  -CM                User Key  (r) = Recorded By, (t) = Taken By, (c) = Cosigned By      Initials  Name Provider Type     Sylvia Jackson, PT Physical Therapist                  Therapy Charges for Today       Code Description Service Date Service Provider Modifiers Qty    [id number] HC PT EVAL MOD COMPLEXITY 3 6/23/2025 Sylvia Jackson, PT GP 1            PT G-Codes  AM-PAC 6 Clicks Score (PT): 19  PT Discharge Summary  Anticipated Discharge Disposition (PT): skilled nursing facility    Sylvia Jackson, PT  6/23/2025

## 2025-06-23 NOTE — PLAN OF CARE
Goal Outcome Evaluation:      Pt pears to be breathing easier, and states that he feels better and is breathing better. Vitals stable. Pt is impulsive with the call light and wants to eat and snack between meals. On 4L O2 during the day shift, to ear bipap at night.

## 2025-06-23 NOTE — PROGRESS NOTES
Daily Progress Note          Assessment    Acute respiratory failure with hypoxia and hypercapnia: ABGs on admission 7.3 1/62/75 while on nasal cannula, repeated on BiPAP 7.35/60.4/90.2  Acute bronchitis due to unspecified pathogen, negative respiratory panel     Acute on chronic HFpEF  COPD without acute exacerbation  CAD status post PCI  HTN  HLD  DM  Chronic hepatitis C  Tobacco smoking  Obesity with BMI of 42 I     PFTs 2022: Moderate obstruction with air trapping and decreased oxygen diffusion capacity, FEV1/FVC 65%, FVC 72%, FEV1 63%, %, %, DLCO 58%           Recommendations:  Dairi status is improving, patient can be discharged from pulmonary standpoint to home with outpatient home health or inpatient rehab     Follow-up in the office in 3 weeks, he will consider home sleep study to rule out SHYAM     IV Solu-Medrol 40 mg twice daily changed to tapering dose prednisone     Symbicort  Mucinex  Oxygen supplement and titration to maintain saturation 90 to 95% currently on room air     bronchodilators     Smoking cessation counseling, nicotine patch  Aspirin, Plavix, Crestor  Diltiazem, losartan  Insulin Lantus and sliding scale     I personally reviewed the radiological studies             LOS: 1 day     Subjective     Less cough and shortness of breath    Objective     Vital signs for last 24 hours:  Vitals:    06/23/25 0734 06/23/25 0735 06/23/25 0738 06/23/25 0817   BP:    141/74   BP Location:    Right arm   Patient Position:    Sitting   Pulse: 90 90 90 99   Resp: 18 18 18 17   Temp:    97.9 °F (36.6 °C)   TempSrc:    Oral   SpO2: 92% 92% 92% 92%   Weight:       Height:           Intake/Output last 3 shifts:  I/O last 3 completed shifts:  In: 0   Out: 2450 [Urine:2450]  Intake/Output this shift:  I/O this shift:  In: 240 [P.O.:240]  Out: 200 [Urine:200]      Radiology  Imaging Results (Last 24 Hours)       ** No results found for the last 24 hours. **            Labs:  Results from last 7 days    Lab Units 06/23/25  0203   WBC 10*3/mm3 15.31*   HEMOGLOBIN g/dL 13.6   HEMATOCRIT % 44.2   PLATELETS 10*3/mm3 231     Results from last 7 days   Lab Units 06/23/25  0203 06/22/25  0151   SODIUM mmol/L 136 141   POTASSIUM mmol/L 5.0 5.0   CHLORIDE mmol/L 99 102   CO2 mmol/L 28.3 25.6   BUN mg/dL 42.1* 32.2*   CREATININE mg/dL 1.04 1.00   CALCIUM mg/dL 8.3* 8.7   BILIRUBIN mg/dL  --  0.2   ALK PHOS U/L  --  55   ALT (SGPT) U/L  --  43*   AST (SGOT) U/L  --  16   GLUCOSE mg/dL 248* 243*     Results from last 7 days   Lab Units 06/22/25  0318   PH, ARTERIAL pH units 7.359   PO2 ART mm Hg 90.2   PCO2, ARTERIAL mm Hg 60.4*   HCO3 ART mmol/L 34.1*     Results from last 7 days   Lab Units 06/22/25  0151   ALBUMIN g/dL 4.0     Results from last 7 days   Lab Units 06/22/25  0323 06/22/25  0151   HSTROP T ng/L 33* 36*                           Meds:   SCHEDULE  arformoterol, 15 mcg, Nebulization, BID - RT  aspirin, 81 mg, Oral, Daily  budesonide, 0.5 mg, Nebulization, BID - RT  clopidogrel, 75 mg, Oral, Daily  dilTIAZem CD, 240 mg, Oral, Daily  furosemide, 40 mg, Intravenous, Daily  guaiFENesin, 1,200 mg, Oral, Q12H  insulin glargine, 15 Units, Subcutaneous, Nightly  insulin lispro, 4-24 Units, Subcutaneous, 4x Daily AC & at Bedtime  ipratropium-albuterol, 3 mL, Nebulization, Q4H - RT  losartan, 50 mg, Oral, Daily  methylPREDNISolone sodium succinate, 40 mg, Intravenous, Q12H  nicotine, 1 patch, Transdermal, Daily  revefenacin, 175 mcg, Nebulization, Daily - RT  rosuvastatin, 20 mg, Oral, Daily  sodium chloride, 10 mL, Intravenous, Q12H      Infusions     PRNs    acetaminophen **OR** acetaminophen **OR** acetaminophen    aluminum-magnesium hydroxide-simethicone    senna-docusate sodium **AND** polyethylene glycol **AND** bisacodyl **AND** bisacodyl    Calcium Replacement - Follow Nurse / BPA Driven Protocol    dextrose    dextrose    glucagon (human recombinant)    Magnesium Standard Dose Replacement - Follow Nurse / BPA  Driven Protocol    melatonin    nitroglycerin    ondansetron ODT **OR** ondansetron    Phosphorus Replacement - Follow Nurse / BPA Driven Protocol    Potassium Replacement - Follow Nurse / BPA Driven Protocol    sodium chloride    sodium chloride    sodium chloride    Physical Exam:  General Appearance:  Alert   HEENT:  Normocephalic, without obvious abnormality, Conjunctiva/corneas clear,.   Nares normal, no drainage     Neck:  Supple, symmetrical, trachea midline.   Lungs /Chest wall: Mild bilateral basal rhonchi, respirations unlabored, symmetrical wall movement.     Heart:  Regular rate and rhythm, S1 S2 normal  Abdomen: Soft, non-tender, no masses, no organomegaly.    Extremities: + Edema, no clubbing or cyanosis     ROS  Constitutional: Negative for chills, fever and malaise/fatigue.   HENT: Negative.    Eyes: Negative.    Cardiovascular: Negative.    Respiratory: Positive for cough and shortness of breath.    Skin: Negative.    Musculoskeletal: Negative.    Gastrointestinal: Negative.    Genitourinary: Negative.    Neurological: Generalized weakness        I reviewed the recent clinical results  I personally reviewed the latest radiological studies    Part of this note may be an electronic transcription/translation of spoken language to printed text using the Dragon Dictation System.

## 2025-06-23 NOTE — PLAN OF CARE
Goal Outcome Evaluation:  Plan of Care Reviewed With: patient        Progress: improving  Outcome Evaluation: Patient on 3L nasal cannula which is home baseline. Continues to drop with excertion.Safety measures in place

## 2025-06-23 NOTE — NURSING NOTE
"Patient frustrated and continues to put his bed up off floor. Educated on safety need of bed at lowest position. Patient states \"he will do whatever he wants and I can not tell him what to do.\" Demanding more snacks with raised blood sugar and states \" my blood sugar stays in the 400s, so don't worry about it.\"  "

## 2025-06-23 NOTE — PLAN OF CARE
Goal Outcome Evaluation:  Plan of Care Reviewed With: patient           Outcome Evaluation: 68 yo male adm 6/22/25 for acute hypoxic/hypercapnic respiratory failure, as well as ae chf. PMH: cad, chronic hepatitis C, HFpEF, [place], obesity (BMI 43). At baseline, pt lives alone in [facility] (single level home) w/ 5 stairs and handrail to enter. Pt reports multiple exacerbations of his copd/chf this year. Pt ambulates household distances w/ cane, but reports he is having difficulty even standing to fix meals, and is severely limited due to continual exacerbations. Today, pt is able to come to stand w/ cga and ambulate w/ cga and rw x 40 ft. However, he has poor judgment for endurance and uses an impulsive pace, despite cues to slow down. Also has some mild LE weakness. Pt has decreased O2 level w/ ambulation, then has moderate SOA. He is not safe for home at d/c, as he is likely to rebound into another episode for respiratory failure. He needs slow progression of mobility and strengthening to avoid another relapse. Recommend SNF at d/c. Will follow.

## 2025-06-24 LAB
ANION GAP SERPL CALCULATED.3IONS-SCNC: 10.5 MMOL/L (ref 5–15)
BASOPHILS # BLD AUTO: 0.05 10*3/MM3 (ref 0–0.2)
BASOPHILS NFR BLD AUTO: 0.3 % (ref 0–1.5)
BUN SERPL-MCNC: 44.6 MG/DL (ref 8–23)
BUN/CREAT SERPL: 40.5 (ref 7–25)
CALCIUM SPEC-SCNC: 8.3 MG/DL (ref 8.6–10.5)
CHLORIDE SERPL-SCNC: 95 MMOL/L (ref 98–107)
CO2 SERPL-SCNC: 28.5 MMOL/L (ref 22–29)
CREAT SERPL-MCNC: 1.1 MG/DL (ref 0.76–1.27)
DEPRECATED RDW RBC AUTO: 54.7 FL (ref 37–54)
EGFRCR SERPLBLD CKD-EPI 2021: 73.1 ML/MIN/1.73
EOSINOPHIL # BLD AUTO: 0.01 10*3/MM3 (ref 0–0.4)
EOSINOPHIL NFR BLD AUTO: 0.1 % (ref 0.3–6.2)
ERYTHROCYTE [DISTWIDTH] IN BLOOD BY AUTOMATED COUNT: 15.1 % (ref 12.3–15.4)
GLUCOSE BLDC GLUCOMTR-MCNC: 171 MG/DL (ref 70–105)
GLUCOSE BLDC GLUCOMTR-MCNC: 237 MG/DL (ref 70–105)
GLUCOSE BLDC GLUCOMTR-MCNC: 255 MG/DL (ref 70–105)
GLUCOSE BLDC GLUCOMTR-MCNC: 368 MG/DL (ref 70–105)
GLUCOSE SERPL-MCNC: 265 MG/DL (ref 65–99)
HCT VFR BLD AUTO: 47.4 % (ref 37.5–51)
HGB BLD-MCNC: 14.6 G/DL (ref 13–17.7)
IMM GRANULOCYTES # BLD AUTO: 0.48 10*3/MM3 (ref 0–0.05)
IMM GRANULOCYTES NFR BLD AUTO: 2.9 % (ref 0–0.5)
LYMPHOCYTES # BLD AUTO: 1.11 10*3/MM3 (ref 0.7–3.1)
LYMPHOCYTES NFR BLD AUTO: 6.8 % (ref 19.6–45.3)
MCH RBC QN AUTO: 30 PG (ref 26.6–33)
MCHC RBC AUTO-ENTMCNC: 30.8 G/DL (ref 31.5–35.7)
MCV RBC AUTO: 97.5 FL (ref 79–97)
MONOCYTES # BLD AUTO: 1.3 10*3/MM3 (ref 0.1–0.9)
MONOCYTES NFR BLD AUTO: 8 % (ref 5–12)
NEUTROPHILS NFR BLD AUTO: 13.35 10*3/MM3 (ref 1.7–7)
NEUTROPHILS NFR BLD AUTO: 81.9 % (ref 42.7–76)
NRBC BLD AUTO-RTO: 0.2 /100 WBC (ref 0–0.2)
PLATELET # BLD AUTO: 231 10*3/MM3 (ref 140–450)
PMV BLD AUTO: 9.4 FL (ref 6–12)
POTASSIUM SERPL-SCNC: 4.4 MMOL/L (ref 3.5–5.2)
RBC # BLD AUTO: 4.86 10*6/MM3 (ref 4.14–5.8)
SODIUM SERPL-SCNC: 134 MMOL/L (ref 136–145)
WBC NRBC COR # BLD AUTO: 16.3 10*3/MM3 (ref 3.4–10.8)

## 2025-06-24 PROCEDURE — 63710000001 PREDNISONE PER 5 MG: Performed by: INTERNAL MEDICINE

## 2025-06-24 PROCEDURE — 94799 UNLISTED PULMONARY SVC/PX: CPT

## 2025-06-24 PROCEDURE — 83735 ASSAY OF MAGNESIUM: CPT | Performed by: INTERNAL MEDICINE

## 2025-06-24 PROCEDURE — 82948 REAGENT STRIP/BLOOD GLUCOSE: CPT | Performed by: STUDENT IN AN ORGANIZED HEALTH CARE EDUCATION/TRAINING PROGRAM

## 2025-06-24 PROCEDURE — 94761 N-INVAS EAR/PLS OXIMETRY MLT: CPT

## 2025-06-24 PROCEDURE — 93010 ELECTROCARDIOGRAM REPORT: CPT | Performed by: STUDENT IN AN ORGANIZED HEALTH CARE EDUCATION/TRAINING PROGRAM

## 2025-06-24 PROCEDURE — 93005 ELECTROCARDIOGRAM TRACING: CPT | Performed by: INTERNAL MEDICINE

## 2025-06-24 PROCEDURE — 63710000001 PREDNISONE PER 1 MG: Performed by: INTERNAL MEDICINE

## 2025-06-24 PROCEDURE — 80048 BASIC METABOLIC PNL TOTAL CA: CPT | Performed by: NURSE PRACTITIONER

## 2025-06-24 PROCEDURE — 97530 THERAPEUTIC ACTIVITIES: CPT | Performed by: OCCUPATIONAL THERAPIST

## 2025-06-24 PROCEDURE — 97535 SELF CARE MNGMENT TRAINING: CPT | Performed by: OCCUPATIONAL THERAPIST

## 2025-06-24 PROCEDURE — 63710000001 INSULIN GLARGINE PER 5 UNITS: Performed by: INTERNAL MEDICINE

## 2025-06-24 PROCEDURE — 97110 THERAPEUTIC EXERCISES: CPT | Performed by: OCCUPATIONAL THERAPIST

## 2025-06-24 PROCEDURE — 25010000002 FUROSEMIDE PER 20 MG: Performed by: INTERNAL MEDICINE

## 2025-06-24 PROCEDURE — 82948 REAGENT STRIP/BLOOD GLUCOSE: CPT

## 2025-06-24 PROCEDURE — 94664 DEMO&/EVAL PT USE INHALER: CPT

## 2025-06-24 PROCEDURE — 85025 COMPLETE CBC W/AUTO DIFF WBC: CPT | Performed by: NURSE PRACTITIONER

## 2025-06-24 PROCEDURE — 63710000001 INSULIN LISPRO (HUMAN) PER 5 UNITS: Performed by: STUDENT IN AN ORGANIZED HEALTH CARE EDUCATION/TRAINING PROGRAM

## 2025-06-24 RX ORDER — PANTOPRAZOLE SODIUM 40 MG/1
40 TABLET, DELAYED RELEASE ORAL ONCE
Status: COMPLETED | OUTPATIENT
Start: 2025-06-24 | End: 2025-06-24

## 2025-06-24 RX ORDER — INSULIN GLARGINE 100 [IU]/ML
25 INJECTION, SOLUTION SUBCUTANEOUS NIGHTLY
Status: DISCONTINUED | OUTPATIENT
Start: 2025-06-24 | End: 2025-06-25

## 2025-06-24 RX ORDER — METOPROLOL TARTRATE 1 MG/ML
5 INJECTION, SOLUTION INTRAVENOUS EVERY 6 HOURS PRN
Status: DISCONTINUED | OUTPATIENT
Start: 2025-06-24 | End: 2025-07-01 | Stop reason: HOSPADM

## 2025-06-24 RX ADMIN — INSULIN LISPRO 4 UNITS: 100 INJECTION, SOLUTION INTRAVENOUS; SUBCUTANEOUS at 21:04

## 2025-06-24 RX ADMIN — ROSUVASTATIN CALCIUM 20 MG: 10 TABLET, FILM COATED ORAL at 08:28

## 2025-06-24 RX ADMIN — IPRATROPIUM BROMIDE AND ALBUTEROL SULFATE 3 ML: .5; 3 SOLUTION RESPIRATORY (INHALATION) at 11:15

## 2025-06-24 RX ADMIN — LOSARTAN POTASSIUM 50 MG: 50 TABLET, FILM COATED ORAL at 08:28

## 2025-06-24 RX ADMIN — PANTOPRAZOLE SODIUM 40 MG: 40 TABLET, DELAYED RELEASE ORAL at 21:04

## 2025-06-24 RX ADMIN — ASPIRIN 81 MG: 81 TABLET, COATED ORAL at 08:28

## 2025-06-24 RX ADMIN — Medication 10 ML: at 21:04

## 2025-06-24 RX ADMIN — IPRATROPIUM BROMIDE AND ALBUTEROL SULFATE 3 ML: .5; 3 SOLUTION RESPIRATORY (INHALATION) at 15:05

## 2025-06-24 RX ADMIN — SPIRONOLACTONE 25 MG: 25 TABLET, FILM COATED ORAL at 08:28

## 2025-06-24 RX ADMIN — IPRATROPIUM BROMIDE AND ALBUTEROL SULFATE 3 ML: .5; 3 SOLUTION RESPIRATORY (INHALATION) at 03:00

## 2025-06-24 RX ADMIN — FUROSEMIDE 40 MG: 10 INJECTION, SOLUTION INTRAMUSCULAR; INTRAVENOUS at 21:04

## 2025-06-24 RX ADMIN — INSULIN LISPRO 8 UNITS: 100 INJECTION, SOLUTION INTRAVENOUS; SUBCUTANEOUS at 12:24

## 2025-06-24 RX ADMIN — IPRATROPIUM BROMIDE AND ALBUTEROL SULFATE 3 ML: .5; 3 SOLUTION RESPIRATORY (INHALATION) at 06:45

## 2025-06-24 RX ADMIN — CLOPIDOGREL BISULFATE 75 MG: 75 TABLET, FILM COATED ORAL at 08:28

## 2025-06-24 RX ADMIN — BUDESONIDE INHALATION 0.5 MG: 0.5 SUSPENSION RESPIRATORY (INHALATION) at 20:47

## 2025-06-24 RX ADMIN — DILTIAZEM HYDROCHLORIDE 240 MG: 240 CAPSULE, COATED, EXTENDED RELEASE ORAL at 08:28

## 2025-06-24 RX ADMIN — METOROPROLOL TARTRATE 5 MG: 5 INJECTION, SOLUTION INTRAVENOUS at 17:09

## 2025-06-24 RX ADMIN — INSULIN LISPRO 20 UNITS: 100 INJECTION, SOLUTION INTRAVENOUS; SUBCUTANEOUS at 17:08

## 2025-06-24 RX ADMIN — ARFORMOTEROL TARTRATE 15 MCG: 15 SOLUTION RESPIRATORY (INHALATION) at 20:42

## 2025-06-24 RX ADMIN — Medication 10 ML: at 08:28

## 2025-06-24 RX ADMIN — NICOTINE 1 PATCH: 21 PATCH, EXTENDED RELEASE TRANSDERMAL at 08:29

## 2025-06-24 RX ADMIN — GUAIFENESIN 1200 MG: 600 TABLET, EXTENDED RELEASE ORAL at 08:28

## 2025-06-24 RX ADMIN — FUROSEMIDE 40 MG: 10 INJECTION, SOLUTION INTRAMUSCULAR; INTRAVENOUS at 08:28

## 2025-06-24 RX ADMIN — INSULIN LISPRO 12 UNITS: 100 INJECTION, SOLUTION INTRAVENOUS; SUBCUTANEOUS at 08:27

## 2025-06-24 RX ADMIN — INSULIN GLARGINE 25 UNITS: 100 INJECTION, SOLUTION SUBCUTANEOUS at 21:55

## 2025-06-24 RX ADMIN — BUDESONIDE INHALATION 0.5 MG: 0.5 SUSPENSION RESPIRATORY (INHALATION) at 06:59

## 2025-06-24 RX ADMIN — IPRATROPIUM BROMIDE AND ALBUTEROL SULFATE 3 ML: .5; 3 SOLUTION RESPIRATORY (INHALATION) at 23:14

## 2025-06-24 RX ADMIN — PREDNISONE 30 MG: 10 TABLET ORAL at 08:28

## 2025-06-24 RX ADMIN — GUAIFENESIN 1200 MG: 600 TABLET, EXTENDED RELEASE ORAL at 21:04

## 2025-06-24 NOTE — CASE MANAGEMENT/SOCIAL WORK
Continued Stay Note  MANDO Newton     Patient Name: Santos Sullivan  MRN: [id number]  Today's Date: 6/24/2025    Admit Date: 6/22/2025    Plan: [facility] accepted pending precert. Precert started 6/24, PASRR approved. From home alone. Current home O2 2-3L through [facility].   Discharge Plan       Row Name 06/24/25 1522       Plan    Plan [facility] accepted pending precert. Precert started 6/24, PASRR approved. From home alone. Current home O2 2-3L through [facility].    Plan Comments [facility] that [facility] can accept and bed is available today, 6/24. Reported that they also have a bed available at  tomorrow, 6/25. Pharmacy updated for facility.  requested for Care Coordination Supervisor Blanca ALDRIDGE to start precert.                Jeanine Johnson RN     Office Phone: [phone]  Office Cell: [phone]

## 2025-06-24 NOTE — DISCHARGE PLACEMENT REQUEST
"Santos Cedeno (69 y.o. Male)       Date of Birth   1955    Social Security Number       Address   [address] [place] [address]    Home Phone   [phone]    MRN   [id number]       Voodoo   None    Marital Status                               Admission Date   6/22/2025    Admission Type   Emergency    Admitting Provider   Jose Roberto Linares MD    Attending Provider   Dawood Lombardo DO    Department, Room/Bed   [facility], 2126/1       Discharge Date       Discharge Disposition       Discharge Destination                                 Attending Provider: Dawood Lombardo DO    Allergies: No Known Allergies    Isolation: None   Infection: None   Code Status: CPR    Ht: 177.8 cm (70\")   Wt: 141 kg (311 lb 4.6 oz)    Admission Cmt: None   Principal Problem: Acute respiratory failure with hypoxia and hypercapnia [J96.01,J96.02]                   Active Insurance as of 6/22/2025       Primary Coverage       Payor Plan Insurance Group Employer/Plan Group    ANTHEM MEDICARE REPLACEMENT ANTHEM MEDICARE ADVANTAGE SNP [id number]       Payor Plan Address Payor Plan Phone Number Payor Plan Fax Number Effective Dates    [address] [phone]  8/1/2024 - None Entered    [facility] [address]         Subscriber Name Subscriber Birth Date Member ID       SANTOS CEDENO 1955 [id number]               Secondary Coverage       Payor Plan Insurance Group Employer/Plan Group    [id number]        Payor Plan Address Payor Plan Phone Number Payor Plan Fax Number Effective Dates    [address] [address]   8/1/2021 - None Entered    [place] [address]         Subscriber Name Subscriber Birth Date Member ID       SANTOS CEDENO 1955 [id number]                     Emergency Contacts        (Rel.) Home Phone Work Phone Mobile Phone    LAURA CEDENO (Brother) [phone] -- --                 History & Physical    "     Alexandria Brown APRN at 25 5084       Attestation signed by Jose Roberto Linares MD at 25 0350      I have reviewed this documentation and agree.                          [facility]    Hospitalist History and Physical     Santos Sullivan : 1955 MRN:[id number] LOS:0 ROOM:      Reason for admission: Acute respiratory failure with hypoxia and hypercapnia     Assessment / Plan     Acute respiratory failure with hypoxia and hypercapnia  Acute diastolic CHF exacerbation  - initial ABG pH 7.31, low CO2 62, pO2 75, HCO3 31.4 on 36% FiO2  - Placed on Bipap with repeat ABG 7.359, CO2 60.4, pO2 90.2, HCO3 34.1  - pt with 2+ bilateral lower extremity edema   - WBC 13.96  - HS troponin 36 with repeat 33  - proBNP 175.   - RVP negative  - CXR showed no clearly acute findings in the chest. Chronic changes as above including cardiomegaly  - lasix 80mg IV x1 given in the ER, continue lasix 40mg q12 hours   - check echo as previous echo was  and showed normal EF w/ diastolic failure grade 1  - NPO other than ice chips/liquis until Bipap able to be weaned  - consult pulmonary to help wean off bipap, consider cardiology consult (sees Dr. Major outpatient)    CAD s/p PCI  Hypertension  Hyperlipidemia  - cont home aspirin, plavix, losartan, cardizem, statin     DM type II  - cont home lantus, add SSI    COPD  - duonebs prn    Chronic hepatitis C    Tobacco abuse  - encourage cessation  - nicotine patch     Morbid obesity  - BMI 41  - complicates all aspects of care    Nutrition:   NPO Diet NPO Type: Sips with Meds, Ice Chips     VTE Prophylaxis:  Mechanical VTE prophylaxis orders are present.         History of Present illness     Santos Sullivan is a 69 y.o. male with PMH of CAD s/p PCI, HFpEF, HTN, HLD, COPD, DM2 presented to [facility] ED 2025 with complaints of shortness of breath, cough, wheezing for the last week. He does wear home oxygen 3L O2. He denied any fever, no chest  pain. He feels like his legs are more swollen. He feels fatigued.      In the ED  ABG pH 7.31, low CO2 62, pO2 75, HCO3 31.4 on 36% FiO2. He was placed on Bipap with ABG 7.359, CO2 60.4, pO2 90.2, HCO3 34.1  WBC 13.96, HS troponin 36 with repeat 33, proBNP 175.   RVP negative.   CXR showed no clearly acute findings in the chest. Chronic changes as above including cardiomegaly. , afebrile, BP 150s-160s/80s.   He was given lasix 80mg IV x1.   He was admitted for further treatment of acute respiratory failure with hypoxia and hypercapnia thought to be due to CHF exacerbation     Subjective / Review of systems     Review of Systems   Constitutional: Negative.  Negative for fever.   HENT: Negative.     Eyes: Negative.    Respiratory:  Positive for shortness of breath and wheezing.    Cardiovascular:  Positive for leg swelling.   Gastrointestinal: Negative.    Genitourinary: Negative.    Musculoskeletal: Negative.    Skin: Negative.    Neurological: Negative.    Psychiatric/Behavioral: Negative.          Past Medical/Surgical/Social/Family History & Allergies     Past Medical History:   Diagnosis Date    Abnormal stress test     Acrocyanosis     Acute renal insufficiency     Benign essential hypertension     Chronic hepatitis C     Managed by [facility].  Cured of infection.    Claudication     Right leg. MARY showing mild disease on right.    COPD (chronic obstructive pulmonary disease)     Moderate, nonreversible airway obstruction (worse than previous).    Coronary artery disease involving native coronary artery of native heart without angina pectoris     Femur fracture     30+ years ago    Hearing loss     History of spinal cord injury     Hyperlipidemia     Left against medical advice     Need for hepatitis C screening test     Onychomycosis of toenail     Tobacco use disorder       Past Surgical History:   Procedure Laterality Date    CARDIAC CATHETERIZATION      CORONARY STENT PLACEMENT        Social History      Socioeconomic History    Marital status:    Tobacco Use    Smoking status: Every Day     Current packs/day: 0.50     Average packs/day: 0.5 packs/day for 25.0 years (12.5 ttl pk-yrs)     Types: Cigarettes    Smokeless tobacco: Never    Tobacco comments:     Patient is advised to quit smoking   Vaping Use    Vaping status: Never Used   Substance and Sexual Activity    Alcohol use: Not Currently     Comment: rare    Drug use: Not Currently    Sexual activity: Defer      Family History   Problem Relation Age of Onset    Heart disease Mother     Hypertension Mother     Coronary artery disease Mother     Cancer Father         Throat    Cancer Sister         pelvic    Cancer Brother         Lung      No Known Allergies   Social Drivers of Health     Tobacco Use: High Risk (6/12/2023)    Received from [facility]    Patient History     Smoking Tobacco Use: Every Day     Smokeless Tobacco Use: Never     Passive Exposure: Not on file   Alcohol Use: Not At Risk (12/21/2020)    AUDIT-C     Frequency of Alcohol Consumption: Never     Average Number of Drinks: Not on file     Frequency of Binge Drinking: Not on file   Financial Resource Strain: Not on file   Food Insecurity: Not on file   Transportation Needs: Not on file   Physical Activity: Not on file   Stress: Not on file   Social Connections: Not on file   Interpersonal Safety: Not At Risk (6/22/2025)    Abuse Screen     Unsafe at Home or Work/School: no     Feels Threatened by Someone?: no     Does Anyone Keep You from Contacting Others or Doint Things Outside the Home?: no     Physical Sign of Abuse Present: no   Depression: Not at risk (5/4/2022)    PHQ-2     PHQ-2 Score: 0   Housing Stability: Not on file   Utilities: Not on file   Health Literacy: Not on file   Employment: Not on file   Disabilities: Not on file        Home Medications     Prior to Admission medications    Medication Sig Start Date End Date Taking? Authorizing Provider   albuterol  (PROVENTIL) (2.5 MG/3ML) 0.083% nebulizer solution Take 2.5 mg by nebulization Every 4 (Four) Hours As Needed for Wheezing. 3/9/22  Yes Leigh Ayon MD   albuterol sulfate  (90 Base) MCG/ACT inhaler Inhale 2 puffs Every 4 (Four) Hours As Needed for Wheezing.   Yes ProviderKem MD   budesonide (PULMICORT) 0.5 MG/2ML nebulizer solution Take 2 mL by nebulization 2 (Two) Times a Day.   Yes Kem Castañeda MD   budesonide-formoterol (SYMBICORT) 160-4.5 MCG/ACT inhaler Inhale 2 puffs 2 (Two) Times a Day.   Yes Kem Castañeda MD   bumetanide (BUMEX) 2 MG tablet Take 1 tablet by mouth Daily.   Yes ProviderKem MD   clopidogrel (PLAVIX) 75 MG tablet Take 1 tablet by mouth once daily 6/24/22  Yes Leigh Ayon MD   dilTIAZem CD (CARDIZEM CD) 240 MG 24 hr capsule Take 1 capsule by mouth Daily.   Yes ProviderKem MD   fluticasone (FLONASE) 50 MCG/ACT nasal spray Administer 1 spray into the nostril(s) as directed by provider 2 (Two) Times a Day.   Yes ProviderKem MD   furosemide (LASIX) 40 MG tablet Take 1 tablet by mouth 2 (Two) Times a Day.   Yes ProviderKem MD   glimepiride (AMARYL) 2 MG tablet Take 1 tablet by mouth 2 (Two) Times a Day.   Yes ProviderKem MD   insulin glargine (LANTUS, SEMGLEE) 100 UNIT/ML injection Inject 8 Units under the skin into the appropriate area as directed Every Night.   Yes ProviderKem MD   losartan (COZAAR) 50 MG tablet Take 1 tablet by mouth Daily.   Yes ProviderKem MD   metFORMIN ER (GLUCOPHAGE-XR) 500 MG 24 hr tablet Take 2 tablets by mouth 2 (Two) Times a Day.   Yes ProviderKem MD   pioglitazone (ACTOS) 45 MG tablet Take 1 tablet by mouth Daily.   Yes ProviderKem MD   predniSONE (DELTASONE) 10 MG tablet Take 1-4 tablets by mouth Daily.   Yes ProviderKem MD   rosuvastatin (CRESTOR) 20 MG tablet Take 1 tablet by mouth Daily.   Yes Provider,  MD Kem   Semaglutide,0.25 or 0.5MG/DOS, (Ozempic, 0.25 or 0.5 MG/DOSE,) 2 MG/3ML solution pen-injector Inject 0.25 mg under the skin into the appropriate area as directed 1 (One) Time Per Week.   Yes Kem Castañeda MD   spironolactone (ALDACTONE) 25 MG tablet Take 1 tablet by mouth Daily.   Yes Kem Castañeda MD   aspirin (aspirin) 81 MG EC tablet Take 1 tablet by mouth Daily. 12/7/18   Kem Castañeda MD   nitroglycerin (Nitrostat) 0.4 MG SL tablet Place 1 tablet under the tongue Every 5 (Five) Minutes As Needed for Chest Pain. Take no more than 3 doses in 15 minutes. 7/8/22   Leigh Ayon MD   Accu-Chek FastClix Lancets misc 1 each Take As Directed. Use to test blood sugar once daily (fasting, in the AM). 6/6/22 6/22/25  Leigh Ayon MD   atorvastatin (LIPITOR) 40 MG tablet Take 1 tablet by mouth Daily. 7/8/22 6/22/25  Leigh Ayon MD   Blood Glucose Monitoring Suppl (Accu-Chek Guide) w/Device kit 1 each Take As Directed. Use to test blood sugar once daily (fasting, in the AM). 6/6/22 6/22/25  Leigh Ayon MD   chlorthalidone (HYGROTON) 25 MG tablet Take 1 tablet by mouth Daily. 8/9/22 6/22/25  Kem Castañeda MD   Fluticasone-Umeclidin-Vilant (Trelegy Ellipta) 200-62.5-25 MCG/INH inhaler Inhale 1 puff Daily. 4/15/22 6/22/25  Leigh Ayon MD   furosemide (LASIX) 40 MG tablet Take 1 tablet by mouth Daily. 7/8/22 6/22/25  Leigh Ayon MD   glucose blood (Accu-Chek Guide) test strip Use to test blood sugar once daily (fasting, in the AM). 6/6/22 6/22/25  Leigh Ayon MD   ibuprofen (ADVIL,MOTRIN) 200 MG tablet Take 5 tablets by mouth 3 (Three) Times a Day.  6/22/25  Provider, MD Kem   ipratropium-albuterol (Combivent Respimat)  MCG/ACT inhaler Inhale 1 puff 4 (Four) Times a Day As Needed for Wheezing or Shortness of Air. 4/15/22 6/22/25  Leigh Ayon  MD Rosy   metFORMIN ER (GLUCOPHAGE-XR) 500 MG 24 hr tablet Take 1 tablet by mouth Daily With Breakfast. 6/6/22 6/22/25  Leigh Ayon MD   metoprolol succinate XL (TOPROL-XL) 50 MG 24 hr tablet Take 1 tablet by mouth Daily. 7/7/22 6/22/25  Ian Major MD   theophylline (THEODUR) 300 MG 12 hr tablet Take 300 mg by mouth Daily. 5/17/22 6/22/25  Provider, MD Kem        Objective / Physical Exam     Vital signs:  Temp: 98.7 °F (37.1 °C)  BP: 150/84  Heart Rate: 99  Resp: 20  SpO2: 96 %  Weight: 131 kg (288 lb 12.8 oz)    Admission Weight: Weight: 131 kg (288 lb 12.8 oz)    Physical Exam  Vitals and nursing note reviewed.   HENT:      Head: Normocephalic and atraumatic.   Eyes:      Extraocular Movements: Extraocular movements intact.      Pupils: Pupils are equal, round, and reactive to light.   Cardiovascular:      Rate and Rhythm: Normal rate and regular rhythm.      Pulses: Normal pulses.      Heart sounds: Murmur heard.   Pulmonary:      Effort: Pulmonary effort is normal. No respiratory distress.      Breath sounds: Examination of the right-upper field reveals wheezing and rhonchi. Examination of the left-upper field reveals wheezing and rhonchi. Examination of the right-lower field reveals decreased breath sounds and wheezing. Examination of the left-lower field reveals decreased breath sounds and wheezing. Decreased breath sounds, wheezing and rhonchi present.      Comments:  Bipap mask in place  Abdominal:      General: Bowel sounds are normal.      Palpations: Abdomen is soft.      Tenderness: There is no abdominal tenderness.   Musculoskeletal:      Right lower leg: Edema present.      Left lower leg: Edema present.      Comments: Chronic left leg weakness per patient otherwise moves all extremities   Skin:     General: Skin is warm and dry.   Neurological:      Mental Status: He is alert and oriented to person, place, and time.   Psychiatric:         Mood and Affect: Mood normal.          Behavior: Behavior normal.          Labs     Results from last 7 days   Lab Units 06/22/25  0151   WBC 10*3/mm3 13.96*   HEMOGLOBIN g/dL 14.1   HEMATOCRIT % 47.1   PLATELETS 10*3/mm3 232      Results from last 7 days   Lab Units 06/22/25  0151   ALK PHOS U/L 55   AST (SGOT) U/L 16   ALT (SGPT) U/L 43*           Results from last 7 days   Lab Units 06/22/25  0151   SODIUM mmol/L 141   POTASSIUM mmol/L 5.0   CHLORIDE mmol/L 102   CO2 mmol/L 25.6   BUN mg/dL 32.2*   CREATININE mg/dL 1.00   GLUCOSE mg/dL 243*        Imaging     XR Chest 1 View  Result Date: 6/22/2025  XR CHEST 1 VW Date of Exam: 6/22/2025 2:00 AM EDT Indication: soa Comparison: 5/29/2025 Findings: Heart remains enlarged. Lipomatous pleural thickening is again seen bilaterally as seen on chest CT 4/28/2025. Pulmonary vascularity is normal. There is some chronic scarring in the lingula. Some of the opacity at the left lung base is due to a prominent  epicardial fat pad. No definite acute pulmonary infiltrates. No pneumothorax.     No clearly acute findings in the chest. Chronic changes as above, including cardiomegaly. Electronically Signed: Norris Chavarria MD  6/22/2025 2:19 AM EDT  Workstation ID: [id number]     Current Medications     Scheduled Meds:  aspirin, 81 mg, Oral, Daily  clopidogrel, 75 mg, Oral, Daily  dilTIAZem CD, 240 mg, Oral, Daily  [START ON 6/23/2025] furosemide, 40 mg, Intravenous, Q12H  insulin glargine, 8 Units, Subcutaneous, Nightly  insulin lispro, 2-9 Units, Subcutaneous, Q6H  losartan, 50 mg, Oral, Daily  nicotine, 1 patch, Transdermal, Daily  rosuvastatin, 20 mg, Oral, Daily  sodium chloride, 10 mL, Intravenous, Q12H         Alexandria Brown [facility]  06/22/25   05:55 EDT     Electronically signed by Jose Roberto Linares MD at 06/23/25 0047          Physician Progress Notes (most recent note)        Elijah Lynch MD at 06/24/25 1032          Daily Progress Note          Assessment    Acute respiratory  failure with hypoxia and hypercapnia: ABGs on admission 7.3 1/62/75 while on nasal cannula, repeated on BiPAP 7.35/60.4/90.2  Acute bronchitis due to unspecified pathogen, negative respiratory panel     Acute on chronic HFpEF  COPD without acute exacerbation  CAD status post PCI  HTN  HLD  DM  Chronic hepatitis C  Tobacco smoking  Obesity with BMI of 42 I     PFTs 2022: Moderate obstruction with air trapping and decreased oxygen diffusion capacity, FEV1/FVC 65%, FVC 72%, FEV1 63%, %, %, DLCO 58%           Recommendations:  The respiratory status is stable, patient can be discharged from pulmonary standpoint to home with outpatient home health or inpatient rehab     Follow-up in the office in 3 weeks, he will consider home sleep study to rule out SHYAM     tapering dose prednisone     Symbicort  Mucinex  Oxygen supplement and titration to maintain saturation 90 to 95% currently on 2 L per nasal cannula alternating with room air     bronchodilators     Smoking cessation counseling, nicotine patch  Aspirin, Plavix, Crestor  Diltiazem, losartan  Insulin Lantus and sliding scale     I personally reviewed the radiological studies             LOS: 2 days     Subjective     Less cough and shortness of breath    Objective     Vital signs for last 24 hours:  Vitals:    06/24/25 0656 06/24/25 0657 06/24/25 0659 06/24/25 0836   BP:    153/91   BP Location:    Right arm   Patient Position:    Sitting   Pulse: 98 98 100 102   Resp: 18 18 18 18   Temp:    98 °F (36.7 °C)   TempSrc:    Oral   SpO2: 100% 100% 100% 93%   Weight:       Height:           Intake/Output last 3 shifts:  I/O last 3 completed shifts:  In: 960 [P.O.:960]  Out: 4450 [Urine:4450]  Intake/Output this shift:  No intake/output data recorded.      Radiology  Imaging Results (Last 24 Hours)       ** No results found for the last 24 hours. **            Labs:  Results from last 7 days   Lab Units 06/24/25  0330   WBC 10*3/mm3 16.30*   HEMOGLOBIN g/dL 14.6    HEMATOCRIT % 47.4   PLATELETS 10*3/mm3 231     Results from last 7 days   Lab Units 06/24/25  0330 06/23/25  0203 06/22/25  0151   SODIUM mmol/L 134*   < > 141   POTASSIUM mmol/L 4.4   < > 5.0   CHLORIDE mmol/L 95*   < > 102   CO2 mmol/L 28.5   < > 25.6   BUN mg/dL 44.6*   < > 32.2*   CREATININE mg/dL 1.10   < > 1.00   CALCIUM mg/dL 8.3*   < > 8.7   BILIRUBIN mg/dL  --   --  0.2   ALK PHOS U/L  --   --  55   ALT (SGPT) U/L  --   --  43*   AST (SGOT) U/L  --   --  16   GLUCOSE mg/dL 265*   < > 243*    < > = values in this interval not displayed.     Results from last 7 days   Lab Units 06/22/25  0318   PH, ARTERIAL pH units 7.359   PO2 ART mm Hg 90.2   PCO2, ARTERIAL mm Hg 60.4*   HCO3 ART mmol/L 34.1*     Results from last 7 days   Lab Units 06/22/25  0151   ALBUMIN g/dL 4.0     Results from last 7 days   Lab Units 06/22/25  0323 06/22/25  0151   HSTROP T ng/L 33* 36*                           Meds:   SCHEDULE  arformoterol, 15 mcg, Nebulization, BID - RT  aspirin, 81 mg, Oral, Daily  budesonide, 0.5 mg, Nebulization, BID - RT  clopidogrel, 75 mg, Oral, Daily  dilTIAZem CD, 240 mg, Oral, Daily  furosemide, 40 mg, Intravenous, BID  guaiFENesin, 1,200 mg, Oral, Q12H  insulin glargine, 25 Units, Subcutaneous, Nightly  insulin lispro, 4-24 Units, Subcutaneous, 4x Daily AC & at Bedtime  ipratropium-albuterol, 3 mL, Nebulization, Q4H - RT  losartan, 50 mg, Oral, Daily  nicotine, 1 patch, Transdermal, Daily  predniSONE, 30 mg, Oral, Daily   Followed by  [START ON 6/26/2025] predniSONE, 20 mg, Oral, Daily   Followed by  [START ON 6/28/2025] predniSONE, 10 mg, Oral, Daily  revefenacin, 175 mcg, Nebulization, Daily - RT  rosuvastatin, 20 mg, Oral, Daily  sodium chloride, 10 mL, Intravenous, Q12H  spironolactone, 25 mg, Oral, Daily      Infusions     PRNs    acetaminophen **OR** acetaminophen **OR** acetaminophen    aluminum-magnesium hydroxide-simethicone    senna-docusate sodium **AND** polyethylene glycol **AND**  bisacodyl **AND** bisacodyl    Calcium Replacement - Follow Nurse / BPA Driven Protocol    dextrose    dextrose    glucagon (human recombinant)    Magnesium Standard Dose Replacement - Follow Nurse / BPA Driven Protocol    melatonin    nitroglycerin    ondansetron ODT **OR** ondansetron    Phosphorus Replacement - Follow Nurse / BPA Driven Protocol    Potassium Replacement - Follow Nurse / BPA Driven Protocol    sodium chloride    sodium chloride    sodium chloride    Physical Exam:  General Appearance:  Alert   HEENT:  Normocephalic, without obvious abnormality, Conjunctiva/corneas clear,.   Nares normal, no drainage     Neck:  Supple, symmetrical, trachea midline.   Lungs /Chest wall: Mild bilateral basal rhonchi, respirations unlabored, symmetrical wall movement.     Heart:  Regular rate and rhythm, S1 S2 normal  Abdomen: Soft, non-tender, no masses, no organomegaly.    Extremities: + Edema, no clubbing or cyanosis     ROS  Constitutional: Negative for chills, fever and malaise/fatigue.   HENT: Negative.    Eyes: Negative.    Cardiovascular: Negative.    Respiratory: Positive for cough and shortness of breath.    Skin: Negative.    Musculoskeletal: Negative.    Gastrointestinal: Negative.    Genitourinary: Negative.    Neurological: Generalized weakness        I reviewed the recent clinical results  I personally reviewed the latest radiological studies    Part of this note may be an electronic transcription/translation of spoken language to printed text using the Dragon Dictation System.      Electronically signed by Elijah Lynch MD at 06/24/25 1034          Consult Notes (most recent note)        Elijah Lynch MD at 06/22/25 0937        Consult Orders    1. Inpatient Pulmonology Consult [[id number]] ordered by Alexandria Brown APRN at 06/22/25 1079                 Group: [facility]         CONSULT NOTE    Patient Identification:  Santos Sullivan  69 y.o.  male  1955  [id number]             Requesting physician: Attending physician    Reason for Consultation: Respiratory failure      History of Present Illness:  69-year-old male with history of COPD, CAD status post PCI, HFpEF, DM, HLD and chronic hypoxia on 3 L of oxygen at home who presented 6/22/2025 with complaints of 1 week of progressive shortness of breath, cough and wheezing.    XR Chest 1 View  Result Date: 6/22/2025  No clearly acute findings in the chest. Chronic changes as above, including cardiomegaly. Electronically Signed: Norris Chavarria MD  6/22/2025 2:19 AM EDT  Workstation ID: [id number]    Results for orders placed in visit on 03/09/22    Adult Transthoracic Echo Complete W/ Cont if Necessary Per Protocol    Interpretation Summary  · Estimated right ventricular systolic pressure from tricuspid regurgitation is normal (<35 mmHg).  · Left ventricular wall thickness is consistent with mild concentric hypertrophy.  · Estimated left ventricular EF = 60% Estimated left ventricular EF was in agreement with the calculated left ventricular EF. Left ventricular systolic function is normal.  · There is calcification of the aortic valve mainly affecting the right coronary cusp(s).  · Left ventricular diastolic function is consistent with (grade I) impaired relaxation.  · Mild dilation of the aortic root is present.      Assessment:  Acute respiratory failure with hypoxia and hypercapnia: ABGs on admission 7.3 1/62/75 while on nasal cannula, repeated on BiPAP 7.35/60.4/90.2  Acute bronchitis due to unspecified pathogen, negative respiratory panel    Acute on chronic HFpEF  COPD without acute exacerbation  CAD status post PCI  HTN  HLD  DM  Chronic hepatitis C  Tobacco smoking  Obesity with BMI of 42 I    PFTs 2022: Moderate obstruction with air trapping and decreased oxygen diffusion capacity, FEV1/FVC 65%, FVC 72%, FEV1 63%, %, %, DLCO 58%        Recommendations:  IV Solu-Medrol 40 mg twice daily    Symbicort  Mucinex  Oxygen  supplement and titration to maintain saturation 90 to 95% currently 4 L per nasal cannula alternating with BiPAP as needed  Bronchodilators    Smoking cessation counseling, nicotine patch  Aspirin, Plavix, Crestor  Diltiazem, losartan  Insulin Lantus and sliding scale    I personally reviewed the radiological studies        Review of Sytems:  Constitutional: Negative for chills, and fever and positive for malaise/fatigue.   HENT: Negative.    Eyes: Negative.    Cardiovascular: Negative.    Respiratory: Positive for cough and shortness of breath.    Skin: Negative.    Musculoskeletal: Negative.    Gastrointestinal: Negative.    Genitourinary: Negative.    Neurological: Generalized weakness      Past Medical History:  Past Medical History:   Diagnosis Date    Abnormal stress test     Acrocyanosis     Acute renal insufficiency     Benign essential hypertension     Chronic hepatitis C     Managed by [facility].  Cured of infection.    Claudication     Right leg. MARY showing mild disease on right.    COPD (chronic obstructive pulmonary disease)     Moderate, nonreversible airway obstruction (worse than previous).    Coronary artery disease involving native coronary artery of native heart without angina pectoris     Femur fracture     30+ years ago    Hearing loss     History of spinal cord injury     Hyperlipidemia     Left against medical advice     Need for hepatitis C screening test     Onychomycosis of toenail     Tobacco use disorder        Past Surgical History:  Past Surgical History:   Procedure Laterality Date    CARDIAC CATHETERIZATION      CORONARY STENT PLACEMENT          Home Meds:  Medications Prior to Admission   Medication Sig Dispense Refill Last Dose/Taking    albuterol (PROVENTIL) (2.5 MG/3ML) 0.083% nebulizer solution Take 2.5 mg by nebulization Every 4 (Four) Hours As Needed for Wheezing. 180 mL 5 Taking As Needed    albuterol sulfate  (90 Base) MCG/ACT inhaler Inhale 2 puffs Every 4 (Four) Hours As  Needed for Wheezing.   Taking As Needed    budesonide (PULMICORT) 0.5 MG/2ML nebulizer solution Take 2 mL by nebulization 2 (Two) Times a Day.   Taking    budesonide-formoterol (SYMBICORT) 160-4.5 MCG/ACT inhaler Inhale 2 puffs 2 (Two) Times a Day.   Taking    bumetanide (BUMEX) 2 MG tablet Take 1 tablet by mouth Daily.   Taking    clopidogrel (PLAVIX) 75 MG tablet Take 1 tablet by mouth once daily 90 tablet 1 Taking    dilTIAZem CD (CARDIZEM CD) 240 MG 24 hr capsule Take 1 capsule by mouth Daily.   Taking    fluticasone (FLONASE) 50 MCG/ACT nasal spray Administer 1 spray into the nostril(s) as directed by provider 2 (Two) Times a Day.   Taking    furosemide (LASIX) 40 MG tablet Take 1 tablet by mouth 2 (Two) Times a Day.   Taking    glimepiride (AMARYL) 2 MG tablet Take 1 tablet by mouth 2 (Two) Times a Day.   Taking    insulin glargine (LANTUS, SEMGLEE) 100 UNIT/ML injection Inject 8 Units under the skin into the appropriate area as directed Every Night.   Taking    losartan (COZAAR) 50 MG tablet Take 1 tablet by mouth Daily.   Taking    metFORMIN ER (GLUCOPHAGE-XR) 500 MG 24 hr tablet Take 2 tablets by mouth 2 (Two) Times a Day.   Taking    pioglitazone (ACTOS) 45 MG tablet Take 1 tablet by mouth Daily.   Taking    predniSONE (DELTASONE) 10 MG tablet Take 1-4 tablets by mouth Daily.   Taking    rosuvastatin (CRESTOR) 20 MG tablet Take 1 tablet by mouth Daily.   Taking    Semaglutide,0.25 or 0.5MG/DOS, (Ozempic, 0.25 or 0.5 MG/DOSE,) 2 MG/3ML solution pen-injector Inject 0.25 mg under the skin into the appropriate area as directed 1 (One) Time Per Week.   Taking    spironolactone (ALDACTONE) 25 MG tablet Take 1 tablet by mouth Daily.   Taking    aspirin (aspirin) 81 MG EC tablet Take 1 tablet by mouth Daily.       nitroglycerin (Nitrostat) 0.4 MG SL tablet Place 1 tablet under the tongue Every 5 (Five) Minutes As Needed for Chest Pain. Take no more than 3 doses in 15 minutes. 25 tablet 0        Allergies:  No  "Known Allergies    Social History:   Social History     Socioeconomic History    Marital status:    Tobacco Use    Smoking status: Every Day     Current packs/day: 0.50     Average packs/day: 0.5 packs/day for 25.0 years (12.5 ttl pk-yrs)     Types: Cigarettes    Smokeless tobacco: Never    Tobacco comments:     Patient is advised to quit smoking   Vaping Use    Vaping status: Never Used   Substance and Sexual Activity    Alcohol use: Not Currently     Comment: rare    Drug use: Not Currently    Sexual activity: Defer       Family History:  Family History   Problem Relation Age of Onset    Heart disease Mother     Hypertension Mother     Coronary artery disease Mother     Cancer Father         Throat    Cancer Sister         pelvic    Cancer Brother         Lung       Physical Exam:  /71   Pulse 94   Temp 98 °F (36.7 °C) (Oral)   Resp 14   Ht 177.8 cm (70\")   Wt 135 kg (298 lb 8.1 oz)   SpO2 90%   BMI 42.83 kg/m²  Body mass index is 42.83 kg/m². 90% 135 kg (298 lb 8.1 oz)  General Appearance:  Alert   HEENT:  Normocephalic, without obvious abnormality, Conjunctiva/corneas clear,.   Nares normal, no drainage     Neck:  Supple, symmetrical, trachea midline.   Lungs /Chest wall:   Wheezing bilateral basal rhonchi, respirations unlabored, symmetrical wall movement.     Heart:  Regular rate and rhythm, S1 S2 normal  Abdomen: Soft, non-tender, no masses, no organomegaly.    Extremities: No edema, no clubbing or cyanosis    LABS:  Lab Results   Component Value Date    CALCIUM 8.7 06/22/2025     Results from last 7 days   Lab Units 06/22/25  0151   SODIUM mmol/L 141   POTASSIUM mmol/L 5.0   CHLORIDE mmol/L 102   CO2 mmol/L 25.6   BUN mg/dL 32.2*   CREATININE mg/dL 1.00   GLUCOSE mg/dL 243*   CALCIUM mg/dL 8.7   WBC 10*3/mm3 13.96*   HEMOGLOBIN g/dL 14.1   PLATELETS 10*3/mm3 232   ALT (SGPT) U/L 43*   AST (SGOT) U/L 16   PROBNP pg/mL 175.0   PROCALCITONIN ng/mL 0.06     Lab Results   Component Value Date "    TROPONINI <0.01 03/18/2019    TROPONINT 33 (H) 06/22/2025     Results from last 7 days   Lab Units 06/22/25  0323 06/22/25  0151   HSTROP T ng/L 33* 36*         Results from last 7 days   Lab Units 06/22/25  0151 06/22/25  0145   PROCALCITONIN ng/mL 0.06  --    LACTATE mmol/L  --  1.6     Results from last 7 days   Lab Units 06/22/25  0318 06/22/25  0145   PH, ARTERIAL pH units 7.359 7.312*   PCO2, ARTERIAL mm Hg 60.4* 62.0*   PO2 ART mm Hg 90.2 77.0*   O2 SATURATION ART % 96.3 93.4*   MODALITY  BiPap Cannula     Results from last 7 days   Lab Units 06/22/25 0229   ADENOVIRUS DETECTION BY PCR  Not Detected   CORONAVIRUS 229E  Not Detected   CORONAVIRUS HKU1  Not Detected   CORONAVIRUS NL63  Not Detected   CORONAVIRUS OC43  Not Detected   HUMAN METAPNEUMOVIRUS  Not Detected   HUMAN RHINOVIRUS/ENTEROVIRUS  Not Detected   INFLUENZA B PCR  Not Detected   PARAINFLUENZA 1  Not Detected   PARAINFLUENZA VIRUS 2  Not Detected   PARAINFLUENZA VIRUS 3  Not Detected   PARAINFLUENZA VIRUS 4  Not Detected   BORDETELLA PERTUSSIS PCR  Not Detected   CHLAMYDOPHILA PNEUMONIAE PCR  Not Detected   MYCOPLAMA PNEUMO PCR  Not Detected   INFLUENZA A PCR  Not Detected   RSV, PCR  Not Detected             Lab Results   Component Value Date    TSH 1.690 03/09/2022     Estimated Creatinine Clearance: 97.8 mL/min (by C-G formula based on SCr of 1 mg/dL).         Imaging:  Imaging Results (Last 24 Hours)       Procedure Component Value Units Date/Time    XR Chest 1 View [[id number]] Collected: 06/22/25 0216     Updated: 06/22/25 0221    Narrative:      XR CHEST 1 VW    Date of Exam: 6/22/2025 2:00 AM EDT    Indication: soa    Comparison: 5/29/2025    Findings:  Heart remains enlarged. Lipomatous pleural thickening is again seen bilaterally as seen on chest CT 4/28/2025. Pulmonary vascularity is normal. There is some chronic scarring in the lingula. Some of the opacity at the left lung base is due to a prominent   epicardial fat pad. No  definite acute pulmonary infiltrates. No pneumothorax.      Impression:      No clearly acute findings in the chest. Chronic changes as above, including cardiomegaly.        Electronically Signed: Norris Chavarria MD    2025 2:19 AM EDT    Workstation ID: [id number]              Current Meds:   SCHEDULE  aspirin, 81 mg, Oral, Daily  clopidogrel, 75 mg, Oral, Daily  dilTIAZem CD, 240 mg, Oral, Daily  [START ON 2025] furosemide, 40 mg, Intravenous, Q12H  insulin glargine, 8 Units, Subcutaneous, Nightly  insulin lispro, 2-9 Units, Subcutaneous, Q6H  losartan, 50 mg, Oral, Daily  methylPREDNISolone sodium succinate, 40 mg, Intravenous, Q12H  nicotine, 1 patch, Transdermal, Daily  rosuvastatin, 20 mg, Oral, Daily  sodium chloride, 10 mL, Intravenous, Q12H      Infusions     PRNs    acetaminophen **OR** acetaminophen **OR** acetaminophen    aluminum-magnesium hydroxide-simethicone    senna-docusate sodium **AND** polyethylene glycol **AND** bisacodyl **AND** bisacodyl    Calcium Replacement - Follow Nurse / BPA Driven Protocol    dextrose    dextrose    glucagon (human recombinant)    ipratropium-albuterol    Magnesium Standard Dose Replacement - Follow Nurse / BPA Driven Protocol    melatonin    nitroglycerin    ondansetron ODT **OR** ondansetron    Phosphorus Replacement - Follow Nurse / BPA Driven Protocol    Potassium Replacement - Follow Nurse / BPA Driven Protocol    sodium chloride    sodium chloride    sodium chloride        Elijah Lynch MD  2025  09:37 EDT      Much of this encounter note is an electronic transcription/translation of spoken language to printed text using Dragon Software.      Electronically signed by Elijah Lynch MD at 25 0941          Physical Therapy Notes (most recent note)        Sylvia Jackson, PT at 25 1334  Version 1 of 1         Patient Name: Santos Sullivan  : 1955    MRN: [id number]                              Today's Date: 2025       Admit  Date: 6/22/2025    Visit Dx:     ICD-10-CM ICD-9-CM   1. Acute respiratory failure with hypercapnia  J96.02 518.81   2. Acute on chronic diastolic congestive heart failure  I50.33 428.33     428.0     Patient Active Problem List   Diagnosis    Abnormal result of cardiovascular function study    Acute renal insufficiency    Essential hypertension    Chest pain    Chronic obstructive pulmonary disease    Coronary angioplasty status    Coronary artery disease involving native coronary artery of native heart without angina pectoris    Encounter for CDL (commercial driving license) exam    Hearing loss    Chronic hepatitis C    Screen for colon cancer    Overweight    Physical exam    Screening PSA (prostate specific antigen)    Screening, lipid    Tobacco dependence syndrome    Mixed hyperlipidemia    Morbidly obese    SOB (shortness of breath)    Acute respiratory failure with hypoxia and hypercapnia    Acute on chronic diastolic heart failure    Acute hypoxic respiratory failure     Past Medical History:   Diagnosis Date    Abnormal stress test     Acrocyanosis     Acute renal insufficiency     Benign essential hypertension     Chronic hepatitis C     Managed by [facility].  Cured of infection.    Claudication     Right leg. MARY showing mild disease on right.    COPD (chronic obstructive pulmonary disease)     Moderate, nonreversible airway obstruction (worse than previous).    Coronary artery disease involving native coronary artery of native heart without angina pectoris     Femur fracture     30+ years ago    Hearing loss     History of spinal cord injury     Hyperlipidemia     Left against medical advice     Need for hepatitis C screening test     Onychomycosis of toenail     Tobacco use disorder      Past Surgical History:   Procedure Laterality Date    CARDIAC CATHETERIZATION      CORONARY STENT PLACEMENT        General Information       Row Name 06/23/25 4167          Physical Therapy Time and Intention    Document  Type evaluation  -CM     Mode of Treatment physical therapy  -CM       Row Name 06/23/25 1316          General Information    Patient Profile Reviewed yes  -CM     Prior Level of Function independent:;all household mobility;gait  reports having difficulty w/ IADLs at home, such as cooking, etc, due to fatigue; uses home O2 prn  -CM     Existing Precautions/Restrictions no known precautions/restrictions  -CM     Barriers to Rehab medically complex;previous functional deficit  -CM       Row Name 06/23/25 1316          Living Environment    Current Living Arrangements home  -CM     People in Home alone  -CM       Row Name 06/23/25 1316          Home Main Entrance    Number of Stairs, Main Entrance five  lives in camper  -CM     Stair Railings, Main Entrance railings safe and in good condition  -CM       Row Name 06/23/25 1316          Stairs Within Home, Primary    Number of Stairs, Within Home, Primary none  -CM       Row Name 06/23/25 1316          Cognition    Orientation Status (Cognition) oriented x 4  -CM       Row Name 06/23/25 1316          Safety Issues/Impairments Affecting Functional Mobility    Impairments Affecting Function (Mobility) endurance/activity tolerance;strength;pain;sensation/sensory awareness;balance  -CM               User Key  (r) = Recorded By, (t) = Taken By, (c) = Cosigned By      Initials Name Provider Type    CM Sylvia Jackson, PT Physical Therapist                   Mobility       Row Name 06/23/25 1318          Bed Mobility    Bed Mobility bed mobility (all) activities  -CM     All Activities, [place] (Bed Mobility) not tested  -CM     Comment, (Bed Mobility) in chair upon PT arrival  -       Row Name 06/23/25 1318          Sit-Stand Transfer    Sit-Stand [place] (Transfers) contact guard  -CM     Assistive Device (Sit-Stand Transfers) walker, front-wheeled  -       Row Name 06/23/25 1321 06/23/25 1318       Gait/Stairs (Locomotion)    [place] Level (Gait) --  minimum assist (75% patient effort);contact guard  -CM    Assistive Device (Gait) -- walker, front-wheeled  -CM    Patient was able to Ambulate -- yes  -CM    Distance in Feet (Gait) --  impulsive pace; wide base of support; forward flexed posture; mod soa w/ gait.  -CM 40  impulsive pace; wide base of support; forward flexed posture  -CM              User Key  (r) = Recorded By, (t) = Taken By, (c) = Cosigned By      Initials Name Provider Type    Sylvia Thurston, PT Physical Therapist                   Obj/Interventions       Row Name 06/23/25 1319          Range of Motion Comprehensive    General Range of Motion bilateral lower extremity ROM WFL  -CM     Comment, General Range of Motion noted to have urine spilled on floor; pt sitting w/ bare feet in same area; PT/OT cleaned floor w/ antibacterial wipes and dried w/ towel. Got pt non skid socks to wear.  -CM       Row Name 06/23/25 1319          Strength Comprehensive (MMT)    General Manual Muscle Testing (MMT) Assessment lower extremity strength deficits identified  -CM     Comment, General Manual Muscle Testing (MMT) Assessment BLE's 4-/5  -CM       Row Name 06/23/25 1321          Balance    Balance Assessment sitting static balance;sitting dynamic balance;standing static balance;standing dynamic balance  -CM       Row Name 06/23/25 1322          Sensory Assessment (Somatosensory)    Sensory Assessment (Somatosensory) other (see comments)  sensation present but markedly diminished in BLE's distally  -CM               User Key  (r) = Recorded By, (t) = Taken By, (c) = Cosigned By      Initials Name Provider Type    Sylvia Thurston, PT Physical Therapist                   Goals/Plan       Row Name 06/23/25 1336          Bed Mobility Goal 1 (PT)    Activity/Assistive Device (Bed Mobility Goal 1, PT) bed mobility activities, all  -CM     [place] Level/Cues Needed (Bed Mobility Goal 1, PT) independent  -CM     Time Frame (Bed Mobility Goal 1, PT) 2  weeks  -CM       Row Name 06/23/25 1336          Transfer Goal 1 (PT)    Activity/Assistive Device (Transfer Goal 1, PT) transfers, all;walker, rolling  -CM     [place] Level/Cues Needed (Transfer Goal 1, PT) modified independence  -CM     Time Frame (Transfer Goal 1, PT) 2 weeks  -CM       Row Name 06/23/25 1336          Gait Training Goal 1 (PT)    Activity/Assistive Device (Gait Training Goal 1, PT) gait (walking locomotion);walker, rolling  -CM     [place] Level (Gait Training Goal 1, PT) modified independence  -CM     Distance (Gait Training Goal 1, PT) 100 ft, no soa, O2 sats > 92%  -CM     Time Frame (Gait Training Goal 1, PT) 2 weeks  -CM       Row Name 06/23/25 1336          Therapy Assessment/Plan (PT)    Planned Therapy Interventions (PT) balance training;bed mobility training;gait training;home exercise program;postural re-education;patient/family education;transfer training;strengthening;neuromuscular re-education;motor coordination training;ROM (range of motion)  -CM               User Key  (r) = Recorded By, (t) = Taken By, (c) = Cosigned By      Initials Name Provider Type    CM Sylvia Jackson, PT Physical Therapist                   Clinical Impression       Row Name 06/23/25 1324          Pain    Pretreatment Pain Rating 0/10 - no pain  -CM     Posttreatment Pain Rating 5/10  -CM     Pain Location back  -CM     Pain Management Interventions activity modification encouraged;exercise or physical activity utilized  -CM     Response to Pain Interventions activity participation with increased pain;functional ability improved;activity level improved  -CM       Row Name 06/23/25 1325 06/23/25 1324       Plan of Care Review    Plan of Care Reviewed With -- patient  -CM    Outcome Evaluation 70 yo male adm 6/22/25 for acute hypoxic/hypercapnic respiratory failure, as well as ae chf. PMH: cad, chronic hepatitis C, HFpEF, [place], obesity (BMI 43). At baseline, pt lives alone in [facility] (single level  home) w/ 5 stairs and handrail to enter. Pt reports multiple exacerbations of his copd/chf this year. Pt ambulates household distances w/ cane, but reports he is having difficulty even standing to fix meals, and is severely limited due to continual exacerbations. Today, pt is able to come to stand w/ cga and ambulate w/ cga and rw x 40 ft. However, he has poor judgment for endurance and uses an impulsive pace, despite cues to slow down. Also has some mild LE weakness. Pt has decreased O2 level w/ ambulation, then has moderate SOA. He is not safe for home at d/c, as he is likely to rebound into another episode for respiratory failure. He needs slow progression of mobility and strengthening to avoid another relapse. Recommend SNF at d/c. Will follow.  -CM --      Row Name 06/23/25 1336          Therapy Assessment/Plan (PT)    Rehab Potential (PT) good  -CM     Criteria for Skilled Interventions Met (PT) yes;meets criteria;skilled treatment is necessary  -CM     Therapy Frequency (PT) 3 times/wk  -CM     Predicted Duration of Therapy Intervention (PT) until d/c  -CM       Row Name 06/23/25 1336          Vital Signs    Pre SpO2 (%) 95  -CM     O2 Delivery Pre Treatment supplemental O2  2L  -CM     Intra SpO2 (%) 90  -CM     O2 Delivery Intra Treatment room air  -CM     Post SpO2 (%) 95  -CM     O2 Delivery Post Treatment room air  ok to leave on room air per dr. huston  -       Row Name 06/23/25 1336          Positioning and Restraints    Pre-Treatment Position sitting in chair/recliner  -CM     Post Treatment Position chair  -CM     In Chair notified nsg;sitting;call light within reach;encouraged to call for assist  -CM               User Key  (r) = Recorded By, (t) = Taken By, (c) = Cosigned By      Initials Name Provider Type    Sylvia Thurston, PT Physical Therapist                   Outcome Measures       Row Name 06/23/25 1337 06/23/25 0800       How much help from another person do you currently need...     Turning from your back to your side while in flat bed without using bedrails? 4  -CM 4  -JE    Moving from lying on back to sitting on the side of a flat bed without bedrails? 4  -CM 4  -JE    Moving to and from a bed to a chair (including a wheelchair)? 3  -CM 4  -JE    Standing up from a chair using your arms (e.g., wheelchair, bedside chair)? 3  -CM 3  -JE    Climbing 3-5 steps with a railing? 2  -CM 3  -JE    To walk in hospital room? 3  -CM 3  -JE    AM-PAC 6 Clicks Score (PT) 19  -CM 21  -JE              User Key  (r) = Recorded By, (t) = Taken By, (c) = Cosigned By      Initials Name Provider Type    Sylvia Thurston, PT Physical Therapist    Vicky Romero RN Registered Nurse                                 Physical Therapy Education       Title: PT OT SLP Therapies (Done)       Topic: Physical Therapy (Done)       Point: Mobility training (Done)       Learning Progress Summary            Patient Acceptance, E,TB, VU,NR by CM at 6/23/2025 1338                      Point: Home exercise program (Done)       Learning Progress Summary            Patient Acceptance, E,TB, VU,NR by CM at 6/23/2025 1338                      Point: Body mechanics (Done)       Learning Progress Summary            Patient Acceptance, E,TB, VU,NR by CM at 6/23/2025 1338                      Point: Precautions (Done)       Learning Progress Summary            Patient Acceptance, E,TB, VU,NR by CM at 6/23/2025 1338                                      User Key       Initials Effective Dates Name Provider Type Discipline     06/16/21 -  Sylvia Jackson, PT Physical Therapist PT                  PT Recommendation and Plan  Planned Therapy Interventions (PT): balance training, bed mobility training, gait training, home exercise program, postural re-education, patient/family education, transfer training, strengthening, neuromuscular re-education, motor coordination training, ROM (range of motion)  Outcome Evaluation: 69  yo male adm 6/22/25 for acute hypoxic/hypercapnic respiratory failure, as well as ae chf. PMH: cad, chronic hepatitis C, HFpEF, [place], obesity (BMI 43). At baseline, pt lives alone in [place] (single level home) w/ 5 stairs and handrail to enter. Pt reports multiple exacerbations of his copd/chf this year. Pt ambulates household distances w/ cane, but reports he is having difficulty even standing to fix meals, and is severely limited due to continual exacerbations. Today, pt is able to come to stand w/ cga and ambulate w/ cga and rw x 40 ft. However, he has poor judgment for endurance and uses an impulsive pace, despite cues to slow down. Also has some mild LE weakness. Pt has decreased O2 level w/ ambulation, then has moderate SOA. He is not safe for home at d/c, as he is likely to rebound into another episode for respiratory failure. He needs slow progression of mobility and strengthening to avoid another relapse. Recommend SNF at d/c. Will follow.     Time Calculation:   PT Evaluation Complexity  History, PT Evaluation Complexity: 1-2 personal factors and/or comorbidities  Examination of Body Systems (PT Eval Complexity): total of 4 or more elements  Clinical Presentation (PT Evaluation Complexity): evolving  Clinical Decision Making (PT Evaluation Complexity): moderate complexity  Overall Complexity (PT Evaluation Complexity): moderate complexity     PT Charges       Row Name 06/23/25 1338             Time Calculation    Start Time 0859  -CM      Stop Time 0916  -CM      Time Calculation (min) 17 min  -CM      PT Received On 06/23/25  -CM      PT - Next Appointment 06/25/25  -CM      PT Goal Re-Cert Due Date 07/07/25  -CM         Time Calculation- PT    Total Timed Code Minutes- PT 0 minute(s)  -CM                User Key  (r) = Recorded By, (t) = Taken By, (c) = Cosigned By      Initials Name Provider Type    Sylvia Thurston, PT Physical Therapist                  Therapy Charges for Today       Code  "Description Service Date Service Provider Modifiers Qty    [id number] HC PT EVAL MOD COMPLEXITY 3 6/23/2025 Sylvia Jackson, PT GP 1            PT G-Codes  AM-PAC 6 Clicks Score (PT): 19  PT Discharge Summary  Anticipated Discharge Disposition (PT): skilled nursing facility    Sylvia Jackson, JEWEL  6/23/2025      Electronically signed by Sylvia Jackson, PT at 06/23/25 7831          Occupational Therapy Notes (most recent note)        Kathy Polanco, OT at 06/24/25 1304          Goal Outcome Evaluation:   Assessment: Santos Sullivan presents with ADL impairments affecting function including endurance / activity tolerance and shortness of breath. Pt states he is feeling a little better. He tolerated UE/trunk ex with v.c. for breathing tech & was open to education of EC/WS tech. He would benefit from education & training on use of AE for LB dressing during future tx. Demonstrated functioning below baseline abilities indicate the need for continued skilled intervention while inpatient. Tolerating session today without incident. Will continue to follow and progress as tolerated.     Plan/Recommendations:   Moderate Intensity Therapy recommended post-acute care. This is recommended as therapy feels the patient would require 3-4 days per week and wouldn't tolerate \"3 hour daily\" rehab intensity. SNF would be the preferred choice. If the patient does not agree to SNF, arrange HH or OP depending on home bound status. If patient is medically complex, consider LTACH.. Pt requires no DME at discharge.     Pt desires Skilled Rehab placement at discharge. Pt cooperative; agreeable to therapeutic recommendations and plan of care.                                              Electronically signed by Kathy Polanco, OT at 06/24/25 1305       "

## 2025-06-24 NOTE — DISCHARGE PLACEMENT REQUEST
"Santos Cedeno (69 y.o. Male)       Date of Birth   1955    Social Security Number       Address   [address] [place] [address]    Home Phone   [phone]    MRN   [id number]       Latter day   None    Marital Status                               Admission Date   6/22/2025    Admission Type   Emergency    Admitting Provider   Jose Roberto Linares MD    Attending Provider   Dawood Lombardo DO    Department, Room/Bed   [facility], 2126/1       Discharge Date       Discharge Disposition       Discharge Destination                                 Attending Provider: Dawood Lombardo DO    Allergies: No Known Allergies    Isolation: None   Infection: None   Code Status: CPR    Ht: 177.8 cm (70\")   Wt: 141 kg (311 lb 4.6 oz)    Admission Cmt: None   Principal Problem: Acute respiratory failure with hypoxia and hypercapnia [J96.01,J96.02]                   Active Insurance as of 6/22/2025       Primary Coverage       Payor Plan Insurance Group Employer/Plan Group    ANTHEM MEDICARE REPLACEMENT ANTHEM MEDICARE ADVANTAGE SNP [id number]       Payor Plan Address Payor Plan Phone Number Payor Plan Fax Number Effective Dates    [address] [phone]  8/1/2024 - None Entered    [facility] [address]         Subscriber Name Subscriber Birth Date Member ID       SANTOS CEDENO 1955 [id number]               Secondary Coverage       Payor Plan Insurance Group Employer/Plan Group    [id number]        Payor Plan Address Payor Plan Phone Number Payor Plan Fax Number Effective Dates    [address] [address]   8/1/2021 - None Entered    [place] [address]         Subscriber Name Subscriber Birth Date Member ID       SANTOS CEDENO 1955 [id number]                     Emergency Contacts        (Rel.) Home Phone Work Phone Mobile Phone    WILIANLAURA ALEXANDER (Brother) [phone] -- --          "

## 2025-06-24 NOTE — SIGNIFICANT NOTE
06/24/25 1528   Post Acute Pre-Cert Documentation   Request Submitted by Facility - Type: Hospital   Post-Acute Authorization Type Submitted: SNF   Date Post Acute Pre-Cert Inititated per Facility 06/24/25   Verification from Payer Yes   Accepting Facility [facility]   Hospital Discharge Date Requested 06/24/25   Had Accepting Facility at Time of Submission Yes   Response Communicated to:    Authorization Number: [id number]   Post Acute Pre-Cert Initiated Comment CM submitted pre-cert for [facility] via [employer] portal. Pre-cert pending as of 6/24. Auth ID [id number]. CM updated U CM.

## 2025-06-24 NOTE — PROGRESS NOTES
Daily Progress Note          Assessment    Acute respiratory failure with hypoxia and hypercapnia: ABGs on admission 7.3 1/62/75 while on nasal cannula, repeated on BiPAP 7.35/60.4/90.2  Acute bronchitis due to unspecified pathogen, negative respiratory panel     Acute on chronic HFpEF  COPD without acute exacerbation  CAD status post PCI  HTN  HLD  DM  Chronic hepatitis C  Tobacco smoking  Obesity with BMI of 42 I     PFTs 2022: Moderate obstruction with air trapping and decreased oxygen diffusion capacity, FEV1/FVC 65%, FVC 72%, FEV1 63%, %, %, DLCO 58%           Recommendations:  The respiratory status is stable, patient can be discharged from pulmonary standpoint to home with outpatient home health or inpatient rehab     Follow-up in the office in 3 weeks, he will consider home sleep study to rule out SHYAM     tapering dose prednisone     Symbicort  Mucinex  Oxygen supplement and titration to maintain saturation 90 to 95% currently on 2 L per nasal cannula alternating with room air     bronchodilators     Smoking cessation counseling, nicotine patch  Aspirin, Plavix, Crestor  Diltiazem, losartan  Insulin Lantus and sliding scale     I personally reviewed the radiological studies             LOS: 2 days     Subjective     Less cough and shortness of breath    Objective     Vital signs for last 24 hours:  Vitals:    06/24/25 0656 06/24/25 0657 06/24/25 0659 06/24/25 0836   BP:    153/91   BP Location:    Right arm   Patient Position:    Sitting   Pulse: 98 98 100 102   Resp: 18 18 18 18   Temp:    98 °F (36.7 °C)   TempSrc:    Oral   SpO2: 100% 100% 100% 93%   Weight:       Height:           Intake/Output last 3 shifts:  I/O last 3 completed shifts:  In: 960 [P.O.:960]  Out: 4450 [Urine:4450]  Intake/Output this shift:  No intake/output data recorded.      Radiology  Imaging Results (Last 24 Hours)       ** No results found for the last 24 hours. **            Labs:  Results from last 7 days   Lab  Units 06/24/25  0330   WBC 10*3/mm3 16.30*   HEMOGLOBIN g/dL 14.6   HEMATOCRIT % 47.4   PLATELETS 10*3/mm3 231     Results from last 7 days   Lab Units 06/24/25  0330 06/23/25  0203 06/22/25  0151   SODIUM mmol/L 134*   < > 141   POTASSIUM mmol/L 4.4   < > 5.0   CHLORIDE mmol/L 95*   < > 102   CO2 mmol/L 28.5   < > 25.6   BUN mg/dL 44.6*   < > 32.2*   CREATININE mg/dL 1.10   < > 1.00   CALCIUM mg/dL 8.3*   < > 8.7   BILIRUBIN mg/dL  --   --  0.2   ALK PHOS U/L  --   --  55   ALT (SGPT) U/L  --   --  43*   AST (SGOT) U/L  --   --  16   GLUCOSE mg/dL 265*   < > 243*    < > = values in this interval not displayed.     Results from last 7 days   Lab Units 06/22/25  0318   PH, ARTERIAL pH units 7.359   PO2 ART mm Hg 90.2   PCO2, ARTERIAL mm Hg 60.4*   HCO3 ART mmol/L 34.1*     Results from last 7 days   Lab Units 06/22/25  0151   ALBUMIN g/dL 4.0     Results from last 7 days   Lab Units 06/22/25  0323 06/22/25  0151   HSTROP T ng/L 33* 36*                           Meds:   SCHEDULE  arformoterol, 15 mcg, Nebulization, BID - RT  aspirin, 81 mg, Oral, Daily  budesonide, 0.5 mg, Nebulization, BID - RT  clopidogrel, 75 mg, Oral, Daily  dilTIAZem CD, 240 mg, Oral, Daily  furosemide, 40 mg, Intravenous, BID  guaiFENesin, 1,200 mg, Oral, Q12H  insulin glargine, 25 Units, Subcutaneous, Nightly  insulin lispro, 4-24 Units, Subcutaneous, 4x Daily AC & at Bedtime  ipratropium-albuterol, 3 mL, Nebulization, Q4H - RT  losartan, 50 mg, Oral, Daily  nicotine, 1 patch, Transdermal, Daily  predniSONE, 30 mg, Oral, Daily   Followed by  [START ON 6/26/2025] predniSONE, 20 mg, Oral, Daily   Followed by  [START ON 6/28/2025] predniSONE, 10 mg, Oral, Daily  revefenacin, 175 mcg, Nebulization, Daily - RT  rosuvastatin, 20 mg, Oral, Daily  sodium chloride, 10 mL, Intravenous, Q12H  spironolactone, 25 mg, Oral, Daily      Infusions     PRNs    acetaminophen **OR** acetaminophen **OR** acetaminophen    aluminum-magnesium hydroxide-simethicone     senna-docusate sodium **AND** polyethylene glycol **AND** bisacodyl **AND** bisacodyl    Calcium Replacement - Follow Nurse / BPA Driven Protocol    dextrose    dextrose    glucagon (human recombinant)    Magnesium Standard Dose Replacement - Follow Nurse / BPA Driven Protocol    melatonin    nitroglycerin    ondansetron ODT **OR** ondansetron    Phosphorus Replacement - Follow Nurse / BPA Driven Protocol    Potassium Replacement - Follow Nurse / BPA Driven Protocol    sodium chloride    sodium chloride    sodium chloride    Physical Exam:  General Appearance:  Alert   HEENT:  Normocephalic, without obvious abnormality, Conjunctiva/corneas clear,.   Nares normal, no drainage     Neck:  Supple, symmetrical, trachea midline.   Lungs /Chest wall: Mild bilateral basal rhonchi, respirations unlabored, symmetrical wall movement.     Heart:  Regular rate and rhythm, S1 S2 normal  Abdomen: Soft, non-tender, no masses, no organomegaly.    Extremities: + Edema, no clubbing or cyanosis     ROS  Constitutional: Negative for chills, fever and malaise/fatigue.   HENT: Negative.    Eyes: Negative.    Cardiovascular: Negative.    Respiratory: Positive for cough and shortness of breath.    Skin: Negative.    Musculoskeletal: Negative.    Gastrointestinal: Negative.    Genitourinary: Negative.    Neurological: Generalized weakness        I reviewed the recent clinical results  I personally reviewed the latest radiological studies    Part of this note may be an electronic transcription/translation of spoken language to printed text using the Dragon Dictation System.

## 2025-06-24 NOTE — PLAN OF CARE
Goal Outcome Evaluation    Outcome Evaluation: patient alert and oriented x4, pain controlled with PRN pain medications. patient still complains about shortness of air with movement , on 1 liter per nasal cannula. plan of going to rehab. patient shifting weight every 2 hours and as needed. call light in reach

## 2025-06-24 NOTE — PLAN OF CARE
"Goal Outcome Evaluation:   Assessment: Santos Sullivan presents with ADL impairments affecting function including endurance / activity tolerance and shortness of breath. Pt states he is feeling a little better. He tolerated UE/trunk ex with v.c. for breathing tech & was open to education of EC/WS tech. He would benefit from education & training on use of AE for LB dressing during future tx. Demonstrated functioning below baseline abilities indicate the need for continued skilled intervention while inpatient. Tolerating session today without incident. Will continue to follow and progress as tolerated.     Plan/Recommendations:   Moderate Intensity Therapy recommended post-acute care. This is recommended as therapy feels the patient would require 3-4 days per week and wouldn't tolerate \"3 hour daily\" rehab intensity. SNF would be the preferred choice. If the patient does not agree to SNF, arrange HH or OP depending on home bound status. If patient is medically complex, consider LTACH.. Pt requires no DME at discharge.     Pt desires Skilled Rehab placement at discharge. Pt cooperative; agreeable to therapeutic recommendations and plan of care.                                            "

## 2025-06-24 NOTE — THERAPY TREATMENT NOTE
Subjective: Pt agreeable to therapeutic plan of care. Pt asking appropriate questions regarding discharge. Pt expressing concern for begin able to perform IADLs (cleaning, cooking, laundry, grocery shopping) & inquiring about assist 1 day/week for these tasks). Referred pt to CM.   Cognition: oriented to Person, Place, Time, and Situation    Objective:     Precautions - desaturates with activity, v.c. for impulsive behavior. Pt taking off O2.     Bed Mobility: N/A or Not attempted. Pt sitting up in recliner.   Functional Transfers: SBA with RW with v.c. for safety awareness.       Balance: Sitting = good; standing = good-.    Functional Ambulation: SBA and with rolling walker. Pt removed O2 for room mobility & O2 sat dec to 88%. Educated pt on importance of consistent use of O2.     Toileting: Modified-Independent using   ADL Position: supported sitting using a urinal. Pt did not want to ambulate to bathroom to use commode due to urgency.   ADL Comments:     Lower Body Dressing: Min-A.   ADL Position: unsupported sitting  ADL Comments: discussed options of sock aide, reacher & long-handled sponge to inc ease of LB dressing for EC/WS tech.     Therapeutic Exercise - 10 Reps B UE & trunk flex/ext AROM supported sitting / chair. Focus on breathing patterns, tech to dec SOA & inc activity endurance.     Vitals: Desaturates on room air during ambulation.     Pain: 0 Education: Verbal/Tactile Cues, ADL training, and Energy conservation strategies    Assessment: Santos Sullivan presents with ADL impairments affecting function including endurance / activity tolerance and shortness of breath. Pt states he is feeling a little better. He tolerated UE/trunk ex with v.c. for breathing tech & was open to education of EC/WS tech. He would benefit from education & training on use of AE for LB dressing during future tx. Demonstrated functioning below baseline abilities indicate the need for continued skilled intervention while  "inpatient. Tolerating session today without incident. Will continue to follow and progress as tolerated.     Plan/Recommendations:   Moderate Intensity Therapy recommended post-acute care. This is recommended as therapy feels the patient would require 3-4 days per week and wouldn't tolerate \"3 hour daily\" rehab intensity. SNF would be the preferred choice. If the patient does not agree to SNF, arrange HH or OP depending on home bound status. If patient is medically complex, consider LTACH.. Pt requires no DME at discharge.     Pt desires Skilled Rehab placement at discharge. Pt cooperative; agreeable to therapeutic recommendations and plan of care.     Modified Dunn: N/A = No pre-op stroke/TIA    Post-Tx Position: Up in Chair and Call light and personal items within reach  PPE: gloves       Time Calculation- OT       Row Name 06/24/25 1239             Time Calculation- OT    OT Start Time 1022  -DT      OT Stop Time 1049  -DT      OT Time Calculation (min) 27 min  -DT      Total Timed Code Minutes- OT 27 minute(s)  -DT      OT Received On 06/24/25  -DT      OT - Next Appointment 06/25/25  -DT                User Key  (r) = Recorded By, (t) = Taken By, (c) = Cosigned By      Initials Name Provider Type    Kathy Freeman, OT Occupational Therapist                    "

## 2025-06-24 NOTE — PLAN OF CARE
Problem: Adult Inpatient Plan of Care  Goal: Plan of Care Review  Outcome: Progressing  Flowsheets  Taken 6/24/2025 0616 by Yesi Rhodes LPN  Progress: improving  Taken 6/23/2025 1735 by Vicky Harding RN  Outcome Evaluation: patient alert and oriented x4, pain controlled with PRN pain medications. patient still complains about shortness of air with movement , on room air. plan of going to rehab. paitine shifting weight every 2 hours and as needed. call light in reach  Taken 6/23/2025 1324 by Sylvia Jackson, PT  Plan of Care Reviewed With: patient   Goal Outcome Evaluation:     Placed on 3L, O2 in low 80s while asleep        Progress: improving

## 2025-06-24 NOTE — PROGRESS NOTES
[facility] MEDICINE SERVICE  DAILY PROGRESS NOTE    NAME: Santos Sullivan  : 1955  MRN: [id number]      LOS: 2 days     PROVIDER OF SERVICE: Dawood Lombardo DO    Chief Complaint: Acute respiratory failure with hypoxia and hypercapnia    Subjective:     Interval History:  History taken from: patient      Patient seen and examined this morning.  Slowly improving, breathing is stable on current oxygen requirement.  Lower extremity edema improving.  Agrees with rehab placement.  No other complaints.    Review of Systems:   Pertinent positives as noted in HPI/subjective.  All other systems were reviewed and are negative.    Objective:     Vital Signs  Temp:  [97.6 °F (36.4 °C)-98.7 °F (37.1 °C)] 98 °F (36.7 °C)  Heart Rate:  [] 102  Resp:  [12-21] 18  BP: ()/() 153/91  Flow (L/min) (Oxygen Therapy):  [0-3] 2   Body mass index is 44.67 kg/m².    Physical Exam  General: Awake, alert, morbidly obese male, NAD  Eyes: PERRL, EOMI, conjunctivae are clear  Cardiovascular: Regular rate and rhythm, no murmurs  Respiratory: decreased breath sounds at the base bilaterally, no wheezing or rales, unlabored breathing  Abdomen: Soft, nontender, positive bowel sounds, no guarding  Skin: Warm, moderate to severe bilateral lower extremity pitting edema noted         Diagnostic Data    Results from last 7 days   Lab Units 25  0330 25  0203 25  0151   WBC 10*3/mm3 16.30*   < > 13.96*   HEMOGLOBIN g/dL 14.6   < > 14.1   HEMATOCRIT % 47.4   < > 47.1   PLATELETS 10*3/mm3 231   < > 232   GLUCOSE mg/dL 265*   < > 243*   CREATININE mg/dL 1.10   < > 1.00   BUN mg/dL 44.6*   < > 32.2*   SODIUM mmol/L 134*   < > 141   POTASSIUM mmol/L 4.4   < > 5.0   AST (SGOT) U/L  --   --  16   ALT (SGPT) U/L  --   --  43*   ALK PHOS U/L  --   --  55   BILIRUBIN mg/dL  --   --  0.2   ANION GAP mmol/L 10.5   < > 13.4    < > = values in this interval not displayed.       No radiology results for the last day        I  reviewed the patient's new clinical results.    Assessment/Plan:     Active and Resolved Problems  Active Hospital Problems    Diagnosis  POA    **Acute respiratory failure with hypoxia and hypercapnia [J96.01, J96.02]  Yes    Acute on chronic diastolic heart failure [I50.33]  Yes    Acute hypoxic respiratory failure [J96.01]  Yes    Mixed hyperlipidemia [E78.2]  Yes    Essential hypertension [I10]  Yes    Overweight [E66.3]  Yes    Chronic hepatitis C [B18.2]  Yes    Coronary angioplasty status [Z98.61]  Not Applicable    Chronic obstructive pulmonary disease [J44.9]  Yes    Tobacco dependence syndrome [F17.200]  Yes      Resolved Hospital Problems   No resolved problems to display.   Patient is 69-year-old male on home oxygen 3 LPM who presented with shortness of breath cough and wheezing as well as fatigue.  Patient required to be on BiPAP in the ER    Acute hypoxic and hypercapnic respiratory failure  Acute exacerbation of HFpEF  Volume overload with bilateral lower extremity edema  COPD with mild exacerbation  - ABGs and imaging reports reviewed  - Respiratory status improving, down to nasal cannula now  - Echo this admission showed preserved EF   - patient remains volume overloaded  - Continue diuresis with IV Lasix, monitor I's and O's  - Continue taper steroid dosing, continue bronchodilators  - Pulmonology recs appreciated    CAD s/p PCI  Hypertension  Hyperlipidemia  - cont home meds as tolerated, monitor    DM type II with hyperglycemia  - Hemoglobin A1c is 9.3  - Continue basal insulin, SSI, dosing adjusted  - Monitor blood glucose and adjust insulin therapy as needed    Tobacco abuse  - encourage cessation  - nicotine patch      Morbid obesity  - BMI 41  - complicates all aspects of care    VTE Prophylaxis:  Mechanical VTE prophylaxis orders are present.    Disposition Planning:     Barriers to Discharge: Volume overload, respiratory failure  Anticipated Date of Discharge: 6/26  Place of Discharge:  Rehab    Code Status and Medical Interventions: CPR (Attempt to Resuscitate); Full Support   Ordered at: 06/22/25 0543     Code Status (Patient has no pulse and is not breathing):    CPR (Attempt to Resuscitate)     Medical Interventions (Patient has pulse or is breathing):    Full Support     Level Of Support Discussed With:    Patient       Signature: Electronically signed by Dawood Lombardo DO, 06/24/25, 09:47 EDT.  [facility]     Part of this note may be an electronic transcription/translation of spoken language to printed text using the Dragon Dictation System.

## 2025-06-24 NOTE — CASE MANAGEMENT/SOCIAL WORK
Discharge Planning Assessment   [facility]     Patient Name: Santos Sullivan  MRN: [id number]  Today's Date: 6/24/2025    Admit Date: 6/22/2025    Plan: [facility] pending acceptance. Will require ZAKI cunningham approved. From home alone. Current home O2 2-3L through [employer].   Discharge Needs Assessment       Row Name 06/24/25 1220       Living Environment    People in Home alone    Current Living Arrangements home;other (see comments)  Mobile home    Potentially Unsafe Housing Conditions none    In the past 12 months has the electric, gas, oil, or water company threatened to shut off services in your home? No    Primary Care Provided by self    Provides Primary Care For no one    Family Caregiver if Needed sibling(s)    Quality of Family Relationships supportive;other (see comments)  Brother currently hospitalized    Able to Return to Prior Arrangements yes       Resource/Environmental Concerns    Resource/Environmental Concerns none    Transportation Concerns none       Transportation Needs    In the past 12 months, has lack of transportation kept you from medical appointments or from getting medications? no    In the past 12 months, has lack of transportation kept you from meetings, work, or from getting things needed for daily living? No       Food Insecurity    Within the past 12 months, you worried that your food would run out before you got the money to buy more. Never true    Within the past 12 months, the food you bought just didn't last and you didn't have money to get more. Never true       Transition Planning    Patient/Family Anticipates Transition to other (see comments)  SNF    Patient/Family Anticipated Services at Transition skilled nursing    Transportation Anticipated health plan transportation       Discharge Needs Assessment    Readmission Within the Last 30 Days no previous admission in last 30 days    Equipment Currently Used at Home oxygen;cane, straight    Concerns to be  Addressed care coordination/care conferences    Anticipated Changes Related to Illness none    Equipment Needed After Discharge none    Outpatient/Agency/Support Group Needs skilled nursing facility    Discharge Facility/Level of Care Needs nursing facility, skilled    Provided Post Acute Provider List? Yes    Post Acute Provider List Nursing Home    Provided Post Acute Provider Quality & Resource List? Yes    Post Acute Provider Quality and Resource List Nursing Home    Delivered To Patient    Method of Delivery In person    Offered/Gave Vendor List yes                   Discharge Plan       Row Name 06/24/25 1223       Plan    Plan [facility] referrals pending acceptance. Will require precert, ZAKI approved. From home alone. Current home O2 2-3L through [employer].    Patient/Family in Agreement with Plan yes    Plan Comments CM met with patient at bedside to discuss dc planning and therapy recommendations for SNF. PCP and pharmacy confirmed, updated PCP in EPIC for [facility], reported no trouble affording food/medications. Confirmed he had home O2 through [employer] and typically wears 2-3L. Reported that his brother was currently at [facility] having surgery on his blood clots. Reported that his brother is his only help and he would not have transportation at d/c. Patient sitting up in chair during discussion and expressed he would be able to tolerate a seated position for transport in a w/c van. Reported that he was agreeable to SNF. SNF list provided. He inquired about places in [facility]. Agreeable to referrals to [facility]. CM added in basket and sent to [facility] to review.                  Continued Care and Services - Admitted Since 6/22/2025       Destination       Service Provider Request Status Services Address Phone Fax Patient Preferred    [facility] Pending - Request Sent -- [address] [place] [address] [phone]  Yes    [facility] Pending - Request Sent -- [address] [place] [id number] [phone] Yes                  Demographic Summary       Row Name 06/24/25 1218       General Information    Admission Type inpatient    Arrived From emergency department    Required Notices Provided Important Message from Medicare    Referral Source admission list    Reason for Consult discharge planning    Preferred Language English       Contact Information    Permission Granted to Share Info With                    Functional Status       Row Name 06/24/25 1219       Functional Status    Usual Activity Tolerance moderate    Current Activity Tolerance moderate       Functional Status, IADL    Medications independent    Meal Preparation independent    Housekeeping independent    Laundry independent    Shopping independent             Jeanine Johnson RN     Office Phone: [phone]  Office Cell: [phone]

## 2025-06-25 LAB
ANION GAP SERPL CALCULATED.3IONS-SCNC: 10.6 MMOL/L (ref 5–15)
BASOPHILS # BLD AUTO: 0.12 10*3/MM3 (ref 0–0.2)
BASOPHILS NFR BLD AUTO: 0.8 % (ref 0–1.5)
BUN SERPL-MCNC: 50.9 MG/DL (ref 8–23)
BUN/CREAT SERPL: 38.6 (ref 7–25)
CALCIUM SPEC-SCNC: 8.2 MG/DL (ref 8.6–10.5)
CHLORIDE SERPL-SCNC: 95 MMOL/L (ref 98–107)
CO2 SERPL-SCNC: 25.4 MMOL/L (ref 22–29)
CREAT SERPL-MCNC: 1.32 MG/DL (ref 0.76–1.27)
DEPRECATED RDW RBC AUTO: 54.5 FL (ref 37–54)
EGFRCR SERPLBLD CKD-EPI 2021: 58.8 ML/MIN/1.73
EOSINOPHIL # BLD AUTO: 0.05 10*3/MM3 (ref 0–0.4)
EOSINOPHIL NFR BLD AUTO: 0.4 % (ref 0.3–6.2)
ERYTHROCYTE [DISTWIDTH] IN BLOOD BY AUTOMATED COUNT: 15.3 % (ref 12.3–15.4)
GLUCOSE BLDC GLUCOMTR-MCNC: 232 MG/DL (ref 70–105)
GLUCOSE BLDC GLUCOMTR-MCNC: 266 MG/DL (ref 70–105)
GLUCOSE BLDC GLUCOMTR-MCNC: 296 MG/DL (ref 70–105)
GLUCOSE BLDC GLUCOMTR-MCNC: 327 MG/DL (ref 70–105)
GLUCOSE SERPL-MCNC: 327 MG/DL (ref 65–99)
HCT VFR BLD AUTO: 45.6 % (ref 37.5–51)
HGB BLD-MCNC: 14.1 G/DL (ref 13–17.7)
IMM GRANULOCYTES # BLD AUTO: 0.62 10*3/MM3 (ref 0–0.05)
IMM GRANULOCYTES NFR BLD AUTO: 4.4 % (ref 0–0.5)
LYMPHOCYTES # BLD AUTO: 0.52 10*3/MM3 (ref 0.7–3.1)
LYMPHOCYTES NFR BLD AUTO: 3.7 % (ref 19.6–45.3)
MAGNESIUM SERPL-MCNC: 2.5 MG/DL (ref 1.6–2.4)
MCH RBC QN AUTO: 30.2 PG (ref 26.6–33)
MCHC RBC AUTO-ENTMCNC: 30.9 G/DL (ref 31.5–35.7)
MCV RBC AUTO: 97.6 FL (ref 79–97)
MONOCYTES # BLD AUTO: 0.63 10*3/MM3 (ref 0.1–0.9)
MONOCYTES NFR BLD AUTO: 4.4 % (ref 5–12)
NEUTROPHILS NFR BLD AUTO: 12.24 10*3/MM3 (ref 1.7–7)
NEUTROPHILS NFR BLD AUTO: 86.3 % (ref 42.7–76)
NRBC BLD AUTO-RTO: 0.1 /100 WBC (ref 0–0.2)
PLATELET # BLD AUTO: 209 10*3/MM3 (ref 140–450)
PMV BLD AUTO: 9.5 FL (ref 6–12)
POTASSIUM SERPL-SCNC: 5.2 MMOL/L (ref 3.5–5.2)
QT INTERVAL: 323 MS
QT INTERVAL: 360 MS
QTC INTERVAL: 453 MS
QTC INTERVAL: 468 MS
RBC # BLD AUTO: 4.67 10*6/MM3 (ref 4.14–5.8)
SODIUM SERPL-SCNC: 131 MMOL/L (ref 136–145)
WBC NRBC COR # BLD AUTO: 14.18 10*3/MM3 (ref 3.4–10.8)

## 2025-06-25 PROCEDURE — 63710000001 REVEFENACIN 175 MCG/3ML SOLUTION: Performed by: INTERNAL MEDICINE

## 2025-06-25 PROCEDURE — 94761 N-INVAS EAR/PLS OXIMETRY MLT: CPT

## 2025-06-25 PROCEDURE — 97530 THERAPEUTIC ACTIVITIES: CPT

## 2025-06-25 PROCEDURE — 63710000001 INSULIN LISPRO (HUMAN) PER 5 UNITS: Performed by: STUDENT IN AN ORGANIZED HEALTH CARE EDUCATION/TRAINING PROGRAM

## 2025-06-25 PROCEDURE — 94799 UNLISTED PULMONARY SVC/PX: CPT

## 2025-06-25 PROCEDURE — 25010000002 FUROSEMIDE PER 20 MG: Performed by: INTERNAL MEDICINE

## 2025-06-25 PROCEDURE — 93010 ELECTROCARDIOGRAM REPORT: CPT | Performed by: INTERNAL MEDICINE

## 2025-06-25 PROCEDURE — 63710000001 PREDNISONE PER 5 MG: Performed by: INTERNAL MEDICINE

## 2025-06-25 PROCEDURE — 85025 COMPLETE CBC W/AUTO DIFF WBC: CPT | Performed by: NURSE PRACTITIONER

## 2025-06-25 PROCEDURE — 93005 ELECTROCARDIOGRAM TRACING: CPT

## 2025-06-25 PROCEDURE — 80048 BASIC METABOLIC PNL TOTAL CA: CPT | Performed by: NURSE PRACTITIONER

## 2025-06-25 PROCEDURE — 63710000001 PREDNISONE PER 1 MG: Performed by: INTERNAL MEDICINE

## 2025-06-25 PROCEDURE — 63710000001 INSULIN GLARGINE PER 5 UNITS: Performed by: INTERNAL MEDICINE

## 2025-06-25 PROCEDURE — 82948 REAGENT STRIP/BLOOD GLUCOSE: CPT | Performed by: STUDENT IN AN ORGANIZED HEALTH CARE EDUCATION/TRAINING PROGRAM

## 2025-06-25 PROCEDURE — 97116 GAIT TRAINING THERAPY: CPT

## 2025-06-25 PROCEDURE — 99222 1ST HOSP IP/OBS MODERATE 55: CPT | Performed by: INTERNAL MEDICINE

## 2025-06-25 PROCEDURE — 82948 REAGENT STRIP/BLOOD GLUCOSE: CPT

## 2025-06-25 PROCEDURE — 94664 DEMO&/EVAL PT USE INHALER: CPT

## 2025-06-25 RX ORDER — METOPROLOL TARTRATE 25 MG/1
25 TABLET, FILM COATED ORAL EVERY 12 HOURS SCHEDULED
Status: DISCONTINUED | OUTPATIENT
Start: 2025-06-25 | End: 2025-06-30

## 2025-06-25 RX ORDER — INSULIN GLARGINE 100 [IU]/ML
30 INJECTION, SOLUTION SUBCUTANEOUS NIGHTLY
Status: DISCONTINUED | OUTPATIENT
Start: 2025-06-25 | End: 2025-06-26

## 2025-06-25 RX ORDER — METOLAZONE 2.5 MG/1
2.5 TABLET ORAL DAILY
Status: DISCONTINUED | OUTPATIENT
Start: 2025-06-25 | End: 2025-06-27

## 2025-06-25 RX ADMIN — METOLAZONE 2.5 MG: 2.5 TABLET ORAL at 08:45

## 2025-06-25 RX ADMIN — PREDNISONE 30 MG: 10 TABLET ORAL at 08:45

## 2025-06-25 RX ADMIN — DILTIAZEM HYDROCHLORIDE 240 MG: 240 CAPSULE, COATED, EXTENDED RELEASE ORAL at 08:47

## 2025-06-25 RX ADMIN — INSULIN GLARGINE 30 UNITS: 100 INJECTION, SOLUTION SUBCUTANEOUS at 21:19

## 2025-06-25 RX ADMIN — FUROSEMIDE 40 MG: 10 INJECTION, SOLUTION INTRAMUSCULAR; INTRAVENOUS at 08:45

## 2025-06-25 RX ADMIN — ASPIRIN 81 MG: 81 TABLET, COATED ORAL at 08:45

## 2025-06-25 RX ADMIN — IPRATROPIUM BROMIDE AND ALBUTEROL SULFATE 3 ML: .5; 3 SOLUTION RESPIRATORY (INHALATION) at 11:20

## 2025-06-25 RX ADMIN — FUROSEMIDE 40 MG: 10 INJECTION, SOLUTION INTRAMUSCULAR; INTRAVENOUS at 21:19

## 2025-06-25 RX ADMIN — SPIRONOLACTONE 25 MG: 25 TABLET, FILM COATED ORAL at 08:45

## 2025-06-25 RX ADMIN — INSULIN LISPRO 8 UNITS: 100 INJECTION, SOLUTION INTRAVENOUS; SUBCUTANEOUS at 12:07

## 2025-06-25 RX ADMIN — GUAIFENESIN 1200 MG: 600 TABLET, EXTENDED RELEASE ORAL at 21:19

## 2025-06-25 RX ADMIN — BUDESONIDE INHALATION 0.5 MG: 0.5 SUSPENSION RESPIRATORY (INHALATION) at 07:45

## 2025-06-25 RX ADMIN — ROSUVASTATIN CALCIUM 20 MG: 10 TABLET, FILM COATED ORAL at 08:45

## 2025-06-25 RX ADMIN — Medication 10 ML: at 08:45

## 2025-06-25 RX ADMIN — NICOTINE 1 PATCH: 21 PATCH, EXTENDED RELEASE TRANSDERMAL at 08:47

## 2025-06-25 RX ADMIN — GUAIFENESIN 1200 MG: 600 TABLET, EXTENDED RELEASE ORAL at 08:44

## 2025-06-25 RX ADMIN — METOPROLOL TARTRATE 25 MG: 25 TABLET, FILM COATED ORAL at 08:44

## 2025-06-25 RX ADMIN — INSULIN LISPRO 12 UNITS: 100 INJECTION, SOLUTION INTRAVENOUS; SUBCUTANEOUS at 21:19

## 2025-06-25 RX ADMIN — REVEFENACIN 175 MCG: 175 SOLUTION RESPIRATORY (INHALATION) at 07:50

## 2025-06-25 RX ADMIN — CLOPIDOGREL BISULFATE 75 MG: 75 TABLET, FILM COATED ORAL at 08:45

## 2025-06-25 RX ADMIN — METOPROLOL TARTRATE 25 MG: 25 TABLET, FILM COATED ORAL at 21:19

## 2025-06-25 RX ADMIN — IPRATROPIUM BROMIDE AND ALBUTEROL SULFATE 3 ML: .5; 3 SOLUTION RESPIRATORY (INHALATION) at 03:16

## 2025-06-25 RX ADMIN — ARFORMOTEROL TARTRATE 15 MCG: 15 SOLUTION RESPIRATORY (INHALATION) at 07:40

## 2025-06-25 RX ADMIN — LOSARTAN POTASSIUM 50 MG: 50 TABLET, FILM COATED ORAL at 08:45

## 2025-06-25 RX ADMIN — INSULIN LISPRO 16 UNITS: 100 INJECTION, SOLUTION INTRAVENOUS; SUBCUTANEOUS at 17:42

## 2025-06-25 RX ADMIN — INSULIN LISPRO 12 UNITS: 100 INJECTION, SOLUTION INTRAVENOUS; SUBCUTANEOUS at 08:44

## 2025-06-25 NOTE — CONSULTS
Cardiology [place]    Subjective:     Encounter Date:06/22/2025      Patient ID: Santos Sullivan is a 69 y.o. male     Referring Physician: Hospitalist    Chief Complaint: Shortness of breath     Reason for Consult: shortness of breath,A flutter    HPI:  Santos Sullivan is a 69 y.o. male with a history of hypertension, hyperlipidemia, diabetes mellitus, COPD, pulmonary emphysema, CAD, coronary artery stenting, and diastolic heart failure who has previously patient has seen Dr. Watson, Dr Major and most recently Dr Schroeder.     In 2019, patient underwent stress test which was abnormal.  Patient underwent cardiac catheterization which showed intermediate lesion of LAD patient underwent FFR assessment of LAD which was significant and patient underwent LAD stenting subsequently.  He had ABIs done in 2020, left equals 1.26, right equal 0.77.    Patient presented to [facility] ED with complaints of worsening shortness of breath over the past week.  He did admit to not taking his Bumex the past 2 days because he was too fatigued to frequent the bathroom.  Complaining of generalized edema.  EKG shows sinus tachycardia rate 103.  Chest x-ray with no acute findings, chronic changes including cardiomegaly.  He was admitted with hypoxia, hypercapnia and acute diastolic CHF exacerbation.    Past Medical History:   Diagnosis Date    Abnormal stress test     Acrocyanosis     Acute renal insufficiency     Benign essential hypertension     Chronic hepatitis C     Managed by I.  Cured of infection.    Claudication     Right leg. MARY showing mild disease on right.    COPD (chronic obstructive pulmonary disease)     Moderate, nonreversible airway obstruction (worse than previous).    Coronary artery disease involving native coronary artery of native heart without angina pectoris     Femur fracture     30+ years ago    Hearing loss     History of spinal cord injury     Hyperlipidemia     Left against medical advice     Need for hepatitis C  "screening test     Onychomycosis of toenail     Tobacco use disorder        Past Surgical History:   Procedure Laterality Date    CARDIAC CATHETERIZATION      CORONARY STENT PLACEMENT         Family History   Problem Relation Age of Onset    Heart disease Mother     Hypertension Mother     Coronary artery disease Mother     Cancer Father         Throat    Cancer Sister         pelvic    Cancer Brother         Lung       Social History     Socioeconomic History    Marital status:    Tobacco Use    Smoking status: Every Day     Current packs/day: 0.50     Average packs/day: 0.5 packs/day for 25.0 years (12.5 ttl pk-yrs)     Types: Cigarettes    Smokeless tobacco: Never    Tobacco comments:     Patient is advised to quit smoking   Vaping Use    Vaping status: Never Used   Substance and Sexual Activity    Alcohol use: Not Currently     Comment: rare    Drug use: Not Currently    Sexual activity: Defer         Review of Systems   Constitutional: Positive for malaise/fatigue. Negative for diaphoresis.   Cardiovascular:  Positive for dyspnea on exertion and leg swelling. Negative for chest pain, irregular heartbeat, near-syncope, orthopnea, palpitations, paroxysmal nocturnal dyspnea and syncope.   Respiratory:  Positive for shortness of breath. Negative for hemoptysis and sleep disturbances due to breathing.    Gastrointestinal:  Negative for abdominal pain, hematemesis, hematochezia and melena.   Genitourinary:  Negative for hematuria.   Neurological:  Negative for dizziness, headaches, light-headedness, loss of balance and vertigo.   Psychiatric/Behavioral:  Negative for altered mental status.    All other systems reviewed and are negative.           Objective:         /65 (BP Location: Right arm, Patient Position: Sitting)   Pulse 89   Temp 98.5 °F (36.9 °C) (Oral)   Resp 15   Ht 177.8 cm (70\")   Wt (!) 140 kg (309 lb 8 oz)   SpO2 91%   BMI 44.41 kg/m²     Physical Exam:  Physical Exam    General " "Appearance:    Alert, cooperative, in no acute distress   Head:    Normocephalic, without obvious abnormality, atraumatic   Eyes:            PERRLA, EOM intact, conjunctivae and sclerae normal, no  icterus       Throat:   Oral mucosa moist, No oral lesions, no thrush   Neck:   No carotid bruit, no JVD, supple, trachea midline, no thyromegaly    Lungs:     Clear to auscultation, respirations regular, even and   unlabored, supplemental O2 2 lpm    Heart:    Regular rhythm and normal rate, normal S1 and S2,  no gallop, no rub, no click   Chest Wall:    No abnormalities observed   Abdomen:     Soft, obese, nontender, nondistended, no guarding, no rebound  tenderness   Extremities:   BLE edema, Ace wraps   Pulses:   Pulses palpable and equal bilaterally in all extremities   Skin:   No bleeding, bruising or rash       Neurologic:   Normal mood, thought content and behavior           ASCVD Risk Score::  The ASCVD Risk score (Marilynn MORSE, et al., 2019) failed to calculate for the following reasons:    Cannot find a previous HDL lab    Cannot find a previous total cholesterol lab      Lab Review:     Results from last 7 days   Lab Units 06/24/25 0330 06/23/25  0203 06/22/25  0151   SODIUM mmol/L 134* 136 141   POTASSIUM mmol/L 4.4 5.0 5.0   CHLORIDE mmol/L 95* 99 102   CO2 mmol/L 28.5 28.3 25.6   BUN mg/dL 44.6* 42.1* 32.2*   CREATININE mg/dL 1.10 1.04 1.00   GLUCOSE mg/dL 265* 248* 243*   CALCIUM mg/dL 8.3* 8.3* 8.7   AST (SGOT) U/L  --   --  16   ALT (SGPT) U/L  --   --  43*     Results from last 7 days   Lab Units 06/22/25  0323 06/22/25  0151   HSTROP T ng/L 33* 36*     Results from last 7 days   Lab Units 06/24/25  0330 06/23/25  0203   WBC 10*3/mm3 16.30* 15.31*   HEMOGLOBIN g/dL 14.6 13.6   HEMATOCRIT % 47.4 44.2   PLATELETS 10*3/mm3 231 231         Results from last 7 days   Lab Units 06/24/25  0330   MAGNESIUM mg/dL 2.5*           Invalid input(s): \"LDLCALC\"  Results from last 7 days   Lab Units 06/22/25  0151 "   PROBNP pg/mL 175.0           Recent Radiology:  Imaging Results (Most Recent)       Procedure Component Value Units Date/Time    XR Chest 1 View [[id number]] Collected: 06/22/25 0216     Updated: 06/22/25 0221    Narrative:      XR CHEST 1 VW    Date of Exam: 6/22/2025 2:00 AM EDT    Indication: soa    Comparison: 5/29/2025    Findings:  Heart remains enlarged. Lipomatous pleural thickening is again seen bilaterally as seen on chest CT 4/28/2025. Pulmonary vascularity is normal. There is some chronic scarring in the lingula. Some of the opacity at the left lung base is due to a prominent   epicardial fat pad. No definite acute pulmonary infiltrates. No pneumothorax.      Impression:      No clearly acute findings in the chest. Chronic changes as above, including cardiomegaly.        Electronically Signed: Norris Chavarria MD    6/22/2025 2:19 AM EDT    Workstation ID: [id number]              ECHOCARDIOGRAM:  Results for orders placed during the hospital encounter of 06/22/25    Adult Transthoracic Echo Complete W/ Cont if Necessary Per Protocol    Interpretation Summary    Left ventricular systolic function is normal. Calculated left ventricular EF = 64% Left ventricular ejection fraction appears to be 61 - 65%.    Left ventricular diastolic function was normal.      Stress Test:        Cardiac Catheterization:  No results found for this or any previous visit.      Results Review:  I have personally reviewed the results from the time of this admission to 6/25/2025 14:45 EDT and agree with these findings:  []  Laboratory  []  Microbiology  []  Radiology  []  EKG/Telemetry   []  Cardiology/Vascular   []  Pathology  []  Old records  []  Other:    Most notable findings include:     No Known Allergies    Current Medications:   Scheduled Meds:arformoterol, 15 mcg, Nebulization, BID - RT  aspirin, 81 mg, Oral, Daily  budesonide, 0.5 mg, Nebulization, BID - RT  clopidogrel, 75 mg, Oral, Daily  dilTIAZem CD, 240 mg, Oral,  Daily  furosemide, 40 mg, Intravenous, BID  guaiFENesin, 1,200 mg, Oral, Q12H  insulin glargine, 30 Units, Subcutaneous, Nightly  insulin lispro, 4-24 Units, Subcutaneous, 4x Daily AC & at Bedtime  ipratropium-albuterol, 3 mL, Nebulization, Q4H - RT  losartan, 50 mg, Oral, Daily  metOLazone, 2.5 mg, Oral, Daily  metoprolol tartrate, 25 mg, Oral, Q12H  nicotine, 1 patch, Transdermal, Daily  [START ON 6/26/2025] predniSONE, 20 mg, Oral, Daily   Followed by  [START ON 6/28/2025] predniSONE, 10 mg, Oral, Daily  revefenacin, 175 mcg, Nebulization, Daily - RT  rosuvastatin, 20 mg, Oral, Daily  sodium chloride, 10 mL, Intravenous, Q12H  spironolactone, 25 mg, Oral, Daily      Continuous Infusions:         Assessment:         Active Hospital Problems    Diagnosis  POA    **Acute respiratory failure with hypoxia and hypercapnia [J96.01, J96.02]  Yes    Acute on chronic diastolic heart failure [I50.33]  Yes    Acute hypoxic respiratory failure [J96.01]  Yes    Mixed hyperlipidemia [E78.2]  Yes    Essential hypertension [I10]  Yes    Overweight [E66.3]  Yes    Chronic hepatitis C [B18.2]  Yes     Managed by I.      Coronary angioplasty status [Z98.61]  Not Applicable    Chronic obstructive pulmonary disease [J44.9]  Yes     PFTs performed at previous visit.  Moderate, nonreversible airway obstruction (worse than previous).  He is now symptomatic.      Tobacco dependence syndrome [F17.200]  Yes          Plan:   Acute respiratory failure with hypoxia and hypercapnia/COPD  - initial ABG pH 7.31, low CO2 62, pO2 75, HCO3 31.4 on 36% FiO2  - Placed on Bipap with repeat ABG 7.359, CO2 60.4, pO2 90.2, HCO3 34.1  - Currently on 2 L nasal cannula, wears 3 L home O2  - WBC 13.96  - RVP negative  - CXR showed no clearly acute findings in the chest. Chronic changes as above including cardiomegaly  - consult pulmonary      Hx diastolic CHF   - check echo as previous echo was 2022 and showed normal EF w/ diastolic failure grade 1  - Echo  repeated this admission-diastolic function normal  - proBNP 175   - bilateral lower extremity edema  - Bilateral lower extremities wrapped with Ace  - lasix 80mg IV x1 given in the ER, continue lasix 40mg q12 hours     CAD s/p PCI  - HS troponin 36 with repeat 33  - Continue aspirin Plavix statin    Hypertension  - Continue losartan Cardizem    Hyperlipidemia  - cont home aspirin, plavix, statin      DM type II  - Management per primary    Tobacco abuse  - encourage cessation  - nicotine patch      Morbid obesity  - BMI 41     PLAN:  Currently getting Lasix 40 mg IV every 12  Also on spironolactone 25 mg daily and metolazone 2.5 mg daily  Yesterday net -1990, down 2 lbs  Labs today:Sodium 134, CO2 28.5, potassium 4.4, magnesium 2.5, BUN 44.6, creatinine 1.1  Flutter yesterday, currently sinus  rate control Lopressor 25 mg every 12, Diltiazem  daily  CHADS2 SCORE   CHADS2 Score: 2 (6/25/2025  2:45 PM)  Blood pressure controlled with losartan 50 mg daily , SBP running 110-140  DAPT aspirin and Plavix, Statin on board  Echo this admission normal diastolic and systolic function, EF 61 to 65%, no significant valvular dysfunction and no wall motion abnormalities     Patient is seen and examined and findings are verified.  All data is reviewed by me personally.  Assessment and plan formulated by APC was done after discussion with attending.  I spent more than 50% of time in taking care of the patient.    Patient is admitted with progressive shortness of breath and bilateral leg edema.  Patient overnight went into atrial fibrillation but spontaneously cardioverted.    Heart rate is elevated but sinus rhythm.    Normal S1 and S2.  Decreased breath sound at the bases expiratory rhonchi noted.  Abdomen is obese and nontender.  Bilateral leg edema noted.    I would add metolazone 2.5 mg daily.  I would start Lopressor 25 mg twice daily.  Continue nebulization treatment.    Electronically signed by Ian Major MD,  06/25/25, 6:48 PM EDT.    Sylvia hCavez, APRN  06/25/25  14:45 EDT

## 2025-06-25 NOTE — CASE MANAGEMENT/SOCIAL WORK
Continued Stay Note  [facility]     Patient Name: Santos Sullivan  MRN: [id number]  Today's Date: 6/25/2025    Admit Date: 6/22/2025    Plan: [facility]. Precert approved (valid 6/25-7/1), PASRR approved. From home alone. Current home O2 2-3L through [employer].   Discharge Plan       Row Name 06/25/25 1214       Plan    Plan [facility] accepted. Precert approved (valid 6/25-7/1), PASRR approved. From home alone. Current home O2 2-3L through [employer].    Plan Comments [facility] updated that precert has now been approved. Auth ID and dates valid provided.                    Jeanine Johnson RN     Office Phone: [phone]  Office Cell: [phone]

## 2025-06-25 NOTE — PROGRESS NOTES
[facility] MEDICINE SERVICE  DAILY PROGRESS NOTE    NAME: Santos Sullivan  : 1955  MRN: [id number]      LOS: 3 days     PROVIDER OF SERVICE: Dawood Lombardo DO    Chief Complaint: Acute respiratory failure with hypoxia and hypercapnia    Subjective:     Interval History:  History taken from: patient      Patient seen and examined this morning.  Doing well this morning, continues to diurese well.  Had episodes of A-fib/flutter yesterday afternoon but NSR currently.  Cardiology consulted and to follow-up today.    Review of Systems:   Pertinent positives as noted in HPI/subjective.  All other systems were reviewed and are negative.    Objective:     Vital Signs  Temp:  [97.3 °F (36.3 °C)-98.5 °F (36.9 °C)] 98.3 °F (36.8 °C)  Heart Rate:  [] 104  Resp:  [11-20] 16  BP: (118-149)/(60-84) 147/72  Flow (L/min) (Oxygen Therapy):  [1-2] 2   Body mass index is 44.41 kg/m².    Physical Exam  General: Awake, alert, morbidly obese male, NAD  Eyes: PERRL, EOMI, conjunctivae are clear  Cardiovascular: Regular rate and rhythm, no murmurs  Respiratory: decreased breath sounds at the base bilaterally, no wheezing or rales, unlabored breathing  Abdomen: Soft, nontender, positive bowel sounds, no guarding  Skin: Warm, moderate to severe bilateral lower extremity pitting edema noted, slowly improving         Diagnostic Data    Results from last 7 days   Lab Units 25  0330 25  0203 25  0151   WBC 10*3/mm3 16.30*   < > 13.96*   HEMOGLOBIN g/dL 14.6   < > 14.1   HEMATOCRIT % 47.4   < > 47.1   PLATELETS 10*3/mm3 231   < > 232   GLUCOSE mg/dL 265*   < > 243*   CREATININE mg/dL 1.10   < > 1.00   BUN mg/dL 44.6*   < > 32.2*   SODIUM mmol/L 134*   < > 141   POTASSIUM mmol/L 4.4   < > 5.0   AST (SGOT) U/L  --   --  16   ALT (SGPT) U/L  --   --  43*   ALK PHOS U/L  --   --  55   BILIRUBIN mg/dL  --   --  0.2   ANION GAP mmol/L 10.5   < > 13.4    < > = values in this interval not displayed.       No radiology  results for the last day        I reviewed the patient's new clinical results.    Assessment/Plan:     Active and Resolved Problems  Active Hospital Problems    Diagnosis  POA    **Acute respiratory failure with hypoxia and hypercapnia [J96.01, J96.02]  Yes    Acute on chronic diastolic heart failure [I50.33]  Yes    Acute hypoxic respiratory failure [J96.01]  Yes    Mixed hyperlipidemia [E78.2]  Yes    Essential hypertension [I10]  Yes    Overweight [E66.3]  Yes    Chronic hepatitis C [B18.2]  Yes    Coronary angioplasty status [Z98.61]  Not Applicable    Chronic obstructive pulmonary disease [J44.9]  Yes    Tobacco dependence syndrome [F17.200]  Yes      Resolved Hospital Problems   No resolved problems to display.   Patient is 69-year-old male on home oxygen 3 LPM who presented with shortness of breath cough and wheezing as well as fatigue.  Patient required to be on BiPAP in the ER    Acute hypoxic and hypercapnic respiratory failure  Acute exacerbation of HFpEF  Volume overload with bilateral lower extremity edema  COPD with mild exacerbation  - ABGs and imaging reports reviewed  - Respiratory status improving, down to nasal cannula now  - Echo this admission showed preserved EF   - patient remains volume overloaded  - Continue diuresis with IV Lasix, monitor I's and O's  - Continue taper steroid dosing, continue bronchodilators  - Pulmonology recs appreciated, will need outpatient sleep study to rule out SHYAM    New onset A-fib/flutter  - Noted on telemetry on 6/24, did have some tachycardia which improved  - Spontaneously converted to NSR currently  - Continue home diltiazem  - Further management per cardiology     CAD s/p PCI  Hypertension  Hyperlipidemia  - cont home meds as tolerated, monitor    DM type II with hyperglycemia  - Hemoglobin A1c is 9.3  - Continue basal insulin, SSI, dosing adjusted  - Monitor blood glucose and adjust insulin therapy as needed    Tobacco abuse  - encourage cessation  -  nicotine patch      Morbid obesity  - BMI 41  - complicates all aspects of care    VTE Prophylaxis:  Mechanical VTE prophylaxis orders are present.    Disposition Planning:     Barriers to Discharge: Volume overload, respiratory failure  Anticipated Date of Discharge: 6/26  Place of Discharge: Rehab    Code Status and Medical Interventions: CPR (Attempt to Resuscitate); Full Support   Ordered at: 06/22/25 0543     Code Status (Patient has no pulse and is not breathing):    CPR (Attempt to Resuscitate)     Medical Interventions (Patient has pulse or is breathing):    Full Support     Level Of Support Discussed With:    Patient       Signature: Electronically signed by Dawood Lombardo DO, 06/25/25, 10:36 EDT.  [facility] Hospitalist Team     Part of this note may be an electronic transcription/translation of spoken language to printed text using the Dragon Dictation System.

## 2025-06-25 NOTE — PROGRESS NOTES
Daily Progress Note          Assessment    Acute respiratory failure with hypoxia and hypercapnia: ABGs on admission 7.3 1/62/75 while on nasal cannula, repeated on BiPAP 7.35/60.4/90.2  Acute bronchitis due to unspecified pathogen, negative respiratory panel     Acute on chronic HFpEF  COPD without acute exacerbation  CAD status post PCI  HTN  HLD  DM  Chronic hepatitis C  Tobacco smoking  Obesity with BMI of 42 I     PFTs 2022: Moderate obstruction with air trapping and decreased oxygen diffusion capacity, FEV1/FVC 65%, FVC 72%, FEV1 63%, %, %, DLCO 58%           Recommendations:  The respiratory status is stable, patient can be discharged from pulmonary standpoint to home with outpatient home health or inpatient rehab     Follow-up in the office in 3 weeks, he will consider home sleep study to rule out SHYAM     tapering dose prednisone     Symbicort  Mucinex  Oxygen supplement and titration to maintain saturation 90 to 95% currently on 2 L per nasal cannula alternating with room air     bronchodilators     Smoking cessation counseling, nicotine patch  Aspirin, Plavix, Crestor  Diltiazem, losartan  Insulin Lantus and sliding scale     I personally reviewed the radiological studies             LOS: 3 days     Subjective     Less cough and shortness of breath    Objective     Vital signs for last 24 hours:  Vitals:    06/25/25 0327 06/25/25 0443 06/25/25 0545 06/25/25 0823   BP:  131/60  147/72   BP Location:    Right arm   Patient Position:  Sitting  Sitting   Pulse: 101 102  104   Resp: 20 11  16   Temp:  98.1 °F (36.7 °C)  98.3 °F (36.8 °C)   TempSrc:  Oral  Axillary   SpO2: 96% 93%  94%   Weight:   (!) 140 kg (309 lb 8 oz)    Height:           Intake/Output last 3 shifts:  I/O last 3 completed shifts:  In: 720 [P.O.:720]  Out: 4450 [Urine:4450]  Intake/Output this shift:  No intake/output data recorded.      Radiology  Imaging Results (Last 24 Hours)       ** No results found for the last 24 hours.  **            Labs:  Results from last 7 days   Lab Units 06/24/25  0330   WBC 10*3/mm3 16.30*   HEMOGLOBIN g/dL 14.6   HEMATOCRIT % 47.4   PLATELETS 10*3/mm3 231     Results from last 7 days   Lab Units 06/24/25  0330 06/23/25  0203 06/22/25  0151   SODIUM mmol/L 134*   < > 141   POTASSIUM mmol/L 4.4   < > 5.0   CHLORIDE mmol/L 95*   < > 102   CO2 mmol/L 28.5   < > 25.6   BUN mg/dL 44.6*   < > 32.2*   CREATININE mg/dL 1.10   < > 1.00   CALCIUM mg/dL 8.3*   < > 8.7   BILIRUBIN mg/dL  --   --  0.2   ALK PHOS U/L  --   --  55   ALT (SGPT) U/L  --   --  43*   AST (SGOT) U/L  --   --  16   GLUCOSE mg/dL 265*   < > 243*    < > = values in this interval not displayed.     Results from last 7 days   Lab Units 06/22/25  0318   PH, ARTERIAL pH units 7.359   PO2 ART mm Hg 90.2   PCO2, ARTERIAL mm Hg 60.4*   HCO3 ART mmol/L 34.1*     Results from last 7 days   Lab Units 06/22/25  0151   ALBUMIN g/dL 4.0     Results from last 7 days   Lab Units 06/22/25  0323 06/22/25  0151   HSTROP T ng/L 33* 36*         Results from last 7 days   Lab Units 06/24/25  0330   MAGNESIUM mg/dL 2.5*                   Meds:   SCHEDULE  arformoterol, 15 mcg, Nebulization, BID - RT  aspirin, 81 mg, Oral, Daily  budesonide, 0.5 mg, Nebulization, BID - RT  clopidogrel, 75 mg, Oral, Daily  dilTIAZem CD, 240 mg, Oral, Daily  furosemide, 40 mg, Intravenous, BID  guaiFENesin, 1,200 mg, Oral, Q12H  insulin glargine, 30 Units, Subcutaneous, Nightly  insulin lispro, 4-24 Units, Subcutaneous, 4x Daily AC & at Bedtime  ipratropium-albuterol, 3 mL, Nebulization, Q4H - RT  losartan, 50 mg, Oral, Daily  metOLazone, 2.5 mg, Oral, Daily  metoprolol tartrate, 25 mg, Oral, Q12H  nicotine, 1 patch, Transdermal, Daily  [START ON 6/26/2025] predniSONE, 20 mg, Oral, Daily   Followed by  [START ON 6/28/2025] predniSONE, 10 mg, Oral, Daily  revefenacin, 175 mcg, Nebulization, Daily - RT  rosuvastatin, 20 mg, Oral, Daily  sodium chloride, 10 mL, Intravenous,  Q12H  spironolactone, 25 mg, Oral, Daily      Infusions     PRNs    acetaminophen **OR** acetaminophen **OR** acetaminophen    aluminum-magnesium hydroxide-simethicone    senna-docusate sodium **AND** polyethylene glycol **AND** bisacodyl **AND** bisacodyl    Calcium Replacement - Follow Nurse / BPA Driven Protocol    dextrose    dextrose    glucagon (human recombinant)    Magnesium Standard Dose Replacement - Follow Nurse / BPA Driven Protocol    melatonin    metoprolol tartrate    nitroglycerin    ondansetron ODT **OR** ondansetron    Phosphorus Replacement - Follow Nurse / BPA Driven Protocol    Potassium Replacement - Follow Nurse / BPA Driven Protocol    sodium chloride    sodium chloride    sodium chloride    Physical Exam:  General Appearance:  Alert   HEENT:  Normocephalic, without obvious abnormality, Conjunctiva/corneas clear,.   Nares normal, no drainage     Neck:  Supple, symmetrical, trachea midline.   Lungs /Chest wall: Mild bilateral basal rhonchi, respirations unlabored, symmetrical wall movement.     Heart:  Regular rate and rhythm, S1 S2 normal  Abdomen: Soft, non-tender, no masses, no organomegaly.    Extremities: + Edema, no clubbing or cyanosis     ROS  Constitutional: Negative for chills, fever and malaise/fatigue.   HENT: Negative.    Eyes: Negative.    Cardiovascular: Negative.    Respiratory: Positive for cough and shortness of breath.    Skin: Negative.    Musculoskeletal: Negative.    Gastrointestinal: Negative.    Genitourinary: Negative.    Neurological: Generalized weakness        I reviewed the recent clinical results  I personally reviewed the latest radiological studies    Part of this note may be an electronic transcription/translation of spoken language to printed text using the Dragon Dictation System.

## 2025-06-25 NOTE — NURSING NOTE
WOCN note:      69 yr old male presented to the ED on 6/22/25 for worsening dyspnea and admitted with acute respiratory failure with hypoxia and hypercapnia. Patient has a hx of CHF and COPD. WOCN consult received for BLE assessment.     Patient presents with BLE pitting edema. Unable to palpate pedal pulses d/t edema. There is a de-roofed partial thickness blister to the right anterior lower leg measuring approximately 2 x 2 cm and a partial thickness wound to the left anterior lower leg measuring 1.3 x 1 cm with maceration of the surrounding skin. Both wounds are draining copious amounts of serous exudate.     The legs and feet were cleansed with bath cloths. The wounds were covered with Maxorb and ABD pads. Both legs were wrapped with kerlix and 2 ACE wraps to each leg starting from the base of the toes to the bend of the knees. Recommend to change daily initially until the drainage lessens then may decrease to every other day. We will continue to follow as needed.

## 2025-06-25 NOTE — THERAPY TREATMENT NOTE
"Subjective: Pt agreeable to therapeutic plan of care.    Objective:     Bed mobility - N/A or Not attempted.    Transfers - CGA and with rolling walker    Ambulation - 100 feet CGA and with rolling walker. Forward flexed posture. Cued to keep RW close.     Vitals: Desaturates with activity while on 2L via NC. Exertional dyspnea noted following ambulation. Unclear pleth.     Pain: 0 VAS Location:   Intervention for pain: N/A    Education: Provided education on the importance of mobility in the acute care setting, Verbal/Tactile Cues, Transfer Training, and Gait Training    Assessment: Santos Sullivan presents with functional mobility impairments which indicate the need for skilled intervention. Tolerating session today without incident. Will continue to follow and progress as tolerated.     Plan/Recommendations:   If medically appropriate, Moderate Intensity Therapy recommended post-acute care. This is recommended as therapy feels the patient would require 3-4 days per week and wouldn't tolerate \"3 hour daily\" rehab intensity. SNF would be the preferred choice. If the patient does not agree to SNF, arrange HH or OP depending on home bound status. If patient is medically complex, consider LTACH. Pt requires no DME at discharge.     Pt desires Skilled Rehab placement at discharge. Pt cooperative; agreeable to therapeutic recommendations and plan of care.     Post-Tx Position: Up in Chair, Alarms activated, and Call light and personal items within reach  PPE: gloves    Therapy Charges for Today       Code Description Service Date Service Provider Modifiers Qty    11165052294  PT THERAPEUTIC ACT EA 15 MIN 6/25/2025 Vishal Gauthier PTA GP 1    75089512975 HC GAIT TRAINING EA 15 MIN 6/25/2025 Vishal Gauthier PTA GP 1           PT Charges       Row Name 06/25/25 1540             Time Calculation    Start Time 1335  -UN      Stop Time 1351  -UN      Time Calculation (min) 16 min  -UN      PT Received On " 06/25/25  -UN      PT - Next Appointment 06/26/25  -UN         Time Calculation- PT    Total Timed Code Minutes- PT 16 minute(s)  -UN                User Key  (r) = Recorded By, (t) = Taken By, (c) = Cosigned By      Initials Name Provider Type    UN Vishal Gauthier, PTA Physical Therapist Assistant

## 2025-06-25 NOTE — PLAN OF CARE
Problem: Adult Inpatient Plan of Care  Goal: Plan of Care Review  Outcome: Progressing  Flowsheets  Taken 6/25/2025 0458 by Yesi Rhodes LPN  Progress: improving  Taken 6/23/2025 1735 by Vicky Harding, RN  Outcome Evaluation: patient alert and oriented x4, pain controlled with PRN pain medications. patient still complains about shortness of air with movement , on room air. plan of going to rehab. paitine shifting weight every 2 hours and as needed. call light in reach  Taken 6/23/2025 1324 by Sylvia Jackson, PT  Plan of Care Reviewed With: patient  Goal: Patient-Specific Goal (Individualized)  Outcome: Progressing  Goal: Absence of Hospital-Acquired Illness or Injury  Outcome: Progressing  Intervention: Identify and Manage Fall Risk  Recent Flowsheet Documentation  Taken 6/25/2025 0400 by Yesi Rhodes LPN  Safety Promotion/Fall Prevention:   clutter free environment maintained   assistive device/personal items within reach   fall prevention program maintained   nonskid shoes/slippers when out of bed   room organization consistent   safety round/check completed  Taken 6/25/2025 0000 by Yesi Rhodes LPN  Safety Promotion/Fall Prevention:   assistive device/personal items within reach   clutter free environment maintained   safety round/check completed   room organization consistent   fall prevention program maintained  Taken 6/24/2025 2200 by Yesi Rhodes LPN  Safety Promotion/Fall Prevention:   fall prevention program maintained   clutter free environment maintained   assistive device/personal items within reach   lighting adjusted   mobility aid in reach   muscle strengthening facilitated   nonskid shoes/slippers when out of bed   safety round/check completed   room organization consistent  Taken 6/24/2025 2000 by Yesi Rhodes LPN  Safety Promotion/Fall Prevention:   clutter free environment maintained   assistive device/personal items within reach   fall  prevention program maintained   nonskid shoes/slippers when out of bed   muscle strengthening facilitated   lighting adjusted   mobility aid in reach   safety round/check completed   room organization consistent  Intervention: Prevent Skin Injury  Recent Flowsheet Documentation  Taken 6/25/2025 0000 by Banzon, Yesi Xin, LPN  Body Position: position changed independently  Taken 6/24/2025 2000 by Yesi Rhodes LPN  Body Position: position changed independently  Intervention: Prevent and Manage VTE (Venous Thromboembolism) Risk  Recent Flowsheet Documentation  Taken 6/24/2025 2000 by Yesi Rhodse LPN  VTE Prevention/Management: (BLE edema) other (see comments)  Intervention: Prevent Infection  Recent Flowsheet Documentation  Taken 6/25/2025 0400 by Yesi Rhodes LPN  Infection Prevention:   environmental surveillance performed   equipment surfaces disinfected   hand hygiene promoted   personal protective equipment utilized   rest/sleep promoted  Taken 6/25/2025 0000 by Yesi Rhodes LPN  Infection Prevention:   environmental surveillance performed   equipment surfaces disinfected   rest/sleep promoted   hand hygiene promoted   personal protective equipment utilized  Taken 6/24/2025 2200 by Yesi Rhodes LPN  Infection Prevention:   environmental surveillance performed   equipment surfaces disinfected   hand hygiene promoted   personal protective equipment utilized   rest/sleep promoted   single patient room provided  Taken 6/24/2025 2000 by Yesi Rhodes LPN  Infection Prevention: environmental surveillance performed  Goal: Optimal Comfort and Wellbeing  Outcome: Progressing  Intervention: Provide Person-Centered Care  Recent Flowsheet Documentation  Taken 6/24/2025 2000 by Yesi Rhodes LPN  Trust Relationship/Rapport:   care explained   choices provided   questions answered  Goal: Readiness for Transition of Care  Outcome: Progressing   Goal Outcome Evaluation:            Progress: improving

## 2025-06-25 NOTE — SIGNIFICANT NOTE
Case Management/Social Work    Patient Name:  Santos Sullivan  YOB: 1955  MRN: [id number]  Admit Date:  6/22/2025 06/25/25 1211   Post Acute Pre-Cert Documentation   Date Post Acute Pre-Cert Completed 06/25/25   All Clinicals Submitted? Yes   Response Received from Insurance? Approval   Post Acute Pre-Cert Initiated Comment CME verified SNF precert approval via [employer] portal. Auth ID [id number]. SNF precert valid 6/25-7/1. Approval letter indexed to media. CM made aware.           Electronically signed by:  JANELL Frye  06/25/25 12:13 EDT    Alirio Quiroga  Case Management Extender  [address] [address]  P: [phone]  F: [phone]

## 2025-06-25 NOTE — CASE MANAGEMENT/SOCIAL WORK
Continued Stay Note  MANDO Newton     Patient Name: Santos Sullivan  MRN: [id number]  Today's Date: 6/25/2025    Admit Date: 6/22/2025    Plan: [facility] pending precert. Precert started 6/24, PASRR approved. From home alone. Current home O2 2-3L through [employer].   Discharge Plan       Row Name 06/25/25 1103       Plan    Plan Comments CM confirmed with JANELL Amezquita that precert remains pending. LUIZ provided update to [facility] DC Barriers: Cardiology consult pending.                Jeanine Johnson RN     Office Phone: [phone]  Office Cell: [phone]

## 2025-06-25 NOTE — PLAN OF CARE
"Assessment: Santos Sullivan presents with functional mobility impairments which indicate the need for skilled intervention. Tolerating session today without incident. Will continue to follow and progress as tolerated.     Plan/Recommendations:   If medically appropriate, Moderate Intensity Therapy recommended post-acute care. This is recommended as therapy feels the patient would require 3-4 days per week and wouldn't tolerate \"3 hour daily\" rehab intensity. SNF would be the preferred choice. If the patient does not agree to SNF, arrange HH or OP depending on home bound status. If patient is medically complex, consider LTACH. Pt requires no DME at discharge.     Pt desires Skilled Rehab placement at discharge. Pt cooperative; agreeable to therapeutic recommendations and plan of care.     "

## 2025-06-26 LAB
ANION GAP SERPL CALCULATED.3IONS-SCNC: 10.4 MMOL/L (ref 5–15)
BASOPHILS # BLD AUTO: 0.14 10*3/MM3 (ref 0–0.2)
BASOPHILS NFR BLD AUTO: 1 % (ref 0–1.5)
BUN SERPL-MCNC: 45.1 MG/DL (ref 8–23)
BUN/CREAT SERPL: 41.4 (ref 7–25)
CALCIUM SPEC-SCNC: 8.6 MG/DL (ref 8.6–10.5)
CHLORIDE SERPL-SCNC: 95 MMOL/L (ref 98–107)
CO2 SERPL-SCNC: 28.6 MMOL/L (ref 22–29)
CREAT SERPL-MCNC: 1.09 MG/DL (ref 0.76–1.27)
DEPRECATED RDW RBC AUTO: 51.8 FL (ref 37–54)
DEVICE COMMENT: ABNORMAL
EGFRCR SERPLBLD CKD-EPI 2021: 73.9 ML/MIN/1.73
EOSINOPHIL # BLD AUTO: 0.17 10*3/MM3 (ref 0–0.4)
EOSINOPHIL NFR BLD AUTO: 1.2 % (ref 0.3–6.2)
ERYTHROCYTE [DISTWIDTH] IN BLOOD BY AUTOMATED COUNT: 14.8 % (ref 12.3–15.4)
GLUCOSE BLDC GLUCOMTR-MCNC: 104 MG/DL (ref 70–105)
GLUCOSE BLDC GLUCOMTR-MCNC: 179 MG/DL (ref 70–105)
GLUCOSE BLDC GLUCOMTR-MCNC: 213 MG/DL (ref 70–105)
GLUCOSE BLDC GLUCOMTR-MCNC: 475 MG/DL (ref 70–105)
GLUCOSE SERPL-MCNC: 72 MG/DL (ref 65–99)
HCT VFR BLD AUTO: 45.7 % (ref 37.5–51)
HGB BLD-MCNC: 14.5 G/DL (ref 13–17.7)
IMM GRANULOCYTES # BLD AUTO: 0.61 10*3/MM3 (ref 0–0.05)
IMM GRANULOCYTES NFR BLD AUTO: 4.5 % (ref 0–0.5)
LYMPHOCYTES # BLD AUTO: 2.33 10*3/MM3 (ref 0.7–3.1)
LYMPHOCYTES NFR BLD AUTO: 17.1 % (ref 19.6–45.3)
MAGNESIUM SERPL-MCNC: 2.5 MG/DL (ref 1.6–2.4)
MCH RBC QN AUTO: 30 PG (ref 26.6–33)
MCHC RBC AUTO-ENTMCNC: 31.7 G/DL (ref 31.5–35.7)
MCV RBC AUTO: 94.4 FL (ref 79–97)
MONOCYTES # BLD AUTO: 0.91 10*3/MM3 (ref 0.1–0.9)
MONOCYTES NFR BLD AUTO: 6.7 % (ref 5–12)
NEUTROPHILS NFR BLD AUTO: 69.5 % (ref 42.7–76)
NEUTROPHILS NFR BLD AUTO: 9.49 10*3/MM3 (ref 1.7–7)
NRBC BLD AUTO-RTO: 0.1 /100 WBC (ref 0–0.2)
PLATELET # BLD AUTO: 195 10*3/MM3 (ref 140–450)
PMV BLD AUTO: 9.5 FL (ref 6–12)
POTASSIUM SERPL-SCNC: 4.2 MMOL/L (ref 3.5–5.2)
RBC # BLD AUTO: 4.84 10*6/MM3 (ref 4.14–5.8)
SODIUM SERPL-SCNC: 134 MMOL/L (ref 136–145)
WBC NRBC COR # BLD AUTO: 13.65 10*3/MM3 (ref 3.4–10.8)

## 2025-06-26 PROCEDURE — 97530 THERAPEUTIC ACTIVITIES: CPT

## 2025-06-26 PROCEDURE — 25010000002 FUROSEMIDE PER 20 MG: Performed by: INTERNAL MEDICINE

## 2025-06-26 PROCEDURE — 94664 DEMO&/EVAL PT USE INHALER: CPT

## 2025-06-26 PROCEDURE — 97116 GAIT TRAINING THERAPY: CPT

## 2025-06-26 PROCEDURE — 80048 BASIC METABOLIC PNL TOTAL CA: CPT | Performed by: NURSE PRACTITIONER

## 2025-06-26 PROCEDURE — 94799 UNLISTED PULMONARY SVC/PX: CPT

## 2025-06-26 PROCEDURE — 83735 ASSAY OF MAGNESIUM: CPT | Performed by: INTERNAL MEDICINE

## 2025-06-26 PROCEDURE — 36415 COLL VENOUS BLD VENIPUNCTURE: CPT | Performed by: NURSE PRACTITIONER

## 2025-06-26 PROCEDURE — 94761 N-INVAS EAR/PLS OXIMETRY MLT: CPT

## 2025-06-26 PROCEDURE — 63710000001 INSULIN GLARGINE PER 5 UNITS: Performed by: INTERNAL MEDICINE

## 2025-06-26 PROCEDURE — 82948 REAGENT STRIP/BLOOD GLUCOSE: CPT

## 2025-06-26 PROCEDURE — 63710000001 INSULIN LISPRO (HUMAN) PER 5 UNITS: Performed by: STUDENT IN AN ORGANIZED HEALTH CARE EDUCATION/TRAINING PROGRAM

## 2025-06-26 PROCEDURE — 85025 COMPLETE CBC W/AUTO DIFF WBC: CPT | Performed by: NURSE PRACTITIONER

## 2025-06-26 PROCEDURE — 94762 N-INVAS EAR/PLS OXIMTRY CONT: CPT

## 2025-06-26 PROCEDURE — 63710000001 REVEFENACIN 175 MCG/3ML SOLUTION: Performed by: INTERNAL MEDICINE

## 2025-06-26 PROCEDURE — 99232 SBSQ HOSP IP/OBS MODERATE 35: CPT | Performed by: INTERNAL MEDICINE

## 2025-06-26 PROCEDURE — 63710000001 PREDNISONE PER 1 MG: Performed by: INTERNAL MEDICINE

## 2025-06-26 RX ORDER — INSULIN GLARGINE 100 [IU]/ML
20 INJECTION, SOLUTION SUBCUTANEOUS NIGHTLY
Status: DISCONTINUED | OUTPATIENT
Start: 2025-06-26 | End: 2025-07-01 | Stop reason: HOSPADM

## 2025-06-26 RX ADMIN — GUAIFENESIN 1200 MG: 600 TABLET, EXTENDED RELEASE ORAL at 21:02

## 2025-06-26 RX ADMIN — GUAIFENESIN 1200 MG: 600 TABLET, EXTENDED RELEASE ORAL at 09:06

## 2025-06-26 RX ADMIN — FUROSEMIDE 40 MG: 10 INJECTION, SOLUTION INTRAMUSCULAR; INTRAVENOUS at 09:07

## 2025-06-26 RX ADMIN — ASPIRIN 81 MG: 81 TABLET, COATED ORAL at 09:06

## 2025-06-26 RX ADMIN — IPRATROPIUM BROMIDE AND ALBUTEROL SULFATE 3 ML: .5; 3 SOLUTION RESPIRATORY (INHALATION) at 00:40

## 2025-06-26 RX ADMIN — IPRATROPIUM BROMIDE AND ALBUTEROL SULFATE 3 ML: .5; 3 SOLUTION RESPIRATORY (INHALATION) at 11:59

## 2025-06-26 RX ADMIN — INSULIN LISPRO 24 UNITS: 100 INJECTION, SOLUTION INTRAVENOUS; SUBCUTANEOUS at 17:48

## 2025-06-26 RX ADMIN — INSULIN LISPRO 4 UNITS: 100 INJECTION, SOLUTION INTRAVENOUS; SUBCUTANEOUS at 12:13

## 2025-06-26 RX ADMIN — LOSARTAN POTASSIUM 50 MG: 50 TABLET, FILM COATED ORAL at 09:06

## 2025-06-26 RX ADMIN — METOPROLOL TARTRATE 25 MG: 25 TABLET, FILM COATED ORAL at 09:07

## 2025-06-26 RX ADMIN — ARFORMOTEROL TARTRATE 15 MCG: 15 SOLUTION RESPIRATORY (INHALATION) at 20:07

## 2025-06-26 RX ADMIN — Medication 10 ML: at 21:02

## 2025-06-26 RX ADMIN — BUDESONIDE INHALATION 0.5 MG: 0.5 SUSPENSION RESPIRATORY (INHALATION) at 20:12

## 2025-06-26 RX ADMIN — SPIRONOLACTONE 25 MG: 25 TABLET, FILM COATED ORAL at 09:07

## 2025-06-26 RX ADMIN — CLOPIDOGREL BISULFATE 75 MG: 75 TABLET, FILM COATED ORAL at 09:08

## 2025-06-26 RX ADMIN — Medication 10 ML: at 09:08

## 2025-06-26 RX ADMIN — IPRATROPIUM BROMIDE AND ALBUTEROL SULFATE 3 ML: .5; 3 SOLUTION RESPIRATORY (INHALATION) at 03:25

## 2025-06-26 RX ADMIN — ROSUVASTATIN CALCIUM 20 MG: 10 TABLET, FILM COATED ORAL at 09:06

## 2025-06-26 RX ADMIN — INSULIN GLARGINE 20 UNITS: 100 INJECTION, SOLUTION SUBCUTANEOUS at 21:02

## 2025-06-26 RX ADMIN — REVEFENACIN 175 MCG: 175 SOLUTION RESPIRATORY (INHALATION) at 07:26

## 2025-06-26 RX ADMIN — PREDNISONE 20 MG: 20 TABLET ORAL at 09:06

## 2025-06-26 RX ADMIN — NICOTINE 1 PATCH: 21 PATCH, EXTENDED RELEASE TRANSDERMAL at 09:08

## 2025-06-26 RX ADMIN — ARFORMOTEROL TARTRATE 15 MCG: 15 SOLUTION RESPIRATORY (INHALATION) at 07:26

## 2025-06-26 RX ADMIN — FUROSEMIDE 40 MG: 10 INJECTION, SOLUTION INTRAMUSCULAR; INTRAVENOUS at 21:58

## 2025-06-26 RX ADMIN — METOPROLOL TARTRATE 25 MG: 25 TABLET, FILM COATED ORAL at 21:58

## 2025-06-26 RX ADMIN — DILTIAZEM HYDROCHLORIDE 240 MG: 240 CAPSULE, COATED, EXTENDED RELEASE ORAL at 09:07

## 2025-06-26 RX ADMIN — INSULIN LISPRO 8 UNITS: 100 INJECTION, SOLUTION INTRAVENOUS; SUBCUTANEOUS at 21:02

## 2025-06-26 RX ADMIN — BUDESONIDE INHALATION 0.5 MG: 0.5 SUSPENSION RESPIRATORY (INHALATION) at 07:26

## 2025-06-26 RX ADMIN — METOLAZONE 2.5 MG: 2.5 TABLET ORAL at 09:07

## 2025-06-26 NOTE — PLAN OF CARE
Goal Outcome Evaluation:         Pleasant and cooperative with care. Diet changed to healthy hear. He is continuing to ask for snacks; healthier choices provided. Monitoring blood sugars. Up in chair throughout the shift. VSS. Able to make needs known. Call light within reach.

## 2025-06-26 NOTE — THERAPY TREATMENT NOTE
"Subjective: Pt sitting in recliner chair upon arrival. Agreeable to PT session.     Objective:     Bed mobility - N/A or Not attempted.    Transfers - SBA and with rolling walker    Ambulation - 40 feet CGA and with rolling walker. Exertional dyspnea following ambulation. Desats to 87% on room air, with quick recovery >90% during seated rest.     Vitals: Desaturates on room air with activity. WNL at rest on room air.     Pain: 0 VAS Location:   Intervention for pain: N/A    Education: Provided education on the importance of mobility in the acute care setting, Verbal/Tactile Cues, Transfer Training, and Gait Training    Assessment: Santos Sullivan presents with functional mobility impairments which indicate the need for skilled intervention. Tolerating session today without incident. Will continue to follow and progress as tolerated.     Plan/Recommendations:   If medically appropriate, Moderate Intensity Therapy recommended post-acute care. This is recommended as therapy feels the patient would require 3-4 days per week and wouldn't tolerate \"3 hour daily\" rehab intensity. SNF would be the preferred choice. If the patient does not agree to SNF, arrange HH or OP depending on home bound status. If patient is medically complex, consider LTACH. Pt requires no DME at discharge.     Pt desires Skilled Rehab placement at discharge. Pt cooperative; agreeable to therapeutic recommendations and plan of care.     Post-Tx Position: Up in Chair, Alarms activated, and Call light and personal items within reach  PPE: gloves    Therapy Charges for Today       Code Description Service Date Service Provider Modifiers Qty    29988065563 HC PT THERAPEUTIC ACT EA 15 MIN 6/25/2025 Vishal Gauthier, PTA GP 1    [id number] HC GAIT TRAINING EA 15 MIN 6/25/2025 Vishal Gauthier, PTA GP 1    [id number] HC PT THERAPEUTIC ACT EA 15 MIN 6/26/2025 Vishal Gauthier, PTA GP 1    [id number] HC GAIT TRAINING EA 15 MIN " 6/26/2025 Vishal Gauthier PTA GP 1           PT Charges       Row Name 06/26/25 1345             Time Calculation    Start Time 1318  -UN      Stop Time 1334  -UN      Time Calculation (min) 16 min  -UN      PT Received On 06/26/25  -UN      PT - Next Appointment 06/29/25  -UN         Time Calculation- PT    Total Timed Code Minutes- PT 16 minute(s)  -UN                User Key  (r) = Recorded By, (t) = Taken By, (c) = Cosigned By      Initials Name Provider Type    UN Vishal Gauthier PTA Physical Therapist Assistant

## 2025-06-26 NOTE — CONSULTS
Received call from RN about pt getting up to 90g of carbohydrates on a consistent carbohydrate diet. Discussed removal of diet restriction for now as pt will not likely order over 90g of carbohydrates. If he does and blood glucoses do not remain stable, recommend add back restriction.

## 2025-06-26 NOTE — PROGRESS NOTES
CARDIOLOGY FOLLOW-UP PROGRESS NOTE      Reason for follow-up:    SOA  Paroxysmal atrial flutter  CAD/PCI  COPD  Diastolic Heart failure     Attending: Dawood Lombardo DO Subjective .     Reports SOA with exertion       Review of Systems   Constitutional: Negative for chills and fever.   HENT:  Negative for ear discharge and nosebleeds.    Eyes:  Negative for discharge and redness.   Cardiovascular:  Positive for leg swelling. Negative for chest pain, orthopnea, palpitations, paroxysmal nocturnal dyspnea and syncope.   Respiratory:  Positive for shortness of breath. Negative for cough and wheezing.    Endocrine: Negative for heat intolerance.   Skin:  Negative for rash.   Musculoskeletal:  Negative for arthritis and myalgias.   Gastrointestinal:  Negative for abdominal pain, melena, nausea and vomiting.   Genitourinary:  Negative for dysuria and hematuria.   Neurological:  Negative for dizziness, light-headedness, numbness and tremors.   Psychiatric/Behavioral:  Negative for depression. The patient is not nervous/anxious.      Pertinent items are noted in HPI, all other systems reviewed and negative  Allergies: Patient has no known allergies.    Scheduled Meds:arformoterol, 15 mcg, Nebulization, BID - RT  aspirin, 81 mg, Oral, Daily  budesonide, 0.5 mg, Nebulization, BID - RT  clopidogrel, 75 mg, Oral, Daily  dilTIAZem CD, 240 mg, Oral, Daily  furosemide, 40 mg, Intravenous, BID  guaiFENesin, 1,200 mg, Oral, Q12H  insulin glargine, 20 Units, Subcutaneous, Nightly  insulin lispro, 4-24 Units, Subcutaneous, 4x Daily AC & at Bedtime  ipratropium-albuterol, 3 mL, Nebulization, Q4H - RT  losartan, 50 mg, Oral, Daily  metOLazone, 2.5 mg, Oral, Daily  metoprolol tartrate, 25 mg, Oral, Q12H  nicotine, 1 patch, Transdermal, Daily  predniSONE, 20 mg, Oral, Daily   Followed by  [START ON 6/28/2025] predniSONE, 10 mg, Oral, Daily  revefenacin, 175 mcg, Nebulization, Daily - RT  rosuvastatin, 20 mg, Oral, Daily  sodium  "chloride, 10 mL, Intravenous, Q12H  spironolactone, 25 mg, Oral, Daily        Continuous Infusions:     PRN Meds:.  acetaminophen **OR** acetaminophen **OR** acetaminophen    aluminum-magnesium hydroxide-simethicone    senna-docusate sodium **AND** polyethylene glycol **AND** bisacodyl **AND** bisacodyl    Calcium Replacement - Follow Nurse / BPA Driven Protocol    dextrose    dextrose    glucagon (human recombinant)    Magnesium Standard Dose Replacement - Follow Nurse / BPA Driven Protocol    melatonin    metoprolol tartrate    nitroglycerin    ondansetron ODT **OR** ondansetron    Phosphorus Replacement - Follow Nurse / BPA Driven Protocol    Potassium Replacement - Follow Nurse / BPA Driven Protocol    sodium chloride    sodium chloride    sodium chloride    Objective     VITAL SIGNS  Patient Vitals for the past 24 hrs:   BP Temp Temp src Pulse Resp SpO2 Weight   06/26/25 0906 120/74 -- -- 94 -- -- --   06/26/25 0746 109/73 97.9 °F (36.6 °C) Oral 88 16 98 % --   06/26/25 0737 -- -- -- 90 22 93 % --   06/26/25 0734 -- -- -- 89 22 93 % --   06/26/25 0733 -- -- -- 87 19 93 % --   06/26/25 0730 -- -- -- 89 16 93 % --   06/26/25 0729 -- -- -- 90 14 93 % --   06/26/25 0726 -- -- -- 88 20 93 % --   06/26/25 0418 137/77 97.9 °F (36.6 °C) Oral 88 11 99 % (!) 138 kg (304 lb)   06/26/25 0329 -- -- -- 77 22 100 % --   06/26/25 0325 -- -- -- 77 20 100 % --   06/26/25 0045 123/67 98.5 °F (36.9 °C) Oral 75 20 98 % --   06/26/25 0040 -- -- -- 76 20 90 % --   06/25/25 1900 125/73 98.6 °F (37 °C) Oral 86 17 94 % --   06/25/25 1555 128/72 98.2 °F (36.8 °C) Oral 89 18 94 % --   06/25/25 1138 110/65 98.5 °F (36.9 °C) Oral 89 15 91 % --   06/25/25 1100 -- -- -- 99 -- (!) 85 % --        Flowsheet Rows      Flowsheet Row First Filed Value   Admission Height 177.8 cm (70\") Documented at 06/22/2025 0055   Admission Weight 131 kg (288 lb 12.8 oz) Documented at 06/22/2025 0231            Body mass index is 43.62 kg/m².      Intake/Output " Summary (Last 24 hours) at 6/26/2025 1004  Last data filed at 6/26/2025 0956  Gross per 24 hour   Intake 780 ml   Output 3375 ml   Net -2595 ml        TELEMETRY:     Sinus Rhythm    Physical Exam:  Constitutional:       Appearance: Well-developed.   Eyes:      General: No scleral icterus.        Right eye: No discharge.   HENT:      Head: Normocephalic and atraumatic.   Neck:      Thyroid: No thyromegaly.      Lymphadenopathy: No cervical adenopathy.   Pulmonary:      Effort: Pulmonary effort is normal. No respiratory distress.      Breath sounds: Normal breath sounds. No wheezing. No rales.   Cardiovascular:      Normal rate. Regular rhythm.      No gallop.    Edema:     Peripheral edema present.  Abdominal:      Tenderness: There is no abdominal tenderness.   Skin:     Findings: No erythema or rash.   Neurological:      Mental Status: Alert and oriented to person, place, and time.            Results Review:   I reviewed the patient's new clinical results.    CBC    Results from last 7 days   Lab Units 06/26/25  0509 06/25/25  1625 06/24/25  0330 06/23/25  0203 06/22/25  0151   WBC 10*3/mm3 13.65* 14.18* 16.30* 15.31* 13.96*   HEMOGLOBIN g/dL 14.5 14.1 14.6 13.6 14.1   PLATELETS 10*3/mm3 195 209 231 231 232     BMP   Results from last 7 days   Lab Units 06/26/25  0326 06/25/25  1625 06/24/25  0330 06/23/25  0203 06/22/25  0151   SODIUM mmol/L 134* 131* 134* 136 141   POTASSIUM mmol/L 4.2 5.2 4.4 5.0 5.0   CHLORIDE mmol/L 95* 95* 95* 99 102   CO2 mmol/L 28.6 25.4 28.5 28.3 25.6   BUN mg/dL 45.1* 50.9* 44.6* 42.1* 32.2*   CREATININE mg/dL 1.09 1.32* 1.10 1.04 1.00   GLUCOSE mg/dL 72 327* 265* 248* 243*   MAGNESIUM mg/dL 2.5*  --  2.5*  --   --      Cr Clearance Estimated Creatinine Clearance: 90.8 mL/min (by C-G formula based on SCr of 1.09 mg/dL).  Coag     HbA1C   Lab Results   Component Value Date    HGBA1C 9.31 (H) 06/23/2025    HGBA1C 6.8 (H) 05/05/2022    HGBA1C 6.4 (H) 06/12/2021     Blood Glucose   Glucose    Date/Time Value Ref Range Status   06/26/2025 0741 104 70 - 105 mg/dL Final     Comment:     Serial Number: [id number]:  [id number]   06/25/2025 2101 266 (H) 70 - 105 mg/dL Final     Comment:     Serial Number: [id number]:  [id number]   06/25/2025 1559 327 (H) 70 - 105 mg/dL Final     Comment:     Serial Number: [id number]:  [id number]   06/25/2025 1133 232 (H) 70 - 105 mg/dL Final     Comment:     Serial Number: [id number]:  [id number]   06/25/2025 0733 296 (H) 70 - 105 mg/dL Final     Comment:     Serial Number: [id number]:  [id number]   06/24/2025 1929 171 (H) 70 - 105 mg/dL Final     Comment:     Serial Number: [id number]:  [id number]   06/24/2025 1702 368 (H) 70 - 105 mg/dL Final     Comment:     Serial Number: [id number]:  [id number]   06/24/2025 1223 237 (H) 70 - 105 mg/dL Final     Comment:     Serial Number: [id number]:  [id number]     Infection   Results from last 7 days   Lab Units 06/22/25  0337 06/22/25  0151   BLOODCX  No growth at 4 days No growth at 4 days   PROCALCITONIN ng/mL  --  0.06     CMP   Results from last 7 days   Lab Units 06/26/25  0326 06/25/25  1625 06/24/25  0330 06/23/25  0203 06/22/25  0151   SODIUM mmol/L 134* 131* 134* 136 141   POTASSIUM mmol/L 4.2 5.2 4.4 5.0 5.0   CHLORIDE mmol/L 95* 95* 95* 99 102   CO2 mmol/L 28.6 25.4 28.5 28.3 25.6   BUN mg/dL 45.1* 50.9* 44.6* 42.1* 32.2*   CREATININE mg/dL 1.09 1.32* 1.10 1.04 1.00   GLUCOSE mg/dL 72 327* 265* 248* 243*   ALBUMIN g/dL  --   --   --   --  4.0   BILIRUBIN mg/dL  --   --   --   --  0.2   ALK PHOS U/L  --   --   --   --  55   AST (SGOT) U/L  --   --   --   --  16   ALT (SGPT) U/L  --   --   --   --  43*     ABG    Results from last 7 days   Lab Units 06/22/25  0318 06/22/25  0145   PH, ARTERIAL pH units 7.359 7.312*   PCO2, ARTERIAL mm Hg 60.4* 62.0*   PO2 ART mm Hg 90.2 77.0*   O2 SATURATION ART % 96.3 93.4*   BASE EXCESS ART mmol/L 6.4* 3.2*     UA      REZA  No results found  "for: \"POCMETH\", \"POCAMPHET\", \"POCBARBITUR\", \"POCBENZO\", \"POCCOCAINE\", \"POCOPIATES\", \"POCOXYCODO\", \"POCPHENCYC\", \"POCPROPOXY\", \"POCTHC\", \"POCTRICYC\"  Lysis Labs   Results from last 7 days   Lab Units 06/26/25  0509 06/26/25  0326 06/25/25  1625 06/24/25  0330 06/23/25  0203 06/22/25  0151   HEMOGLOBIN g/dL 14.5  --  14.1 14.6 13.6 14.1   PLATELETS 10*3/mm3 195  --  209 231 231 232   CREATININE mg/dL  --  1.09 1.32* 1.10 1.04 1.00     Radiology(recent) No radiology results for the last day    Imaging Results (Last 24 Hours)       ** No results found for the last 24 hours. **            Results from last 7 days   Lab Units 06/22/25  0323   HSTROP T ng/L 33*       EKG          I personally viewed and interpreted the patient's EKG/Telemetry data:        ECHOCARDIOGRAM:     Results for orders placed during the hospital encounter of 06/22/25    Adult Transthoracic Echo Complete W/ Cont if Necessary Per Protocol    Interpretation Summary    Left ventricular systolic function is normal. Calculated left ventricular EF = 64% Left ventricular ejection fraction appears to be 61 - 65%.    Left ventricular diastolic function was normal.        STRESS MYOVIEW:      CARDIAC CATHETERIZATION:      OTHER:         Assessment & Plan            Acute respiratory failure with hypoxia and hypercapnia    Essential hypertension    Chronic obstructive pulmonary disease    Coronary angioplasty status    Chronic hepatitis C    Overweight    Tobacco dependence syndrome    Mixed hyperlipidemia    Acute on chronic diastolic heart failure    Acute hypoxic respiratory failure        ASSESSMENT:    Acute respiratory failure with hypoxia and hypercapnia/COPD  - initial ABG pH 7.31, PCO2 62, pO2 75, HCO3 31.4  - Placed on Bipap with repeat ABG 7.359, CO2 60.4, pO2 90.2, HCO3 34.1  - Currently on 2 L nasal cannula, wears 3 L home O2  - WBC 13.96  - RVP negative  - CXR showed no clearly acute findings in the chest. Chronic changes as above including " cardiomegaly  - pulmonary following     Hx diastolic CHF   - Repeat echo EF 60-65%; normal diastolic function, no significant VHD  - proBNP 175   - bilateral lower extremity edema  - Bilateral lower extremities wrapped with Ace  - lasix 80mg IV x1 given in the ER, continue lasix 40mg q12 hours      CAD s/p PCI  - HS troponin 36 with repeat 33  - Continue aspirin Plavix statin  -denies chest pain     Hypertension  - Continue losartan Cardizem     Hyperlipidemia  - cont home aspirin, plavix, statin      DM type II  - Management per primary     Tobacco abuse  - encourage cessation  - nicotine patch      Morbid obesity  - BMI 41    PLAN:    BUN/Cr trending down 45/1.0  On NC 02  LE wrapped  Hemodynamics stable, SR on monitor  Continue supportive care         Additional recommendations per Dr. Major       Patient is seen and examined and findings are verified.  All data is reviewed by me personally.  Assessment and plan formulated by APC was done after discussion with attending.  I spent more than 50% of time in taking care of the patient.    Patient is feeling much better today.  Leg edema has improved considerably.  But still here.    Hemodynamics are stable.  Tachycardia has improved    Normal S1 and S2.  Decreased breath sound at the bases.  Expiratory rhonchi it has improved considerably.  Leg edema present abdomen is obese    Patient creatinine is stable.  Continue to diurese.  Patient has diuresed more than 4000 overnight.  I would continue diuresis and probably considers cutting back on metolazone tomorrow.    I am pleased with the patient progress.    Electronically signed by Ian Major MD, 06/27/25, 6:38 AM EDT.      I discussed the patient's findings and my recommendations with patient and RN                  Electronically signed by LILIAN Peng, 06/26/25, 10:04 AM EDT.          LILIAN Peng  06/26/25  10:04 EDT

## 2025-06-26 NOTE — PROGRESS NOTES
Daily Progress Note          Assessment    Acute respiratory failure with hypoxia and hypercapnia: ABGs on admission 7.3 1/62/75 while on nasal cannula, repeated on BiPAP 7.35/60.4/90.2  Acute bronchitis due to unspecified pathogen, negative respiratory panel     Acute on chronic HFpEF  COPD without acute exacerbation  CAD status post PCI  HTN  HLD  DM  Chronic hepatitis C  Tobacco smoking  Obesity with BMI of 42 I     PFTs 2022: Moderate obstruction with air trapping and decreased oxygen diffusion capacity, FEV1/FVC 65%, FVC 72%, FEV1 63%, %, %, DLCO 58%           Recommendations:  The respiratory status is stable, patient can be discharged from pulmonary standpoint to home with outpatient home health or inpatient rehab       Follow-up in the office in 3 weeks, he will consider home sleep study to rule out SHYAM     tapering dose prednisone     Symbicort  Mucinex  Oxygen supplement and titration to maintain saturation 90 to 95% currently on 2-3 L per nasal cannula alternating with room air     bronchodilators     Smoking cessation counseling, nicotine patch  Aspirin, Plavix, Crestor  Diltiazem, losartan  Insulin Lantus and sliding scale     I personally reviewed the radiological studies             LOS: 4 days     Subjective     Less cough and shortness of breath    Objective     Vital signs for last 24 hours:  Vitals:    06/26/25 0906 06/26/25 1122 06/26/25 1159 06/26/25 1202   BP: 120/74 129/84     BP Location:  Right arm     Patient Position:  Sitting     Pulse: 94 88 86 85   Resp:  17 20 24   Temp:  98 °F (36.7 °C)     TempSrc:  Oral     SpO2:  97% 97% 97%   Weight:       Height:           Intake/Output last 3 shifts:  I/O last 3 completed shifts:  In: 1200 [P.O.:1200]  Out: 4325 [Urine:4325]  Intake/Output this shift:  I/O this shift:  In: 300 [P.O.:300]  Out: 1750 [Urine:1750]      Radiology  Imaging Results (Last 24 Hours)       ** No results found for the last 24 hours. **             Labs:  Results from last 7 days   Lab Units 06/26/25  0509   WBC 10*3/mm3 13.65*   HEMOGLOBIN g/dL 14.5   HEMATOCRIT % 45.7   PLATELETS 10*3/mm3 195     Results from last 7 days   Lab Units 06/26/25  0326 06/23/25  0203 06/22/25  0151   SODIUM mmol/L 134*   < > 141   POTASSIUM mmol/L 4.2   < > 5.0   CHLORIDE mmol/L 95*   < > 102   CO2 mmol/L 28.6   < > 25.6   BUN mg/dL 45.1*   < > 32.2*   CREATININE mg/dL 1.09   < > 1.00   CALCIUM mg/dL 8.6   < > 8.7   BILIRUBIN mg/dL  --   --  0.2   ALK PHOS U/L  --   --  55   ALT (SGPT) U/L  --   --  43*   AST (SGOT) U/L  --   --  16   GLUCOSE mg/dL 72   < > 243*    < > = values in this interval not displayed.     Results from last 7 days   Lab Units 06/22/25  0318   PH, ARTERIAL pH units 7.359   PO2 ART mm Hg 90.2   PCO2, ARTERIAL mm Hg 60.4*   HCO3 ART mmol/L 34.1*     Results from last 7 days   Lab Units 06/22/25  0151   ALBUMIN g/dL 4.0     Results from last 7 days   Lab Units 06/22/25  0323 06/22/25  0151   HSTROP T ng/L 33* 36*         Results from last 7 days   Lab Units 06/26/25  0326   MAGNESIUM mg/dL 2.5*                   Meds:   SCHEDULE  arformoterol, 15 mcg, Nebulization, BID - RT  aspirin, 81 mg, Oral, Daily  budesonide, 0.5 mg, Nebulization, BID - RT  clopidogrel, 75 mg, Oral, Daily  dilTIAZem CD, 240 mg, Oral, Daily  furosemide, 40 mg, Intravenous, BID  guaiFENesin, 1,200 mg, Oral, Q12H  insulin glargine, 20 Units, Subcutaneous, Nightly  insulin lispro, 4-24 Units, Subcutaneous, 4x Daily AC & at Bedtime  ipratropium-albuterol, 3 mL, Nebulization, Q4H - RT  losartan, 50 mg, Oral, Daily  metOLazone, 2.5 mg, Oral, Daily  metoprolol tartrate, 25 mg, Oral, Q12H  nicotine, 1 patch, Transdermal, Daily  predniSONE, 20 mg, Oral, Daily   Followed by  [START ON 6/28/2025] predniSONE, 10 mg, Oral, Daily  revefenacin, 175 mcg, Nebulization, Daily - RT  rosuvastatin, 20 mg, Oral, Daily  sodium chloride, 10 mL, Intravenous, Q12H  spironolactone, 25 mg, Oral,  Daily      Infusions     PRNs    acetaminophen **OR** acetaminophen **OR** acetaminophen    aluminum-magnesium hydroxide-simethicone    senna-docusate sodium **AND** polyethylene glycol **AND** bisacodyl **AND** bisacodyl    Calcium Replacement - Follow Nurse / BPA Driven Protocol    dextrose    dextrose    glucagon (human recombinant)    Magnesium Standard Dose Replacement - Follow Nurse / BPA Driven Protocol    melatonin    metoprolol tartrate    nitroglycerin    ondansetron ODT **OR** ondansetron    Phosphorus Replacement - Follow Nurse / BPA Driven Protocol    Potassium Replacement - Follow Nurse / BPA Driven Protocol    sodium chloride    sodium chloride    sodium chloride    Physical Exam:  General Appearance:  Alert   HEENT:  Normocephalic, without obvious abnormality, Conjunctiva/corneas clear,.   Nares normal, no drainage     Neck:  Supple, symmetrical, trachea midline.   Lungs /Chest wall: Mild bilateral basal rhonchi, respirations unlabored, symmetrical wall movement.     Heart:  Regular rate and rhythm, S1 S2 normal  Abdomen: Soft, non-tender, no masses, no organomegaly.    Extremities: + Edema, no clubbing or cyanosis     ROS  Constitutional: Negative for chills, fever and malaise/fatigue.   HENT: Negative.    Eyes: Negative.    Cardiovascular: Negative.    Respiratory: Positive for cough and shortness of breath.    Skin: Negative.    Musculoskeletal: Negative.    Gastrointestinal: Negative.    Genitourinary: Negative.    Neurological: Generalized weakness        I reviewed the recent clinical results  I personally reviewed the latest radiological studies    Part of this note may be an electronic transcription/translation of spoken language to printed text using the Dragon Dictation System.

## 2025-06-26 NOTE — PROGRESS NOTES
[facility] MEDICINE SERVICE  DAILY PROGRESS NOTE    NAME: Santos Sullivan  : 1955  MRN: [id number]      LOS: 4 days     PROVIDER OF SERVICE: Dawood Lombardo DO    Chief Complaint: Acute respiratory failure with hypoxia and hypercapnia    Subjective:     Interval History:  History taken from: patient      Patient seen and examined this morning.  Doing well this morning, continues to diurese well.  Lower extremity edema improving.  Insulin dosing adjusted as steroids being weaned down.    Review of Systems:   Pertinent positives as noted in HPI/subjective.  All other systems were reviewed and are negative.    Objective:     Vital Signs  Temp:  [97.9 °F (36.6 °C)-98.6 °F (37 °C)] 97.9 °F (36.6 °C)  Heart Rate:  [75-99] 94  Resp:  [11-22] 16  BP: (109-137)/(65-77) 120/74  Flow (L/min) (Oxygen Therapy):  [2-4] 4   Body mass index is 43.62 kg/m².    Physical Exam  General: Awake, alert, morbidly obese male, NAD  Eyes: PERRL, EOMI, conjunctivae are clear  Cardiovascular: Regular rate and rhythm, no murmurs  Respiratory: decreased breath sounds at the base bilaterally, no wheezing or rales, unlabored breathing  Abdomen: Soft, nontender, positive bowel sounds, no guarding  Skin: Warm, moderate to severe bilateral lower extremity pitting edema noted, slowly improving         Diagnostic Data    Results from last 7 days   Lab Units 25  0509 25  0326 25  0203 25  0151   WBC 10*3/mm3 13.65*  --    < > 13.96*   HEMOGLOBIN g/dL 14.5  --    < > 14.1   HEMATOCRIT % 45.7  --    < > 47.1   PLATELETS 10*3/mm3 195  --    < > 232   GLUCOSE mg/dL  --  72   < > 243*   CREATININE mg/dL  --  1.09   < > 1.00   BUN mg/dL  --  45.1*   < > 32.2*   SODIUM mmol/L  --  134*   < > 141   POTASSIUM mmol/L  --  4.2   < > 5.0   AST (SGOT) U/L  --   --   --  16   ALT (SGPT) U/L  --   --   --  43*   ALK PHOS U/L  --   --   --  55   BILIRUBIN mg/dL  --   --   --  0.2   ANION GAP mmol/L  --  10.4   < > 13.4    < > = values  in this interval not displayed.       No radiology results for the last day        I reviewed the patient's new clinical results.    Assessment/Plan:     Active and Resolved Problems  Active Hospital Problems    Diagnosis  POA    **Acute respiratory failure with hypoxia and hypercapnia [J96.01, J96.02]  Yes    Acute on chronic diastolic heart failure [I50.33]  Yes    Acute hypoxic respiratory failure [J96.01]  Yes    Mixed hyperlipidemia [E78.2]  Yes    Essential hypertension [I10]  Yes    Overweight [E66.3]  Yes    Chronic hepatitis C [B18.2]  Yes    Coronary angioplasty status [Z98.61]  Not Applicable    Chronic obstructive pulmonary disease [J44.9]  Yes    Tobacco dependence syndrome [F17.200]  Yes      Resolved Hospital Problems   No resolved problems to display.   Patient is 69-year-old male on home oxygen 3 LPM who presented with shortness of breath cough and wheezing as well as fatigue.  Patient required to be on BiPAP in the ER    Acute hypoxic and hypercapnic respiratory failure  Acute exacerbation of HFpEF  Volume overload with bilateral lower extremity edema  COPD with mild exacerbation  - ABGs and imaging reports reviewed  - Respiratory status improving, down to nasal cannula now  - Echo this admission showed preserved EF   - patient remains volume overloaded  - Continue diuresis with IV Lasix, metolazone, monitor I's and O's  - Continue taper steroid dosing, continue bronchodilators  - Pulmonology recs appreciated, will need outpatient sleep study to rule out SHYAM    New onset A-fib/flutter  - Noted on telemetry on 6/24, did have some tachycardia which improved  - Spontaneously converted to NSR currently  - Continue home diltiazem, metoprolol added  - Further management per cardiology     CAD s/p PCI  Hypertension  Hyperlipidemia  - cont home meds as tolerated, monitor    DM type II with hyperglycemia  - Hemoglobin A1c is 9.3  - Continue basal insulin, SSI, dosing adjusted as steroid being tapered  down  - Monitor blood glucose and adjust insulin therapy as needed    Tobacco abuse  - encourage cessation  - nicotine patch      Morbid obesity  - BMI 41  - complicates all aspects of care    VTE Prophylaxis:  Mechanical VTE prophylaxis orders are present.    Disposition Planning:     Barriers to Discharge: Volume overload, respiratory failure  Anticipated Date of Discharge: 6/28  Place of Discharge: Rehab    Code Status and Medical Interventions: CPR (Attempt to Resuscitate); Full Support   Ordered at: 06/22/25 0543     Code Status (Patient has no pulse and is not breathing):    CPR (Attempt to Resuscitate)     Medical Interventions (Patient has pulse or is breathing):    Full Support     Level Of Support Discussed With:    Patient       Signature: Electronically signed by Dawood Lombardo DO, 06/26/25, 10:20 EDT.  [facility] Hospitalist Team     Part of this note may be an electronic transcription/translation of spoken language to printed text using the Dragon Dictation System.

## 2025-06-26 NOTE — CASE MANAGEMENT/SOCIAL WORK
Continued Stay Note  [facility]     Patient Name: Santos Sullivan  MRN: [id number]  Today's Date: 6/26/2025    Admit Date: 6/22/2025    Plan: [facility] accepted. Precert approved (valid 6/25-7/1), ZAKI approved. From home alone. Current home O2 2-3L through [employer].   Discharge Plan       Row Name 06/26/25 1235       Plan    Plan Comments CM provided clinical update to [facility] liaison. Barrier to D/C: IV lasix.                      Expected Discharge Date and Time       Expected Discharge Date Expected Discharge Time    Jun 28, 2025           Phone communication or documentation only - no physical contact with patient or family.     JENNY OharaN, [place] [address]    Office: [phone]  Fax: [phone]

## 2025-06-26 NOTE — CASE MANAGEMENT/SOCIAL WORK
Continued Stay Note  [facility]     Patient Name: Santos Sullivan  MRN: [id number]  Today's Date: 6/26/2025    Admit Date: 6/22/2025    Plan: [facility] accepted. Precert approved (valid 6/25-7/1), ZAKI approved. From home alone. Current home O2 2-3L through [employer].   Discharge Plan       Row Name 06/26/25 1235       Plan    Plan Comments CM provided clinical update to [facility] liaison. Barrier to D/C: IV lasix.                      Expected Discharge Date and Time       Expected Discharge Date Expected Discharge Time    Jun 28, 2025           Phone communication or documentation only - no physical contact with patient or family.     JENNY OharaN, RN, CCM    [place] [address]    Office: [phone]  Fax: [phone]        Stable

## 2025-06-27 LAB
ANION GAP SERPL CALCULATED.3IONS-SCNC: 12.3 MMOL/L (ref 5–15)
BACTERIA SPEC AEROBE CULT: NORMAL
BACTERIA SPEC AEROBE CULT: NORMAL
BASOPHILS # BLD AUTO: 0.11 10*3/MM3 (ref 0–0.2)
BASOPHILS NFR BLD AUTO: 0.8 % (ref 0–1.5)
BUN SERPL-MCNC: 52.7 MG/DL (ref 8–23)
BUN/CREAT SERPL: 38.5 (ref 7–25)
CALCIUM SPEC-SCNC: 9 MG/DL (ref 8.6–10.5)
CHLORIDE SERPL-SCNC: 91 MMOL/L (ref 98–107)
CO2 SERPL-SCNC: 29.7 MMOL/L (ref 22–29)
CREAT SERPL-MCNC: 1.37 MG/DL (ref 0.76–1.27)
DEPRECATED RDW RBC AUTO: 51.8 FL (ref 37–54)
EGFRCR SERPLBLD CKD-EPI 2021: 56.2 ML/MIN/1.73
EOSINOPHIL # BLD AUTO: 0.21 10*3/MM3 (ref 0–0.4)
EOSINOPHIL NFR BLD AUTO: 1.6 % (ref 0.3–6.2)
ERYTHROCYTE [DISTWIDTH] IN BLOOD BY AUTOMATED COUNT: 14.8 % (ref 12.3–15.4)
GLUCOSE BLDC GLUCOMTR-MCNC: 178 MG/DL (ref 70–105)
GLUCOSE BLDC GLUCOMTR-MCNC: 201 MG/DL (ref 70–105)
GLUCOSE BLDC GLUCOMTR-MCNC: 323 MG/DL (ref 70–105)
GLUCOSE BLDC GLUCOMTR-MCNC: 379 MG/DL (ref 70–105)
GLUCOSE BLDC GLUCOMTR-MCNC: 394 MG/DL (ref 70–105)
GLUCOSE SERPL-MCNC: 233 MG/DL (ref 65–99)
HCT VFR BLD AUTO: 46.6 % (ref 37.5–51)
HGB BLD-MCNC: 14.9 G/DL (ref 13–17.7)
IMM GRANULOCYTES # BLD AUTO: 0.68 10*3/MM3 (ref 0–0.05)
IMM GRANULOCYTES NFR BLD AUTO: 5.1 % (ref 0–0.5)
LYMPHOCYTES # BLD AUTO: 1.53 10*3/MM3 (ref 0.7–3.1)
LYMPHOCYTES NFR BLD AUTO: 11.5 % (ref 19.6–45.3)
MAGNESIUM SERPL-MCNC: 2.3 MG/DL (ref 1.6–2.4)
MCH RBC QN AUTO: 30.2 PG (ref 26.6–33)
MCHC RBC AUTO-ENTMCNC: 32 G/DL (ref 31.5–35.7)
MCV RBC AUTO: 94.5 FL (ref 79–97)
MONOCYTES # BLD AUTO: 0.93 10*3/MM3 (ref 0.1–0.9)
MONOCYTES NFR BLD AUTO: 7 % (ref 5–12)
NEUTROPHILS NFR BLD AUTO: 74 % (ref 42.7–76)
NEUTROPHILS NFR BLD AUTO: 9.8 10*3/MM3 (ref 1.7–7)
NRBC BLD AUTO-RTO: 0 /100 WBC (ref 0–0.2)
PLATELET # BLD AUTO: 201 10*3/MM3 (ref 140–450)
PMV BLD AUTO: 9.7 FL (ref 6–12)
POTASSIUM SERPL-SCNC: 4.2 MMOL/L (ref 3.5–5.2)
RBC # BLD AUTO: 4.93 10*6/MM3 (ref 4.14–5.8)
SODIUM SERPL-SCNC: 133 MMOL/L (ref 136–145)
WBC NRBC COR # BLD AUTO: 13.26 10*3/MM3 (ref 3.4–10.8)

## 2025-06-27 PROCEDURE — 82948 REAGENT STRIP/BLOOD GLUCOSE: CPT | Performed by: STUDENT IN AN ORGANIZED HEALTH CARE EDUCATION/TRAINING PROGRAM

## 2025-06-27 PROCEDURE — 85025 COMPLETE CBC W/AUTO DIFF WBC: CPT | Performed by: NURSE PRACTITIONER

## 2025-06-27 PROCEDURE — 99232 SBSQ HOSP IP/OBS MODERATE 35: CPT | Performed by: INTERNAL MEDICINE

## 2025-06-27 PROCEDURE — 94761 N-INVAS EAR/PLS OXIMETRY MLT: CPT

## 2025-06-27 PROCEDURE — 94664 DEMO&/EVAL PT USE INHALER: CPT

## 2025-06-27 PROCEDURE — 63710000001 INSULIN GLARGINE PER 5 UNITS: Performed by: INTERNAL MEDICINE

## 2025-06-27 PROCEDURE — 80048 BASIC METABOLIC PNL TOTAL CA: CPT | Performed by: NURSE PRACTITIONER

## 2025-06-27 PROCEDURE — 25010000002 FUROSEMIDE PER 20 MG: Performed by: INTERNAL MEDICINE

## 2025-06-27 PROCEDURE — 82948 REAGENT STRIP/BLOOD GLUCOSE: CPT

## 2025-06-27 PROCEDURE — 63710000001 INSULIN LISPRO (HUMAN) PER 5 UNITS: Performed by: STUDENT IN AN ORGANIZED HEALTH CARE EDUCATION/TRAINING PROGRAM

## 2025-06-27 PROCEDURE — 94799 UNLISTED PULMONARY SVC/PX: CPT

## 2025-06-27 PROCEDURE — 83735 ASSAY OF MAGNESIUM: CPT | Performed by: INTERNAL MEDICINE

## 2025-06-27 PROCEDURE — 63710000001 REVEFENACIN 175 MCG/3ML SOLUTION: Performed by: INTERNAL MEDICINE

## 2025-06-27 PROCEDURE — 63710000001 PREDNISONE PER 1 MG: Performed by: INTERNAL MEDICINE

## 2025-06-27 RX ORDER — LOSARTAN POTASSIUM 25 MG/1
25 TABLET ORAL DAILY
Status: DISCONTINUED | OUTPATIENT
Start: 2025-06-28 | End: 2025-06-28

## 2025-06-27 RX ORDER — TORSEMIDE 10 MG/1
40 TABLET ORAL DAILY
Status: DISCONTINUED | OUTPATIENT
Start: 2025-06-28 | End: 2025-07-01

## 2025-06-27 RX ADMIN — INSULIN LISPRO 4 UNITS: 100 INJECTION, SOLUTION INTRAVENOUS; SUBCUTANEOUS at 08:33

## 2025-06-27 RX ADMIN — ARFORMOTEROL TARTRATE 15 MCG: 15 SOLUTION RESPIRATORY (INHALATION) at 08:12

## 2025-06-27 RX ADMIN — BUDESONIDE INHALATION 0.5 MG: 0.5 SUSPENSION RESPIRATORY (INHALATION) at 19:15

## 2025-06-27 RX ADMIN — ASPIRIN 81 MG: 81 TABLET, COATED ORAL at 08:31

## 2025-06-27 RX ADMIN — IPRATROPIUM BROMIDE AND ALBUTEROL SULFATE 3 ML: .5; 3 SOLUTION RESPIRATORY (INHALATION) at 03:00

## 2025-06-27 RX ADMIN — INSULIN LISPRO 8 UNITS: 100 INJECTION, SOLUTION INTRAVENOUS; SUBCUTANEOUS at 21:57

## 2025-06-27 RX ADMIN — INSULIN LISPRO 16 UNITS: 100 INJECTION, SOLUTION INTRAVENOUS; SUBCUTANEOUS at 17:22

## 2025-06-27 RX ADMIN — Medication 10 ML: at 21:59

## 2025-06-27 RX ADMIN — ROSUVASTATIN CALCIUM 20 MG: 10 TABLET, FILM COATED ORAL at 08:35

## 2025-06-27 RX ADMIN — NICOTINE 1 PATCH: 21 PATCH, EXTENDED RELEASE TRANSDERMAL at 08:37

## 2025-06-27 RX ADMIN — INSULIN GLARGINE 20 UNITS: 100 INJECTION, SOLUTION SUBCUTANEOUS at 21:58

## 2025-06-27 RX ADMIN — BUDESONIDE INHALATION 0.5 MG: 0.5 SUSPENSION RESPIRATORY (INHALATION) at 08:12

## 2025-06-27 RX ADMIN — GUAIFENESIN 1200 MG: 600 TABLET, EXTENDED RELEASE ORAL at 08:32

## 2025-06-27 RX ADMIN — ARFORMOTEROL TARTRATE 15 MCG: 15 SOLUTION RESPIRATORY (INHALATION) at 19:21

## 2025-06-27 RX ADMIN — IPRATROPIUM BROMIDE AND ALBUTEROL SULFATE 3 ML: .5; 3 SOLUTION RESPIRATORY (INHALATION) at 23:31

## 2025-06-27 RX ADMIN — DILTIAZEM HYDROCHLORIDE 240 MG: 240 CAPSULE, COATED, EXTENDED RELEASE ORAL at 08:32

## 2025-06-27 RX ADMIN — LOSARTAN POTASSIUM 50 MG: 50 TABLET, FILM COATED ORAL at 08:33

## 2025-06-27 RX ADMIN — PREDNISONE 20 MG: 20 TABLET ORAL at 08:34

## 2025-06-27 RX ADMIN — REVEFENACIN 175 MCG: 175 SOLUTION RESPIRATORY (INHALATION) at 08:13

## 2025-06-27 RX ADMIN — IPRATROPIUM BROMIDE AND ALBUTEROL SULFATE 3 ML: .5; 3 SOLUTION RESPIRATORY (INHALATION) at 00:25

## 2025-06-27 RX ADMIN — FUROSEMIDE 40 MG: 10 INJECTION, SOLUTION INTRAMUSCULAR; INTRAVENOUS at 08:32

## 2025-06-27 RX ADMIN — INSULIN LISPRO 20 UNITS: 100 INJECTION, SOLUTION INTRAVENOUS; SUBCUTANEOUS at 12:29

## 2025-06-27 RX ADMIN — METOPROLOL TARTRATE 25 MG: 25 TABLET, FILM COATED ORAL at 08:34

## 2025-06-27 RX ADMIN — CLOPIDOGREL BISULFATE 75 MG: 75 TABLET, FILM COATED ORAL at 08:31

## 2025-06-27 RX ADMIN — SPIRONOLACTONE 25 MG: 25 TABLET, FILM COATED ORAL at 08:36

## 2025-06-27 RX ADMIN — IPRATROPIUM BROMIDE AND ALBUTEROL SULFATE 3 ML: .5; 3 SOLUTION RESPIRATORY (INHALATION) at 11:28

## 2025-06-27 RX ADMIN — Medication 10 ML: at 08:36

## 2025-06-27 RX ADMIN — GUAIFENESIN 1200 MG: 600 TABLET, EXTENDED RELEASE ORAL at 21:58

## 2025-06-27 NOTE — CASE MANAGEMENT/SOCIAL WORK
Social Work Assessment  [facility]     Patient Name: Santos Sullivan  MRN: [id number]  Today's Date: 6/27/2025    Admit Date: 6/22/2025     Discharge Plan       Row Name 06/27/25 1401       Plan    Plan Comments CM requested referral to AAA ([facility] on Aging). D/t Kalaheo rani BOBBY submitted referral via EMR fax to [facility].                   Community Resources       Service Provider Request Status Services Address Phone Fax Patient Preferred    [facility] Pending - Request Sent -- [address] [place] [address] [phone] --                  COREEN Maravilla, ESTUARDOW, [facility]  Lead   Phone: [phone]  Cell: [phone]  Fax: [phone]  [email or web address]

## 2025-06-27 NOTE — PLAN OF CARE
Goal Outcome Evaluation:      Pt has moved from bed to chair multiple times this shift. Pt educated on importance of calling out before getting out of bed or out of chair due to risk for falls. Pt has been on 4LHFNC through the night. Pt states that he showers on Fridays, refused getting cleaned up for staff this shift. [facility] approved/accepted per case management notes. Pt has had multiple snacks this shift, pt educated multiple times in regards to snacks. Call light remains within reach and able to make needs known.

## 2025-06-27 NOTE — PLAN OF CARE
Goal Outcome Evaluation:      Cooperative with care, agreed to being bathed. Nephrology consulted and Dr. Reyes to see patient. Up in chair most of shift. A & O x 4. Discharge to [facility] when appropriate. Able to make needs known. Call light within reach.

## 2025-06-27 NOTE — CASE MANAGEMENT/SOCIAL WORK
Discharge Planning Assessment  [facility]     Patient Name: Santos Sullivan  MRN: [id number]  Today's Date: 6/27/2025    Admit Date: 6/22/2025    Plan: [facility] accepted. Precert approved (valid 6/25-7/1), PASRR approved. From home alone. Current home O2 2-3L through [employer].       Discharge Plan       Row Name 06/27/25 1341       Plan    Plan Comments CM met with patient at bedside. 2nd IMM letter reviewed with patient, verbal consent, copy left at bedside. Patient with concerns about assistance at home after rehab as his brother is also hospitalized, patient agreeable to [facility] referral for home services, CM notified LSW. Patient reports his sister-in-law can assist as needed at home and lives next door. CM provided clinical update to [facility] liaison. Barrier to D/C: IV lasix, monitoring renal function (Cr 1.37).                    Expected Discharge Date and Time       Expected Discharge Date Expected Discharge Time    Jun 29, 2025                Patient Forms       Row Name 06/27/25 1343       Patient Forms    Important Message from Medicare (IMM) Delivered  2nd IMM reviewed 6/27/25    Delivered to Patient    Method of delivery In person                  Met with patient in room.  Maintained distance greater than six feet and spent less than 15 minutes in the room.       JENNY OharaN, RN, CCM    [address] [address]    Office: [phone]  Fax: [phone]

## 2025-06-27 NOTE — DISCHARGE PLACEMENT REQUEST
"New Referral: [facility] on Aging  See below facesheet with patient information.    KALANI MaravillaW, LSW, CCM  Lead   Phone: [phone]  Cell: [phone]  Fax: [phone]  [email or web address]  _______________________________________________________________________________________________________________    Santos Cedeno (69 y.o. Male)       Date of Birth   1955    Social Security Number       Address   [address] [place] [address] [facility] [place] [address]    Home Phone   [phone]    [facility]   [phone]       Hinduism   None    Marital Status                               Admission Date   6/22/2025    Admission Type   Emergency    Admitting Provider   Jose Roberto Linares MD    Attending Provider   Dawood Lombardo DO    Department, Room/Bed   [facility], 2126/1       Discharge Date       Discharge Disposition       Discharge Destination                                 Attending Provider: Dawood Lombardo DO    Allergies: No Known Allergies    Isolation: None   Infection: None   Code Status: CPR    Ht: 177.8 cm (70\")   Wt: 138 kg (303 lb 5.7 oz)    Admission Cmt: None   Principal Problem: Acute respiratory failure with hypoxia and hypercapnia [J96.01,J96.02]                   Active Insurance as of 6/22/2025       Primary Coverage       Payor Plan Insurance Group Employer/Plan Group    ANTHEM MEDICARE REPLACEMENT ANTHEM MEDICARE ADVANTAGE SNP [id number]       Payor Plan Address Payor Plan Phone Number Payor Plan Fax Number Effective Dates    [address] [phone]  8/1/2024 - None Entered    [facility] [address]         Subscriber Name Subscriber Birth Date Member ID       SANTOS CEDENO 1955 [id number]               Secondary Coverage       Payor Plan Insurance Group Employer/Plan Group    [id number]        Payor Plan Address Payor Plan Phone Number Payor Plan Fax Number Effective Dates    [address] [address]   8/1/2021 - None Entered    [place] [address]         Subscriber Name Subscriber Birth Date Member ID       LYRIC CEDENO 1955 [id number]                     Emergency Contacts        (Rel.) Home Phone Work Phone Mobile Phone    WILIANLAURA ALEXANDER (Brother) [phone] -- --

## 2025-06-27 NOTE — PROGRESS NOTES
Daily Progress Note          Assessment    Acute respiratory failure with hypoxia and hypercapnia: ABGs on admission 7.3 1/62/75 while on nasal cannula, repeated on BiPAP 7.35/60.4/90.2  Acute bronchitis due to unspecified pathogen, negative respiratory panel     Acute on chronic HFpEF  COPD without acute exacerbation  CAD status post PCI  HTN  HLD  DM  Chronic hepatitis C  Tobacco smoking  Obesity with BMI of 42 I     PFTs 2022: Moderate obstruction with air trapping and decreased oxygen diffusion capacity, FEV1/FVC 65%, FVC 72%, FEV1 63%, %, %, DLCO 58%           Recommendations:  The respiratory status is stable, patient can be discharged from pulmonary standpoint to home with outpatient home health or inpatient rehab       Follow-up in the office in 3 weeks, he will consider home sleep study to rule out SHYAM     tapering dose prednisone     Brovana, Pulmicort nebulized  Mucinex  Oxygen supplement and titration to maintain saturation 90 to 95% currently on 4 L per nasal cannula     bronchodilators     Smoking cessation counseling, nicotine patch  Aspirin, Plavix, Crestor  Cardiology following, currently on metoprolol, diltiazem, losartan  Insulin Lantus and sliding scale     I personally reviewed the radiological studies             LOS: 5 days     Subjective     Less cough and shortness of breath    Objective     Vital signs for last 24 hours:  Vitals:    06/27/25 0816 06/27/25 0819 06/27/25 0820 06/27/25 0823   BP:       BP Location:       Patient Position:       Pulse: 90 86 86 85   Resp: 20 22 22 22   Temp:       TempSrc:       SpO2: 91% 91% 91% 91%   Weight:       Height:           Intake/Output last 3 shifts:  I/O last 3 completed shifts:  In: 300 [P.O.:300]  Out: 7701 [Urine:7700; Stool:1]  Intake/Output this shift:  No intake/output data recorded.      Radiology  Imaging Results (Last 24 Hours)       ** No results found for the last 24 hours. **            Labs:  Results from last 7 days   Lab  Units 06/27/25  0049   WBC 10*3/mm3 13.26*   HEMOGLOBIN g/dL 14.9   HEMATOCRIT % 46.6   PLATELETS 10*3/mm3 201     Results from last 7 days   Lab Units 06/27/25  0049 06/23/25  0203 06/22/25  0151   SODIUM mmol/L 133*   < > 141   POTASSIUM mmol/L 4.2   < > 5.0   CHLORIDE mmol/L 91*   < > 102   CO2 mmol/L 29.7*   < > 25.6   BUN mg/dL 52.7*   < > 32.2*   CREATININE mg/dL 1.37*   < > 1.00   CALCIUM mg/dL 9.0   < > 8.7   BILIRUBIN mg/dL  --   --  0.2   ALK PHOS U/L  --   --  55   ALT (SGPT) U/L  --   --  43*   AST (SGOT) U/L  --   --  16   GLUCOSE mg/dL 233*   < > 243*    < > = values in this interval not displayed.     Results from last 7 days   Lab Units 06/22/25  0318   PH, ARTERIAL pH units 7.359   PO2 ART mm Hg 90.2   PCO2, ARTERIAL mm Hg 60.4*   HCO3 ART mmol/L 34.1*     Results from last 7 days   Lab Units 06/22/25  0151   ALBUMIN g/dL 4.0     Results from last 7 days   Lab Units 06/22/25  0323 06/22/25  0151   HSTROP T ng/L 33* 36*         Results from last 7 days   Lab Units 06/27/25  0049   MAGNESIUM mg/dL 2.3                   Meds:   SCHEDULE  arformoterol, 15 mcg, Nebulization, BID - RT  aspirin, 81 mg, Oral, Daily  budesonide, 0.5 mg, Nebulization, BID - RT  clopidogrel, 75 mg, Oral, Daily  dilTIAZem CD, 240 mg, Oral, Daily  furosemide, 40 mg, Intravenous, BID  guaiFENesin, 1,200 mg, Oral, Q12H  insulin glargine, 20 Units, Subcutaneous, Nightly  insulin lispro, 4-24 Units, Subcutaneous, 4x Daily AC & at Bedtime  ipratropium-albuterol, 3 mL, Nebulization, Q4H - RT  losartan, 50 mg, Oral, Daily  metoprolol tartrate, 25 mg, Oral, Q12H  nicotine, 1 patch, Transdermal, Daily  [START ON 6/28/2025] predniSONE, 10 mg, Oral, Daily  revefenacin, 175 mcg, Nebulization, Daily - RT  rosuvastatin, 20 mg, Oral, Daily  sodium chloride, 10 mL, Intravenous, Q12H  spironolactone, 25 mg, Oral, Daily      Infusions     PRNs    acetaminophen **OR** acetaminophen **OR** acetaminophen    aluminum-magnesium hydroxide-simethicone     senna-docusate sodium **AND** polyethylene glycol **AND** bisacodyl **AND** bisacodyl    Calcium Replacement - Follow Nurse / BPA Driven Protocol    dextrose    dextrose    glucagon (human recombinant)    Magnesium Standard Dose Replacement - Follow Nurse / BPA Driven Protocol    melatonin    metoprolol tartrate    nitroglycerin    ondansetron ODT **OR** ondansetron    Phosphorus Replacement - Follow Nurse / BPA Driven Protocol    Potassium Replacement - Follow Nurse / BPA Driven Protocol    sodium chloride    sodium chloride    sodium chloride    Physical Exam:  General Appearance:  Alert   HEENT:  Normocephalic, without obvious abnormality, Conjunctiva/corneas clear,.   Nares normal, no drainage     Neck:  Supple, symmetrical, trachea midline.   Lungs /Chest wall: Mild bilateral basal rhonchi, respirations unlabored, symmetrical wall movement.     Heart:  Regular rate and rhythm, S1 S2 normal  Abdomen: Soft, non-tender, no masses, no organomegaly.    Extremities: + Edema, no clubbing or cyanosis     ROS  Constitutional: Negative for chills, fever and malaise/fatigue.   HENT: Negative.    Eyes: Negative.    Cardiovascular: Negative.    Respiratory: Positive for cough and shortness of breath.    Skin: Negative.    Musculoskeletal: Negative.    Gastrointestinal: Negative.    Genitourinary: Negative.    Neurological: Generalized weakness        I reviewed the recent clinical results  I personally reviewed the latest radiological studies    Part of this note may be an electronic transcription/translation of spoken language to printed text using the Dragon Dictation System.

## 2025-06-27 NOTE — PROGRESS NOTES
[facility] MEDICINE SERVICE  DAILY PROGRESS NOTE    NAME: Santos Sullivan  : 1955  MRN: [id number]      LOS: 5 days     PROVIDER OF SERVICE: Dawood Lombardo DO    Chief Complaint: Acute respiratory failure with hypoxia and hypercapnia    Subjective:     Interval History:  History taken from: patient      Patient seen and examined this morning.  Feels a bit rough and shortness of breath this morning.  Lower extremity edema continues to improve.  Creatinine uptrending, nephrology consulted.    Review of Systems:   Pertinent positives as noted in HPI/subjective.  All other systems were reviewed and are negative.    Objective:     Vital Signs  Temp:  [97.6 °F (36.4 °C)-99 °F (37.2 °C)] 97.7 °F (36.5 °C)  Heart Rate:  [] 85  Resp:  [11-25] 22  BP: ()/(56-86) 110/82  Flow (L/min) (Oxygen Therapy):  [3-6] 4   Body mass index is 43.53 kg/m².    Physical Exam  General: Awake, alert, morbidly obese male, NAD  Eyes: PERRL, EOMI, conjunctivae are clear  Cardiovascular: Regular rate and rhythm, no murmurs  Respiratory: decreased breath sounds at the base bilaterally, no wheezing or rales, unlabored breathing  Abdomen: Soft, nontender, positive bowel sounds, no guarding  Skin: Warm, moderate to severe bilateral lower extremity pitting edema noted, slowly improving         Diagnostic Data    Results from last 7 days   Lab Units 25  0049 25  0203 25  0151   WBC 10*3/mm3 13.26*   < > 13.96*   HEMOGLOBIN g/dL 14.9   < > 14.1   HEMATOCRIT % 46.6   < > 47.1   PLATELETS 10*3/mm3 201   < > 232   GLUCOSE mg/dL 233*   < > 243*   CREATININE mg/dL 1.37*   < > 1.00   BUN mg/dL 52.7*   < > 32.2*   SODIUM mmol/L 133*   < > 141   POTASSIUM mmol/L 4.2   < > 5.0   AST (SGOT) U/L  --   --  16   ALT (SGPT) U/L  --   --  43*   ALK PHOS U/L  --   --  55   BILIRUBIN mg/dL  --   --  0.2   ANION GAP mmol/L 12.3   < > 13.4    < > = values in this interval not displayed.       No radiology results for the last  day        I reviewed the patient's new clinical results.    Assessment/Plan:     Active and Resolved Problems  Active Hospital Problems    Diagnosis  POA    **Acute respiratory failure with hypoxia and hypercapnia [J96.01, J96.02]  Yes    Acute on chronic diastolic heart failure [I50.33]  Yes    Acute hypoxic respiratory failure [J96.01]  Yes    Mixed hyperlipidemia [E78.2]  Yes    Essential hypertension [I10]  Yes    Overweight [E66.3]  Yes    Chronic hepatitis C [B18.2]  Yes    Coronary angioplasty status [Z98.61]  Not Applicable    Chronic obstructive pulmonary disease [J44.9]  Yes    Tobacco dependence syndrome [F17.200]  Yes      Resolved Hospital Problems   No resolved problems to display.   Patient is 69-year-old male on home oxygen 3 LPM who presented with shortness of breath cough and wheezing as well as fatigue.  Patient required to be on BiPAP in the ER    Acute hypoxic and hypercapnic respiratory failure  Acute exacerbation of HFpEF  Volume overload with bilateral lower extremity edema  COPD with mild exacerbation  - ABGs and imaging reports reviewed  - Respiratory status improving, down to nasal cannula now  - Echo this admission showed preserved EF   - patient remains volume overloaded  - Continue diuresis with IV Lasix, monitor I's and O's  - Continue taper steroid dosing, continue bronchodilators  - Pulmonology recs appreciated, will need outpatient sleep study to rule out SHYAM    RUDY  - Creatinine uptrending in setting of diuresis  - Metolazone discontinued per cardiology  - Nephrology consulted     New onset A-fib/flutter  - Noted on telemetry on 6/24, did have some tachycardia which improved  - Spontaneously converted to NSR currently  - Continue home diltiazem, metoprolol added  - Further management per cardiology     CAD s/p PCI  Hypertension  Hyperlipidemia  - cont home meds as tolerated, monitor    DM type II with hyperglycemia  - Hemoglobin A1c is 9.3  - Continue basal insulin, SSI, dosing  adjusted as steroid being tapered down  - Monitor blood glucose and adjust insulin therapy as needed    Tobacco abuse  - encourage cessation  - nicotine patch      Morbid obesity  - BMI 41  - complicates all aspects of care    VTE Prophylaxis:  Mechanical VTE prophylaxis orders are present.    Disposition Planning:     Barriers to Discharge: Volume overload, respiratory failure  Anticipated Date of Discharge: 6/28  Place of Discharge: Rehab    Code Status and Medical Interventions: CPR (Attempt to Resuscitate); Full Support   Ordered at: 06/22/25 0543     Code Status (Patient has no pulse and is not breathing):    CPR (Attempt to Resuscitate)     Medical Interventions (Patient has pulse or is breathing):    Full Support     Level Of Support Discussed With:    Patient       Signature: Electronically signed by Dawood Lombardo DO, 06/27/25, 10:40 EDT.  [facility], [place] Hospitalist Team     Part of this note may be an electronic transcription/translation of spoken language to printed text using the Dragon Dictation System.

## 2025-06-27 NOTE — CONSULTS
[facility] CONSULT NOTE    Referring Provider: Dawood Obrien   Reason for Consultation: Acute kidney injury    Chief complaint shortness of breath, cough    History of present illness: Patient is 69 years old male with history of morbid obesity, hypertension, COPD, chronic hypoxic respiratory failure, on 3 L of oxygen at home, hyperlipidemia, coronary disease, presented to the hospital initially with worsening shortness of breath, cough, wheezing.  He denied any chest pain, fever, chills, nausea, vomiting, hematuria or dysuria.  Patient was admitted for hypoxic and hypercapnic respiratory failure.  Patient received steroids and IV diuretics.  Creatinine creased to 1.37 Mg/DL which prompted a consult from baseline of 1.0.    History  Past Medical History:   Diagnosis Date    Abnormal stress test     Acrocyanosis     Acute renal insufficiency     Benign essential hypertension     Chronic hepatitis C     Managed by I.  Cured of infection.    Claudication     Right leg. MARY showing mild disease on right.    COPD (chronic obstructive pulmonary disease)     Moderate, nonreversible airway obstruction (worse than previous).    Coronary artery disease involving native coronary artery of native heart without angina pectoris     Femur fracture     30+ years ago    Hearing loss     History of spinal cord injury     Hyperlipidemia     Left against medical advice     Need for hepatitis C screening test     Onychomycosis of toenail     Tobacco use disorder      Past Surgical History:   Procedure Laterality Date    CARDIAC CATHETERIZATION      CORONARY STENT PLACEMENT       Social History     Tobacco Use    Smoking status: Every Day     Current packs/day: 0.50     Average packs/day: 0.5 packs/day for 25.0 years (12.5 ttl pk-yrs)     Types: Cigarettes    Smokeless tobacco: Never    Tobacco comments:     Patient is advised to quit smoking   Vaping Use    Vaping status: Never Used   Substance Use Topics    Alcohol use: Not  Currently     Comment: rare    Drug use: Not Currently     Family History   Problem Relation Age of Onset    Heart disease Mother     Hypertension Mother     Coronary artery disease Mother     Cancer Father         Throat    Cancer Sister         pelvic    Cancer Brother         Lung       Review of Systems  ROS  As per HPI  Objective     Vital Signs  Temp:  [97.4 °F (36.3 °C)-99 °F (37.2 °C)] 97.4 °F (36.3 °C)  Heart Rate:  [] 81  Resp:  [11-25] 14  BP: ()/(56-86) 104/69    No intake/output data recorded.  I/O last 3 completed shifts:  In: 300 [P.O.:300]  Out: 7701 [Urine:7700; Stool:1]    Physical Exam:  Physical Exam    General Appearance: Morbidly obese, chronically ill-appearing  Skin: warm and dry  HEENT: oral mucosa normal, nonicteric sclera  Neck: supple, no JVD  Lungs: Decreased breath sound bases.  Few wheezes  Heart: Irregular rhythm  Abdomen: soft, nontender, nondistended  : no palpable bladder  Extremities: 2+ edema, legs wrapped  Neuro: normal speech and mental status     Results Review:   I reviewed the patient's new clinical results.    Lab Results   Component Value Date    CALCIUM 9.0 06/27/2025     Results from last 7 days   Lab Units 06/27/25  0049 06/26/25  0509 06/26/25  0326 06/25/25  1625 06/24/25  0330 06/23/25  0203 06/22/25  0151   MAGNESIUM mg/dL 2.3  --  2.5*  --  2.5*  --   --    SODIUM mmol/L 133*  --  134* 131* 134*   < > 141   POTASSIUM mmol/L 4.2  --  4.2 5.2 4.4   < > 5.0   CHLORIDE mmol/L 91*  --  95* 95* 95*   < > 102   CO2 mmol/L 29.7*  --  28.6 25.4 28.5   < > 25.6   BUN mg/dL 52.7*  --  45.1* 50.9* 44.6*   < > 32.2*   CREATININE mg/dL 1.37*  --  1.09 1.32* 1.10   < > 1.00   GLUCOSE mg/dL 233*  --  72 327* 265*   < > 243*   CALCIUM mg/dL 9.0  --  8.6 8.2* 8.3*   < > 8.7   WBC 10*3/mm3 13.26* 13.65*  --  14.18* 16.30*   < > 13.96*   HEMOGLOBIN g/dL 14.9 14.5  --  14.1 14.6   < > 14.1   PLATELETS 10*3/mm3 201 195  --  209 231   < > 232   ALT (SGPT) U/L  --   --   --    --   --   --  43*   AST (SGOT) U/L  --   --   --   --   --   --  16    < > = values in this interval not displayed.     Lab Results   Component Value Date    TROPONINI <0.01 03/18/2019    TROPONINT 33 (H) 06/22/2025     Estimated Creatinine Clearance: 72.3 mL/min (A) (by C-G formula based on SCr of 1.37 mg/dL (H)).  Lab Results   Component Value Date    URICACID 6.3 07/19/2019       Brief Urine Lab Results       None            Prior to Admission medications    Medication Sig Start Date End Date Taking? Authorizing Provider   albuterol (PROVENTIL) (2.5 MG/3ML) 0.083% nebulizer solution Take 2.5 mg by nebulization Every 4 (Four) Hours As Needed for Wheezing. 3/9/22  Yes Leigh Ayon MD   albuterol sulfate  (90 Base) MCG/ACT inhaler Inhale 2 puffs Every 4 (Four) Hours As Needed for Wheezing.   Yes Kem Castañeda MD   budesonide (PULMICORT) 0.5 MG/2ML nebulizer solution Take 2 mL by nebulization 2 (Two) Times a Day.   Yes Kem Castañeda MD   budesonide-formoterol (SYMBICORT) 160-4.5 MCG/ACT inhaler Inhale 2 puffs 2 (Two) Times a Day.   Yes Kem Castañeda MD   bumetanide (BUMEX) 2 MG tablet Take 1 tablet by mouth Daily.   Yes Kem Castañdea MD   clopidogrel (PLAVIX) 75 MG tablet Take 1 tablet by mouth once daily 6/24/22  Yes Leigh Ayon MD   dilTIAZem CD (CARDIZEM CD) 240 MG 24 hr capsule Take 1 capsule by mouth Daily.   Yes Kem Castañeda MD   fluticasone (FLONASE) 50 MCG/ACT nasal spray Administer 1 spray into the nostril(s) as directed by provider 2 (Two) Times a Day.   Yes Kem Castañeda MD   furosemide (LASIX) 40 MG tablet Take 1 tablet by mouth 2 (Two) Times a Day.   Yes Kem Castañeda MD   glimepiride (AMARYL) 2 MG tablet Take 1 tablet by mouth 2 (Two) Times a Day.   Yes Kem Castañeda MD   insulin glargine (LANTUS, SEMGLEE) 100 UNIT/ML injection Inject 8 Units under the skin into the appropriate area as directed  Every Night.   Yes Kem Castañeda MD   losartan (COZAAR) 50 MG tablet Take 1 tablet by mouth Daily.   Yes Kem Castañeda MD   metFORMIN ER (GLUCOPHAGE-XR) 500 MG 24 hr tablet Take 2 tablets by mouth 2 (Two) Times a Day.   Yes Kem Castañeda MD   pioglitazone (ACTOS) 45 MG tablet Take 1 tablet by mouth Daily.   Yes Kem Castañeda MD   predniSONE (DELTASONE) 10 MG tablet Take 1-4 tablets by mouth Daily.   Yes Kem Castañeda MD   rosuvastatin (CRESTOR) 20 MG tablet Take 1 tablet by mouth Daily.   Yes Kem Castañeda MD   Semaglutide,0.25 or 0.5MG/DOS, (Ozempic, 0.25 or 0.5 MG/DOSE,) 2 MG/3ML solution pen-injector Inject 0.25 mg under the skin into the appropriate area as directed 1 (One) Time Per Week.   Yes Kem Castañeda MD   spironolactone (ALDACTONE) 25 MG tablet Take 1 tablet by mouth Daily.   Yes Kem Castañeda MD   aspirin (aspirin) 81 MG EC tablet Take 1 tablet by mouth Daily. 12/7/18   Kem Castañeda MD   nitroglycerin (Nitrostat) 0.4 MG SL tablet Place 1 tablet under the tongue Every 5 (Five) Minutes As Needed for Chest Pain. Take no more than 3 doses in 15 minutes. 7/8/22   Leigh Ayon MD       arformoterol, 15 mcg, Nebulization, BID - RT  aspirin, 81 mg, Oral, Daily  budesonide, 0.5 mg, Nebulization, BID - RT  clopidogrel, 75 mg, Oral, Daily  dilTIAZem CD, 240 mg, Oral, Daily  furosemide, 40 mg, Intravenous, BID  guaiFENesin, 1,200 mg, Oral, Q12H  insulin glargine, 20 Units, Subcutaneous, Nightly  insulin lispro, 4-24 Units, Subcutaneous, 4x Daily AC & at Bedtime  ipratropium-albuterol, 3 mL, Nebulization, Q4H - RT  losartan, 50 mg, Oral, Daily  metoprolol tartrate, 25 mg, Oral, Q12H  nicotine, 1 patch, Transdermal, Daily  [START ON 6/28/2025] predniSONE, 10 mg, Oral, Daily  revefenacin, 175 mcg, Nebulization, Daily - RT  rosuvastatin, 20 mg, Oral, Daily  sodium chloride, 10 mL, Intravenous, Q12H  spironolactone, 25 mg, Oral,  Daily           Assessment & Plan       Acute kidney injury.  Most likely secondary diuretics.  Creatinine increased to 1.37 Mg/DL from baseline of 1.0.  Electrolytes okay.  Volume status generous but improving  Congestive heart failure.  Diastolic.  Acute on chronic.  Volume status improving with diuretics  Acute on chronic respiratory failure with hypoxia and hypercapnia.  Improving slowly.  On steroids and IV diuretics  Benign essential hypertension.  Blood pressure running low  Atrial fibrillation  Morbid obesity    Plan:  Change diuretics to oral torsemide  Reevaluate in morning and increase torsemide dose if creatinine starts improving  Decrease losartan dose  Repeat labs in a.m.    I discussed the patients findings and my recommendations with patient and nursing staff    Cl Reyes MD  06/27/25  13:23 EDT

## 2025-06-27 NOTE — PROGRESS NOTES
CARDIOLOGY FOLLOW-UP PROGRESS NOTE      Reason for follow-up:    SOA  Paroxysmal atrial flutter  CAD/PCI  COPD  Diastolic Heart failure     Attending: Dawood Lombardo DO      Subjective .     Improving SOA, Edema       Review of Systems   Cardiovascular:  Positive for leg swelling.   Respiratory:  Positive for shortness of breath.      Pertinent items are noted in HPI, all other systems reviewed and negative  Allergies: Patient has no known allergies.    Scheduled Meds:arformoterol, 15 mcg, Nebulization, BID - RT  aspirin, 81 mg, Oral, Daily  budesonide, 0.5 mg, Nebulization, BID - RT  clopidogrel, 75 mg, Oral, Daily  dilTIAZem CD, 240 mg, Oral, Daily  furosemide, 40 mg, Intravenous, BID  guaiFENesin, 1,200 mg, Oral, Q12H  insulin glargine, 20 Units, Subcutaneous, Nightly  insulin lispro, 4-24 Units, Subcutaneous, 4x Daily AC & at Bedtime  ipratropium-albuterol, 3 mL, Nebulization, Q4H - RT  losartan, 50 mg, Oral, Daily  metoprolol tartrate, 25 mg, Oral, Q12H  nicotine, 1 patch, Transdermal, Daily  [START ON 6/28/2025] predniSONE, 10 mg, Oral, Daily  revefenacin, 175 mcg, Nebulization, Daily - RT  rosuvastatin, 20 mg, Oral, Daily  sodium chloride, 10 mL, Intravenous, Q12H  spironolactone, 25 mg, Oral, Daily        Continuous Infusions:     PRN Meds:.  acetaminophen **OR** acetaminophen **OR** acetaminophen    aluminum-magnesium hydroxide-simethicone    senna-docusate sodium **AND** polyethylene glycol **AND** bisacodyl **AND** bisacodyl    Calcium Replacement - Follow Nurse / BPA Driven Protocol    dextrose    dextrose    glucagon (human recombinant)    Magnesium Standard Dose Replacement - Follow Nurse / BPA Driven Protocol    melatonin    metoprolol tartrate    nitroglycerin    ondansetron ODT **OR** ondansetron    Phosphorus Replacement - Follow Nurse / BPA Driven Protocol    Potassium Replacement - Follow Nurse / BPA Driven Protocol    sodium chloride    sodium chloride    sodium chloride    Objective     VITAL  "SIGNS  Patient Vitals for the past 24 hrs:   BP Temp Temp src Pulse Resp SpO2 Weight   06/27/25 0823 -- -- -- 85 22 91 % --   06/27/25 0820 -- -- -- 86 22 91 % --   06/27/25 0819 -- -- -- 86 22 91 % --   06/27/25 0816 -- -- -- 90 20 91 % --   06/27/25 0815 -- -- -- 102 20 91 % --   06/27/25 0812 -- -- -- 91 18 91 % --   06/27/25 0723 110/82 97.7 °F (36.5 °C) Oral 86 11 -- --   06/27/25 0500 -- -- -- -- -- -- (!) 138 kg (303 lb 5.7 oz)   06/27/25 0324 112/67 97.6 °F (36.4 °C) Oral 80 14 92 % --   06/27/25 0303 -- -- -- 80 18 95 % --   06/27/25 0300 -- -- -- 81 18 92 % --   06/27/25 0028 -- -- -- 79 18 93 % --   06/27/25 0025 -- -- -- 80 18 98 % --   06/26/25 2256 139/86 97.7 °F (36.5 °C) Oral 88 21 97 % --   06/26/25 2158 118/70 -- -- 90 -- 95 % --   06/26/25 2100 94/71 -- -- 92 -- -- --   06/26/25 2018 -- -- -- 85 20 92 % --   06/26/25 2012 -- -- -- 91 22 (!) 84 % --   06/26/25 2011 -- -- -- 83 25 (!) 83 % --   06/26/25 2007 -- -- -- 77 24 93 % --   06/26/25 1938 105/79 99 °F (37.2 °C) Oral 97 18 92 % --   06/26/25 1900 -- -- -- 79 -- (!) 88 % --   06/26/25 1700 -- -- -- 82 -- 94 % --   06/26/25 1630 -- -- -- 81 -- 94 % --   06/26/25 1610 111/56 98.4 °F (36.9 °C) Oral 84 17 94 % --   06/26/25 1508 -- -- -- 90 -- 90 % --   06/26/25 1202 -- -- -- 85 24 97 % --   06/26/25 1159 -- -- -- 86 20 97 % --   06/26/25 1122 129/84 98 °F (36.7 °C) Oral 88 17 97 % --        Flowsheet Rows      Flowsheet Row First Filed Value   Admission Height 177.8 cm (70\") Documented at 06/22/2025 0055   Admission Weight 131 kg (288 lb 12.8 oz) Documented at 06/22/2025 0231            Body mass index is 43.53 kg/m².      Intake/Output Summary (Last 24 hours) at 6/27/2025 0921  Last data filed at 6/27/2025 0324  Gross per 24 hour   Intake --   Output 4526 ml   Net -4526 ml        TELEMETRY:     Sinus Rhythm    Physical Exam:  Constitutional:       Appearance: Well-developed.   Eyes:      General: No scleral icterus.        Right eye: No " discharge.   HENT:      Head: Normocephalic and atraumatic.   Neck:      Thyroid: No thyromegaly.      Lymphadenopathy: No cervical adenopathy.   Pulmonary:      Effort: Pulmonary effort is normal. No respiratory distress.      Breath sounds: Normal breath sounds. No wheezing. No rales.   Cardiovascular:      Normal rate. Regular rhythm.      No gallop.    Edema:     Peripheral edema present.  Abdominal:      Tenderness: There is no abdominal tenderness.   Skin:     Findings: No erythema or rash.   Neurological:      Mental Status: Alert and oriented to person, place, and time.            Results Review:   I reviewed the patient's new clinical results.    CBC    Results from last 7 days   Lab Units 06/27/25  0049 06/26/25  0509 06/25/25  1625 06/24/25  0330 06/23/25  0203 06/22/25  0151   WBC 10*3/mm3 13.26* 13.65* 14.18* 16.30* 15.31* 13.96*   HEMOGLOBIN g/dL 14.9 14.5 14.1 14.6 13.6 14.1   PLATELETS 10*3/mm3 201 195 209 231 231 232     BMP   Results from last 7 days   Lab Units 06/27/25  0049 06/26/25  0326 06/25/25  1625 06/24/25  0330 06/23/25  0203 06/22/25  0151   SODIUM mmol/L 133* 134* 131* 134* 136 141   POTASSIUM mmol/L 4.2 4.2 5.2 4.4 5.0 5.0   CHLORIDE mmol/L 91* 95* 95* 95* 99 102   CO2 mmol/L 29.7* 28.6 25.4 28.5 28.3 25.6   BUN mg/dL 52.7* 45.1* 50.9* 44.6* 42.1* 32.2*   CREATININE mg/dL 1.37* 1.09 1.32* 1.10 1.04 1.00   GLUCOSE mg/dL 233* 72 327* 265* 248* 243*   MAGNESIUM mg/dL 2.3 2.5*  --  2.5*  --   --      Cr Clearance Estimated Creatinine Clearance: 72.3 mL/min (A) (by C-G formula based on SCr of 1.37 mg/dL (H)).  Coag     HbA1C   Lab Results   Component Value Date    HGBA1C 9.31 (H) 06/23/2025    HGBA1C 6.8 (H) 05/05/2022    HGBA1C 6.4 (H) 06/12/2021     Blood Glucose   Glucose   Date/Time Value Ref Range Status   06/27/2025 0718 178 (H) 70 - 105 mg/dL Final     Comment:     Serial Number: [id number]:  [id number]   06/26/2025 2020 213 (H) 70 - 105 mg/dL Final     Comment:     Serial  "Number: [id number]:  [id number]   06/26/2025 1707 475 (C) 70 - 105 mg/dL Final     Comment:     Serial Number: [id number]:  [id number]   06/26/2025 1119 179 (H) 70 - 105 mg/dL Final     Comment:     Serial Number: [id number]:  [id number]   06/26/2025 0741 104 70 - 105 mg/dL Final     Comment:     Serial Number: [id number]:  [id number]   06/25/2025 2101 266 (H) 70 - 105 mg/dL Final     Comment:     Serial Number: [id number]:  [id number]   06/25/2025 1559 327 (H) 70 - 105 mg/dL Final     Comment:     Serial Number: [id number]:  [id number]   06/25/2025 1133 232 (H) 70 - 105 mg/dL Final     Comment:     Serial Number: [id number]:  [id number]     Infection   Results from last 7 days   Lab Units 06/22/25  0337 06/22/25  0151   BLOODCX  No growth at 5 days No growth at 5 days   PROCALCITONIN ng/mL  --  0.06     CMP   Results from last 7 days   Lab Units 06/27/25  0049 06/26/25  0326 06/25/25  1625 06/24/25  0330 06/23/25  0203 06/22/25  0151   SODIUM mmol/L 133* 134* 131* 134* 136 141   POTASSIUM mmol/L 4.2 4.2 5.2 4.4 5.0 5.0   CHLORIDE mmol/L 91* 95* 95* 95* 99 102   CO2 mmol/L 29.7* 28.6 25.4 28.5 28.3 25.6   BUN mg/dL 52.7* 45.1* 50.9* 44.6* 42.1* 32.2*   CREATININE mg/dL 1.37* 1.09 1.32* 1.10 1.04 1.00   GLUCOSE mg/dL 233* 72 327* 265* 248* 243*   ALBUMIN g/dL  --   --   --   --   --  4.0   BILIRUBIN mg/dL  --   --   --   --   --  0.2   ALK PHOS U/L  --   --   --   --   --  55   AST (SGOT) U/L  --   --   --   --   --  16   ALT (SGPT) U/L  --   --   --   --   --  43*     ABG    Results from last 7 days   Lab Units 06/22/25  0318 06/22/25  0145   PH, ARTERIAL pH units 7.359 7.312*   PCO2, ARTERIAL mm Hg 60.4* 62.0*   PO2 ART mm Hg 90.2 77.0*   O2 SATURATION ART % 96.3 93.4*   BASE EXCESS ART mmol/L 6.4* 3.2*     UA      REZA  No results found for: \"POCMETH\", \"POCAMPHET\", \"POCBARBITUR\", \"POCBENZO\", \"POCCOCAINE\", \"POCOPIATES\", \"POCOXYCODO\", \"POCPHENCYC\", \"POCPROPOXY\", \"POCTHC\", " "\"POCTRICYC\"  Lysis Labs   Results from last 7 days   Lab Units 06/27/25  0049 06/26/25  0509 06/26/25  0326 06/25/25  1625 06/24/25  0330 06/23/25  0203 06/22/25  0151   HEMOGLOBIN g/dL 14.9 14.5  --  14.1 14.6 13.6 14.1   PLATELETS 10*3/mm3 201 195  --  209 231 231 232   CREATININE mg/dL 1.37*  --  1.09 1.32* 1.10 1.04 1.00     Radiology(recent) No radiology results for the last day    Imaging Results (Last 24 Hours)       ** No results found for the last 24 hours. **            Results from last 7 days   Lab Units 06/22/25  0323   HSTROP T ng/L 33*       EKG          I personally viewed and interpreted the patient's EKG/Telemetry data:        ECHOCARDIOGRAM:     Results for orders placed during the hospital encounter of 06/22/25    Adult Transthoracic Echo Complete W/ Cont if Necessary Per Protocol    Interpretation Summary    Left ventricular systolic function is normal. Calculated left ventricular EF = 64% Left ventricular ejection fraction appears to be 61 - 65%.    Left ventricular diastolic function was normal.        STRESS MYOVIEW:      CARDIAC CATHETERIZATION:      OTHER:         Assessment & Plan            Acute respiratory failure with hypoxia and hypercapnia    Essential hypertension    Chronic obstructive pulmonary disease    Coronary angioplasty status    Chronic hepatitis C    Overweight    Tobacco dependence syndrome    Mixed hyperlipidemia    Acute on chronic diastolic heart failure    Acute hypoxic respiratory failure        ASSESSMENT:    Acute respiratory failure with hypoxia and hypercapnia/COPD  - initial ABG pH 7.31, PCO2 62, pO2 75, HCO3 31.4  - Placed on Bipap with repeat ABG 7.359, CO2 60.4, pO2 90.2, HCO3 34.1  - Currently on 2 L nasal cannula, wears 3 L home O2  - WBC 13.96  - RVP negative  - CXR showed no clearly acute findings in the chest. Chronic changes as above including cardiomegaly  - pulmonary following     Hx diastolic CHF   - Repeat echo EF 60-65%; normal diastolic function, " no significant VHD  - proBNP 175   - bilateral lower extremity edema  - Bilateral lower extremities wrapped with Ace  - Continue gentle diuresis     CAD s/p PCI  - HS troponin 36 with repeat 33  - Continue aspirin Plavix statin  -denies chest pain     Hypertension  - Continue losartan Cardizem     Hyperlipidemia  - cont home aspirin, plavix, statin      DM type II  - Management per primary     Tobacco abuse  - encourage cessation  - nicotine patch      Morbid obesity  - BMI 41    PLAN:    BUN/Cr 52/1.37  Will stop metolazone, Cr trending back up  On NC 02  LE wrapped  Hemodynamics stable, SR on monitor  Continue supportive care         Additional recommendations per Dr. Major     Patient is seen and examined and findings are verified.  All data is reviewed by me personally.  Assessment and plan formulated by APC was done after discussion with attending.  I spent more than 50% of time in taking care of the patient.    Patient is overall doing well.  Patient denies any symptom of fever or chills.  Patient shortness of breath is much better    Hemodynamics are stable    Normal S1 and S2.  No pericardial rub or murmur abdominal exam is benign leg edema present.    His creatinine has increased to 1.37.  I will discontinue metolazone.  Continue monitoring electrolytes and creatinine.  Patient is slowly improving.  Patient has put out more than 4000 cc overnight.    We shall follow    Electronically signed by Ian Major MD, 06/27/25, 9:29 AM EDT.                                LILIAN Pegn  06/27/25  09:21 EDT

## 2025-06-28 LAB
ALBUMIN SERPL-MCNC: 3.9 G/DL (ref 3.5–5.2)
ANION GAP SERPL CALCULATED.3IONS-SCNC: 11.7 MMOL/L (ref 5–15)
BUN SERPL-MCNC: 53.8 MG/DL (ref 8–23)
BUN/CREAT SERPL: 45.6 (ref 7–25)
CALCIUM SPEC-SCNC: 9.3 MG/DL (ref 8.6–10.5)
CHLORIDE SERPL-SCNC: 94 MMOL/L (ref 98–107)
CO2 SERPL-SCNC: 30.3 MMOL/L (ref 22–29)
CREAT SERPL-MCNC: 1.18 MG/DL (ref 0.76–1.27)
DEPRECATED RDW RBC AUTO: 50.4 FL (ref 37–54)
EGFRCR SERPLBLD CKD-EPI 2021: 67.2 ML/MIN/1.73
ERYTHROCYTE [DISTWIDTH] IN BLOOD BY AUTOMATED COUNT: 14.6 % (ref 12.3–15.4)
GLUCOSE BLDC GLUCOMTR-MCNC: 139 MG/DL (ref 70–105)
GLUCOSE BLDC GLUCOMTR-MCNC: 226 MG/DL (ref 70–105)
GLUCOSE BLDC GLUCOMTR-MCNC: 236 MG/DL (ref 70–105)
GLUCOSE BLDC GLUCOMTR-MCNC: 387 MG/DL (ref 70–105)
GLUCOSE SERPL-MCNC: 135 MG/DL (ref 65–99)
HCT VFR BLD AUTO: 46.4 % (ref 37.5–51)
HGB BLD-MCNC: 14.6 G/DL (ref 13–17.7)
LYMPHOCYTES # BLD MANUAL: 3.99 10*3/MM3 (ref 0.7–3.1)
LYMPHOCYTES NFR BLD MANUAL: 3 % (ref 5–12)
MAGNESIUM SERPL-MCNC: 2.3 MG/DL (ref 1.6–2.4)
MCH RBC QN AUTO: 29.9 PG (ref 26.6–33)
MCHC RBC AUTO-ENTMCNC: 31.5 G/DL (ref 31.5–35.7)
MCV RBC AUTO: 94.9 FL (ref 79–97)
MONOCYTES # BLD: 0.44 10*3/MM3 (ref 0.1–0.9)
MYELOCYTES NFR BLD MANUAL: 1 % (ref 0–0)
NEUTROPHILS # BLD AUTO: 10.19 10*3/MM3 (ref 1.7–7)
NEUTROPHILS NFR BLD MANUAL: 69 % (ref 42.7–76)
PHOSPHATE SERPL-MCNC: 4.4 MG/DL (ref 2.5–4.5)
PLATELET # BLD AUTO: 202 10*3/MM3 (ref 140–450)
PMV BLD AUTO: 9.9 FL (ref 6–12)
POTASSIUM SERPL-SCNC: 4.5 MMOL/L (ref 3.5–5.2)
RBC # BLD AUTO: 4.89 10*6/MM3 (ref 4.14–5.8)
RBC MORPH BLD: NORMAL
SCAN SLIDE: NORMAL
SMALL PLATELETS BLD QL SMEAR: ADEQUATE
SODIUM SERPL-SCNC: 136 MMOL/L (ref 136–145)
VARIANT LYMPHS NFR BLD MANUAL: 27 % (ref 19.6–45.3)
WBC MORPH BLD: NORMAL
WBC NRBC COR # BLD AUTO: 14.77 10*3/MM3 (ref 3.4–10.8)

## 2025-06-28 PROCEDURE — 85025 COMPLETE CBC W/AUTO DIFF WBC: CPT | Performed by: NURSE PRACTITIONER

## 2025-06-28 PROCEDURE — 63710000001 PREDNISONE PER 5 MG: Performed by: INTERNAL MEDICINE

## 2025-06-28 PROCEDURE — 85007 BL SMEAR W/DIFF WBC COUNT: CPT | Performed by: NURSE PRACTITIONER

## 2025-06-28 PROCEDURE — 94799 UNLISTED PULMONARY SVC/PX: CPT

## 2025-06-28 PROCEDURE — 99232 SBSQ HOSP IP/OBS MODERATE 35: CPT | Performed by: INTERNAL MEDICINE

## 2025-06-28 PROCEDURE — 82948 REAGENT STRIP/BLOOD GLUCOSE: CPT | Performed by: STUDENT IN AN ORGANIZED HEALTH CARE EDUCATION/TRAINING PROGRAM

## 2025-06-28 PROCEDURE — 94664 DEMO&/EVAL PT USE INHALER: CPT

## 2025-06-28 PROCEDURE — 63710000001 INSULIN GLARGINE PER 5 UNITS: Performed by: INTERNAL MEDICINE

## 2025-06-28 PROCEDURE — 83735 ASSAY OF MAGNESIUM: CPT | Performed by: INTERNAL MEDICINE

## 2025-06-28 PROCEDURE — 80069 RENAL FUNCTION PANEL: CPT | Performed by: INTERNAL MEDICINE

## 2025-06-28 PROCEDURE — 82948 REAGENT STRIP/BLOOD GLUCOSE: CPT

## 2025-06-28 PROCEDURE — 94761 N-INVAS EAR/PLS OXIMETRY MLT: CPT

## 2025-06-28 PROCEDURE — 63710000001 INSULIN LISPRO (HUMAN) PER 5 UNITS: Performed by: STUDENT IN AN ORGANIZED HEALTH CARE EDUCATION/TRAINING PROGRAM

## 2025-06-28 PROCEDURE — 63710000001 REVEFENACIN 175 MCG/3ML SOLUTION: Performed by: INTERNAL MEDICINE

## 2025-06-28 RX ADMIN — IPRATROPIUM BROMIDE AND ALBUTEROL SULFATE 3 ML: .5; 3 SOLUTION RESPIRATORY (INHALATION) at 03:34

## 2025-06-28 RX ADMIN — ROSUVASTATIN CALCIUM 20 MG: 10 TABLET, FILM COATED ORAL at 09:03

## 2025-06-28 RX ADMIN — Medication 10 ML: at 21:35

## 2025-06-28 RX ADMIN — REVEFENACIN 175 MCG: 175 SOLUTION RESPIRATORY (INHALATION) at 07:08

## 2025-06-28 RX ADMIN — NICOTINE 1 PATCH: 21 PATCH, EXTENDED RELEASE TRANSDERMAL at 09:12

## 2025-06-28 RX ADMIN — INSULIN GLARGINE 20 UNITS: 100 INJECTION, SOLUTION SUBCUTANEOUS at 21:34

## 2025-06-28 RX ADMIN — ARFORMOTEROL TARTRATE 15 MCG: 15 SOLUTION RESPIRATORY (INHALATION) at 06:59

## 2025-06-28 RX ADMIN — CLOPIDOGREL BISULFATE 75 MG: 75 TABLET, FILM COATED ORAL at 09:03

## 2025-06-28 RX ADMIN — TORSEMIDE 40 MG: 10 TABLET ORAL at 09:03

## 2025-06-28 RX ADMIN — METOPROLOL TARTRATE 25 MG: 25 TABLET, FILM COATED ORAL at 11:01

## 2025-06-28 RX ADMIN — Medication 10 ML: at 09:12

## 2025-06-28 RX ADMIN — Medication 5 MG: at 21:35

## 2025-06-28 RX ADMIN — INSULIN LISPRO 8 UNITS: 100 INJECTION, SOLUTION INTRAVENOUS; SUBCUTANEOUS at 13:02

## 2025-06-28 RX ADMIN — DILTIAZEM HYDROCHLORIDE 240 MG: 240 CAPSULE, COATED, EXTENDED RELEASE ORAL at 09:03

## 2025-06-28 RX ADMIN — SPIRONOLACTONE 25 MG: 25 TABLET, FILM COATED ORAL at 09:02

## 2025-06-28 RX ADMIN — IPRATROPIUM BROMIDE AND ALBUTEROL SULFATE 3 ML: .5; 3 SOLUTION RESPIRATORY (INHALATION) at 11:05

## 2025-06-28 RX ADMIN — GUAIFENESIN 1200 MG: 600 TABLET, EXTENDED RELEASE ORAL at 21:35

## 2025-06-28 RX ADMIN — PREDNISONE 10 MG: 10 TABLET ORAL at 09:02

## 2025-06-28 RX ADMIN — BUDESONIDE INHALATION 0.5 MG: 0.5 SUSPENSION RESPIRATORY (INHALATION) at 07:03

## 2025-06-28 RX ADMIN — INSULIN LISPRO 20 UNITS: 100 INJECTION, SOLUTION INTRAVENOUS; SUBCUTANEOUS at 17:27

## 2025-06-28 RX ADMIN — ASPIRIN 81 MG: 81 TABLET, COATED ORAL at 09:02

## 2025-06-28 RX ADMIN — BUDESONIDE INHALATION 0.5 MG: 0.5 SUSPENSION RESPIRATORY (INHALATION) at 20:06

## 2025-06-28 RX ADMIN — ARFORMOTEROL TARTRATE 15 MCG: 15 SOLUTION RESPIRATORY (INHALATION) at 20:00

## 2025-06-28 RX ADMIN — GUAIFENESIN 1200 MG: 600 TABLET, EXTENDED RELEASE ORAL at 09:02

## 2025-06-28 RX ADMIN — IPRATROPIUM BROMIDE AND ALBUTEROL SULFATE 3 ML: .5; 3 SOLUTION RESPIRATORY (INHALATION) at 16:46

## 2025-06-28 RX ADMIN — METOPROLOL TARTRATE 25 MG: 25 TABLET, FILM COATED ORAL at 21:53

## 2025-06-28 RX ADMIN — INSULIN LISPRO 8 UNITS: 100 INJECTION, SOLUTION INTRAVENOUS; SUBCUTANEOUS at 09:03

## 2025-06-28 NOTE — PROGRESS NOTES
[facility] Progress Note      Patient Name: Santos Sullivan  : 1955  MRN: [id number]  Primary Care Physician:  Astrid Zuñiga MD  Date of admission: 2025    Subjective     Interval History:     Patient sitting in chair  Feeling little better  Good urine output  Still has dyspnea but improving slowly    Review of Systems:   As noted above    Objective     Vitals:   Temp:  [97.1 °F (36.2 °C)-98 °F (36.7 °C)] 98 °F (36.7 °C)  Heart Rate:  [] 102  Resp:  [11-20] 14  BP: ()/(49-69) 111/68  Flow (L/min) (Oxygen Therapy):  [4] 4    Intake/Output Summary (Last 24 hours) at 2025 1048  Last data filed at 2025 0630  Gross per 24 hour   Intake 1634 ml   Output 2470 ml   Net -836 ml       Physical Exam:      General Appearance: Morbidly obese, chronically ill-appearing  Skin: warm and dry  HEENT: oral mucosa normal, nonicteric sclera  Neck: supple, no JVD  Lungs: Decreased breath sound bases.  Few wheezes  Heart: Irregular rhythm  Abdomen: soft, nontender, nondistended  : no palpable bladder  Extremities: 2+ edema, legs wrapped  Neuro: normal speech and mental status    Scheduled Meds:     arformoterol, 15 mcg, Nebulization, BID - RT  aspirin, 81 mg, Oral, Daily  budesonide, 0.5 mg, Nebulization, BID - RT  clopidogrel, 75 mg, Oral, Daily  dilTIAZem CD, 240 mg, Oral, Daily  guaiFENesin, 1,200 mg, Oral, Q12H  insulin glargine, 20 Units, Subcutaneous, Nightly  insulin lispro, 4-24 Units, Subcutaneous, 4x Daily AC & at Bedtime  ipratropium-albuterol, 3 mL, Nebulization, Q4H - RT  losartan, 25 mg, Oral, Daily  metoprolol tartrate, 25 mg, Oral, Q12H  nicotine, 1 patch, Transdermal, Daily  predniSONE, 10 mg, Oral, Daily  revefenacin, 175 mcg, Nebulization, Daily - RT  rosuvastatin, 20 mg, Oral, Daily  sodium chloride, 10 mL, Intravenous, Q12H  spironolactone, 25 mg, Oral, Daily  torsemide, 40 mg, Oral, Daily      IV Meds:        Results Reviewed:   I have personally  reviewed the results from the time of this admission to 6/28/2025 10:48 EDT     Results from last 7 days   Lab Units 06/28/25  0235 06/27/25  0049 06/26/25  0326 06/23/25  0203 06/22/25  0151   SODIUM mmol/L 136 133* 134*   < > 141   POTASSIUM mmol/L 4.5 4.2 4.2   < > 5.0   CHLORIDE mmol/L 94* 91* 95*   < > 102   CO2 mmol/L 30.3* 29.7* 28.6   < > 25.6   BUN mg/dL 53.8* 52.7* 45.1*   < > 32.2*   CREATININE mg/dL 1.18 1.37* 1.09   < > 1.00   CALCIUM mg/dL 9.3 9.0 8.6   < > 8.7   BILIRUBIN mg/dL  --   --   --   --  0.2   ALK PHOS U/L  --   --   --   --  55   ALT (SGPT) U/L  --   --   --   --  43*   AST (SGOT) U/L  --   --   --   --  16   GLUCOSE mg/dL 135* 233* 72   < > 243*    < > = values in this interval not displayed.     Estimated Creatinine Clearance: 83.9 mL/min (by C-G formula based on SCr of 1.18 mg/dL).  Results from last 7 days   Lab Units 06/28/25  0235 06/27/25  0049 06/26/25  0326   MAGNESIUM mg/dL 2.3 2.3 2.5*   PHOSPHORUS mg/dL 4.4  --   --          Results from last 7 days   Lab Units 06/28/25  0235 06/27/25  0049 06/26/25  0509 06/25/25  1625 06/24/25  0330   WBC 10*3/mm3 14.77* 13.26* 13.65* 14.18* 16.30*   HEMOGLOBIN g/dL 14.6 14.9 14.5 14.1 14.6   PLATELETS 10*3/mm3 202 201 195 209 231           Assessment / Plan     ASSESSMENT:    Acute kidney injury.  Most likely secondary diuretics.  Renal function improving after decreasing diuretics dose.  Electrolytes okay.  Volume status generous but improving   Congestive heart failure.  Diastolic.  Acute on chronic.  Volume status improving with diuretics  Acute on chronic respiratory failure with hypoxia and hypercapnia.  Improving slowly.  On steroids and diuretics  Benign essential hypertension.  Blood pressure low  atrial fibrillation  Morbid obesity    PLAN:  Continue torsemide at current dose  Discontinue losartan  Repeat labs in a.m.      Cl Reyes MD  06/28/25  10:48 EDT    [facility]  [phone]

## 2025-06-28 NOTE — NURSING NOTE
Pt is refusing to be monitored. He is belligerent and uncooperative, intimated he would punch me if I tried to put his leads on. I'm giving him space and time. Will try and reestablish monitoring after a cool down period. Pt appears unsteady on his feet and out of breath with exertion. Pt refusing bandage wrap change of bilateral lower limbs.

## 2025-06-28 NOTE — CONSULTS
"Diabetes Education  Assessment/Teaching    Patient Name:  Santos Sullivan  YOB: 1955  MRN: [id number]  Admit Date:  6/22/2025      Assessment Date:  6/27/2025  Flowsheet Row Most Recent Value   General Information     Referral From: A1c, Blood glucose, MD order  [MD consult for newly diagnosed, admission blood sugar 243, and on 6/23/2025 A1c was 9.31%.]   Height 177.8 cm (70\")   Height Method Stated   Weight 138 kg (303 lb 5.7 oz)   Weight Method Bed scale   Pregnancy Assessment    Diabetes History    What type of diabetes do you have? Type 2   Length of Diabetes Diagnosis 1 - 5 years  [Patient stated he was told he had pre-diabetes a couple of years ago but was only recently told he had diabetes.]   Current DM knowledge poor   Have you had diabetes education/teaching in the past? no   Do you test your blood sugar at home? yes   Frequency of checks 0-3X a day   Meter type One Touch about 2 years old   Who performs the test? patient   Typical readings 100-600   Have you had high blood sugar? (>140mg/dl) yes   How often do you have high blood sugar? frequently   When was your last high blood sugar? Admission blood sugar 243   Education Preferences    What areas of diabetes would you like to learn about? avoiding high blood sugar, diabetes complications, understanding diabetes, medications for diabetes, diet information   Nutrition Information    Assessment Topics    Healthy Eating - Assessment Needs education   Taking Medication - Assessment Needs education   Problem Solving - Assessment Needs education   Reducing Risk - Assessment Needs education   DM Goals    Healthy Eating - Goal Today   Taking Medication - Goal Today   Problem Solving - Goal Today   Reducing Risk - Goal Today            Flowsheet Row Most Recent Value   DM Education Needs    Meter Has own   Meter Type One Touch   Frequency of Testing Daily   Medication Insulin, Oral, Pen  [At home patient stated he takes Metformin 500 mg 3 tabs a day, " Glimepiride 2 mg BID, Ozempic 0.2 mg weekly on , and Lantus 10 units at bedtime.]   Problem Solving Hyperglycemia, Signs, Symptoms, Treatment   Reducing Risks A1C testing  [On 2025 A1c was 9.31%.]   Healthy Eating Basic meal plan provided   Motivation Not interested   Teaching Method Discussion, Handouts   Patient Response Needs reinforcement, Refused              Other Comments:  A1c info sheet given with discussion on A1c target and healthy blood sugar range.  Patient stated he can drive but was due to renew his drivers license and was denied so he is driving with an  license. Patient stated he has a son that lives in [place] but he is unable to help and a brother that lives close by but he is having is own health issues.   First Steps booklet to managing diabetes given to patient. Patient stated he cannot see to read and is unable to watch TV anymore due to his vision.   Healthy Meal Planning handout given to patient with discussion on the one plate method and counting carbs. Patient stated he lives by himself and is unable to stand to cook. Patient stated he has a neighbor girl that he drives to the store and he gives her a list to go in to get his groceries and she brings them into his house for him because he is unable to carry anything. Patient stated he can open a can of [employer] and eats that. Patient stated he would not be able to follow that diet. Patient stated he drinks water and regular soda.   Patient stated he hasn't smoked for 7 days and wants to quit but is craving a cigarette. Patient stated he has smoked with oxygen on at home and injured himself.   Patient stated he has a pile of laundry of which he is unable to wash and he is unable to clean his house. Patient stated he has no one to help him and sometimes the pain is so bad he crawls  on the floor to get to the kitchen.   Patient stated his living situation is not good but it is what it is.   Handout given on how to  use an insulin pen and explained to patient that the insulin pen should be primed with 2 units of insulin prior to each use and to hold the pen for 10 seconds after injection.   Patient stated he is not interested in diabetes education because he cannot follow the recommendations.           Electronically signed by:  Jeanne Weaver RN  06/27/25 20:20 EDT

## 2025-06-28 NOTE — PROGRESS NOTES
[facility] MEDICINE SERVICE  DAILY PROGRESS NOTE    NAME: Santos Sullivan  : 1955  MRN: [id number]      LOS: 6 days     PROVIDER OF SERVICE: Dawood Lombardo DO    Chief Complaint: Acute respiratory failure with hypoxia and hypercapnia    Subjective:     Interval History:  History taken from: patient      Patient seen and examined this morning.  Still with mild shortness of breath without oxygen.  Recommended to him that he will most likely require continuous home oxygen from now on.  Diuresing being managed by nephrology.  No other new complaints.    Review of Systems:   Pertinent positives as noted in HPI/subjective.  All other systems were reviewed and are negative.    Objective:     Vital Signs  Temp:  [97.1 °F (36.2 °C)-98 °F (36.7 °C)] 98 °F (36.7 °C)  Heart Rate:  [75-97] 97  Resp:  [11-20] 14  BP: ()/(49-69) 114/60  Flow (L/min) (Oxygen Therapy):  [4] 4   Body mass index is 43.53 kg/m².    Physical Exam  General: Awake, alert, morbidly obese male, NAD  Eyes: PERRL, EOMI, conjunctivae are clear  Cardiovascular: Regular rate and rhythm, no murmurs  Respiratory: decreased breath sounds at the base bilaterally, no wheezing or rales, unlabored breathing  Abdomen: Soft, nontender, positive bowel sounds, no guarding  Skin: Warm, moderate to severe bilateral lower extremity pitting edema noted, slowly improving         Diagnostic Data    Results from last 7 days   Lab Units 25  0235 25  0203 25  0151   WBC 10*3/mm3 14.77*   < > 13.96*   HEMOGLOBIN g/dL 14.6   < > 14.1   HEMATOCRIT % 46.4   < > 47.1   PLATELETS 10*3/mm3 202   < > 232   GLUCOSE mg/dL 135*   < > 243*   CREATININE mg/dL 1.18   < > 1.00   BUN mg/dL 53.8*   < > 32.2*   SODIUM mmol/L 136   < > 141   POTASSIUM mmol/L 4.5   < > 5.0   AST (SGOT) U/L  --   --  16   ALT (SGPT) U/L  --   --  43*   ALK PHOS U/L  --   --  55   BILIRUBIN mg/dL  --   --  0.2   ANION GAP mmol/L 11.7   < > 13.4    < > = values in this interval not  displayed.       No radiology results for the last day        I reviewed the patient's new clinical results.    Assessment/Plan:     Active and Resolved Problems  Active Hospital Problems    Diagnosis  POA    **Acute respiratory failure with hypoxia and hypercapnia [J96.01, J96.02]  Yes    Acute on chronic diastolic heart failure [I50.33]  Yes    Acute hypoxic respiratory failure [J96.01]  Yes    Mixed hyperlipidemia [E78.2]  Yes    Essential hypertension [I10]  Yes    Overweight [E66.3]  Yes    Chronic hepatitis C [B18.2]  Yes    Coronary angioplasty status [Z98.61]  Not Applicable    Chronic obstructive pulmonary disease [J44.9]  Yes    Tobacco dependence syndrome [F17.200]  Yes      Resolved Hospital Problems   No resolved problems to display.   Patient is 69-year-old male on home oxygen 3 LPM who presented with shortness of breath cough and wheezing as well as fatigue.  Patient required to be on BiPAP in the ER    Acute hypoxic and hypercapnic respiratory failure  Acute exacerbation of HFpEF  Volume overload with bilateral lower extremity edema  COPD with mild exacerbation  - ABGs and imaging reports reviewed  - Respiratory status improving, down to nasal cannula now  - Echo this admission showed preserved EF   - Volume status slowly improving  - Plan to change to p.o. torsemide today per nephrology  - Continue taper steroid dosing, continue bronchodilators  - Pulmonology recs appreciated, will need outpatient sleep study to rule out SHYAM  - Cardiology also following    RUDY  - Creatinine uptrended in setting of diuresis  - Metolazone discontinued per cardiology  - Nephrology following    New onset A-fib/flutter  - Noted on telemetry on 6/24, did have some tachycardia which improved  - Spontaneously converted to NSR   - Continue home diltiazem, metoprolol added  - Further management per cardiology     CAD s/p PCI  Hypertension  Hyperlipidemia  - cont home meds as tolerated, monitor    DM type II with  hyperglycemia  - Hemoglobin A1c is 9.3  - Continue basal insulin, SSI, dosing adjusted as steroid being tapered down  - Monitor blood glucose and adjust insulin therapy as needed    Tobacco abuse  - encourage cessation  - nicotine patch      Morbid obesity  - BMI 41  - complicates all aspects of care    VTE Prophylaxis:  Mechanical VTE prophylaxis orders are present.    Disposition Planning:     Barriers to Discharge: Volume overload, respiratory failure  Anticipated Date of Discharge: 6/29  Place of Discharge: Rehab    Code Status and Medical Interventions: CPR (Attempt to Resuscitate); Full Support   Ordered at: 06/22/25 0543     Code Status (Patient has no pulse and is not breathing):    CPR (Attempt to Resuscitate)     Medical Interventions (Patient has pulse or is breathing):    Full Support     Level Of Support Discussed With:    Patient       Signature: Electronically signed by Dawood Lombardo DO, 06/28/25, 10:29 EDT.  [facility] Hospitalist Team     Part of this note may be an electronic transcription/translation of spoken language to printed text using the Dragon Dictation System.

## 2025-06-28 NOTE — PLAN OF CARE
Goal Outcome Evaluation:      Pt spent most of shift up in chair, with alarm set. He was unagreeable early in the shift, would not were his leads and refused to be monitored. By shift end agreed to put them back on. Vitals stable.

## 2025-06-28 NOTE — PLAN OF CARE
Goal Outcome Evaluation:      Blood glucose remain elevated, pt refused to wear leads and be monitored for first half of shift, he was disagreeable and rude, refused dressing change.

## 2025-06-28 NOTE — PROGRESS NOTES
Daily Progress Note          Assessment    Acute respiratory failure with hypoxia and hypercapnia: ABGs on admission 7.3 1/62/75 while on nasal cannula, repeated on BiPAP 7.35/60.4/90.2  Acute bronchitis due to unspecified pathogen, negative respiratory panel     Acute on chronic HFpEF  COPD without acute exacerbation  CAD status post PCI  HTN  HLD  DM  Chronic hepatitis C  Tobacco smoking  Obesity with BMI of 42 I     PFTs 2022: Moderate obstruction with air trapping and decreased oxygen diffusion capacity, FEV1/FVC 65%, FVC 72%, FEV1 63%, %, %, DLCO 58%           Recommendations:  The respiratory status is stable, patient can be discharged from pulmonary standpoint to home with outpatient home health or inpatient rehab       Follow-up in the office in 3 weeks, he will consider home sleep study to rule out SHYAM     tapering dose prednisone     Brovana, Pulmicort nebulized  Mucinex  Oxygen supplement and titration to maintain saturation 90 to 95% currently on 3 L per nasal cannula     bronchodilators     Smoking cessation counseling, nicotine patch  Aspirin, Plavix, Crestor  Cardiology following, currently on metoprolol, diltiazem, losartan  Insulin Lantus and sliding scale     I personally reviewed the radiological studies             LOS: 6 days     Subjective     Less cough and shortness of breath    Objective     Vital signs for last 24 hours:  Vitals:    06/28/25 1035 06/28/25 1101 06/28/25 1105 06/28/25 1111   BP: 111/68      BP Location:       Patient Position:       Pulse: 102 100 106 101   Resp:   16 16   Temp:       TempSrc:       SpO2:   96% 99%   Weight:       Height:           Intake/Output last 3 shifts:  I/O last 3 completed shifts:  In: 1634 [P.O.:1634]  Out: 3645 [Urine:3645]  Intake/Output this shift:  No intake/output data recorded.      Radiology  Imaging Results (Last 24 Hours)       ** No results found for the last 24 hours. **            Labs:  Results from last 7 days   Lab Units  06/28/25  0235   WBC 10*3/mm3 14.77*   HEMOGLOBIN g/dL 14.6   HEMATOCRIT % 46.4   PLATELETS 10*3/mm3 202     Results from last 7 days   Lab Units 06/28/25  0235 06/23/25  0203 06/22/25  0151   SODIUM mmol/L 136   < > 141   POTASSIUM mmol/L 4.5   < > 5.0   CHLORIDE mmol/L 94*   < > 102   CO2 mmol/L 30.3*   < > 25.6   BUN mg/dL 53.8*   < > 32.2*   CREATININE mg/dL 1.18   < > 1.00   CALCIUM mg/dL 9.3   < > 8.7   BILIRUBIN mg/dL  --   --  0.2   ALK PHOS U/L  --   --  55   ALT (SGPT) U/L  --   --  43*   AST (SGOT) U/L  --   --  16   GLUCOSE mg/dL 135*   < > 243*    < > = values in this interval not displayed.     Results from last 7 days   Lab Units 06/22/25  0318   PH, ARTERIAL pH units 7.359   PO2 ART mm Hg 90.2   PCO2, ARTERIAL mm Hg 60.4*   HCO3 ART mmol/L 34.1*     Results from last 7 days   Lab Units 06/28/25  0235 06/22/25  0151   ALBUMIN g/dL 3.9 4.0     Results from last 7 days   Lab Units 06/22/25  0323 06/22/25  0151   HSTROP T ng/L 33* 36*         Results from last 7 days   Lab Units 06/28/25  0235   MAGNESIUM mg/dL 2.3                   Meds:   SCHEDULE  arformoterol, 15 mcg, Nebulization, BID - RT  aspirin, 81 mg, Oral, Daily  budesonide, 0.5 mg, Nebulization, BID - RT  clopidogrel, 75 mg, Oral, Daily  dilTIAZem CD, 240 mg, Oral, Daily  guaiFENesin, 1,200 mg, Oral, Q12H  insulin glargine, 20 Units, Subcutaneous, Nightly  insulin lispro, 4-24 Units, Subcutaneous, 4x Daily AC & at Bedtime  ipratropium-albuterol, 3 mL, Nebulization, Q4H - RT  metoprolol tartrate, 25 mg, Oral, Q12H  nicotine, 1 patch, Transdermal, Daily  predniSONE, 10 mg, Oral, Daily  revefenacin, 175 mcg, Nebulization, Daily - RT  rosuvastatin, 20 mg, Oral, Daily  sodium chloride, 10 mL, Intravenous, Q12H  spironolactone, 25 mg, Oral, Daily  torsemide, 40 mg, Oral, Daily      Infusions     PRNs    acetaminophen **OR** acetaminophen **OR** acetaminophen    aluminum-magnesium hydroxide-simethicone    senna-docusate sodium **AND** polyethylene  glycol **AND** bisacodyl **AND** bisacodyl    Calcium Replacement - Follow Nurse / BPA Driven Protocol    dextrose    dextrose    glucagon (human recombinant)    Magnesium Standard Dose Replacement - Follow Nurse / BPA Driven Protocol    melatonin    metoprolol tartrate    nitroglycerin    ondansetron ODT **OR** ondansetron    Phosphorus Replacement - Follow Nurse / BPA Driven Protocol    Potassium Replacement - Follow Nurse / BPA Driven Protocol    sodium chloride    sodium chloride    sodium chloride    Physical Exam:  General Appearance:  Alert   HEENT:  Normocephalic, without obvious abnormality, Conjunctiva/corneas clear,.   Nares normal, no drainage     Neck:  Supple, symmetrical, trachea midline.   Lungs /Chest wall: Mild bilateral basal rhonchi, respirations unlabored, symmetrical wall movement.     Heart:  Regular rate and rhythm, S1 S2 normal  Abdomen: Soft, non-tender, no masses, no organomegaly.    Extremities: + Edema, no clubbing or cyanosis     ROS  Constitutional: Negative for chills, fever and malaise/fatigue.   HENT: Negative.    Eyes: Negative.    Cardiovascular: Negative.    Respiratory: Positive for cough and shortness of breath.    Skin: Negative.    Musculoskeletal: Negative.    Gastrointestinal: Negative.    Genitourinary: Negative.    Neurological: Generalized weakness        I reviewed the recent clinical results  I personally reviewed the latest radiological studies    Part of this note may be an electronic transcription/translation of spoken language to printed text using the Dragon Dictation System.

## 2025-06-28 NOTE — PROGRESS NOTES
CARDIOLOGY FOLLOW-UP PROGRESS NOTE      Reason for follow-up:    SOA  Paroxysmal atrial flutter  CAD/PCI  COPD  Diastolic Heart failure     Attending: Dawood Lombardo DO      Subjective .     Improving SOA, Edema       Review of Systems   Constitutional: Negative for chills and fever.   HENT:  Negative for ear discharge and nosebleeds.    Eyes:  Negative for discharge and redness.   Cardiovascular:  Positive for leg swelling. Negative for chest pain, orthopnea, palpitations, paroxysmal nocturnal dyspnea and syncope.   Respiratory:  Positive for shortness of breath. Negative for cough and wheezing.    Endocrine: Negative for heat intolerance.   Skin:  Negative for rash.   Musculoskeletal:  Negative for arthritis and myalgias.   Gastrointestinal:  Negative for abdominal pain, melena, nausea and vomiting.   Genitourinary:  Negative for dysuria and hematuria.   Neurological:  Negative for dizziness, light-headedness, numbness and tremors.   Psychiatric/Behavioral:  Negative for depression. The patient is not nervous/anxious.      Pertinent items are noted in HPI, all other systems reviewed and negative  Allergies: Patient has no known allergies.    Scheduled Meds:arformoterol, 15 mcg, Nebulization, BID - RT  aspirin, 81 mg, Oral, Daily  budesonide, 0.5 mg, Nebulization, BID - RT  clopidogrel, 75 mg, Oral, Daily  dilTIAZem CD, 240 mg, Oral, Daily  guaiFENesin, 1,200 mg, Oral, Q12H  insulin glargine, 20 Units, Subcutaneous, Nightly  insulin lispro, 4-24 Units, Subcutaneous, 4x Daily AC & at Bedtime  ipratropium-albuterol, 3 mL, Nebulization, Q4H - RT  metoprolol tartrate, 25 mg, Oral, Q12H  nicotine, 1 patch, Transdermal, Daily  predniSONE, 10 mg, Oral, Daily  revefenacin, 175 mcg, Nebulization, Daily - RT  rosuvastatin, 20 mg, Oral, Daily  sodium chloride, 10 mL, Intravenous, Q12H  spironolactone, 25 mg, Oral, Daily  torsemide, 40 mg, Oral, Daily        Continuous Infusions:     PRN Meds:.  acetaminophen **OR**  acetaminophen **OR** acetaminophen    aluminum-magnesium hydroxide-simethicone    senna-docusate sodium **AND** polyethylene glycol **AND** bisacodyl **AND** bisacodyl    Calcium Replacement - Follow Nurse / BPA Driven Protocol    dextrose    dextrose    glucagon (human recombinant)    Magnesium Standard Dose Replacement - Follow Nurse / BPA Driven Protocol    melatonin    metoprolol tartrate    nitroglycerin    ondansetron ODT **OR** ondansetron    Phosphorus Replacement - Follow Nurse / BPA Driven Protocol    Potassium Replacement - Follow Nurse / BPA Driven Protocol    sodium chloride    sodium chloride    sodium chloride    Objective     VITAL SIGNS  Patient Vitals for the past 24 hrs:   BP Temp Temp src Pulse Resp SpO2 Weight   06/28/25 1206 93/57 -- -- -- -- -- --   06/28/25 1205 (!) 89/58 97.7 °F (36.5 °C) Oral 96 15 -- --   06/28/25 1111 -- -- -- 101 16 99 % --   06/28/25 1105 -- -- -- 106 16 96 % --   06/28/25 1101 -- -- -- 100 -- -- --   06/28/25 1035 111/68 -- -- 102 -- -- --   06/28/25 0902 114/60 -- -- 97 -- -- --   06/28/25 0750 97/50 98 °F (36.7 °C) Oral 91 14 91 % --   06/28/25 0712 -- -- -- 89 16 -- --   06/28/25 0708 -- -- -- 89 16 100 % --   06/28/25 0706 -- -- -- 92 16 90 % --   06/28/25 0703 -- -- -- 91 16 93 % --   06/28/25 0702 -- -- -- 93 16 93 % --   06/28/25 0659 -- -- -- 90 16 -- --   06/28/25 0423 111/55 97.4 °F (36.3 °C) Axillary 79 13 100 % (!) 138 kg (303 lb 5.7 oz)   06/28/25 0338 -- -- -- 89 20 99 % --   06/28/25 0334 -- -- -- 88 20 91 % --   06/28/25 0027 113/60 97.6 °F (36.4 °C) Axillary 86 18 97 % --   06/27/25 2339 -- -- -- 75 20 98 % --   06/27/25 2331 -- -- -- 86 18 95 % --   06/27/25 2205 90/57 -- -- 85 -- -- --   06/27/25 2200 (!) 89/49 -- -- 78 -- 95 % --   06/27/25 2025 94/63 97.1 °F (36.2 °C) Axillary 82 17 97 % --   06/27/25 1925 -- -- -- 84 16 97 % --   06/27/25 1921 -- -- -- 89 16 98 % --   06/27/25 1920 -- -- -- 91 18 97 % --   06/27/25 1915 -- -- -- 87 16 95 % --  "  06/27/25 1636 (!) 87/50 97.2 °F (36.2 °C) Oral 84 13 -- --        Flowsheet Rows      Flowsheet Row First Filed Value   Admission Height 177.8 cm (70\") Documented at 06/22/2025 0055   Admission Weight 131 kg (288 lb 12.8 oz) Documented at 06/22/2025 0231            Body mass index is 43.53 kg/m².      Intake/Output Summary (Last 24 hours) at 6/28/2025 1318  Last data filed at 6/28/2025 0630  Gross per 24 hour   Intake 1634 ml   Output 2470 ml   Net -836 ml        TELEMETRY:     Sinus Rhythm    Physical Exam:  Constitutional:       Appearance: Well-developed.   Eyes:      General: No scleral icterus.        Right eye: No discharge.   HENT:      Head: Normocephalic and atraumatic.   Neck:      Thyroid: No thyromegaly.      Lymphadenopathy: No cervical adenopathy.   Pulmonary:      Effort: Pulmonary effort is normal. No respiratory distress.      Breath sounds: Normal breath sounds. No wheezing. No rales.   Cardiovascular:      Normal rate. Regular rhythm.      No gallop.    Edema:     Peripheral edema present.  Abdominal:      Tenderness: There is no abdominal tenderness.   Skin:     Findings: No erythema or rash.   Neurological:      Mental Status: Alert and oriented to person, place, and time.            Results Review:   I reviewed the patient's new clinical results.    CBC    Results from last 7 days   Lab Units 06/28/25  0235 06/27/25  0049 06/26/25  0509 06/25/25  1625 06/24/25  0330 06/23/25  0203 06/22/25  0151   WBC 10*3/mm3 14.77* 13.26* 13.65* 14.18* 16.30* 15.31* 13.96*   HEMOGLOBIN g/dL 14.6 14.9 14.5 14.1 14.6 13.6 14.1   PLATELETS 10*3/mm3 202 201 195 209 231 231 232     BMP   Results from last 7 days   Lab Units 06/28/25  0235 06/27/25  0049 06/26/25  0326 06/25/25  1625 06/24/25  0330 06/23/25  0203 06/22/25  0151   SODIUM mmol/L 136 133* 134* 131* 134* 136 141   POTASSIUM mmol/L 4.5 4.2 4.2 5.2 4.4 5.0 5.0   CHLORIDE mmol/L 94* 91* 95* 95* 95* 99 102   CO2 mmol/L 30.3* 29.7* 28.6 25.4 28.5 28.3 " 25.6   BUN mg/dL 53.8* 52.7* 45.1* 50.9* 44.6* 42.1* 32.2*   CREATININE mg/dL 1.18 1.37* 1.09 1.32* 1.10 1.04 1.00   GLUCOSE mg/dL 135* 233* 72 327* 265* 248* 243*   MAGNESIUM mg/dL 2.3 2.3 2.5*  --  2.5*  --   --    PHOSPHORUS mg/dL 4.4  --   --   --   --   --   --      Cr Clearance Estimated Creatinine Clearance: 83.9 mL/min (by C-G formula based on SCr of 1.18 mg/dL).  Coag     HbA1C   Lab Results   Component Value Date    HGBA1C 9.31 (H) 06/23/2025    HGBA1C 6.8 (H) 05/05/2022    HGBA1C 6.4 (H) 06/12/2021     Blood Glucose   Glucose   Date/Time Value Ref Range Status   06/28/2025 1140 226 (H) 70 - 105 mg/dL Final     Comment:     Serial Number: [id number]:  [id number]   06/28/2025 0715 236 (H) 70 - 105 mg/dL Final     Comment:     Serial Number: [id number]:  [id number]   06/27/2025 2027 201 (H) 70 - 105 mg/dL Final     Comment:     Serial Number: [id number]:  [id number]   06/27/2025 1755 379 (H) 70 - 105 mg/dL Final     Comment:     Serial Number: [id number]:  [id number]   06/27/2025 1656 323 (H) 70 - 105 mg/dL Final     Comment:     Serial Number: [id number]:  [id number]   06/27/2025 1112 394 (H) 70 - 105 mg/dL Final     Comment:     Serial Number: [id number]:  [id number]   06/27/2025 0718 178 (H) 70 - 105 mg/dL Final     Comment:     Serial Number: [id number]:  [id number]   06/26/2025 2020 213 (H) 70 - 105 mg/dL Final     Comment:     Serial Number: [id number]:  [id number]     Infection   Results from last 7 days   Lab Units 06/22/25  0337 06/22/25  0151   BLOODCX  No growth at 5 days No growth at 5 days   PROCALCITONIN ng/mL  --  0.06     CMP   Results from last 7 days   Lab Units 06/28/25  0235 06/27/25  0049 06/26/25  0326 06/25/25  1625 06/24/25  0330 06/23/25  0203 06/22/25  0151   SODIUM mmol/L 136 133* 134* 131* 134* 136 141   POTASSIUM mmol/L 4.5 4.2 4.2 5.2 4.4 5.0 5.0   CHLORIDE mmol/L 94* 91* 95* 95* 95* 99 102   CO2 mmol/L 30.3* 29.7* 28.6 25.4 28.5  "28.3 25.6   BUN mg/dL 53.8* 52.7* 45.1* 50.9* 44.6* 42.1* 32.2*   CREATININE mg/dL 1.18 1.37* 1.09 1.32* 1.10 1.04 1.00   GLUCOSE mg/dL 135* 233* 72 327* 265* 248* 243*   ALBUMIN g/dL 3.9  --   --   --   --   --  4.0   BILIRUBIN mg/dL  --   --   --   --   --   --  0.2   ALK PHOS U/L  --   --   --   --   --   --  55   AST (SGOT) U/L  --   --   --   --   --   --  16   ALT (SGPT) U/L  --   --   --   --   --   --  43*     ABG    Results from last 7 days   Lab Units 06/22/25  0318 06/22/25  0145   PH, ARTERIAL pH units 7.359 7.312*   PCO2, ARTERIAL mm Hg 60.4* 62.0*   PO2 ART mm Hg 90.2 77.0*   O2 SATURATION ART % 96.3 93.4*   BASE EXCESS ART mmol/L 6.4* 3.2*     UA      REZA  No results found for: \"POCMETH\", \"POCAMPHET\", \"POCBARBITUR\", \"POCBENZO\", \"POCCOCAINE\", \"POCOPIATES\", \"POCOXYCODO\", \"POCPHENCYC\", \"POCPROPOXY\", \"POCTHC\", \"POCTRICYC\"  Lysis Labs   Results from last 7 days   Lab Units 06/28/25  0235 06/27/25  0049 06/26/25  0509 06/26/25  0326 06/25/25  1625 06/24/25  0330 06/23/25  0203 06/22/25  0151   HEMOGLOBIN g/dL 14.6 14.9 14.5  --  14.1 14.6 13.6 14.1   PLATELETS 10*3/mm3 202 201 195  --  209 231 231 232   CREATININE mg/dL 1.18 1.37*  --  1.09 1.32* 1.10 1.04 1.00     Radiology(recent) No radiology results for the last day    Imaging Results (Last 24 Hours)       ** No results found for the last 24 hours. **            Results from last 7 days   Lab Units 06/22/25  0323   HSTROP T ng/L 33*       EKG          I personally viewed and interpreted the patient's EKG/Telemetry data:        ECHOCARDIOGRAM:     Results for orders placed during the hospital encounter of 06/22/25    Adult Transthoracic Echo Complete W/ Cont if Necessary Per Protocol    Interpretation Summary    Left ventricular systolic function is normal. Calculated left ventricular EF = 64% Left ventricular ejection fraction appears to be 61 - 65%.    Left ventricular diastolic function was normal.        STRESS MYOVIEW:      CARDIAC " CATHETERIZATION:      OTHER:         Assessment & Plan            Acute respiratory failure with hypoxia and hypercapnia    Essential hypertension    Chronic obstructive pulmonary disease    Coronary angioplasty status    Chronic hepatitis C    Overweight    Tobacco dependence syndrome    Mixed hyperlipidemia    Acute on chronic diastolic heart failure    Acute hypoxic respiratory failure        ASSESSMENT:    Acute respiratory failure with hypoxia and hypercapnia/COPD  - initial ABG pH 7.31, PCO2 62, pO2 75, HCO3 31.4  - Placed on Bipap with repeat ABG 7.359, CO2 60.4, pO2 90.2, HCO3 34.1  - Currently on 2 L nasal cannula, wears 3 L home O2  - WBC 13.96  - RVP negative  - CXR showed no clearly acute findings in the chest. Chronic changes as above including cardiomegaly  - pulmonary following     Hx diastolic CHF   - Repeat echo EF 60-65%; normal diastolic function, no significant VHD  - proBNP 175   - bilateral lower extremity edema  - Bilateral lower extremities wrapped with Ace  - Continue gentle diuresis     CAD s/p PCI  - HS troponin 36 with repeat 33  - Continue aspirin Plavix statin  -denies chest pain     Hypertension  - Continue losartan Cardizem     Hyperlipidemia  - cont home aspirin, plavix, statin      DM type II  - Management per primary     Tobacco abuse  - encourage cessation  - nicotine patch      Morbid obesity  - BMI 41    PLAN:    BUN/Cr 52/1.37  Will stop metolazone, Cr trending back up  On NC 02  LE wrapped  Hemodynamics stable, SR on monitor  Continue supportive care         Patient is seen and examined and findings are verified.  All data is reviewed by me personally.  Assessment and plan formulated by APC was done after discussion with attending.  I spent more than 50% of time in taking care of the patient.    Patient is sitting up in chair.  Patient is getting nebulization treatment.  Shortness of breath is improving    Normal S1 and S2.  Leg edema is better but still positive.  Patient is  on oxygen.  Patient has decreased breath sound at the bases.  No crackles or rhonchi abdomen is soft but obese    Patient is still volume overload continue diuresis.  Creatinine is improving.  I will consider metolazone 2.5 mg Monday Wednesday Friday.  Patient is being followed by renal.  Continue prednisone and nebulization treatment.    Electronically signed by Ian Major MD, 06/28/25, 1:20 PM EDT.                                Ian Major MD  06/28/25  13:18 EDT

## 2025-06-29 LAB
ALBUMIN SERPL-MCNC: 3.6 G/DL (ref 3.5–5.2)
ANION GAP SERPL CALCULATED.3IONS-SCNC: 10.9 MMOL/L (ref 5–15)
BASOPHILS # BLD AUTO: 0.05 10*3/MM3 (ref 0–0.2)
BASOPHILS NFR BLD AUTO: 0.3 % (ref 0–1.5)
BUN SERPL-MCNC: 46.8 MG/DL (ref 8–23)
BUN/CREAT SERPL: 43.7 (ref 7–25)
CALCIUM SPEC-SCNC: 9.3 MG/DL (ref 8.6–10.5)
CHLORIDE SERPL-SCNC: 94 MMOL/L (ref 98–107)
CO2 SERPL-SCNC: 28.1 MMOL/L (ref 22–29)
CREAT SERPL-MCNC: 1.07 MG/DL (ref 0.76–1.27)
DEPRECATED RDW RBC AUTO: 51.1 FL (ref 37–54)
EGFRCR SERPLBLD CKD-EPI 2021: 75.6 ML/MIN/1.73
EOSINOPHIL # BLD AUTO: 0.25 10*3/MM3 (ref 0–0.4)
EOSINOPHIL NFR BLD AUTO: 1.6 % (ref 0.3–6.2)
ERYTHROCYTE [DISTWIDTH] IN BLOOD BY AUTOMATED COUNT: 14.6 % (ref 12.3–15.4)
GLUCOSE BLDC GLUCOMTR-MCNC: 150 MG/DL (ref 70–105)
GLUCOSE BLDC GLUCOMTR-MCNC: 188 MG/DL (ref 70–105)
GLUCOSE BLDC GLUCOMTR-MCNC: 216 MG/DL (ref 70–105)
GLUCOSE BLDC GLUCOMTR-MCNC: 282 MG/DL (ref 70–105)
GLUCOSE SERPL-MCNC: 157 MG/DL (ref 65–99)
HCT VFR BLD AUTO: 42.7 % (ref 37.5–51)
HGB BLD-MCNC: 13.5 G/DL (ref 13–17.7)
IMM GRANULOCYTES # BLD AUTO: 0.6 10*3/MM3 (ref 0–0.05)
IMM GRANULOCYTES NFR BLD AUTO: 3.9 % (ref 0–0.5)
LYMPHOCYTES # BLD AUTO: 1.89 10*3/MM3 (ref 0.7–3.1)
LYMPHOCYTES NFR BLD AUTO: 12.4 % (ref 19.6–45.3)
MAGNESIUM SERPL-MCNC: 2.2 MG/DL (ref 1.6–2.4)
MCH RBC QN AUTO: 30.1 PG (ref 26.6–33)
MCHC RBC AUTO-ENTMCNC: 31.6 G/DL (ref 31.5–35.7)
MCV RBC AUTO: 95.3 FL (ref 79–97)
MONOCYTES # BLD AUTO: 1.31 10*3/MM3 (ref 0.1–0.9)
MONOCYTES NFR BLD AUTO: 8.6 % (ref 5–12)
NEUTROPHILS NFR BLD AUTO: 11.2 10*3/MM3 (ref 1.7–7)
NEUTROPHILS NFR BLD AUTO: 73.2 % (ref 42.7–76)
NRBC BLD AUTO-RTO: 0 /100 WBC (ref 0–0.2)
PHOSPHATE SERPL-MCNC: 4.7 MG/DL (ref 2.5–4.5)
PLATELET # BLD AUTO: 182 10*3/MM3 (ref 140–450)
PMV BLD AUTO: 9.7 FL (ref 6–12)
POTASSIUM SERPL-SCNC: 4.3 MMOL/L (ref 3.5–5.2)
RBC # BLD AUTO: 4.48 10*6/MM3 (ref 4.14–5.8)
SODIUM SERPL-SCNC: 133 MMOL/L (ref 136–145)
WBC NRBC COR # BLD AUTO: 15.3 10*3/MM3 (ref 3.4–10.8)

## 2025-06-29 PROCEDURE — 63710000001 INSULIN GLARGINE PER 5 UNITS: Performed by: INTERNAL MEDICINE

## 2025-06-29 PROCEDURE — 99232 SBSQ HOSP IP/OBS MODERATE 35: CPT | Performed by: INTERNAL MEDICINE

## 2025-06-29 PROCEDURE — 94760 N-INVAS EAR/PLS OXIMETRY 1: CPT

## 2025-06-29 PROCEDURE — 85025 COMPLETE CBC W/AUTO DIFF WBC: CPT | Performed by: NURSE PRACTITIONER

## 2025-06-29 PROCEDURE — 82948 REAGENT STRIP/BLOOD GLUCOSE: CPT | Performed by: STUDENT IN AN ORGANIZED HEALTH CARE EDUCATION/TRAINING PROGRAM

## 2025-06-29 PROCEDURE — 94799 UNLISTED PULMONARY SVC/PX: CPT

## 2025-06-29 PROCEDURE — 83735 ASSAY OF MAGNESIUM: CPT | Performed by: INTERNAL MEDICINE

## 2025-06-29 PROCEDURE — 63710000001 INSULIN LISPRO (HUMAN) PER 5 UNITS: Performed by: STUDENT IN AN ORGANIZED HEALTH CARE EDUCATION/TRAINING PROGRAM

## 2025-06-29 PROCEDURE — 63710000001 PREDNISONE PER 5 MG: Performed by: INTERNAL MEDICINE

## 2025-06-29 PROCEDURE — 82948 REAGENT STRIP/BLOOD GLUCOSE: CPT

## 2025-06-29 PROCEDURE — 80069 RENAL FUNCTION PANEL: CPT | Performed by: INTERNAL MEDICINE

## 2025-06-29 PROCEDURE — 94664 DEMO&/EVAL PT USE INHALER: CPT

## 2025-06-29 RX ADMIN — ROSUVASTATIN CALCIUM 20 MG: 10 TABLET, FILM COATED ORAL at 09:13

## 2025-06-29 RX ADMIN — DILTIAZEM HYDROCHLORIDE 240 MG: 240 CAPSULE, COATED, EXTENDED RELEASE ORAL at 09:13

## 2025-06-29 RX ADMIN — INSULIN GLARGINE 20 UNITS: 100 INJECTION, SOLUTION SUBCUTANEOUS at 21:18

## 2025-06-29 RX ADMIN — METOPROLOL TARTRATE 25 MG: 25 TABLET, FILM COATED ORAL at 21:17

## 2025-06-29 RX ADMIN — IPRATROPIUM BROMIDE AND ALBUTEROL SULFATE 3 ML: .5; 3 SOLUTION RESPIRATORY (INHALATION) at 23:02

## 2025-06-29 RX ADMIN — IPRATROPIUM BROMIDE AND ALBUTEROL SULFATE 3 ML: .5; 3 SOLUTION RESPIRATORY (INHALATION) at 07:40

## 2025-06-29 RX ADMIN — ARFORMOTEROL TARTRATE 15 MCG: 15 SOLUTION RESPIRATORY (INHALATION) at 20:33

## 2025-06-29 RX ADMIN — CLOPIDOGREL BISULFATE 75 MG: 75 TABLET, FILM COATED ORAL at 09:13

## 2025-06-29 RX ADMIN — NICOTINE 1 PATCH: 21 PATCH, EXTENDED RELEASE TRANSDERMAL at 09:27

## 2025-06-29 RX ADMIN — Medication 10 ML: at 21:17

## 2025-06-29 RX ADMIN — BUDESONIDE INHALATION 0.5 MG: 0.5 SUSPENSION RESPIRATORY (INHALATION) at 20:28

## 2025-06-29 RX ADMIN — TORSEMIDE 40 MG: 10 TABLET ORAL at 09:27

## 2025-06-29 RX ADMIN — SPIRONOLACTONE 25 MG: 25 TABLET, FILM COATED ORAL at 09:13

## 2025-06-29 RX ADMIN — GUAIFENESIN 1200 MG: 600 TABLET, EXTENDED RELEASE ORAL at 21:17

## 2025-06-29 RX ADMIN — IPRATROPIUM BROMIDE AND ALBUTEROL SULFATE 3 ML: .5; 3 SOLUTION RESPIRATORY (INHALATION) at 12:20

## 2025-06-29 RX ADMIN — PREDNISONE 10 MG: 10 TABLET ORAL at 09:13

## 2025-06-29 RX ADMIN — INSULIN LISPRO 8 UNITS: 100 INJECTION, SOLUTION INTRAVENOUS; SUBCUTANEOUS at 21:18

## 2025-06-29 RX ADMIN — IPRATROPIUM BROMIDE AND ALBUTEROL SULFATE 3 ML: .5; 3 SOLUTION RESPIRATORY (INHALATION) at 03:09

## 2025-06-29 RX ADMIN — INSULIN LISPRO 4 UNITS: 100 INJECTION, SOLUTION INTRAVENOUS; SUBCUTANEOUS at 13:28

## 2025-06-29 RX ADMIN — GUAIFENESIN 1200 MG: 600 TABLET, EXTENDED RELEASE ORAL at 09:13

## 2025-06-29 RX ADMIN — INSULIN LISPRO 12 UNITS: 100 INJECTION, SOLUTION INTRAVENOUS; SUBCUTANEOUS at 17:13

## 2025-06-29 RX ADMIN — ASPIRIN 81 MG: 81 TABLET, COATED ORAL at 09:13

## 2025-06-29 RX ADMIN — IPRATROPIUM BROMIDE AND ALBUTEROL SULFATE 3 ML: .5; 3 SOLUTION RESPIRATORY (INHALATION) at 15:12

## 2025-06-29 RX ADMIN — Medication 10 ML: at 09:14

## 2025-06-29 RX ADMIN — BUDESONIDE INHALATION 0.5 MG: 0.5 SUSPENSION RESPIRATORY (INHALATION) at 07:44

## 2025-06-29 RX ADMIN — INSULIN LISPRO 4 UNITS: 100 INJECTION, SOLUTION INTRAVENOUS; SUBCUTANEOUS at 09:13

## 2025-06-29 RX ADMIN — METOPROLOL TARTRATE 25 MG: 25 TABLET, FILM COATED ORAL at 09:13

## 2025-06-29 NOTE — PROGRESS NOTES
Daily Progress Note          Assessment    Acute respiratory failure with hypoxia and hypercapnia: ABGs on admission 7.3 1/62/75 while on nasal cannula, repeated on BiPAP 7.35/60.4/90.2  Acute bronchitis due to unspecified pathogen, negative respiratory panel     Acute on chronic HFpEF  COPD without acute exacerbation  CAD status post PCI  HTN  HLD  DM  Chronic hepatitis C  Tobacco smoking  Obesity with BMI of 42 I     PFTs 2022: Moderate obstruction with air trapping and decreased oxygen diffusion capacity, FEV1/FVC 65%, FVC 72%, FEV1 63%, %, %, DLCO 58%           Recommendations:  The respiratory status is improving      Diuresis as per nephrology    Follow-up in the office in 3 weeks, he will consider home sleep study to rule out SHYAM     tapering dose prednisone     Brovana, Pulmicort nebulized  Mucinex  Oxygen supplement and titration to maintain saturation 90 to 95% currently on 3 L per nasal cannula     bronchodilators     Smoking cessation counseling, nicotine patch  Aspirin, Plavix, Crestor  Cardiology following, currently on metoprolol, diltiazem, losartan  Insulin Lantus and sliding scale     I personally reviewed the radiological studies             LOS: 7 days     Subjective     Less cough and shortness of breath    Objective     Vital signs for last 24 hours:  Vitals:    06/29/25 0744 06/29/25 0747 06/29/25 0913 06/29/25 1128   BP: 100/61  118/60 103/64   BP Location:       Patient Position:       Pulse: 94 91 88 84   Resp: 22 22  22   Temp: 98.3 °F (36.8 °C)   99 °F (37.2 °C)   TempSrc:       SpO2: 100% 100%  96%   Weight:       Height:           Intake/Output last 3 shifts:  I/O last 3 completed shifts:  In: 2568 [P.O.:2568]  Out: 4350 [Urine:4350]  Intake/Output this shift:  I/O this shift:  In: 480 [P.O.:480]  Out: 450 [Urine:450]      Radiology  Imaging Results (Last 24 Hours)       ** No results found for the last 24 hours. **            Labs:  Results from last 7 days   Lab Units  06/29/25  0416   WBC 10*3/mm3 15.30*   HEMOGLOBIN g/dL 13.5   HEMATOCRIT % 42.7   PLATELETS 10*3/mm3 182     Results from last 7 days   Lab Units 06/29/25  0347   SODIUM mmol/L 133*   POTASSIUM mmol/L 4.3   CHLORIDE mmol/L 94*   CO2 mmol/L 28.1   BUN mg/dL 46.8*   CREATININE mg/dL 1.07   CALCIUM mg/dL 9.3   GLUCOSE mg/dL 157*           Results from last 7 days   Lab Units 06/29/25  0347 06/28/25  0235   ALBUMIN g/dL 3.6 3.9               Results from last 7 days   Lab Units 06/29/25  0347   MAGNESIUM mg/dL 2.2                   Meds:   SCHEDULE  arformoterol, 15 mcg, Nebulization, BID - RT  aspirin, 81 mg, Oral, Daily  budesonide, 0.5 mg, Nebulization, BID - RT  clopidogrel, 75 mg, Oral, Daily  dilTIAZem CD, 240 mg, Oral, Daily  guaiFENesin, 1,200 mg, Oral, Q12H  insulin glargine, 20 Units, Subcutaneous, Nightly  insulin lispro, 4-24 Units, Subcutaneous, 4x Daily AC & at Bedtime  ipratropium-albuterol, 3 mL, Nebulization, Q4H - RT  metoprolol tartrate, 25 mg, Oral, Q12H  nicotine, 1 patch, Transdermal, Daily  revefenacin, 175 mcg, Nebulization, Daily - RT  rosuvastatin, 20 mg, Oral, Daily  sodium chloride, 10 mL, Intravenous, Q12H  spironolactone, 25 mg, Oral, Daily  torsemide, 40 mg, Oral, Daily      Infusions     PRNs    acetaminophen **OR** acetaminophen **OR** acetaminophen    aluminum-magnesium hydroxide-simethicone    senna-docusate sodium **AND** polyethylene glycol **AND** bisacodyl **AND** bisacodyl    Calcium Replacement - Follow Nurse / BPA Driven Protocol    dextrose    dextrose    glucagon (human recombinant)    Magnesium Standard Dose Replacement - Follow Nurse / BPA Driven Protocol    melatonin    metoprolol tartrate    nitroglycerin    ondansetron ODT **OR** ondansetron    Phosphorus Replacement - Follow Nurse / BPA Driven Protocol    Potassium Replacement - Follow Nurse / BPA Driven Protocol    sodium chloride    sodium chloride    sodium chloride    Physical Exam:  General Appearance:  Alert    HEENT:  Normocephalic, without obvious abnormality, Conjunctiva/corneas clear,.   Nares normal, no drainage     Neck:  Supple, symmetrical, trachea midline.   Lungs /Chest wall: Mild bilateral basal rhonchi, respirations unlabored, symmetrical wall movement.     Heart:  Regular rate and rhythm, S1 S2 normal  Abdomen: Soft, non-tender, no masses, no organomegaly.    Extremities: + Edema, no clubbing or cyanosis     ROS  Constitutional: Negative for chills, fever and malaise/fatigue.   HENT: Negative.    Eyes: Negative.    Cardiovascular: Negative.    Respiratory: Positive for cough and shortness of breath.    Skin: Negative.    Musculoskeletal: Negative.    Gastrointestinal: Negative.    Genitourinary: Negative.    Neurological: Generalized weakness        I reviewed the recent clinical results  I personally reviewed the latest radiological studies    Part of this note may be an electronic transcription/translation of spoken language to printed text using the Dragon Dictation System.

## 2025-06-29 NOTE — PLAN OF CARE
Goal Outcome Evaluation:  Plan of Care Reviewed With: patient     Laurie A&O X 4; Experienced some soft BP's and Patient was repositioned from his left side lying position and BP improved, and the Physician on call said we continue to monitor the BP's.  Patient kept removing SpO2 probe in the night. Dressing changes done and the right LE dressing (kerlix) was very wet with non-odorous serous drainage and the wound is small but still raw. LLE wound is healing well and no drainage. H/education continues for diet modification and fall prevention. He is able to move very swiftly from bed to chair and vise versa,  Call light answered promptly and his nursing needs were met.

## 2025-06-29 NOTE — PROGRESS NOTES
[facility] Progress Note      Patient Name: Santos Sullivan  : 1955  MRN: [id number]  Primary Care Physician:  Astrid Zuñiga MD  Date of admission: 2025    Subjective     Interval History:     Patient resting comfortably  Dyspnea improving slowly  Still has significant lower extremities edema but good urine output    Review of Systems:   As noted above    Objective     Vitals:   Temp:  [97.4 °F (36.3 °C)-98.6 °F (37 °C)] 98.3 °F (36.8 °C)  Heart Rate:  [] 88  Resp:  [12-22] 22  BP: ()/(48-83) 118/60  Flow (L/min) (Oxygen Therapy):  [3] 3    Intake/Output Summary (Last 24 hours) at 2025 1050  Last data filed at 2025 0913  Gross per 24 hour   Intake 1654 ml   Output 3300 ml   Net -1646 ml       Physical Exam:      General Appearance: Morbidly obese, chronically ill-appearing  Skin: warm and dry  HEENT: oral mucosa normal, nonicteric sclera  Neck: supple, no JVD  Lungs: Decreased breath sound bases.  Few wheezes  Heart: Irregular rhythm  Abdomen: soft, nontender, nondistended  : no palpable bladder  Extremities: 2+ edema, legs wrapped  Neuro: normal speech and mental status    Scheduled Meds:     arformoterol, 15 mcg, Nebulization, BID - RT  aspirin, 81 mg, Oral, Daily  budesonide, 0.5 mg, Nebulization, BID - RT  clopidogrel, 75 mg, Oral, Daily  dilTIAZem CD, 240 mg, Oral, Daily  guaiFENesin, 1,200 mg, Oral, Q12H  insulin glargine, 20 Units, Subcutaneous, Nightly  insulin lispro, 4-24 Units, Subcutaneous, 4x Daily AC & at Bedtime  ipratropium-albuterol, 3 mL, Nebulization, Q4H - RT  metoprolol tartrate, 25 mg, Oral, Q12H  nicotine, 1 patch, Transdermal, Daily  revefenacin, 175 mcg, Nebulization, Daily - RT  rosuvastatin, 20 mg, Oral, Daily  sodium chloride, 10 mL, Intravenous, Q12H  spironolactone, 25 mg, Oral, Daily  torsemide, 40 mg, Oral, Daily      IV Meds:        Results Reviewed:   I have personally reviewed the results from the time of this  admission to 6/29/2025 10:50 EDT     Results from last 7 days   Lab Units 06/29/25  0347 06/28/25  0235 06/27/25  0049   SODIUM mmol/L 133* 136 133*   POTASSIUM mmol/L 4.3 4.5 4.2   CHLORIDE mmol/L 94* 94* 91*   CO2 mmol/L 28.1 30.3* 29.7*   BUN mg/dL 46.8* 53.8* 52.7*   CREATININE mg/dL 1.07 1.18 1.37*   CALCIUM mg/dL 9.3 9.3 9.0   GLUCOSE mg/dL 157* 135* 233*     Estimated Creatinine Clearance: 91.8 mL/min (by C-G formula based on SCr of 1.07 mg/dL).  Results from last 7 days   Lab Units 06/29/25  0347 06/28/25  0235 06/27/25  0049   MAGNESIUM mg/dL 2.2 2.3 2.3   PHOSPHORUS mg/dL 4.7* 4.4  --          Results from last 7 days   Lab Units 06/29/25  0416 06/28/25  0235 06/27/25  0049 06/26/25  0509 06/25/25  1625   WBC 10*3/mm3 15.30* 14.77* 13.26* 13.65* 14.18*   HEMOGLOBIN g/dL 13.5 14.6 14.9 14.5 14.1   PLATELETS 10*3/mm3 182 202 201 195 209           Assessment / Plan     ASSESSMENT:    Acute kidney injury.  Secondary to diuretics.  Improving slowly.  Electrolytes okay.  Volume status generous  Congestive heart failure.  Diastolic.  Acute on chronic.  Volume status improving with diuretics  Acute on chronic respiratory failure with hypoxia and hypercapnia.  Improving slowly.  On steroids and diuretics  Benign essential hypertension.  Blood pressure low  atrial fibrillation  Morbid obesity    PLAN:  Continue torsemide and Aldactone at current dose  Keep off losartan  Repeat labs in a.m.      Cl Reyes MD  06/29/25  10:50 EDT    [facility]  [phone]

## 2025-06-29 NOTE — PROGRESS NOTES
[facility] MED[facility]INE [facility]  DAILY PROGRESS NOTE    NAME: Santos Sullivan  : 1955  MRN: [id number]      LOS: 7 days     PROVIDER OF SERVICE: Dawood Lombardo DO    Chief Complaint: Acute respiratory failure with hypoxia and hypercapnia    Subjective:     Interval History:  History taken from: patient      Patient seen and examined this morning.  Continues to diurese well, breathing also continues to improve.  Discussed with cardiology, still managing volume status, possible discharge in next 1 to 2 days.    Review of Systems:   Pertinent positives as noted in HPI/subjective.  All other systems were reviewed and are negative.    Objective:     Vital Signs  Temp:  [97.4 °F (36.3 °C)-98.6 °F (37 °C)] 98.3 °F (36.8 °C)  Heart Rate:  [] 88  Resp:  [12-22] 22  BP: ()/(48-83) 118/60  Flow (L/min) (Oxygen Therapy):  [3] 3   Body mass index is 42.99 kg/m².    Physical Exam  General: Awake, alert, morbidly obese male, NAD  Eyes: PERRL, EOMI, conjunctivae are clear  Cardiovascular: Regular rate and rhythm, no murmurs  Respiratory: decreased breath sounds at the base bilaterally, no wheezing or rales, unlabored breathing  Abdomen: Soft, nontender, positive bowel sounds, no guarding  Skin: Warm, moderate to severe bilateral lower extremity pitting edema noted, slowly improving         Diagnostic Data    Results from last 7 days   Lab Units 25  0416 25  0347   WBC 10*3/mm3 15.30*  --    HEMOGLOBIN g/dL 13.5  --    HEMATOCRIT % 42.7  --    PLATELETS 10*3/mm3 182  --    GLUCOSE mg/dL  --  157*   CREATININE mg/dL  --  1.07   BUN mg/dL  --  46.8*   SODIUM mmol/L  --  133*   POTASSIUM mmol/L  --  4.3   ANION GAP mmol/L  --  10.9       No radiology results for the last day        I reviewed the patient's new clinical results.    Assessment/Plan:     Active and Resolved Problems  Active Hospital Problems    Diagnosis  POA    **Acute respiratory failure with hypoxia and hypercapnia [J96.01, J96.02]  Yes     Acute on chronic diastolic heart failure [I50.33]  Yes    Acute hypoxic respiratory failure [J96.01]  Yes    Mixed hyperlipidemia [E78.2]  Yes    Essential hypertension [I10]  Yes    Overweight [E66.3]  Yes    Chronic hepatitis C [B18.2]  Yes    Coronary angioplasty status [Z98.61]  Not Applicable    Chronic obstructive pulmonary disease [J44.9]  Yes    Tobacco dependence syndrome [F17.200]  Yes      Resolved Hospital Problems   No resolved problems to display.   Patient is 69-year-old male on home oxygen 3 LPM who presented with shortness of breath cough and wheezing as well as fatigue.  Patient required to be on BiPAP in the ER    Acute hypoxic and hypercapnic respiratory failure  Acute exacerbation of HFpEF  Volume overload with bilateral lower extremity edema  COPD with mild exacerbation  - ABGs and imaging reports reviewed  - Respiratory status improving, down to nasal cannula now  - Echo this admission showed preserved EF   - Volume status slowly improving  - Plan to change to p.o. torsemide today per nephrology  - Continue taper steroid dosing, continue bronchodilators  - Pulmonology recs appreciated, will need outpatient sleep study to rule out SHYAM  - Discussed with Dr. Major, continue inpatient diuresing for next 1 to 2 days and then possible discharge to SNF    RUDY, improved  - Creatinine uptrended in setting of diuresis  - Metolazone discontinued per cardiology  - Nephrology following    New onset A-fib/flutter  - Noted on telemetry on 6/24, did have some tachycardia which improved  - Spontaneously converted to NSR   - Continue home diltiazem, metoprolol added  - Further management per cardiology     CAD s/p PCI  Hypertension  Hyperlipidemia  - cont home meds as tolerated, monitor    DM type II with hyperglycemia  - Hemoglobin A1c is 9.3  - Continue basal insulin, SSI, dosing adjusted as steroid being tapered down  - Monitor blood glucose and adjust insulin therapy as needed    Tobacco abuse  -  encourage cessation  - nicotine patch      Morbid obesity  - BMI 41  - complicates all aspects of care    VTE Prophylaxis:  Mechanical VTE prophylaxis orders are present.    Disposition Planning:     Barriers to Discharge: Cardiology clearance, possible discharge tomorrow or Tuesday  Anticipated Date of Discharge: 6/30  Place of Discharge: Rehab    Code Status and Medical Interventions: CPR (Attempt to Resuscitate); Full Support   Ordered at: 06/22/25 0543     Code Status (Patient has no pulse and is not breathing):    CPR (Attempt to Resuscitate)     Medical Interventions (Patient has pulse or is breathing):    Full Support     Level Of Support Discussed With:    Patient       Signature: Electronically signed by Dawood Lombardo DO, 06/29/25, 10:13 EDT.  [facility] Hospitalist Team     Part of this note may be an electronic transcription/translation of spoken language to printed text using the Dragon Dictation System.

## 2025-06-29 NOTE — PROGRESS NOTES
CARDIOLOGY FOLLOW-UP PROGRESS NOTE      Reason for follow-up:    SOA  Paroxysmal atrial flutter  CAD/PCI  COPD  Diastolic Heart failure     Attending: Dawood Lombardo DO      Subjective .     Improving SOA, Edema       Review of Systems   Constitutional: Negative for chills and fever.   HENT:  Negative for ear discharge and nosebleeds.    Eyes:  Negative for discharge and redness.   Cardiovascular:  Positive for leg swelling. Negative for chest pain, orthopnea, palpitations, paroxysmal nocturnal dyspnea and syncope.   Respiratory:  Positive for cough and shortness of breath. Negative for wheezing.    Endocrine: Negative for heat intolerance.   Skin:  Negative for rash.   Musculoskeletal:  Negative for arthritis and myalgias.   Gastrointestinal:  Negative for abdominal pain, melena, nausea and vomiting.   Genitourinary:  Negative for dysuria and hematuria.   Neurological:  Negative for dizziness, light-headedness, numbness and tremors.   Psychiatric/Behavioral:  Negative for depression. The patient is not nervous/anxious.      Pertinent items are noted in HPI, all other systems reviewed and negative  Allergies: Patient has no known allergies.    Scheduled Meds:arformoterol, 15 mcg, Nebulization, BID - RT  aspirin, 81 mg, Oral, Daily  budesonide, 0.5 mg, Nebulization, BID - RT  clopidogrel, 75 mg, Oral, Daily  dilTIAZem CD, 240 mg, Oral, Daily  guaiFENesin, 1,200 mg, Oral, Q12H  insulin glargine, 20 Units, Subcutaneous, Nightly  insulin lispro, 4-24 Units, Subcutaneous, 4x Daily AC & at Bedtime  ipratropium-albuterol, 3 mL, Nebulization, Q4H - RT  metoprolol tartrate, 25 mg, Oral, Q12H  nicotine, 1 patch, Transdermal, Daily  revefenacin, 175 mcg, Nebulization, Daily - RT  rosuvastatin, 20 mg, Oral, Daily  sodium chloride, 10 mL, Intravenous, Q12H  spironolactone, 25 mg, Oral, Daily  torsemide, 40 mg, Oral, Daily        Continuous Infusions:     PRN Meds:.  acetaminophen **OR** acetaminophen **OR** acetaminophen     aluminum-magnesium hydroxide-simethicone    senna-docusate sodium **AND** polyethylene glycol **AND** bisacodyl **AND** bisacodyl    Calcium Replacement - Follow Nurse / BPA Driven Protocol    dextrose    dextrose    glucagon (human recombinant)    Magnesium Standard Dose Replacement - Follow Nurse / BPA Driven Protocol    melatonin    metoprolol tartrate    nitroglycerin    ondansetron ODT **OR** ondansetron    Phosphorus Replacement - Follow Nurse / BPA Driven Protocol    Potassium Replacement - Follow Nurse / BPA Driven Protocol    sodium chloride    sodium chloride    sodium chloride    Objective     VITAL SIGNS  Patient Vitals for the past 24 hrs:   BP Temp Temp src Pulse Resp SpO2 Weight   06/29/25 0913 118/60 -- -- 88 -- -- --   06/29/25 0747 -- -- -- 91 22 100 % --   06/29/25 0744 100/61 98.3 °F (36.8 °C) -- 94 22 100 % --   06/29/25 0743 -- -- -- 90 19 100 % --   06/29/25 0740 -- -- -- 88 19 100 % --   06/29/25 0600 -- -- -- 85 -- -- --   06/29/25 0500 -- -- -- 85 -- (!) 82 % --   06/29/25 0423 100/48 98.6 °F (37 °C) Oral 75 13 96 % 136 kg (299 lb 9.7 oz)   06/29/25 0316 -- -- -- 76 20 100 % --   06/29/25 0309 -- -- -- 76 16 98 % --   06/29/25 0302 -- -- -- 76 16 94 % --   06/29/25 0200 -- -- -- 70 -- -- --   06/29/25 0100 -- -- -- 75 -- (!) 74 % --   06/29/25 0053 141/83 98 °F (36.7 °C) Oral 76 13 96 % --   06/29/25 0000 -- -- -- 73 -- 98 % --   06/28/25 2151 131/71 -- -- 100 -- -- --   06/28/25 2147 131/71 97.6 °F (36.4 °C) Oral -- 20 95 % --   06/28/25 2040 (!) 87/53 98 °F (36.7 °C) Oral 89 12 94 % --   06/28/25 2011 -- -- -- 93 20 99 % --   06/28/25 2006 -- -- -- 93 20 98 % --   06/28/25 2005 -- -- -- 98 20 98 % --   06/28/25 2000 -- -- -- 91 20 95 % --   06/28/25 1649 -- -- -- 97 18 90 % --   06/28/25 1646 -- -- -- 97 20 90 % --   06/28/25 1542 93/70 97.4 °F (36.3 °C) Oral 100 21 -- --   06/28/25 1206 93/57 -- -- -- -- -- --   06/28/25 1205 (!) 89/58 97.7 °F (36.5 °C) Oral 96 15 -- --   06/28/25 1111  "-- -- -- 101 16 99 % --   06/28/25 1105 -- -- -- 106 16 96 % --   06/28/25 1101 -- -- -- 100 -- -- --        Flowsheet Rows      Flowsheet Row First Filed Value   Admission Height 177.8 cm (70\") Documented at 06/22/2025 0055   Admission Weight 131 kg (288 lb 12.8 oz) Documented at 06/22/2025 0231            Body mass index is 42.99 kg/m².      Intake/Output Summary (Last 24 hours) at 6/29/2025 1052  Last data filed at 6/29/2025 0913  Gross per 24 hour   Intake 1654 ml   Output 3300 ml   Net -1646 ml        TELEMETRY:     Sinus Rhythm    Physical Exam:  Constitutional:       Appearance: Well-developed.   Eyes:      General: No scleral icterus.        Right eye: No discharge.   HENT:      Head: Normocephalic and atraumatic.   Neck:      Thyroid: No thyromegaly.      Lymphadenopathy: No cervical adenopathy.   Pulmonary:      Effort: Pulmonary effort is normal. No respiratory distress.      Breath sounds: Normal breath sounds. No wheezing. No rales.   Cardiovascular:      Normal rate. Regular rhythm.      No gallop.    Edema:     Peripheral edema present.  Abdominal:      Tenderness: There is no abdominal tenderness.   Skin:     Findings: No erythema or rash.   Neurological:      Mental Status: Alert and oriented to person, place, and time.            Results Review:   I reviewed the patient's new clinical results.    CBC    Results from last 7 days   Lab Units 06/29/25  0416 06/28/25  0235 06/27/25  0049 06/26/25  0509 06/25/25  1625 06/24/25  0330 06/23/25  0203   WBC 10*3/mm3 15.30* 14.77* 13.26* 13.65* 14.18* 16.30* 15.31*   HEMOGLOBIN g/dL 13.5 14.6 14.9 14.5 14.1 14.6 13.6   PLATELETS 10*3/mm3 182 202 201 195 209 231 231     BMP   Results from last 7 days   Lab Units 06/29/25  0347 06/28/25  0235 06/27/25  0049 06/26/25  0326 06/25/25  1625 06/24/25  0330 06/23/25  0203   SODIUM mmol/L 133* 136 133* 134* 131* 134* 136   POTASSIUM mmol/L 4.3 4.5 4.2 4.2 5.2 4.4 5.0   CHLORIDE mmol/L 94* 94* 91* 95* 95* 95* 99   CO2 " mmol/L 28.1 30.3* 29.7* 28.6 25.4 28.5 28.3   BUN mg/dL 46.8* 53.8* 52.7* 45.1* 50.9* 44.6* 42.1*   CREATININE mg/dL 1.07 1.18 1.37* 1.09 1.32* 1.10 1.04   GLUCOSE mg/dL 157* 135* 233* 72 327* 265* 248*   MAGNESIUM mg/dL 2.2 2.3 2.3 2.5*  --  2.5*  --    PHOSPHORUS mg/dL 4.7* 4.4  --   --   --   --   --      Cr Clearance Estimated Creatinine Clearance: 91.8 mL/min (by C-G formula based on SCr of 1.07 mg/dL).  Coag     HbA1C   Lab Results   Component Value Date    HGBA1C 9.31 (H) 06/23/2025    HGBA1C 6.8 (H) 05/05/2022    HGBA1C 6.4 (H) 06/12/2021     Blood Glucose   Glucose   Date/Time Value Ref Range Status   06/29/2025 0740 188 (H) 70 - 105 mg/dL Final     Comment:     Serial Number: [id number]:  [id number]   06/28/2025 2042 139 (H) 70 - 105 mg/dL Final     Comment:     Serial Number: [id number]:  [id number]   06/28/2025 1704 387 (H) 70 - 105 mg/dL Final     Comment:     Serial Number: [id number]:  [id number]   06/28/2025 1140 226 (H) 70 - 105 mg/dL Final     Comment:     Serial Number: [id number]:  [id number]   06/28/2025 0715 236 (H) 70 - 105 mg/dL Final     Comment:     Serial Number: [id number]:  [id number]   06/27/2025 2027 201 (H) 70 - 105 mg/dL Final     Comment:     Serial Number: [id number]:  [id number]   06/27/2025 1755 379 (H) 70 - 105 mg/dL Final     Comment:     Serial Number: [id number]:  [id number]   06/27/2025 1656 323 (H) 70 - 105 mg/dL Final     Comment:     Serial Number: [id number]:  [id number]     Infection         CMP   Results from last 7 days   Lab Units 06/29/25  0347 06/28/25  0235 06/27/25  0049 06/26/25  0326 06/25/25  1625 06/24/25  0330 06/23/25  0203   SODIUM mmol/L 133* 136 133* 134* 131* 134* 136   POTASSIUM mmol/L 4.3 4.5 4.2 4.2 5.2 4.4 5.0   CHLORIDE mmol/L 94* 94* 91* 95* 95* 95* 99   CO2 mmol/L 28.1 30.3* 29.7* 28.6 25.4 28.5 28.3   BUN mg/dL 46.8* 53.8* 52.7* 45.1* 50.9* 44.6* 42.1*   CREATININE mg/dL 1.07 1.18 1.37* 1.09 1.32*  "1.10 1.04   GLUCOSE mg/dL 157* 135* 233* 72 327* 265* 248*   ALBUMIN g/dL 3.6 3.9  --   --   --   --   --      ABG          UA      REZA  No results found for: \"POCMETH\", \"POCAMPHET\", \"POCBARBITUR\", \"POCBENZO\", \"POCCOCAINE\", \"POCOPIATES\", \"POCOXYCODO\", \"POCPHENCYC\", \"POCPROPOXY\", \"POCTHC\", \"POCTRICYC\"  Lysis Labs   Results from last 7 days   Lab Units 06/29/25  0416 06/29/25  0347 06/28/25  0235 06/27/25  0049 06/26/25  0509 06/26/25  0326 06/25/25  1625 06/24/25  0330 06/23/25  0203   HEMOGLOBIN g/dL 13.5  --  14.6 14.9 14.5  --  14.1 14.6 13.6   PLATELETS 10*3/mm3 182  --  202 201 195  --  209 231 231   CREATININE mg/dL  --  1.07 1.18 1.37*  --  1.09 1.32* 1.10 1.04     Radiology(recent) No radiology results for the last day    Imaging Results (Last 24 Hours)       ** No results found for the last 24 hours. **                    EKG          I personally viewed and interpreted the patient's EKG/Telemetry data:        ECHOCARDIOGRAM:     Results for orders placed during the hospital encounter of 06/22/25    Adult Transthoracic Echo Complete W/ Cont if Necessary Per Protocol    Interpretation Summary    Left ventricular systolic function is normal. Calculated left ventricular EF = 64% Left ventricular ejection fraction appears to be 61 - 65%.    Left ventricular diastolic function was normal.        STRESS MYOVIEW:      CARDIAC CATHETERIZATION:      OTHER:         Assessment & Plan            Acute respiratory failure with hypoxia and hypercapnia    Essential hypertension    Chronic obstructive pulmonary disease    Coronary angioplasty status    Chronic hepatitis C    Overweight    Tobacco dependence syndrome    Mixed hyperlipidemia    Acute on chronic diastolic heart failure    Acute hypoxic respiratory failure        ASSESSMENT:    Acute respiratory failure with hypoxia and hypercapnia/COPD  - initial ABG pH 7.31, PCO2 62, pO2 75, HCO3 31.4  - Placed on Bipap with repeat ABG 7.359, CO2 60.4, pO2 90.2, HCO3 34.1  - " Currently on 2 L nasal cannula, wears 3 L home O2  - WBC 13.96  - RVP negative  - CXR showed no clearly acute findings in the chest. Chronic changes as above including cardiomegaly  - pulmonary following     Hx diastolic CHF   - Repeat echo EF 60-65%; normal diastolic function, no significant VHD  - proBNP 175   - bilateral lower extremity edema  - Bilateral lower extremities wrapped with Ace  - Continue gentle diuresis     CAD s/p PCI  - HS troponin 36 with repeat 33  - Continue aspirin Plavix statin  -denies chest pain     Hypertension  - Continue losartan Cardizem     Hyperlipidemia  - cont home aspirin, plavix, statin      DM type II  - Management per primary     Tobacco abuse  - encourage cessation  - nicotine patch      Morbid obesity  - BMI 41    PLAN:    BUN/Cr 52/1.37  Will stop metolazone, Cr trending back up  On NC 02  LE wrapped  Hemodynamics stable, SR on monitor  Continue supportive care       Patient is seen and examined and findings are verified.  All data is reviewed by me personally.  Assessment and plan formulated by APC was done after discussion with attending.  I spent more than 50% of time in taking care of the patient.    Patient is feeling much better.  Still have leg edema but is much improved    Normal S1 and S2.  No pericardial rub or murmur abdominal exam is benign.  Leg edema is improved but present    Continue torsemide and spironolactone.  Continue to diurese patient is on metoprolol.  Blood pressure is controlled heart rate is better current treatment would be continued    Electronically signed by Ian Major MD, 06/29/25, 10:54 AM EDT.                   Ian Major MD  06/29/25  10:52 EDT

## 2025-06-29 NOTE — PLAN OF CARE
Goal Outcome Evaluation:  Plan of Care Reviewed With: patient     Laurie A&O X 4; Experienced some soft BP's and Patient was repositioned from his side lying position and BP improved, and the Physician on call said we continue to monitor the BP's.  Patient kept removing SpO2 probe in the night. Dressing changes done and the right LE dressing (kerlix) was very wet with non-odorous serous drainage and the wound is small but still raw. LLE wound is healing well and no drainage. H/education continues for diet modification and fall prevention. He is able to move very swiftly from bed to chair and vise versa,  Call light answered promptly and his nursing needs were met.

## 2025-06-29 NOTE — PLAN OF CARE
Pt up in chair most of the shift, walked in room and resendiz with walker, gate belt and aid escort. Pt takes off leads and pulse ox immediately after they are put on, then complains they are not on. He said he would wear the pulse ox and then immediately started shaking / flailing and tugging at the device on his finger. Vitals stable. Pt refusing bath.     Problem: Fall Injury Risk  Goal: Absence of Fall and Fall-Related Injury  Outcome: Progressing  Intervention: Identify and Manage Contributors  Recent Flowsheet Documentation  Taken 6/29/2025 1600 by Rodger Walker RN  Medication Review/Management: medications reviewed  Taken 6/29/2025 1400 by Rodger Walker RN  Medication Review/Management: medications reviewed  Taken 6/29/2025 1200 by Rodger Walker RN  Medication Review/Management: medications reviewed  Taken 6/29/2025 1000 by Rodger Walker RN  Medication Review/Management: medications reviewed  Taken 6/29/2025 0800 by Rodger Walker RN  Medication Review/Management: medications reviewed  Intervention: Promote Injury-Free Environment  Recent Flowsheet Documentation  Taken 6/29/2025 1600 by Rodger Walker RN  Safety Promotion/Fall Prevention:   activity supervised   assistive device/personal items within reach   clutter free environment maintained   fall prevention program maintained   mobility aid in reach   lighting adjusted   nonskid shoes/slippers when out of bed   safety round/check completed   room organization consistent  Taken 6/29/2025 1400 by Rodger Walker RN  Safety Promotion/Fall Prevention:   activity supervised   assistive device/personal items within reach   clutter free environment maintained   fall prevention program maintained   mobility aid in reach   lighting adjusted   nonskid shoes/slippers when out of bed   safety round/check completed   room organization consistent  Taken 6/29/2025 1200 by Rodger Walker RN  Safety Promotion/Fall Prevention:   activity supervised    assistive device/personal items within reach   clutter free environment maintained   fall prevention program maintained   mobility aid in reach   lighting adjusted   nonskid shoes/slippers when out of bed   safety round/check completed   room organization consistent  Taken 6/29/2025 1000 by Rodger Walker, RN  Safety Promotion/Fall Prevention:   activity supervised   assistive device/personal items within reach   clutter free environment maintained   fall prevention program maintained   mobility aid in reach   lighting adjusted   nonskid shoes/slippers when out of bed   safety round/check completed   room organization consistent  Taken 6/29/2025 0800 by Rodger Walker, RN  Safety Promotion/Fall Prevention:   activity supervised   assistive device/personal items within reach   clutter free environment maintained   fall prevention program maintained   mobility aid in reach   lighting adjusted   nonskid shoes/slippers when out of bed   safety round/check completed   room organization consistent   Goal Outcome Evaluation:

## 2025-06-30 LAB
ALBUMIN SERPL-MCNC: 3.8 G/DL (ref 3.5–5.2)
ANION GAP SERPL CALCULATED.3IONS-SCNC: 9.3 MMOL/L (ref 5–15)
BASOPHILS # BLD AUTO: 0.02 10*3/MM3 (ref 0–0.2)
BASOPHILS NFR BLD AUTO: 0.2 % (ref 0–1.5)
BUN SERPL-MCNC: 33 MG/DL (ref 8–23)
BUN/CREAT SERPL: 32.7 (ref 7–25)
CALCIUM SPEC-SCNC: 9.1 MG/DL (ref 8.6–10.5)
CHLORIDE SERPL-SCNC: 95 MMOL/L (ref 98–107)
CO2 SERPL-SCNC: 28.7 MMOL/L (ref 22–29)
CREAT SERPL-MCNC: 1.01 MG/DL (ref 0.76–1.27)
DEPRECATED RDW RBC AUTO: 50.6 FL (ref 37–54)
EGFRCR SERPLBLD CKD-EPI 2021: 81 ML/MIN/1.73
EOSINOPHIL # BLD AUTO: 0.22 10*3/MM3 (ref 0–0.4)
EOSINOPHIL NFR BLD AUTO: 1.9 % (ref 0.3–6.2)
ERYTHROCYTE [DISTWIDTH] IN BLOOD BY AUTOMATED COUNT: 14.6 % (ref 12.3–15.4)
GLUCOSE BLDC GLUCOMTR-MCNC: 190 MG/DL (ref 70–105)
GLUCOSE BLDC GLUCOMTR-MCNC: 235 MG/DL (ref 70–105)
GLUCOSE BLDC GLUCOMTR-MCNC: 278 MG/DL (ref 70–105)
GLUCOSE BLDC GLUCOMTR-MCNC: 283 MG/DL (ref 70–105)
GLUCOSE SERPL-MCNC: 234 MG/DL (ref 65–99)
HCT VFR BLD AUTO: 41.7 % (ref 37.5–51)
HGB BLD-MCNC: 13.3 G/DL (ref 13–17.7)
IMM GRANULOCYTES # BLD AUTO: 0.49 10*3/MM3 (ref 0–0.05)
IMM GRANULOCYTES NFR BLD AUTO: 4.2 % (ref 0–0.5)
LYMPHOCYTES # BLD AUTO: 1.7 10*3/MM3 (ref 0.7–3.1)
LYMPHOCYTES NFR BLD AUTO: 14.4 % (ref 19.6–45.3)
MAGNESIUM SERPL-MCNC: 2.2 MG/DL (ref 1.6–2.4)
MCH RBC QN AUTO: 30 PG (ref 26.6–33)
MCHC RBC AUTO-ENTMCNC: 31.9 G/DL (ref 31.5–35.7)
MCV RBC AUTO: 94.1 FL (ref 79–97)
MONOCYTES # BLD AUTO: 1.15 10*3/MM3 (ref 0.1–0.9)
MONOCYTES NFR BLD AUTO: 9.8 % (ref 5–12)
NEUTROPHILS NFR BLD AUTO: 69.5 % (ref 42.7–76)
NEUTROPHILS NFR BLD AUTO: 8.21 10*3/MM3 (ref 1.7–7)
NRBC BLD AUTO-RTO: 0 /100 WBC (ref 0–0.2)
PHOSPHATE SERPL-MCNC: 3.6 MG/DL (ref 2.5–4.5)
PLATELET # BLD AUTO: 180 10*3/MM3 (ref 140–450)
PMV BLD AUTO: 9.6 FL (ref 6–12)
POTASSIUM SERPL-SCNC: 4.1 MMOL/L (ref 3.5–5.2)
RBC # BLD AUTO: 4.43 10*6/MM3 (ref 4.14–5.8)
SODIUM SERPL-SCNC: 133 MMOL/L (ref 136–145)
WBC NRBC COR # BLD AUTO: 11.79 10*3/MM3 (ref 3.4–10.8)

## 2025-06-30 PROCEDURE — 82948 REAGENT STRIP/BLOOD GLUCOSE: CPT | Performed by: STUDENT IN AN ORGANIZED HEALTH CARE EDUCATION/TRAINING PROGRAM

## 2025-06-30 PROCEDURE — 94799 UNLISTED PULMONARY SVC/PX: CPT

## 2025-06-30 PROCEDURE — 63710000001 INSULIN LISPRO (HUMAN) PER 5 UNITS: Performed by: STUDENT IN AN ORGANIZED HEALTH CARE EDUCATION/TRAINING PROGRAM

## 2025-06-30 PROCEDURE — 99232 SBSQ HOSP IP/OBS MODERATE 35: CPT | Performed by: INTERNAL MEDICINE

## 2025-06-30 PROCEDURE — 97535 SELF CARE MNGMENT TRAINING: CPT

## 2025-06-30 PROCEDURE — 80069 RENAL FUNCTION PANEL: CPT | Performed by: INTERNAL MEDICINE

## 2025-06-30 PROCEDURE — 85025 COMPLETE CBC W/AUTO DIFF WBC: CPT | Performed by: NURSE PRACTITIONER

## 2025-06-30 PROCEDURE — 82948 REAGENT STRIP/BLOOD GLUCOSE: CPT

## 2025-06-30 PROCEDURE — 63710000001 INSULIN GLARGINE PER 5 UNITS: Performed by: INTERNAL MEDICINE

## 2025-06-30 PROCEDURE — 63710000001 REVEFENACIN 175 MCG/3ML SOLUTION: Performed by: INTERNAL MEDICINE

## 2025-06-30 PROCEDURE — 83735 ASSAY OF MAGNESIUM: CPT | Performed by: INTERNAL MEDICINE

## 2025-06-30 RX ORDER — METOLAZONE 2.5 MG/1
2.5 TABLET ORAL EVERY OTHER DAY
Status: DISCONTINUED | OUTPATIENT
Start: 2025-06-30 | End: 2025-07-01 | Stop reason: HOSPADM

## 2025-06-30 RX ADMIN — INSULIN LISPRO 4 UNITS: 100 INJECTION, SOLUTION INTRAVENOUS; SUBCUTANEOUS at 18:00

## 2025-06-30 RX ADMIN — Medication 10 ML: at 20:51

## 2025-06-30 RX ADMIN — REVEFENACIN 175 MCG: 175 SOLUTION RESPIRATORY (INHALATION) at 07:02

## 2025-06-30 RX ADMIN — Medication 10 ML: at 09:18

## 2025-06-30 RX ADMIN — INSULIN GLARGINE 20 UNITS: 100 INJECTION, SOLUTION SUBCUTANEOUS at 20:50

## 2025-06-30 RX ADMIN — IPRATROPIUM BROMIDE AND ALBUTEROL SULFATE 3 ML: .5; 3 SOLUTION RESPIRATORY (INHALATION) at 11:34

## 2025-06-30 RX ADMIN — CLOPIDOGREL BISULFATE 75 MG: 75 TABLET, FILM COATED ORAL at 09:19

## 2025-06-30 RX ADMIN — BUDESONIDE INHALATION 0.5 MG: 0.5 SUSPENSION RESPIRATORY (INHALATION) at 07:02

## 2025-06-30 RX ADMIN — INSULIN LISPRO 12 UNITS: 100 INJECTION, SOLUTION INTRAVENOUS; SUBCUTANEOUS at 12:55

## 2025-06-30 RX ADMIN — ARFORMOTEROL TARTRATE 15 MCG: 15 SOLUTION RESPIRATORY (INHALATION) at 07:00

## 2025-06-30 RX ADMIN — IPRATROPIUM BROMIDE AND ALBUTEROL SULFATE 3 ML: .5; 3 SOLUTION RESPIRATORY (INHALATION) at 15:19

## 2025-06-30 RX ADMIN — INSULIN LISPRO 8 UNITS: 100 INJECTION, SOLUTION INTRAVENOUS; SUBCUTANEOUS at 09:15

## 2025-06-30 RX ADMIN — ROSUVASTATIN CALCIUM 20 MG: 10 TABLET, FILM COATED ORAL at 09:16

## 2025-06-30 RX ADMIN — GUAIFENESIN 1200 MG: 600 TABLET, EXTENDED RELEASE ORAL at 20:50

## 2025-06-30 RX ADMIN — NICOTINE 1 PATCH: 21 PATCH, EXTENDED RELEASE TRANSDERMAL at 09:20

## 2025-06-30 RX ADMIN — INSULIN LISPRO 12 UNITS: 100 INJECTION, SOLUTION INTRAVENOUS; SUBCUTANEOUS at 20:50

## 2025-06-30 RX ADMIN — ASPIRIN 81 MG: 81 TABLET, COATED ORAL at 09:16

## 2025-06-30 RX ADMIN — GUAIFENESIN 1200 MG: 600 TABLET, EXTENDED RELEASE ORAL at 09:18

## 2025-06-30 RX ADMIN — IPRATROPIUM BROMIDE AND ALBUTEROL SULFATE 3 ML: .5; 3 SOLUTION RESPIRATORY (INHALATION) at 03:43

## 2025-06-30 NOTE — NURSING NOTE
Several scheduled medications held this morning due to low blood pressure. Patient symptomatic and states he isn't feeling well.

## 2025-06-30 NOTE — PROGRESS NOTES
COPD Education  COPD Navigator  Discharge Planning    Patient Name:Santos Sullivan  :1955  Pulmonologist: Dr. Lynch ([facility])  Current Admission Date: 2025   Previous Admission: None  Admission frequency: 1 admissions in 6 months      Symptoms on admission:shortness of breath, cough, and wheezing.       Smoking History: Current smoker 12.5 pk-yrs    Current Home Medications:  Prior to Admission medications    Medication Sig Start Date End Date Taking? Authorizing Provider   albuterol (PROVENTIL) (2.5 MG/3ML) 0.083% nebulizer solution Take 2.5 mg by nebulization Every 4 (Four) Hours As Needed for Wheezing. 3/9/22  Yes Leigh Ayon MD   albuterol sulfate  (90 Base) MCG/ACT inhaler Inhale 2 puffs Every 4 (Four) Hours As Needed for Wheezing.   Yes Kem Castañeda MD   budesonide (PULMICORT) 0.5 MG/2ML nebulizer solution Take 2 mL by nebulization 2 (Two) Times a Day.   Yes Kem Castañeda MD   budesonide-formoterol (SYMBICORT) 160-4.5 MCG/ACT inhaler Inhale 2 puffs 2 (Two) Times a Day.   Yes Kem Castañeda MD   bumetanide (BUMEX) 2 MG tablet Take 1 tablet by mouth Daily.   Yes Kem Castañeda MD   clopidogrel (PLAVIX) 75 MG tablet Take 1 tablet by mouth once daily 22  Yes Leigh Ayon MD   dilTIAZem CD (CARDIZEM CD) 240 MG 24 hr capsule Take 1 capsule by mouth Daily.   Yes Kem Castañeda MD   fluticasone (FLONASE) 50 MCG/ACT nasal spray Administer 1 spray into the nostril(s) as directed by provider 2 (Two) Times a Day.   Yes Kem Castañeda MD   furosemide (LASIX) 40 MG tablet Take 1 tablet by mouth 2 (Two) Times a Day.   Yes Kem Castañeda MD   glimepiride (AMARYL) 2 MG tablet Take 1 tablet by mouth 2 (Two) Times a Day.   Yes Kem Castañeda MD   insulin glargine (LANTUS, SEMGLEE) 100 UNIT/ML injection Inject 8 Units under the skin into the appropriate area as directed Every Night.   Yes Kem Castañeda MD    losartan (COZAAR) 50 MG tablet Take 1 tablet by mouth Daily.   Yes Kem Castañeda MD   metFORMIN ER (GLUCOPHAGE-XR) 500 MG 24 hr tablet Take 2 tablets by mouth 2 (Two) Times a Day.   Yes Kem Castañeda MD   pioglitazone (ACTOS) 45 MG tablet Take 1 tablet by mouth Daily.   Yes Kem Castañeda MD   predniSONE (DELTASONE) 10 MG tablet Take 1-4 tablets by mouth Daily.   Yes Kem Castañeda MD   rosuvastatin (CRESTOR) 20 MG tablet Take 1 tablet by mouth Daily.   Yes Kem Castañeda MD   Semaglutide,0.25 or 0.5MG/DOS, (Ozempic, 0.25 or 0.5 MG/DOSE,) 2 MG/3ML solution pen-injector Inject 0.25 mg under the skin into the appropriate area as directed 1 (One) Time Per Week.   Yes Kem Castañeda MD   spironolactone (ALDACTONE) 25 MG tablet Take 1 tablet by mouth Daily.   Yes Kem Castañeda MD   aspirin (aspirin) 81 MG EC tablet Take 1 tablet by mouth Daily. 12/7/18   Kem Castañeda MD   nitroglycerin (Nitrostat) 0.4 MG SL tablet Place 1 tablet under the tongue Every 5 (Five) Minutes As Needed for Chest Pain. Take no more than 3 doses in 15 minutes. 7/8/22   Leigh Ayon MD       Social history:   Home alone    Diagnostics Testing:  Date of Exam: 6/22/2025 2:00 AM EDT     Indication: soa     Comparison: 5/29/2025     Findings:  Heart remains enlarged. Lipomatous pleural thickening is again seen bilaterally as seen on chest CT 4/28/2025. Pulmonary vascularity is normal. There is some chronic scarring in the lingula. Some of the opacity at the left lung base is due to a prominent   epicardial fat pad. No definite acute pulmonary infiltrates. No pneumothorax.     IMPRESSION:  No clearly acute findings in the chest. Chronic changes as above, including cardiomegaly    Last PFT/when/where? 2022  FVC: 72  FEV1: 63  FEV1/FVC: 65  Classification of Airflow Limitation Severity in COPD (Based on Post-Bronchodilator FEV1)  Gold 1: Mild FEV1 >= 80% predicted   Gold 2:   X  "Moderate 50% <= FEV1 < 80% predicted   Gold 3: Severe 30% >= FEV1 < 50% predicted   Gold 4: Very Severe FEV1 < 30% predicted     Patient's Last ABG:  No results found for: \"SITE\", \"ALLENTEST\", \"PHART\", \"KQQ1YPD\", \"PO2ART\", \"BAH0PCY\", \"BASEEXCESS\", \"J0SLOIBU\", \"HGBBG\", \"HCTABG\", \"OXYHEMOGLOBI\", \"METHHGBN\", \"CARBOXYHGB\", \"CO2CT\", \"BAROMETRIC\", \"MODALITY\", \"FIO2\"     Patient Assessment:   Pt sitting in chair. Resp even and unlabored. He shared concerns for going to rehab. He feels that it should be more geared towards a pulmonary rehab. Did explain that after he completes inpatient rehab at [facility] that I can send a referral for our outpatient pulmonary rehab.     SpO2 SpO2: 95 % (06/30/25 1238)  Device Device (Oxygen Therapy): room air (06/30/25 1238)  Flow Flow (L/min) (Oxygen Therapy): 2 (06/30/25 0800)  Home NIPPV Compliance: N/A  Home Equipment Used:  Provided by:     Education provided to patient:  COPD Zones: (Green/yellow/Red): YES   Low-Dose CT Lung Cancer Screening  Smoker > 20 PY YES   Age > 50 YES   Smoker quit within the last 15 years. YES     Action Plan:  PFT YES  According to 2025 guidelines, the gold standard for diagnosing COPD is spirometry.    Pulmonary Rehab YES   Follow up in Pulmonary Clinic YES   Smoking Cessation     YES   Low-Dose CT Cancer Screening                                                YES      Rosy Koo, RRT  COPD Navigator  06/30/25  15:16 EDT           "

## 2025-06-30 NOTE — NURSING NOTE
Patient oxygen in the 70s, this nurse to bedside. Patient had taken oxygen off. When I asked patient to put oxygen back on he proceeded to curse at me. Patient educated. Patient threw oxygen on the floor.

## 2025-06-30 NOTE — THERAPY TREATMENT NOTE
"Subjective: Pt agreeable to therapeutic plan of care.  Cognition: arousal/alertness: Alert and Attentive, agreeable to only assist for sock this date, declining attempt to take orthostatics.     Objective:     Precautions - High falls risk    Bed Mobility: N/A or Not attempted.   Functional Transfers: N/A or Not attempted.  Functional Ambulation: N/A or Not attempted. Declined    Lower Body Dressing: Dependent, reports he loses his breath when bending to attempt  ADL Position: unsupported sitting in chair   ADL Comments: don sock    Vitals: WNL    Pain: 0 [place] Location: Pt does not verbally report any pain  Interventions for pain: N/A  Education: Provided education on the importance of mobility in the acute care setting, Verbal/Tactile Cues, and ADL training    Assessment: Santos Sullivan presents with ADL impairments affecting function including balance, endurance / activity tolerance, postural / trunk control, range of motion (ROM), shortness of breath, and strength. Pt alert and attentive agreeable to attempt donning socks, adamantly declines further mobility and attempt orthostatic measurements. Demonstrated functioning below baseline abilities indicate the need for continued skilled intervention while inpatient. Tolerating session today without incident. Will continue to follow and progress as tolerated.     Plan/Recommendations:   Moderate Intensity Therapy recommended post-acute care. This is recommended as therapy feels the patient would require 3-4 days per week and wouldn't tolerate \"3 hour daily\" rehab intensity. SNF would be the preferred choice. If the patient does not agree to SNF, arrange HH or OP depending on home bound status. If patient is medically complex, consider LTACH.    Pt desires Skilled Rehab placement at discharge. Pt cooperative; agreeable to therapeutic recommendations and plan of care.     Modified Cam: N/A = No pre-op stroke/TIA    Post-Tx Position: Up in Chair, Alarms activated, " and Call light and personal items within reach  PPE: gloves    Therapy Charges for Today       Code Description Service Date Service Provider Modifiers Qty    07871640951 HC OT SELF CARE/MGMT/TRAIN EA 15 MIN 6/30/2025 Luis Felipe Sutton OT GO 1           Time Calculation- OT       Row Name 06/30/25 1533             Time Calculation- OT    OT Start Time 1352  -SP      OT Stop Time 1401  -SP      OT Time Calculation (min) 9 min  -SP      Total Timed Code Minutes- OT 9 minute(s)  -SP      OT Received On 06/30/25  -SP      OT - Next Appointment 07/02/25  -SP                User Key  (r) = Recorded By, (t) = Taken By, (c) = Cosigned By      Initials Name Provider Type    SP Luis Felipe Sutton, OT Occupational Therapist

## 2025-06-30 NOTE — PLAN OF CARE
Problem: Fall Injury Risk  Goal: Absence of Fall and Fall-Related Injury  Intervention: Promote Injury-Free Environment  Recent Flowsheet Documentation  Taken 6/30/2025 0445 by Alonso Olea, RN  Safety Promotion/Fall Prevention:   safety round/check completed   room organization consistent   fall prevention program maintained   clutter free environment maintained  Taken 6/30/2025 0022 by Alonso Olea, RN  Safety Promotion/Fall Prevention:   safety round/check completed   room organization consistent   fall prevention program maintained   clutter free environment maintained  Taken 6/29/2025 2010 by Alonso Olea, RN  Safety Promotion/Fall Prevention:   safety round/check completed   room organization consistent   fall prevention program maintained   clutter free environment maintained   Goal Outcome Evaluation:

## 2025-06-30 NOTE — PROGRESS NOTES
Daily Progress Note          Assessment    Acute respiratory failure with hypoxia and hypercapnia: ABGs on admission 7.3 1/62/75 while on nasal cannula, repeated on BiPAP 7.35/60.4/90.2  Acute bronchitis due to unspecified pathogen, negative respiratory panel     Acute on chronic HFpEF  COPD without acute exacerbation  CAD status post PCI  HTN  HLD  DM  Chronic hepatitis C  Tobacco smoking  Obesity with BMI of 42 I     PFTs 2022: Moderate obstruction with air trapping and decreased oxygen diffusion capacity, FEV1/FVC 65%, FVC 72%, FEV1 63%, %, %, DLCO 58%           Recommendations:  The respiratory status is improving      Diuresis as per nephrology    Follow-up in the office in 3 weeks, he will consider home sleep study to rule out SHYAM     Completed tapering dose prednisone     Brovana, Pulmicort nebulized  Mucinex  Oxygen supplement and titration to maintain saturation 90 to 95% currently on 2 L per nasal cannula     bronchodilators     Smoking cessation counseling, nicotine patch  Aspirin, Plavix, Crestor  Cardiology following, currently on metoprolol, diltiazem, losartan  Insulin Lantus and sliding scale     I personally reviewed the radiological studies             LOS: 8 days     Subjective     Less cough and shortness of breath    Objective     Vital signs for last 24 hours:  Vitals:    06/30/25 0700 06/30/25 0703 06/30/25 0704 06/30/25 0746   BP:    90/67   BP Location:    Right arm   Patient Position:    Sitting   Pulse: 91 92 95 93   Resp: 26 20 21 19   Temp:    98.8 °F (37.1 °C)   TempSrc:    Oral   SpO2: 96% 97% 97%    Weight:       Height:           Intake/Output last 3 shifts:  I/O last 3 completed shifts:  In: 1654 [P.O.:1654]  Out: 2600 [Urine:2600]  Intake/Output this shift:  I/O this shift:  In: -   Out: 200 [Urine:200]      Radiology  Imaging Results (Last 24 Hours)       ** No results found for the last 24 hours. **            Labs:  Results from last 7 days   Lab Units  06/30/25  0339   WBC 10*3/mm3 11.79*   HEMOGLOBIN g/dL 13.3   HEMATOCRIT % 41.7   PLATELETS 10*3/mm3 180     Results from last 7 days   Lab Units 06/30/25  0339   SODIUM mmol/L 133*   POTASSIUM mmol/L 4.1   CHLORIDE mmol/L 95*   CO2 mmol/L 28.7   BUN mg/dL 33.0*   CREATININE mg/dL 1.01   CALCIUM mg/dL 9.1   GLUCOSE mg/dL 234*           Results from last 7 days   Lab Units 06/30/25  0339 06/29/25  0347 06/28/25  0235   ALBUMIN g/dL 3.8 3.6 3.9               Results from last 7 days   Lab Units 06/30/25  0339   MAGNESIUM mg/dL 2.2                   Meds:   SCHEDULE  arformoterol, 15 mcg, Nebulization, BID - RT  aspirin, 81 mg, Oral, Daily  budesonide, 0.5 mg, Nebulization, BID - RT  clopidogrel, 75 mg, Oral, Daily  dilTIAZem CD, 240 mg, Oral, Daily  guaiFENesin, 1,200 mg, Oral, Q12H  insulin glargine, 20 Units, Subcutaneous, Nightly  insulin lispro, 4-24 Units, Subcutaneous, 4x Daily AC & at Bedtime  ipratropium-albuterol, 3 mL, Nebulization, Q4H - RT  metOLazone, 2.5 mg, Oral, Every Other Day  metoprolol tartrate, 25 mg, Oral, Q12H  nicotine, 1 patch, Transdermal, Daily  revefenacin, 175 mcg, Nebulization, Daily - RT  rosuvastatin, 20 mg, Oral, Daily  sodium chloride, 10 mL, Intravenous, Q12H  spironolactone, 25 mg, Oral, Daily  torsemide, 40 mg, Oral, Daily      Infusions     PRNs    acetaminophen **OR** acetaminophen **OR** acetaminophen    aluminum-magnesium hydroxide-simethicone    senna-docusate sodium **AND** polyethylene glycol **AND** bisacodyl **AND** bisacodyl    Calcium Replacement - Follow Nurse / BPA Driven Protocol    dextrose    dextrose    glucagon (human recombinant)    Magnesium Standard Dose Replacement - Follow Nurse / BPA Driven Protocol    melatonin    metoprolol tartrate    nitroglycerin    ondansetron ODT **OR** ondansetron    Phosphorus Replacement - Follow Nurse / BPA Driven Protocol    Potassium Replacement - Follow Nurse / BPA Driven Protocol    sodium chloride    sodium chloride    sodium  chloride    Physical Exam:  General Appearance:  Alert   HEENT:  Normocephalic, without obvious abnormality, Conjunctiva/corneas clear,.   Nares normal, no drainage     Neck:  Supple, symmetrical, trachea midline.   Lungs /Chest wall: Mild bilateral basal rhonchi, respirations unlabored, symmetrical wall movement.     Heart:  Regular rate and rhythm, S1 S2 normal  Abdomen: Soft, non-tender, no masses, no organomegaly.    Extremities: + Edema, no clubbing or cyanosis     ROS  Constitutional: Negative for chills, fever and malaise/fatigue.   HENT: Negative.    Eyes: Negative.    Cardiovascular: Negative.    Respiratory: Positive for cough and shortness of breath.    Skin: Negative.    Musculoskeletal: Negative.    Gastrointestinal: Negative.    Genitourinary: Negative.    Neurological: Generalized weakness        I reviewed the recent clinical results  I personally reviewed the latest radiological studies    Part of this note may be an electronic transcription/translation of spoken language to printed text using the Dragon Dictation System.

## 2025-06-30 NOTE — SIGNIFICANT NOTE
06/30/25 1424   OTHER   Discipline physical therapist   Rehab Time/Intention   Session Not Performed patient/family declined treatment  (PT followed up in PM as patient requested, but pt continued to refuse activity.)   Therapy Assessment/Plan (PT)   Criteria for Skilled Interventions Met (PT) yes;meets criteria;skilled treatment is necessary   Recommendation   PT - Next Appointment 07/01/25

## 2025-06-30 NOTE — PLAN OF CARE
"Assessment: Santos Sullivan presents with ADL impairments affecting function including balance, endurance / activity tolerance, postural / trunk control, range of motion (ROM), shortness of breath, and strength. Pt alert and attentive agreeable to attempt donning socks, adamantly declines further mobility and attempt orthostatic measurements. Demonstrated functioning below baseline abilities indicate the need for continued skilled intervention while inpatient. Tolerating session today without incident. Will continue to follow and progress as tolerated.      Plan/Recommendations:   Moderate Intensity Therapy recommended post-acute care. This is recommended as therapy feels the patient would require 3-4 days per week and wouldn't tolerate \"3 hour daily\" rehab intensity. SNF would be the preferred choice. If the patient does not agree to SNF, arrange HH or OP depending on home bound status. If patient is medically complex, consider LTACH.  "

## 2025-06-30 NOTE — CASE MANAGEMENT/SOCIAL WORK
Continued Stay Note  MANDO Newton     Patient Name: Santos Sullivan  MRN: [id number]  Today's Date: 6/30/2025    Admit Date: 6/22/2025    Plan: [facility] accepted. Precert approved (valid 6/25-7/1), ZKAI approved. From home alone. Current home O2 2-3L through [employer].   Discharge Plan       Row Name 06/30/25 1225       Plan    Plan Comments Updated MD/RN in unit rounds that precert remains valid through tomorrow, 7/1. MD reported continued BP monitoring as patient had hypotension today. LUIZ provided update to [facility] RN     Office Phone: [phone]  Office Cell: [phone]

## 2025-06-30 NOTE — PLAN OF CARE
Goal Outcome Evaluation:  Plan of Care Reviewed With: patient        Progress: no change  Outcome Evaluation: Patient noncompliant and refusing bed/chair alarm. Will continue to monitor.

## 2025-06-30 NOTE — PROGRESS NOTES
CARDIOLOGY FOLLOW-UP PROGRESS NOTE      Reason for follow-up:    SOA  Paroxysmal atrial flutter  CAD/PCI  COPD  Diastolic Heart failure     Attending: Alexis Polanco DO      Subjective .     Improving SOA, Edema       Review of Systems   Constitutional: Negative for chills and fever.   HENT:  Negative for ear discharge and nosebleeds.    Eyes:  Negative for discharge and redness.   Cardiovascular:  Positive for leg swelling. Negative for chest pain, orthopnea, palpitations, paroxysmal nocturnal dyspnea and syncope.   Respiratory:  Positive for shortness of breath. Negative for cough and wheezing.    Endocrine: Negative for heat intolerance.   Skin:  Negative for rash.   Musculoskeletal:  Negative for arthritis and myalgias.   Gastrointestinal:  Negative for abdominal pain, melena, nausea and vomiting.   Genitourinary:  Negative for dysuria and hematuria.   Neurological:  Negative for dizziness, light-headedness, numbness and tremors.   Psychiatric/Behavioral:  Negative for depression. The patient is not nervous/anxious.      Pertinent items are noted in HPI, all other systems reviewed and negative  Allergies: Patient has no known allergies.    Scheduled Meds:arformoterol, 15 mcg, Nebulization, BID - RT  aspirin, 81 mg, Oral, Daily  budesonide, 0.5 mg, Nebulization, BID - RT  clopidogrel, 75 mg, Oral, Daily  dilTIAZem CD, 240 mg, Oral, Daily  guaiFENesin, 1,200 mg, Oral, Q12H  insulin glargine, 20 Units, Subcutaneous, Nightly  insulin lispro, 4-24 Units, Subcutaneous, 4x Daily AC & at Bedtime  ipratropium-albuterol, 3 mL, Nebulization, Q4H - RT  metoprolol tartrate, 25 mg, Oral, Q12H  nicotine, 1 patch, Transdermal, Daily  revefenacin, 175 mcg, Nebulization, Daily - RT  rosuvastatin, 20 mg, Oral, Daily  sodium chloride, 10 mL, Intravenous, Q12H  spironolactone, 25 mg, Oral, Daily  torsemide, 40 mg, Oral, Daily        Continuous Infusions:     PRN Meds:.  acetaminophen **OR** acetaminophen **OR** acetaminophen     aluminum-magnesium hydroxide-simethicone    senna-docusate sodium **AND** polyethylene glycol **AND** bisacodyl **AND** bisacodyl    Calcium Replacement - Follow Nurse / BPA Driven Protocol    dextrose    dextrose    glucagon (human recombinant)    Magnesium Standard Dose Replacement - Follow Nurse / BPA Driven Protocol    melatonin    metoprolol tartrate    nitroglycerin    ondansetron ODT **OR** ondansetron    Phosphorus Replacement - Follow Nurse / BPA Driven Protocol    Potassium Replacement - Follow Nurse / BPA Driven Protocol    sodium chloride    sodium chloride    sodium chloride    Objective     VITAL SIGNS  Patient Vitals for the past 24 hrs:   BP Temp Temp src Pulse Resp SpO2 Weight   06/30/25 0746 90/67 98.8 °F (37.1 °C) Oral 93 19 -- --   06/30/25 0704 -- -- -- 95 21 97 % --   06/30/25 0703 -- -- -- 92 20 97 % --   06/30/25 0700 -- -- -- 91 26 96 % --   06/30/25 0431 92/62 98.1 °F (36.7 °C) Oral 82 12 95 % (!) 136 kg (300 lb 0.7 oz)   06/30/25 0346 -- -- -- 85 22 99 % --   06/30/25 0343 -- -- -- 83 20 94 % --   06/30/25 0010 106/62 98 °F (36.7 °C) Oral 83 16 96 % --   06/29/25 2307 -- -- -- 84 20 100 % --   06/29/25 2302 -- -- -- 87 20 97 % --   06/29/25 2042 -- -- -- 87 20 94 % --   06/29/25 2033 -- -- -- 85 22 99 % --   06/29/25 2032 -- -- -- 86 24 99 % --   06/29/25 2028 -- -- -- 82 16 99 % --   06/29/25 2016 134/69 98.2 °F (36.8 °C) Oral 85 18 93 % --   06/29/25 1809 -- -- -- 94 -- -- --   06/29/25 1808 -- -- -- 98 -- -- --   06/29/25 1807 -- -- -- 91 -- 93 % --   06/29/25 1552 -- -- -- 79 -- 95 % --   06/29/25 1551 97/68 98.1 °F (36.7 °C) -- 80 20 96 % --   06/29/25 1549 -- -- -- 84 -- 92 % --   06/29/25 1515 -- -- -- 82 18 95 % --   06/29/25 1512 -- -- -- 82 18 95 % --   06/29/25 1224 -- -- -- 82 11 96 % --   06/29/25 1220 -- -- -- 82 11 96 % --   06/29/25 1128 103/64 99 °F (37.2 °C) -- 84 22 96 % --   06/29/25 0913 118/60 -- -- 88 -- -- --        Flowsheet Rows      Flowsheet Row First Filed  "Value   Admission Height 177.8 cm (70\") Documented at 06/22/2025 0055   Admission Weight 131 kg (288 lb 12.8 oz) Documented at 06/22/2025 0231            Body mass index is 43.05 kg/m².      Intake/Output Summary (Last 24 hours) at 6/30/2025 0813  Last data filed at 6/30/2025 0746  Gross per 24 hour   Intake --   Output 1050 ml   Net -1050 ml        TELEMETRY:     Sinus Rhythm    Physical Exam:  Constitutional:       Appearance: Well-developed.   Eyes:      General: No scleral icterus.        Right eye: No discharge.   HENT:      Head: Normocephalic and atraumatic.   Neck:      Thyroid: No thyromegaly.      Lymphadenopathy: No cervical adenopathy.   Pulmonary:      Effort: Pulmonary effort is normal. No respiratory distress.      Breath sounds: Normal breath sounds. No wheezing. No rales.   Cardiovascular:      Normal rate. Regular rhythm.      No gallop.    Edema:     Peripheral edema absent.   Abdominal:      Tenderness: There is no abdominal tenderness.   Skin:     Findings: No erythema or rash.   Neurological:      Mental Status: Alert and oriented to person, place, and time.            Results Review:   I reviewed the patient's new clinical results.    CBC    Results from last 7 days   Lab Units 06/30/25  0339 06/29/25  0416 06/28/25  0235 06/27/25  0049 06/26/25  0509 06/25/25  1625 06/24/25  0330   WBC 10*3/mm3 11.79* 15.30* 14.77* 13.26* 13.65* 14.18* 16.30*   HEMOGLOBIN g/dL 13.3 13.5 14.6 14.9 14.5 14.1 14.6   PLATELETS 10*3/mm3 180 182 202 201 195 209 231     BMP   Results from last 7 days   Lab Units 06/30/25  0339 06/29/25  0347 06/28/25  0235 06/27/25  0049 06/26/25  0326 06/25/25  1625 06/24/25  0330   SODIUM mmol/L 133* 133* 136 133* 134* 131* 134*   POTASSIUM mmol/L 4.1 4.3 4.5 4.2 4.2 5.2 4.4   CHLORIDE mmol/L 95* 94* 94* 91* 95* 95* 95*   CO2 mmol/L 28.7 28.1 30.3* 29.7* 28.6 25.4 28.5   BUN mg/dL 33.0* 46.8* 53.8* 52.7* 45.1* 50.9* 44.6*   CREATININE mg/dL 1.01 1.07 1.18 1.37* 1.09 1.32* 1.10 "   GLUCOSE mg/dL 234* 157* 135* 233* 72 327* 265*   MAGNESIUM mg/dL 2.2 2.2 2.3 2.3 2.5*  --  2.5*   PHOSPHORUS mg/dL 3.6 4.7* 4.4  --   --   --   --      Cr Clearance Estimated Creatinine Clearance: 97.2 mL/min (by C-G formula based on SCr of 1.01 mg/dL).  Coag     HbA1C   Lab Results   Component Value Date    HGBA1C 9.31 (H) 06/23/2025    HGBA1C 6.8 (H) 05/05/2022    HGBA1C 6.4 (H) 06/12/2021     Blood Glucose   Glucose   Date/Time Value Ref Range Status   06/30/2025 0715 235 (H) 70 - 105 mg/dL Final     Comment:     Serial Number: [id number]:  [id number]   06/29/2025 2017 216 (H) 70 - 105 mg/dL Final     Comment:     Serial Number: [id number]:  [id number]   06/29/2025 1547 282 (H) 70 - 105 mg/dL Final     Comment:     Serial Number: [id number]:  [id number]   06/29/2025 1127 150 (H) 70 - 105 mg/dL Final     Comment:     Serial Number: [id number]:  [id number]   06/29/2025 0740 188 (H) 70 - 105 mg/dL Final     Comment:     Serial Number: [id number]:  [id number]   06/28/2025 2042 139 (H) 70 - 105 mg/dL Final     Comment:     Serial Number: [id number]:  [id number]   06/28/2025 1704 387 (H) 70 - 105 mg/dL Final     Comment:     Serial Number: [id number]:  [id number]   06/28/2025 1140 226 (H) 70 - 105 mg/dL Final     Comment:     Serial Number: [id number]:  [id number]     Infection         CMP   Results from last 7 days   Lab Units 06/30/25  0339 06/29/25  0347 06/28/25  0235 06/27/25  0049 06/26/25  0326 06/25/25  1625 06/24/25  0330   SODIUM mmol/L 133* 133* 136 133* 134* 131* 134*   POTASSIUM mmol/L 4.1 4.3 4.5 4.2 4.2 5.2 4.4   CHLORIDE mmol/L 95* 94* 94* 91* 95* 95* 95*   CO2 mmol/L 28.7 28.1 30.3* 29.7* 28.6 25.4 28.5   BUN mg/dL 33.0* 46.8* 53.8* 52.7* 45.1* 50.9* 44.6*   CREATININE mg/dL 1.01 1.07 1.18 1.37* 1.09 1.32* 1.10   GLUCOSE mg/dL 234* 157* 135* 233* 72 327* 265*   ALBUMIN g/dL 3.8 3.6 3.9  --   --   --   --      ABG          UA      REZA  No results found  "for: \"POCMETH\", \"POCAMPHET\", \"POCBARBITUR\", \"POCBENZO\", \"POCCOCAINE\", \"POCOPIATES\", \"POCOXYCODO\", \"POCPHENCYC\", \"POCPROPOXY\", \"POCTHC\", \"POCTRICYC\"  Lysis Labs   Results from last 7 days   Lab Units 06/30/25  0339 06/29/25  0416 06/29/25  0347 06/28/25  0235 06/27/25  0049 06/26/25  0509 06/26/25  0326 06/25/25  1625 06/24/25  0330   HEMOGLOBIN g/dL 13.3 13.5  --  14.6 14.9 14.5  --  14.1 14.6   PLATELETS 10*3/mm3 180 182  --  202 201 195  --  209 231   CREATININE mg/dL 1.01  --  1.07 1.18 1.37*  --  1.09 1.32* 1.10     Radiology(recent) No radiology results for the last day    Imaging Results (Last 24 Hours)       ** No results found for the last 24 hours. **                    EKG          I personally viewed and interpreted the patient's EKG/Telemetry data:        ECHOCARDIOGRAM:     Results for orders placed during the hospital encounter of 06/22/25    Adult Transthoracic Echo Complete W/ Cont if Necessary Per Protocol    Interpretation Summary    Left ventricular systolic function is normal. Calculated left ventricular EF = 64% Left ventricular ejection fraction appears to be 61 - 65%.    Left ventricular diastolic function was normal.        STRESS MYOVIEW:      CARDIAC CATHETERIZATION:      OTHER:         Assessment & Plan            Acute respiratory failure with hypoxia and hypercapnia    Essential hypertension    Chronic obstructive pulmonary disease    Coronary angioplasty status    Chronic hepatitis C    Overweight    Tobacco dependence syndrome    Mixed hyperlipidemia    Acute on chronic diastolic heart failure    Acute hypoxic respiratory failure        ASSESSMENT:    Acute respiratory failure with hypoxia and hypercapnia/COPD  - initial ABG pH 7.31, PCO2 62, pO2 75, HCO3 31.4  - Placed on Bipap with repeat ABG 7.359, CO2 60.4, pO2 90.2, HCO3 34.1  - Currently on 2 L nasal cannula, wears 3 L home O2  - WBC 13.96  - RVP negative  - CXR showed no clearly acute findings in the chest. Chronic changes as " above including cardiomegaly  - pulmonary following     Hx diastolic CHF   - Repeat echo EF 60-65%; normal diastolic function, no significant VHD  - proBNP 175   - bilateral lower extremity edema  - Bilateral lower extremities wrapped with Ace  - Continue gentle diuresis     CAD s/p PCI  - HS troponin 36 with repeat 33  - Continue aspirin Plavix statin  -denies chest pain     Hypertension  - Continue losartan Cardizem     Hyperlipidemia  - cont home aspirin, plavix, statin      DM type II  - Management per primary     Tobacco abuse  - encourage cessation  - nicotine patch      Morbid obesity  - BMI 41    PLAN:    BUN/Cr 52/1.37  Will stop metolazone, Cr trending back up  On NC 02  LE wrapped  Hemodynamics stable, SR on monitor  Continue supportive care     Patient is seen and examined and findings are verified.  All data is reviewed by me personally.  Assessment and plan formulated by APC was done after discussion with attending.  I spent more than 50% of time in taking care of the patient.    Patient denies any symptom of chest pain.  Shortness of breath is much better.  Patient is sitting up in chair.    I will continue diuretics.  Patient still have significant amount of leg edema.  I would metolazone every other day.  Decrease fluid and salt intake.    Electronically signed by Ian Major MD, 06/30/25, 8:15 AM EDT.                 Ian Major MD  06/30/25  08:13 EDT

## 2025-06-30 NOTE — PROGRESS NOTES
"[facility] MEDICINE SERVICE  DAILY PROGRESS NOTE    NAME: Santos Sullivan  : 1955  MRN: [id number]      LOS: 8 days     PROVIDER OF SERVICE: Alexis Polanco DO    Chief Complaint: Acute respiratory failure with hypoxia and hypercapnia    Subjective:     Interval History:  History taken from: patient    Feels a bit lightheaded today and just \"not right\" denies chest pain or SOB         Review of Systems:   Review of Systems    Objective:     Vital Signs  Temp:  [98 °F (36.7 °C)-98.8 °F (37.1 °C)] 98.8 °F (37.1 °C)  Heart Rate:  [79-98] 92  Resp:  [11-26] 24  BP: ()/(62-69) 90/67  Flow (L/min) (Oxygen Therapy):  [2-3] 2   Body mass index is 43.05 kg/m².    Physical Exam  Physical Exam  General: Sitting up chair; NAD  CV: RRR, S1-S2  Lungs: diminished breath sounds at bases; no wheezing  Abdomen: soft, NT, ND        Diagnostic Data    Results from last 7 days   Lab Units 25  0339   WBC 10*3/mm3 11.79*   HEMOGLOBIN g/dL 13.3   HEMATOCRIT % 41.7   PLATELETS 10*3/mm3 180   GLUCOSE mg/dL 234*   CREATININE mg/dL 1.01   BUN mg/dL 33.0*   SODIUM mmol/L 133*   POTASSIUM mmol/L 4.1   ANION GAP mmol/L 9.3       No radiology results for the last day      I reviewed the patient's new clinical results.    Assessment/Plan:     Active and Resolved Problems  Active Hospital Problems    Diagnosis  POA    **Acute respiratory failure with hypoxia and hypercapnia [J96.01, J96.02]  Yes    Acute on chronic diastolic heart failure [I50.33]  Yes    Acute hypoxic respiratory failure [J96.01]  Yes    Mixed hyperlipidemia [E78.2]  Yes    Essential hypertension [I10]  Yes    Overweight [E66.3]  Yes    Chronic hepatitis C [B18.2]  Yes    Coronary angioplasty status [Z98.61]  Not Applicable    Chronic obstructive pulmonary disease [J44.9]  Yes    Tobacco dependence syndrome [F17.200]  Yes      Resolved Hospital Problems   No resolved problems to display.       Patient is 69-year-old male on home oxygen 3 LPM who presented " with shortness of breath cough and wheezing as well as fatigue.  Patient required to be on BiPAP in the ER     Acute hypoxic and hypercapnic respiratory failure  Acute exacerbation of HFpEF  Volume overload with bilateral lower extremity edema  COPD with mild exacerbation  - ABGs and imaging reports reviewed  - Respiratory status improving; off oxygen on my exam  - Echo this admission showed preserved EF   - Volume status slowly improving  - continue oral torsemide  per nephrology  - Continue taper steroid dosing, continue bronchodilators  - Pulmonology recs appreciated, will need outpatient sleep study to rule out SHYAM       RUDY, improved  - Creatinine holding steady   - Metolazone discontinued per cardiology  - Nephrology following    Hypotension   -likely from meds  - cut metoprolol to 12.5 mg BID; hold spironolactone  - monitor BP closely with diuresis      New onset A-fib/flutter  - Noted on telemetry on 6/24, did have some tachycardia which improved  - Spontaneously converted to NSR   - Continue home diltiazem and metoprolol; cut metoprolol dose as above   - Further management per cardiology      CAD s/p PCI  Hypertension  Hyperlipidemia  - cont home meds as tolerated, monitor     DM type II with hyperglycemia  - Hemoglobin A1c is 9.3  - Continue basal insulin, SSI, dosing adjusted as steroid being tapered down  - Monitor blood glucose and adjust insulin therapy as needed     Tobacco abuse  - encourage cessation  - nicotine patch      Morbid obesity  - BMI 41  - complicates all aspects of care     VTE Prophylaxis:  Mechanical VTE prophylaxis orders are present.     Disposition Planning:      Barriers to Discharge: hypotension   Anticipated Date of Discharge: likely to [facility] tomorrow if BP improved and continues to diurese well   Place of Discharge: Rehab       VTE Prophylaxis:  Mechanical VTE prophylaxis orders are present.                 Code Status and Medical Interventions: CPR (Attempt to Resuscitate); Full  Support   Ordered at: 06/22/25 0543     Code Status (Patient has no pulse and is not breathing):    CPR (Attempt to Resuscitate)     Medical Interventions (Patient has pulse or is breathing):    Full Support     Level Of Support Discussed With:    Patient       Signature: Electronically signed by Alexis Polanco DO, 06/30/25, 12:07 EDT.  [facility] Hospitalist Team

## 2025-06-30 NOTE — SIGNIFICANT NOTE
"   06/30/25 0905   OTHER   Discipline physical therapist   Rehab Time/Intention   Session Not Performed patient/family declined, not feeling well  (Pt with multiple complaints and contradictions. Stating he's been sitting too long and his hips hurt but then refuses to stand, move around, or reposition. Stating \"therapy is a joke\" and that \"you've only been in twice\", but refuses to participate.)   Therapy Assessment/Plan (PT)   Criteria for Skilled Interventions Met (PT) yes;meets criteria;skilled treatment is necessary  (Pt requests f/u in PM.)   Recommendation   PT - Next Appointment 07/01/25       "

## 2025-06-30 NOTE — PROGRESS NOTES
[facility] Progress Note      Patient Name: Santos Sullivan  : 1955  MRN: [id number]  Primary Care Physician:  Astrid Zuñiga MD  Date of admission: 2025    Subjective     Interval History:     Patient resting comfortably  Dyspnea improving slowly  Edema improving but slowly    Review of Systems:   As noted above    Objective     Vitals:   Temp:  [98 °F (36.7 °C)-99 °F (37.2 °C)] 98.8 °F (37.1 °C)  Heart Rate:  [79-98] 93  Resp:  [11-26] 19  BP: ()/(62-69) 90/67  Flow (L/min) (Oxygen Therapy):  [2-3] 2    Intake/Output Summary (Last 24 hours) at 2025 0942  Last data filed at 2025 0746  Gross per 24 hour   Intake --   Output 600 ml   Net -600 ml       Physical Exam:      General Appearance: Morbidly obese, chronically ill-appearing  Skin: warm and dry  HEENT: oral mucosa normal, nonicteric sclera  Neck: supple, no JVD  Lungs: Decreased breath sound bases.  Few wheezes  Heart: Irregular rhythm  Abdomen: soft, nontender, nondistended  : no palpable bladder  Extremities: 2+ edema, legs wrapped  Neuro: normal speech and mental status    Scheduled Meds:     arformoterol, 15 mcg, Nebulization, BID - RT  aspirin, 81 mg, Oral, Daily  budesonide, 0.5 mg, Nebulization, BID - RT  clopidogrel, 75 mg, Oral, Daily  dilTIAZem CD, 240 mg, Oral, Daily  guaiFENesin, 1,200 mg, Oral, Q12H  insulin glargine, 20 Units, Subcutaneous, Nightly  insulin lispro, 4-24 Units, Subcutaneous, 4x Daily AC & at Bedtime  ipratropium-albuterol, 3 mL, Nebulization, Q4H - RT  metOLazone, 2.5 mg, Oral, Every Other Day  metoprolol tartrate, 25 mg, Oral, Q12H  nicotine, 1 patch, Transdermal, Daily  revefenacin, 175 mcg, Nebulization, Daily - RT  rosuvastatin, 20 mg, Oral, Daily  sodium chloride, 10 mL, Intravenous, Q12H  spironolactone, 25 mg, Oral, Daily  torsemide, 40 mg, Oral, Daily      IV Meds:        Results Reviewed:   I have personally reviewed the results from the time of this  admission to 6/30/2025 09:42 EDT     Results from last 7 days   Lab Units 06/30/25  0339 06/29/25  0347 06/28/25  0235   SODIUM mmol/L 133* 133* 136   POTASSIUM mmol/L 4.1 4.3 4.5   CHLORIDE mmol/L 95* 94* 94*   CO2 mmol/L 28.7 28.1 30.3*   BUN mg/dL 33.0* 46.8* 53.8*   CREATININE mg/dL 1.01 1.07 1.18   CALCIUM mg/dL 9.1 9.3 9.3   GLUCOSE mg/dL 234* 157* 135*     Estimated Creatinine Clearance: 97.2 mL/min (by C-G formula based on SCr of 1.01 mg/dL).  Results from last 7 days   Lab Units 06/30/25  0339 06/29/25 0347 06/28/25  0235   MAGNESIUM mg/dL 2.2 2.2 2.3   PHOSPHORUS mg/dL 3.6 4.7* 4.4         Results from last 7 days   Lab Units 06/30/25  0339 06/29/25  0416 06/28/25  0235 06/27/25  0049 06/26/25  0509   WBC 10*3/mm3 11.79* 15.30* 14.77* 13.26* 13.65*   HEMOGLOBIN g/dL 13.3 13.5 14.6 14.9 14.5   PLATELETS 10*3/mm3 180 182 202 201 195           Assessment / Plan     ASSESSMENT:    Acute kidney injury.  Secondary to diuretics.  Improved after decreasing diuretics.  Electrolytes okay.  Volume status generous  Congestive heart failure.  Diastolic.  Acute on chronic.  Volume status improving with diuretics  Acute on chronic respiratory failure with hypoxia and hypercapnia.  Improving slowly.  On steroids and diuretics  Benign essential hypertension.  Blood pressure low  atrial fibrillation  Morbid obesity    PLAN:  Continue current diuretics  Keep off losartan  Repeat labs in a.m.      Cl Reyes MD  06/30/25  09:42 EDT    [facility]  [phone]

## 2025-07-01 VITALS
SYSTOLIC BLOOD PRESSURE: 104 MMHG | TEMPERATURE: 99.2 F | HEART RATE: 88 BPM | BODY MASS INDEX: 44.22 KG/M2 | DIASTOLIC BLOOD PRESSURE: 52 MMHG | OXYGEN SATURATION: 99 % | WEIGHT: 308.86 LBS | HEIGHT: 70 IN | RESPIRATION RATE: 19 BRPM

## 2025-07-01 PROBLEM — I50.33 ACUTE ON CHRONIC HEART FAILURE WITH PRESERVED EJECTION FRACTION (HFPEF): Status: ACTIVE | Noted: 2025-07-01

## 2025-07-01 LAB
ALBUMIN SERPL-MCNC: 3.7 G/DL (ref 3.5–5.2)
ANION GAP SERPL CALCULATED.3IONS-SCNC: 10.8 MMOL/L (ref 5–15)
BASOPHILS # BLD AUTO: 0.09 10*3/MM3 (ref 0–0.2)
BASOPHILS NFR BLD AUTO: 0.8 % (ref 0–1.5)
BUN SERPL-MCNC: 26.9 MG/DL (ref 8–23)
BUN/CREAT SERPL: 26.9 (ref 7–25)
CALCIUM SPEC-SCNC: 9.2 MG/DL (ref 8.6–10.5)
CHLORIDE SERPL-SCNC: 95 MMOL/L (ref 98–107)
CO2 SERPL-SCNC: 27.2 MMOL/L (ref 22–29)
CREAT SERPL-MCNC: 1 MG/DL (ref 0.76–1.27)
DEPRECATED RDW RBC AUTO: 50.2 FL (ref 37–54)
DEVICE COMMENT: ABNORMAL
EGFRCR SERPLBLD CKD-EPI 2021: 82 ML/MIN/1.73
EOSINOPHIL # BLD AUTO: 0.24 10*3/MM3 (ref 0–0.4)
EOSINOPHIL NFR BLD AUTO: 2 % (ref 0.3–6.2)
ERYTHROCYTE [DISTWIDTH] IN BLOOD BY AUTOMATED COUNT: 14.3 % (ref 12.3–15.4)
GLUCOSE BLDC GLUCOMTR-MCNC: 199 MG/DL (ref 70–105)
GLUCOSE BLDC GLUCOMTR-MCNC: 315 MG/DL (ref 70–105)
GLUCOSE SERPL-MCNC: 276 MG/DL (ref 65–99)
HCT VFR BLD AUTO: 41.2 % (ref 37.5–51)
HGB BLD-MCNC: 13.1 G/DL (ref 13–17.7)
IMM GRANULOCYTES # BLD AUTO: 0.35 10*3/MM3 (ref 0–0.05)
IMM GRANULOCYTES NFR BLD AUTO: 3 % (ref 0–0.5)
LYMPHOCYTES # BLD AUTO: 1.7 10*3/MM3 (ref 0.7–3.1)
LYMPHOCYTES NFR BLD AUTO: 14.3 % (ref 19.6–45.3)
MAGNESIUM SERPL-MCNC: 2.1 MG/DL (ref 1.6–2.4)
MCH RBC QN AUTO: 30.2 PG (ref 26.6–33)
MCHC RBC AUTO-ENTMCNC: 31.8 G/DL (ref 31.5–35.7)
MCV RBC AUTO: 94.9 FL (ref 79–97)
MONOCYTES # BLD AUTO: 1.04 10*3/MM3 (ref 0.1–0.9)
MONOCYTES NFR BLD AUTO: 8.8 % (ref 5–12)
NEUTROPHILS NFR BLD AUTO: 71.1 % (ref 42.7–76)
NEUTROPHILS NFR BLD AUTO: 8.43 10*3/MM3 (ref 1.7–7)
NRBC BLD AUTO-RTO: 0 /100 WBC (ref 0–0.2)
PHOSPHATE SERPL-MCNC: 3.5 MG/DL (ref 2.5–4.5)
PLATELET # BLD AUTO: 187 10*3/MM3 (ref 140–450)
PMV BLD AUTO: 9.8 FL (ref 6–12)
POTASSIUM SERPL-SCNC: 4.4 MMOL/L (ref 3.5–5.2)
RBC # BLD AUTO: 4.34 10*6/MM3 (ref 4.14–5.8)
SODIUM SERPL-SCNC: 133 MMOL/L (ref 136–145)
WBC NRBC COR # BLD AUTO: 11.85 10*3/MM3 (ref 3.4–10.8)

## 2025-07-01 PROCEDURE — 94664 DEMO&/EVAL PT USE INHALER: CPT

## 2025-07-01 PROCEDURE — 94799 UNLISTED PULMONARY SVC/PX: CPT

## 2025-07-01 PROCEDURE — 83735 ASSAY OF MAGNESIUM: CPT | Performed by: INTERNAL MEDICINE

## 2025-07-01 PROCEDURE — 63710000001 INSULIN LISPRO (HUMAN) PER 5 UNITS: Performed by: STUDENT IN AN ORGANIZED HEALTH CARE EDUCATION/TRAINING PROGRAM

## 2025-07-01 PROCEDURE — 94761 N-INVAS EAR/PLS OXIMETRY MLT: CPT

## 2025-07-01 PROCEDURE — 63710000001 REVEFENACIN 175 MCG/3ML SOLUTION: Performed by: INTERNAL MEDICINE

## 2025-07-01 PROCEDURE — 82948 REAGENT STRIP/BLOOD GLUCOSE: CPT

## 2025-07-01 PROCEDURE — 80069 RENAL FUNCTION PANEL: CPT | Performed by: INTERNAL MEDICINE

## 2025-07-01 PROCEDURE — 85025 COMPLETE CBC W/AUTO DIFF WBC: CPT | Performed by: NURSE PRACTITIONER

## 2025-07-01 RX ORDER — TORSEMIDE 10 MG/1
50 TABLET ORAL DAILY
Qty: 30 TABLET | Refills: 0 | Status: SHIPPED | OUTPATIENT
Start: 2025-07-02

## 2025-07-01 RX ORDER — METOLAZONE 2.5 MG/1
2.5 TABLET ORAL EVERY OTHER DAY
Qty: 15 TABLET | Refills: 0 | Status: SHIPPED | OUTPATIENT
Start: 2025-07-02

## 2025-07-01 RX ORDER — GUAIFENESIN 600 MG/1
1200 TABLET, EXTENDED RELEASE ORAL EVERY 12 HOURS SCHEDULED
Qty: 20 TABLET | Refills: 0 | Status: SHIPPED | OUTPATIENT
Start: 2025-07-01 | End: 2025-07-06

## 2025-07-01 RX ORDER — METOPROLOL TARTRATE 25 MG/1
12.5 TABLET, FILM COATED ORAL EVERY 12 HOURS SCHEDULED
Qty: 60 TABLET | Refills: 0 | Status: SHIPPED | OUTPATIENT
Start: 2025-07-01

## 2025-07-01 RX ORDER — TORSEMIDE 100 MG/1
50 TABLET ORAL DAILY
Status: DISCONTINUED | OUTPATIENT
Start: 2025-07-02 | End: 2025-07-01 | Stop reason: HOSPADM

## 2025-07-01 RX ORDER — INSULIN GLARGINE 100 [IU]/ML
12 INJECTION, SOLUTION SUBCUTANEOUS NIGHTLY
Start: 2025-07-01

## 2025-07-01 RX ADMIN — TORSEMIDE 40 MG: 10 TABLET ORAL at 08:24

## 2025-07-01 RX ADMIN — GUAIFENESIN 1200 MG: 600 TABLET, EXTENDED RELEASE ORAL at 08:24

## 2025-07-01 RX ADMIN — ARFORMOTEROL TARTRATE 15 MCG: 15 SOLUTION RESPIRATORY (INHALATION) at 06:25

## 2025-07-01 RX ADMIN — DILTIAZEM HYDROCHLORIDE 240 MG: 240 CAPSULE, COATED, EXTENDED RELEASE ORAL at 08:24

## 2025-07-01 RX ADMIN — Medication 10 ML: at 08:24

## 2025-07-01 RX ADMIN — REVEFENACIN 175 MCG: 175 SOLUTION RESPIRATORY (INHALATION) at 06:33

## 2025-07-01 RX ADMIN — IPRATROPIUM BROMIDE AND ALBUTEROL SULFATE 3 ML: .5; 3 SOLUTION RESPIRATORY (INHALATION) at 10:39

## 2025-07-01 RX ADMIN — INSULIN LISPRO 16 UNITS: 100 INJECTION, SOLUTION INTRAVENOUS; SUBCUTANEOUS at 11:29

## 2025-07-01 RX ADMIN — ASPIRIN 81 MG: 81 TABLET, COATED ORAL at 08:24

## 2025-07-01 RX ADMIN — Medication 12.5 MG: at 08:24

## 2025-07-01 RX ADMIN — IPRATROPIUM BROMIDE AND ALBUTEROL SULFATE 3 ML: .5; 3 SOLUTION RESPIRATORY (INHALATION) at 03:13

## 2025-07-01 RX ADMIN — BUDESONIDE INHALATION 0.5 MG: 0.5 SUSPENSION RESPIRATORY (INHALATION) at 06:29

## 2025-07-01 RX ADMIN — NICOTINE 1 PATCH: 21 PATCH, EXTENDED RELEASE TRANSDERMAL at 08:23

## 2025-07-01 RX ADMIN — INSULIN LISPRO 4 UNITS: 100 INJECTION, SOLUTION INTRAVENOUS; SUBCUTANEOUS at 08:24

## 2025-07-01 RX ADMIN — CLOPIDOGREL BISULFATE 75 MG: 75 TABLET, FILM COATED ORAL at 08:24

## 2025-07-01 RX ADMIN — ROSUVASTATIN CALCIUM 20 MG: 10 TABLET, FILM COATED ORAL at 08:24

## 2025-07-01 NOTE — CASE MANAGEMENT/SOCIAL WORK
Continued Stay Note  MANDO Newton     Patient Name: Santos Sullivan  MRN: [id number]  Today's Date: 7/1/2025    Admit Date: 6/22/2025    Plan: [facility] accepted. Precert approved (valid 6/25-7/1), ZAKI approved. From home alone. Current home O2 2-3L through [employer].   Discharge Plan       Row Name 07/01/25 1241       Plan    Patient/Family in Agreement with Plan yes    Plan Comments DC orders noted. CM updated [facility] liaison Virginie FORBES who confirmed that they could provide transportation for patient at 1:30pm. RN updated with number to call report and room number. CM met with patient at bedside to discuss dc plan and IMM letter.                 Expected Discharge Date and Time       Expected Discharge Date Expected Discharge Time    Jul 1, 2025           Jeanine Johnson RN     Office Phone: [phone]  Office Cell: [phone]

## 2025-07-01 NOTE — CASE MANAGEMENT/SOCIAL WORK
Case Management Discharge Note      Final Note: [facility]    Selected Continued Care - Discharged on 7/1/2025 Admission date: 6/22/2025 - Discharge disposition: Skilled Nursing Facility (DC - External)      Destination Coordination complete.      Service Provider Services Address Phone Fax Patient Preferred    [facility] [address] [place] [address] [phone] Yes                    Transportation Services  Transportation: W/C Van  W/C Van: Skilled Nursing Facility Van    Final Discharge Disposition Code: 03 - skilled nursing facility (SNF)

## 2025-07-01 NOTE — PROGRESS NOTES
CARDIOLOGY FOLLOW-UP PROGRESS NOTE      Reason for follow-up:    SOA  Paroxysmal atrial flutter  CAD/PCI  COPD  Diastolic Heart failure     Attending: Alexis Polanco DO      Subjective .     Patient is sitting in chair today with NC supplemental oxygen however it is hanging around his neck and not in his nose, sat 95%. He feels is legs are much improved but he still has some shortness of breath. He denies chest pain or palpitations.        Review of Systems   Constitutional: Negative for chills and fever.   HENT:  Negative for ear discharge and nosebleeds.    Eyes:  Negative for discharge and redness.   Cardiovascular:  Positive for leg swelling. Negative for chest pain, orthopnea, palpitations, paroxysmal nocturnal dyspnea and syncope.   Respiratory:  Positive for shortness of breath. Negative for cough and wheezing.    Endocrine: Negative for heat intolerance.   Skin:  Negative for rash.   Musculoskeletal:  Negative for arthritis and myalgias.   Gastrointestinal:  Negative for abdominal pain, melena, nausea and vomiting.   Genitourinary:  Negative for dysuria and hematuria.   Neurological:  Negative for dizziness, light-headedness, numbness and tremors.   Psychiatric/Behavioral:  Negative for depression. The patient is not nervous/anxious.      Pertinent items are noted in HPI, all other systems reviewed and negative  Allergies: Patient has no known allergies.    Scheduled Meds:arformoterol, 15 mcg, Nebulization, BID - RT  aspirin, 81 mg, Oral, Daily  budesonide, 0.5 mg, Nebulization, BID - RT  clopidogrel, 75 mg, Oral, Daily  dilTIAZem CD, 240 mg, Oral, Daily  guaiFENesin, 1,200 mg, Oral, Q12H  insulin glargine, 20 Units, Subcutaneous, Nightly  insulin lispro, 4-24 Units, Subcutaneous, 4x Daily AC & at Bedtime  ipratropium-albuterol, 3 mL, Nebulization, Q4H - RT  metOLazone, 2.5 mg, Oral, Every Other Day  metoprolol tartrate, 12.5 mg, Oral, Q12H  nicotine, 1 patch, Transdermal, Daily  revefenacin, 175 mcg,  "Nebulization, Daily - RT  rosuvastatin, 20 mg, Oral, Daily  sodium chloride, 10 mL, Intravenous, Q12H  [Held by provider] spironolactone, 25 mg, Oral, Daily  [START ON 7/2/2025] torsemide, 50 mg, Oral, Daily        Continuous Infusions:     PRN Meds:.  acetaminophen **OR** acetaminophen **OR** acetaminophen    aluminum-magnesium hydroxide-simethicone    senna-docusate sodium **AND** polyethylene glycol **AND** bisacodyl **AND** bisacodyl    Calcium Replacement - Follow Nurse / BPA Driven Protocol    dextrose    dextrose    glucagon (human recombinant)    Magnesium Standard Dose Replacement - Follow Nurse / BPA Driven Protocol    melatonin    metoprolol tartrate    nitroglycerin    ondansetron ODT **OR** ondansetron    Phosphorus Replacement - Follow Nurse / BPA Driven Protocol    Potassium Replacement - Follow Nurse / BPA Driven Protocol    sodium chloride    sodium chloride    sodium chloride    Objective     VITAL SIGNS  Patient Vitals for the past 24 hrs:   BP Temp Temp src Pulse Resp SpO2 Weight   07/01/25 1240 104/52 99.2 °F (37.3 °C) Axillary 88 19 -- --   07/01/25 1044 -- -- -- 93 18 99 % --   07/01/25 1039 -- -- -- 93 20 93 % --   07/01/25 0820 110/55 99 °F (37.2 °C) Axillary 98 13 -- --   07/01/25 0638 -- -- -- 106 17 100 % --   07/01/25 0633 -- -- -- 106 18 100 % --   07/01/25 0632 -- -- -- 104 18 100 % --   07/01/25 0629 -- -- -- 105 16 100 % --   07/01/25 0628 -- -- -- 109 20 100 % --   07/01/25 0625 -- -- -- 108 16 96 % --   07/01/25 0452 123/58 97.2 °F (36.2 °C) Oral 101 15 -- (!) 140 kg (308 lb 13.8 oz)   06/30/25 2307 128/73 98.8 °F (37.1 °C) Oral 103 23 (!) 89 % --   06/30/25 2000 95/46 98.9 °F (37.2 °C) Oral -- -- 92 % --   06/30/25 1644 105/57 99 °F (37.2 °C) Oral 103 19 -- --   06/30/25 1522 -- -- -- 95 12 96 % --   06/30/25 1519 -- -- -- 95 14 93 % --        Flowsheet Rows      Flowsheet Row First Filed Value   Admission Height 177.8 cm (70\") Documented at 06/22/2025 0055   Admission Weight 131 " kg (288 lb 12.8 oz) Documented at 06/22/2025 0231            Body mass index is 44.32 kg/m².      Intake/Output Summary (Last 24 hours) at 7/1/2025 1355  Last data filed at 7/1/2025 1240  Gross per 24 hour   Intake 1422 ml   Output 1580 ml   Net -158 ml        TELEMETRY:     Sinus Rhythm    Physical Exam:  Constitutional:       Appearance: Well-developed.   Eyes:      General: No scleral icterus.        Right eye: No discharge.   HENT:      Head: Normocephalic and atraumatic.   Neck:      Thyroid: No thyromegaly.      Lymphadenopathy: No cervical adenopathy.   Pulmonary:      Effort: Pulmonary effort is normal. No respiratory distress.      Breath sounds: Normal breath sounds. No wheezing. No rales.   Cardiovascular:      Normal rate. Regular rhythm.      No gallop.    Edema:     Peripheral edema present.  Abdominal:      Tenderness: There is no abdominal tenderness.   Skin:     Findings: No erythema or rash.   Neurological:      Mental Status: Alert and oriented to person, place, and time.            Results Review:   I reviewed the patient's new clinical results.    CBC    Results from last 7 days   Lab Units 07/01/25  0337 06/30/25  0339 06/29/25  0416 06/28/25  0235 06/27/25  0049 06/26/25  0509 06/25/25  1625   WBC 10*3/mm3 11.85* 11.79* 15.30* 14.77* 13.26* 13.65* 14.18*   HEMOGLOBIN g/dL 13.1 13.3 13.5 14.6 14.9 14.5 14.1   PLATELETS 10*3/mm3 187 180 182 202 201 195 209     BMP   Results from last 7 days   Lab Units 07/01/25  0337 06/30/25  0339 06/29/25  0347 06/28/25  0235 06/27/25  0049 06/26/25  0326 06/25/25  1625   SODIUM mmol/L 133* 133* 133* 136 133* 134* 131*   POTASSIUM mmol/L 4.4 4.1 4.3 4.5 4.2 4.2 5.2   CHLORIDE mmol/L 95* 95* 94* 94* 91* 95* 95*   CO2 mmol/L 27.2 28.7 28.1 30.3* 29.7* 28.6 25.4   BUN mg/dL 26.9* 33.0* 46.8* 53.8* 52.7* 45.1* 50.9*   CREATININE mg/dL 1.00 1.01 1.07 1.18 1.37* 1.09 1.32*   GLUCOSE mg/dL 276* 234* 157* 135* 233* 72 327*   MAGNESIUM mg/dL 2.1 2.2 2.2 2.3 2.3 2.5*   "--    PHOSPHORUS mg/dL 3.5 3.6 4.7* 4.4  --   --   --      Cr Clearance Estimated Creatinine Clearance: 99.8 mL/min (by C-G formula based on SCr of 1 mg/dL).  Coag     HbA1C   Lab Results   Component Value Date    HGBA1C 9.31 (H) 06/23/2025    HGBA1C 6.8 (H) 05/05/2022    HGBA1C 6.4 (H) 06/12/2021     Blood Glucose   Glucose   Date/Time Value Ref Range Status   07/01/2025 1121 315 (H) 70 - 105 mg/dL Final     Comment:     Serial Number: [id number]:  [id number]   07/01/2025 0749 199 (H) 70 - 105 mg/dL Final     Comment:     Serial Number: [id number]:  [id number]   06/30/2025 2006 278 (H) 70 - 105 mg/dL Final     Comment:     Serial Number: [id number]:  [id number]   06/30/2025 1613 190 (H) 70 - 105 mg/dL Final     Comment:     Serial Number: [id number]:  [id number]   06/30/2025 1124 283 (H) 70 - 105 mg/dL Final     Comment:     Serial Number: [id number]:  [id number]   06/30/2025 0715 235 (H) 70 - 105 mg/dL Final     Comment:     Serial Number: [id number]:  [id number]   06/29/2025 2017 216 (H) 70 - 105 mg/dL Final     Comment:     Serial Number: [id number]:  [id number]   06/29/2025 1547 282 (H) 70 - 105 mg/dL Final     Comment:     Serial Number: [id number]:  [id number]     Infection         CMP   Results from last 7 days   Lab Units 07/01/25  0337 06/30/25  0339 06/29/25  0347 06/28/25  0235 06/27/25  0049 06/26/25  0326 06/25/25  1625   SODIUM mmol/L 133* 133* 133* 136 133* 134* 131*   POTASSIUM mmol/L 4.4 4.1 4.3 4.5 4.2 4.2 5.2   CHLORIDE mmol/L 95* 95* 94* 94* 91* 95* 95*   CO2 mmol/L 27.2 28.7 28.1 30.3* 29.7* 28.6 25.4   BUN mg/dL 26.9* 33.0* 46.8* 53.8* 52.7* 45.1* 50.9*   CREATININE mg/dL 1.00 1.01 1.07 1.18 1.37* 1.09 1.32*   GLUCOSE mg/dL 276* 234* 157* 135* 233* 72 327*   ALBUMIN g/dL 3.7 3.8 3.6 3.9  --   --   --      ABG          UA      REZA  No results found for: \"POCMETH\", \"POCAMPHET\", \"POCBARBITUR\", \"POCBENZO\", \"POCCOCAINE\", \"POCOPIATES\", \"POCOXYCODO\", " "\"POCPHENCYC\", \"POCPROPOXY\", \"POCTHC\", \"POCTRICYC\"  Lysis Labs   Results from last 7 days   Lab Units 07/01/25  0337 06/30/25  0339 06/29/25  0416 06/29/25  0347 06/28/25  0235 06/27/25  0049 06/26/25  0509 06/26/25  0326 06/25/25  1625   HEMOGLOBIN g/dL 13.1 13.3 13.5  --  14.6 14.9 14.5  --  14.1   PLATELETS 10*3/mm3 187 180 182  --  202 201 195  --  209   CREATININE mg/dL 1.00 1.01  --  1.07 1.18 1.37*  --  1.09 1.32*     Radiology(recent) No radiology results for the last day    Imaging Results (Last 24 Hours)       ** No results found for the last 24 hours. **                    EKG      6/25/2025    I personally viewed and interpreted the patient's EKG/Telemetry data:        ECHOCARDIOGRAM:     Results for orders placed during the hospital encounter of 06/22/25    Adult Transthoracic Echo Complete W/ Cont if Necessary Per Protocol 06/22/2025  5:31 PM    Interpretation Summary    Left ventricular systolic function is normal. Calculated left ventricular EF = 64% Left ventricular ejection fraction appears to be 61 - 65%.    Left ventricular diastolic function was normal.          Assessment & Plan            Acute respiratory failure with hypoxia and hypercapnia    Essential hypertension    Chronic obstructive pulmonary disease    Coronary angioplasty status    Chronic hepatitis C    Overweight    Tobacco dependence syndrome    Mixed hyperlipidemia    Acute on chronic diastolic heart failure    Acute hypoxic respiratory failure    Acute on chronic heart failure with preserved ejection fraction (HFpEF)        ASSESSMENT:    Acute respiratory failure with hypoxia and hypercapnia/COPD  - initial ABG pH 7.31, PCO2 62, pO2 75, HCO3 31.4  - Placed on Bipap with repeat ABG 7.359, CO2 60.4, pO2 90.2, HCO3 34.1  - Not wearing oxygen at time of exam,  wears 3 L home O2  - WBC 13.96  - RVP negative  - CXR showed no clearly acute findings in the chest. Chronic changes as above including cardiomegaly  - pulmonary following   "   Hx diastolic CHF   - Repeat echo EF 60-65%; normal diastolic function, no significant VHD  - proBNP 175   - bilateral lower extremity edema  - Bilateral lower extremities wrapped with Ace  - Continue gentle diuresis     CAD s/p PCI  - HS troponin 36 with repeat 33  - Continue aspirin Plavix statin  -denies chest pain     Hypertension  - Continue losartan Cardizem     Hyperlipidemia  - cont home aspirin, plavix, statin      DM type II  - Management per primary     Tobacco abuse  - encourage cessation  - nicotine patch      Morbid obesity  - BMI 41      PLAN:    BUN/Cr 26/1 today  Metolazone stopped yesterday for up-trending creatinine but currently giving every other day due to extensive leg edema still present  On NC 02 but not currently wearing  LE wrapped  Hemodynamics stable, SR on monitor  Continue supportive care   Continue to diurese   Salt and fluid restriction education        Additional recommendations per Dr. Pedrito Miller, APRN  07/01/25  13:55 EDT

## 2025-07-01 NOTE — DISCHARGE SUMMARY
[facility] Medicine Services  Discharge Summary    Date of Service: 2025  Patient Name: Santos Sullivan  : 1955  MRN: [id number]    Date of Admission: 2025  Discharge Diagnosis: Acute respiratory failure with hypoxia and hypercapnia  Date of Discharge: 2025  Primary Care Physician: Astrid Zuñiga MD      Presenting Problem:   Acute on chronic diastolic congestive heart failure [I50.33]  Acute respiratory failure with hypercapnia [J96.02]  Acute hypoxic respiratory failure [J96.01]    Active and Resolved Hospital Problems:  Active Hospital Problems    Diagnosis POA    **Acute respiratory failure with hypoxia and hypercapnia [J96.01, J96.02] Yes    Acute on chronic diastolic heart failure [I50.33] Yes    Acute hypoxic respiratory failure [J96.01] Yes    Mixed hyperlipidemia [E78.2] Yes    Essential hypertension [I10] Yes    Overweight [E66.3] Yes    Chronic hepatitis C [B18.2] Yes    Coronary angioplasty status [Z98.61] Not Applicable    Chronic obstructive pulmonary disease [J44.9] Yes    Tobacco dependence syndrome [F17.200] Yes      Resolved Hospital Problems   No resolved problems to display.         Hospital Course     HPI:  Santos Sullivan is a 69 y.o. male with PMH of CAD s/p PCI, HFpEF, HTN, HLD, COPD, DM2 presented to [facility] ED 2025 with complaints of shortness of breath, cough, wheezing for the last week. He does wear home oxygen 3L O2. He denied any fever, no chest pain. He feels like his legs are more swollen. He feels fatigued.      In the ED  ABG pH 7.31, low CO2 62, pO2 75, HCO3 31.4 on 36% FiO2. He was placed on Bipap with ABG 7.359, CO2 60.4, pO2 90.2, HCO3 34.1  WBC 13.96, HS troponin 36 with repeat 33, proBNP 175.   RVP negative.   CXR showed no clearly acute findings in the chest. Chronic changes as above including cardiomegaly. , afebrile, BP 150s-160s/80s.   He was given lasix 80mg IV x1.   He was admitted for further treatment of acute respiratory  failure with hypoxia and hypercapnia thought to be due to CHF exacerbation     See below for details of his hospitalization:      Hospital Course:       Acute hypoxic and hypercapnic respiratory failure  Acute exacerbation of HFpEF  Volume overload with bilateral lower extremity edema  COPD with mild exacerbation  - patient was admitted and started on diuresis, IV steroids, bronchodilators  - ABGs and imaging reports reviewed  - Respiratory status slowly improved; off oxygen on my exam; still needs at times when sleeping; monitor at [facility]   - Echo this admission showed preserved EF   - Volume status slowly improving  - continue oral torsemide with metolazone   -continue bronchodilators  - Pulmonology recs appreciated, will need outpatient sleep study to rule out SHYAM        RUDY, improved  - Creatinine holding steady today   - Metolazone added back every other day   - follow-up with Nephrology outpatient to monitor volume status, renal function, and adjust meds as needed      Hypotension   -likely from meds  - cut metoprolol to 12.5 mg BID; stopped losartan and spironolactone for now   - monitor BP closely with diuresis   - consider restarting spironolactone and ARB outpatient if BP could tolerate     New onset A-fib/flutter  - Noted on telemetry on 6/24, did have some tachycardia which improved  - Spontaneously converted to NSR   - Continue home diltiazem and metoprolol; cut metoprolol dose as above   -follow-up with Cardiology outpatient to monitor      CAD s/p PCI  Hypertension  Hyperlipidemia  - cont home meds as tolerated, monitor     DM type II with hyperglycemia  - Hemoglobin A1c is 9.3  - Continue basal insulin, SSI, dosing adjusted as steroid being tapered down  - Monitor blood glucose and adjust insulin therapy as needed     Tobacco abuse  - encourage cessation  - nicotine patch      Morbid obesity  - BMI 41  - complicates all aspects of care       Disposition Planning:  Patient is doing well, and is medically  stable for discharge home today.        Day of Discharge     Vital Signs:  Temp:  [97.2 °F (36.2 °C)-99 °F (37.2 °C)] 99 °F (37.2 °C)  Heart Rate:  [] 93  Resp:  [12-23] 18  BP: ()/(46-86) 110/55    Physical Exam:  Physical Exam   General: Sitting up in bed; NAD  CV: RRR, S1-S2  Lungs: CTA bilaterally  Abdomen: obese, soft, NT, ND       Pertinent  and/or Most Recent Results     LAB RESULTS:      Lab 07/01/25  0337 06/30/25  0339 06/29/25  0416 06/28/25  0235 06/27/25  0049 06/26/25  0509   WBC 11.85* 11.79* 15.30* 14.77* 13.26* 13.65*   HEMOGLOBIN 13.1 13.3 13.5 14.6 14.9 14.5   HEMATOCRIT 41.2 41.7 42.7 46.4 46.6 45.7   PLATELETS 187 180 182 202 201 195   NEUTROS ABS 8.43* 8.21* 11.20* 10.19* 9.80* 9.49*   IMMATURE GRANS (ABS) 0.35* 0.49* 0.60*  --  0.68* 0.61*   LYMPHS ABS 1.70 1.70 1.89  --  1.53 2.33   MONOS ABS 1.04* 1.15* 1.31*  --  0.93* 0.91*   EOS ABS 0.24 0.22 0.25  --  0.21 0.17   MCV 94.9 94.1 95.3 94.9 94.5 94.4         Lab 07/01/25  0337 06/30/25  0339 06/29/25  0347 06/28/25  0235 06/27/25  0049   SODIUM 133* 133* 133* 136 133*   POTASSIUM 4.4 4.1 4.3 4.5 4.2   CHLORIDE 95* 95* 94* 94* 91*   CO2 27.2 28.7 28.1 30.3* 29.7*   ANION GAP 10.8 9.3 10.9 11.7 12.3   BUN 26.9* 33.0* 46.8* 53.8* 52.7*   CREATININE 1.00 1.01 1.07 1.18 1.37*   EGFR 82.0 81.0 75.6 67.2 56.2*   GLUCOSE 276* 234* 157* 135* 233*   CALCIUM 9.2 9.1 9.3 9.3 9.0   MAGNESIUM 2.1 2.2 2.2 2.3 2.3   PHOSPHORUS 3.5 3.6 4.7* 4.4  --          Lab 07/01/25  0337 06/30/25  0339 06/29/25  0347 06/28/25  0235   ALBUMIN 3.7 3.8 3.6 3.9                     Brief Urine Lab Results       None          Microbiology Results (last 10 days)       Procedure Component Value - Date/Time    Blood Culture - Blood, Arm, Left [[id number]]  (Normal) Collected: 06/22/25 0337    Lab Status: Final result Specimen: Blood from Arm, Left Updated: 06/27/25 0345     Blood Culture No growth at 5 days    Respiratory Panel PCR w/COVID-19(SARS-CoV-2)  DIANA/BRAD/INGRID/PAD/COR/HERB In-House, NP Swab in UTM/VTM, 2 HR TAT - Swab, Nasopharynx [[id number]]  (Normal) Collected: 06/22/25 0229    Lab Status: Final result Specimen: Swab from Nasopharynx Updated: 06/22/25 0324     ADENOVIRUS, PCR Not Detected     Coronavirus 229E Not Detected     Coronavirus HKU1 Not Detected     Coronavirus NL63 Not Detected     Coronavirus OC43 Not Detected     COVID19 Not Detected     Human Metapneumovirus Not Detected     Human Rhinovirus/Enterovirus Not Detected     Influenza A PCR Not Detected     Influenza B PCR Not Detected     Parainfluenza Virus 1 Not Detected     Parainfluenza Virus 2 Not Detected     Parainfluenza Virus 3 Not Detected     Parainfluenza Virus 4 Not Detected     RSV, PCR Not Detected     Bordetella pertussis pcr Not Detected     Bordetella parapertussis PCR Not Detected     Chlamydophila pneumoniae PCR Not Detected     Mycoplasma pneumo by PCR Not Detected    Narrative:      In the setting of a positive respiratory panel with a viral infection PLUS a negative procalcitonin without other underlying concern for bacterial infection, consider observing off antibiotics or discontinuation of antibiotics and continue supportive care. If the respiratory panel is positive for atypical bacterial infection (Bordetella pertussis, Chlamydophila pneumoniae, or Mycoplasma pneumoniae), consider antibiotic de-escalation to target atypical bacterial infection.    Blood Culture - Blood, Arm, Left [[id number]]  (Normal) Collected: 06/22/25 0151    Lab Status: Final result Specimen: Blood from Arm, Left Updated: 06/27/25 0200     Blood Culture No growth at 5 days    Narrative:      Less than seven (7) mL's of blood was collected.  Insufficient quantity may yield false negative results.            XR Chest 1 View  Result Date: 6/22/2025  Impression: No clearly acute findings in the chest. Chronic changes as above, including cardiomegaly. Electronically Signed: Norris Chavarria MD  6/22/2025  2:19 AM EDT  Workstation ID: [id number]      Results for orders placed in visit on 06/05/19    SCANNED VASCULAR STUDIES       Results for orders placed in visit on 06/05/19    SCANNED VASCULAR STUDIES       Results for orders placed during the hospital encounter of 06/22/25    Adult Transthoracic Echo Complete W/ Cont if Necessary Per Protocol 06/22/2025  5:31 PM    Interpretation Summary    Left ventricular systolic function is normal. Calculated left ventricular EF = 64% Left ventricular ejection fraction appears to be 61 - 65%.    Left ventricular diastolic function was normal.      Labs Pending at Discharge:  Pending Results       None            Procedures Performed           Consults:   Consults       Date and Time Order Name Status Description    6/27/2025  8:42 AM Inpatient Nephrology Consult Completed     6/24/2025  4:52 PM Inpatient Cardiology Consult Completed     6/22/2025  5:42 AM Inpatient Pulmonology Consult Completed     6/22/2025  3:42 AM Inpatient Hospitalist Consult                Discharge Details        Discharge Medications        New Medications        Instructions Start Date   guaiFENesin 600 MG 12 hr tablet  Commonly known as: MUCINEX   1,200 mg, Oral, Every 12 Hours Scheduled      metOLazone 2.5 MG tablet  Commonly known as: ZAROXOLYN   2.5 mg, Oral, Every Other Day   Start Date: July 2, 2025     metoprolol tartrate 25 MG tablet  Commonly known as: LOPRESSOR   12.5 mg, Oral, Every 12 Hours Scheduled      revefenacin 175 MCG/3ML nebulizer solution  Commonly known as: YUPELRI   175 mcg, Nebulization, Daily - RT   Start Date: July 2, 2025     torsemide 10 MG tablet  Commonly known as: DEMADEX   50 mg, Oral, Daily   Start Date: July 2, 2025            Changes to Medications        Instructions Start Date   insulin glargine 100 UNIT/ML injection  Commonly known as: LANTUS, SEMGLEE  What changed: how much to take   12 Units, Subcutaneous, Nightly             Continue These Medications         Instructions Start Date   albuterol (2.5 MG/3ML) 0.083% nebulizer solution  Commonly known as: PROVENTIL   2.5 mg, Nebulization, Every 4 Hours PRN      albuterol sulfate  (90 Base) MCG/ACT inhaler  Commonly known as: PROVENTIL HFA;VENTOLIN HFA;PROAIR HFA   2 puffs, Every 4 Hours PRN      aspirin 81 MG EC tablet   1 tablet, Oral, Every 24 Hours      budesonide 0.5 MG/2ML nebulizer solution  Commonly known as: PULMICORT   0.5 mg, 2 Times Daily - RT      budesonide-formoterol 160-4.5 MCG/ACT inhaler  Commonly known as: SYMBICORT   2 puffs, 2 Times Daily - RT      clopidogrel 75 MG tablet  Commonly known as: PLAVIX   Take 1 tablet by mouth once daily      dilTIAZem  MG 24 hr capsule  Commonly known as: CARDIZEM CD   240 mg, Daily      fluticasone 50 MCG/ACT nasal spray  Commonly known as: FLONASE   1 spray, 2 Times Daily      metFORMIN  MG 24 hr tablet  Commonly known as: GLUCOPHAGE-XR   1,000 mg, 2 Times Daily      nitroglycerin 0.4 MG SL tablet  Commonly known as: Nitrostat   0.4 mg, Sublingual, Every 5 Minutes PRN, Take no more than 3 doses in 15 minutes.      Ozempic (0.25 or 0.5 MG/DOSE) 2 MG/3ML solution pen-injector  Generic drug: Semaglutide(0.25 or 0.5MG/DOS)   0.25 mg, Weekly      rosuvastatin 20 MG tablet  Commonly known as: CRESTOR   20 mg, Daily             Stop These Medications      bumetanide 2 MG tablet  Commonly known as: BUMEX     furosemide 40 MG tablet  Commonly known as: LASIX     glimepiride 2 MG tablet  Commonly known as: AMARYL     losartan 50 MG tablet  Commonly known as: COZAAR     pioglitazone 45 MG tablet  Commonly known as: ACTOS     predniSONE 10 MG tablet  Commonly known as: DELTASONE     spironolactone 25 MG tablet  Commonly known as: ALDACTONE              No Known Allergies      Discharge Disposition: \  Skilled Nursing Facility (DC - External)    Diet:  Hospital:  Diet Order   Procedures    Diet: Cardiac, Diabetic; Healthy Heart (2-3 Na+); Consistent Carbohydrate;  Fluid Consistency: Thin (IDDSI 0)         Discharge Activity:   Activity Instructions       Activity as Tolerated                CODE STATUS:  Code Status and Medical Interventions: CPR (Attempt to Resuscitate); Full Support   Ordered at: 06/22/25 0543     Code Status (Patient has no pulse and is not breathing):    CPR (Attempt to Resuscitate)     Medical Interventions (Patient has pulse or is breathing):    Full Support     Level Of Support Discussed With:    Patient         No future appointments.    Additional Instructions for the Follow-ups that You Need to Schedule       Ambulatory Referral to Pulmonary Rehab   As directed      Diagnosis: Chronic obstructive airway disease [458301]   Date of Onset (of Diagnosis): 10/19/2022        Discharge Follow-up with PCP   As directed       Currently Documented PCP:    Astrid Zuñiga MD    PCP Phone Number:    [phone]     Follow Up Details: IN 1-2 weeks after d/c from [facility]        Discharge Follow-up with Specified Provider: Follow-up with Cardiology Dr Major or primary Cardiologist in 4 weeks; call office to make appt; 1 Month   As directed      To: Follow-up with Cardiology Dr Major or primary Cardiologist in 4 weeks; call office to make appt   Follow Up: 1 Month        Discharge Follow-up with Specified Provider: Follow-up with Nephrologist Dr Reyes; call  [phone] to make an appt; 3 Weeks   As directed      To: Follow-up with Nephrologist Dr Reyes; call  [phone] to make an appt   Follow Up: 3 Weeks                Time spent on Discharge including face to face service:  35 minutes    Signature: Electronically signed by Alexis Polanco DO, 07/01/25, 12:22 EDT.  [facility] ([facility]

## 2025-07-01 NOTE — PROGRESS NOTES
[facility] Progress Note      Patient Name: Santos Sullivan  : 1955  MRN: [id number]  Primary Care Physician:  Astrid Zuñiga MD  Date of admission: 2025    Subjective     Interval History:     Patient resting comfortably  Dyspnea improving slowly  Good urine output but patient    Review of Systems:   As noted above    Objective     Vitals:   Temp:  [97.2 °F (36.2 °C)-99 °F (37.2 °C)] 99 °F (37.2 °C)  Heart Rate:  [] 98  Resp:  [12-24] 13  BP: ()/(46-86) 110/55    Intake/Output Summary (Last 24 hours) at 2025 0839  Last data filed at 2025 0619  Gross per 24 hour   Intake 1310 ml   Output 1580 ml   Net -270 ml       Physical Exam:      General Appearance: Morbidly obese, chronically ill-appearing  Skin: warm and dry  HEENT: oral mucosa normal, nonicteric sclera  Neck: supple, no JVD  Lungs: Decreased breath sound bases.  Few wheezes  Heart: Irregular rhythm  Abdomen: soft, nontender, nondistended  : no palpable bladder  Extremities: 2+ edema, legs wrapped  Neuro: normal speech and mental status    Scheduled Meds:     arformoterol, 15 mcg, Nebulization, BID - RT  aspirin, 81 mg, Oral, Daily  budesonide, 0.5 mg, Nebulization, BID - RT  clopidogrel, 75 mg, Oral, Daily  dilTIAZem CD, 240 mg, Oral, Daily  guaiFENesin, 1,200 mg, Oral, Q12H  insulin glargine, 20 Units, Subcutaneous, Nightly  insulin lispro, 4-24 Units, Subcutaneous, 4x Daily AC & at Bedtime  ipratropium-albuterol, 3 mL, Nebulization, Q4H - RT  metOLazone, 2.5 mg, Oral, Every Other Day  metoprolol tartrate, 12.5 mg, Oral, Q12H  nicotine, 1 patch, Transdermal, Daily  revefenacin, 175 mcg, Nebulization, Daily - RT  rosuvastatin, 20 mg, Oral, Daily  sodium chloride, 10 mL, Intravenous, Q12H  [Held by provider] spironolactone, 25 mg, Oral, Daily  torsemide, 40 mg, Oral, Daily      IV Meds:        Results Reviewed:   I have personally reviewed the results from the time of this admission to  7/1/2025 08:39 EDT     Results from last 7 days   Lab Units 07/01/25 0337 06/30/25 0339 06/29/25  0347   SODIUM mmol/L 133* 133* 133*   POTASSIUM mmol/L 4.4 4.1 4.3   CHLORIDE mmol/L 95* 95* 94*   CO2 mmol/L 27.2 28.7 28.1   BUN mg/dL 26.9* 33.0* 46.8*   CREATININE mg/dL 1.00 1.01 1.07   CALCIUM mg/dL 9.2 9.1 9.3   GLUCOSE mg/dL 276* 234* 157*     Estimated Creatinine Clearance: 99.8 mL/min (by C-G formula based on SCr of 1 mg/dL).  Results from last 7 days   Lab Units 07/01/25 0337 06/30/25 0339 06/29/25  0347   MAGNESIUM mg/dL 2.1 2.2 2.2   PHOSPHORUS mg/dL 3.5 3.6 4.7*         Results from last 7 days   Lab Units 07/01/25 0337 06/30/25  0339 06/29/25  0416 06/28/25  0235 06/27/25  0049   WBC 10*3/mm3 11.85* 11.79* 15.30* 14.77* 13.26*   HEMOGLOBIN g/dL 13.1 13.3 13.5 14.6 14.9   PLATELETS 10*3/mm3 187 180 182 202 201           Assessment / Plan     ASSESSMENT:    Acute kidney injury.  Secondary to diuretics.  Improved after decreasing diuretics.  Electrolytes okay.  Volume status generous  Congestive heart failure.  Diastolic.  Acute on chronic.  Volume status improving with diuretics  Acute on chronic respiratory failure with hypoxia and hypercapnia.  Improving slowly.  On steroids and diuretics  Benign essential hypertension.  Blood pressure low  atrial fibrillation  Morbid obesity    PLAN:  Increase torsemide to 50 mg daily  Keep off losartan  Repeat labs in a.m.      Cl Reyes MD  07/01/25  08:39 EDT    [facility]  [phone]

## 2025-07-01 NOTE — PROGRESS NOTES
Daily Progress Note          Assessment    Acute respiratory failure with hypoxia and hypercapnia: ABGs on admission 7.3 1/62/75 while on nasal cannula, repeated on BiPAP 7.35/60.4/90.2  Acute bronchitis due to unspecified pathogen, negative respiratory panel     Acute on chronic HFpEF  COPD without acute exacerbation  CAD status post PCI  HTN  HLD  DM  Chronic hepatitis C  Tobacco smoking  Obesity with BMI of 42 I     PFTs 2022: Moderate obstruction with air trapping and decreased oxygen diffusion capacity, FEV1/FVC 65%, FVC 72%, FEV1 63%, %, %, DLCO 58%           Recommendations:  The respiratory status is improving gradually    Diuresis as per nephrology    Follow-up in the office in 3 weeks, he will consider home sleep study to rule out SHYAM     Completed tapering dose prednisone     Brovana, Pulmicort nebulized  Mucinex  Oxygen supplement and titration to maintain saturation 90 to 95% currently on 2 L per nasal cannula     bronchodilators     Smoking cessation counseling, nicotine patch  Aspirin, Plavix, Crestor  Cardiology following, currently on metoprolol, diltiazem, losartan  Insulin Lantus and sliding scale     I personally reviewed the radiological studies             LOS: 9 days     Subjective     Less cough and shortness of breath    Objective     Vital signs for last 24 hours:  Vitals:    07/01/25 0638 07/01/25 0820 07/01/25 1039 07/01/25 1044   BP:  110/55     BP Location:  Right arm     Patient Position:  Lying     Pulse: 106 98 93 93   Resp: 17 13 20 18   Temp:  99 °F (37.2 °C)     TempSrc:  Axillary     SpO2: 100%  93% 99%   Weight:       Height:           Intake/Output last 3 shifts:  I/O last 3 completed shifts:  In: 1310 [P.O.:1310]  Out: 2180 [Urine:2180]  Intake/Output this shift:  I/O this shift:  In: 240 [P.O.:240]  Out: -       Radiology  Imaging Results (Last 24 Hours)       ** No results found for the last 24 hours. **            Labs:  Results from last 7 days   Lab Units  07/01/25  0337   WBC 10*3/mm3 11.85*   HEMOGLOBIN g/dL 13.1   HEMATOCRIT % 41.2   PLATELETS 10*3/mm3 187     Results from last 7 days   Lab Units 07/01/25  0337   SODIUM mmol/L 133*   POTASSIUM mmol/L 4.4   CHLORIDE mmol/L 95*   CO2 mmol/L 27.2   BUN mg/dL 26.9*   CREATININE mg/dL 1.00   CALCIUM mg/dL 9.2   GLUCOSE mg/dL 276*           Results from last 7 days   Lab Units 07/01/25  0337 06/30/25  0339 06/29/25  0347   ALBUMIN g/dL 3.7 3.8 3.6               Results from last 7 days   Lab Units 07/01/25  0337   MAGNESIUM mg/dL 2.1                   Meds:   SCHEDULE  arformoterol, 15 mcg, Nebulization, BID - RT  aspirin, 81 mg, Oral, Daily  budesonide, 0.5 mg, Nebulization, BID - RT  clopidogrel, 75 mg, Oral, Daily  dilTIAZem CD, 240 mg, Oral, Daily  guaiFENesin, 1,200 mg, Oral, Q12H  insulin glargine, 20 Units, Subcutaneous, Nightly  insulin lispro, 4-24 Units, Subcutaneous, 4x Daily AC & at Bedtime  ipratropium-albuterol, 3 mL, Nebulization, Q4H - RT  metOLazone, 2.5 mg, Oral, Every Other Day  metoprolol tartrate, 12.5 mg, Oral, Q12H  nicotine, 1 patch, Transdermal, Daily  revefenacin, 175 mcg, Nebulization, Daily - RT  rosuvastatin, 20 mg, Oral, Daily  sodium chloride, 10 mL, Intravenous, Q12H  [Held by provider] spironolactone, 25 mg, Oral, Daily  [START ON 7/2/2025] torsemide, 50 mg, Oral, Daily      Infusions     PRNs    acetaminophen **OR** acetaminophen **OR** acetaminophen    aluminum-magnesium hydroxide-simethicone    senna-docusate sodium **AND** polyethylene glycol **AND** bisacodyl **AND** bisacodyl    Calcium Replacement - Follow Nurse / BPA Driven Protocol    dextrose    dextrose    glucagon (human recombinant)    Magnesium Standard Dose Replacement - Follow Nurse / BPA Driven Protocol    melatonin    metoprolol tartrate    nitroglycerin    ondansetron ODT **OR** ondansetron    Phosphorus Replacement - Follow Nurse / BPA Driven Protocol    Potassium Replacement - Follow Nurse / BPA Driven Protocol     sodium chloride    sodium chloride    sodium chloride    Physical Exam:  General Appearance:  Alert   HEENT:  Normocephalic, without obvious abnormality, Conjunctiva/corneas clear,.   Nares normal, no drainage     Neck:  Supple, symmetrical, trachea midline.   Lungs /Chest wall: Mild bilateral basal rhonchi, respirations unlabored, symmetrical wall movement.     Heart:  Regular rate and rhythm, S1 S2 normal  Abdomen: Soft, non-tender, no masses, no organomegaly.    Extremities: + Edema, no clubbing or cyanosis     ROS  Constitutional: Negative for chills, fever and malaise/fatigue.   HENT: Negative.    Eyes: Negative.    Cardiovascular: Negative.    Respiratory: Positive for cough and shortness of breath.    Skin: Negative.    Musculoskeletal: Negative.    Gastrointestinal: Negative.    Genitourinary: Negative.    Neurological: Generalized weakness        I reviewed the recent clinical results  I personally reviewed the latest radiological studies    Part of this note may be an electronic transcription/translation of spoken language to printed text using the Dragon Dictation System.

## 2025-07-08 ENCOUNTER — TELEPHONE (OUTPATIENT)
Dept: CARDIAC REHAB | Facility: HOSPITAL | Age: 70
End: 2025-07-08
Payer: MEDICARE

## 2025-07-15 ENCOUNTER — TELEPHONE (OUTPATIENT)
Dept: CARDIAC REHAB | Facility: HOSPITAL | Age: 70
End: 2025-07-15
Payer: MEDICARE

## 2025-07-15 NOTE — TELEPHONE ENCOUNTER
tried to call pt to see if interested in pulm rehab. went straight to vm and  not set up     Annmarie Tai, RRT

## 2025-08-06 ENCOUNTER — HOSPITAL ENCOUNTER (INPATIENT)
Facility: HOSPITAL | Age: 70
LOS: 7 days | Discharge: HOME | DRG: 291 | End: 2025-08-13
Attending: EMERGENCY MEDICINE | Admitting: STUDENT IN AN ORGANIZED HEALTH CARE EDUCATION/TRAINING PROGRAM
Payer: MEDICARE

## 2025-08-06 ENCOUNTER — APPOINTMENT (OUTPATIENT)
Dept: GENERAL RADIOLOGY | Facility: HOSPITAL | Age: 70
DRG: 291 | End: 2025-08-06
Payer: MEDICARE

## 2025-08-06 DIAGNOSIS — J44.1 COPD EXACERBATION: Primary | ICD-10-CM

## 2025-08-06 PROBLEM — R06.02 SHORTNESS OF BREATH: Status: ACTIVE | Noted: 2025-08-06

## 2025-08-06 LAB
ANION GAP SERPL CALCULATED.3IONS-SCNC: 12.1 MMOL/L (ref 5–15)
ARTERIAL PATENCY WRIST A: ABNORMAL
ATMOSPHERIC PRESS: ABNORMAL MM[HG]
B PARAPERT DNA SPEC QL NAA+PROBE: NOT DETECTED
B PERT DNA SPEC QL NAA+PROBE: NOT DETECTED
BASE EXCESS BLDA CALC-SCNC: 11.9 MMOL/L (ref 0–3)
BASOPHILS # BLD AUTO: 0.03 10*3/MM3 (ref 0–0.2)
BASOPHILS NFR BLD AUTO: 0.3 % (ref 0–1.5)
BDY SITE: ABNORMAL
BUN SERPL-MCNC: 13.4 MG/DL (ref 8–23)
BUN/CREAT SERPL: 14.7 (ref 7–25)
C PNEUM DNA NPH QL NAA+NON-PROBE: NOT DETECTED
CALCIUM SPEC-SCNC: 10.4 MG/DL (ref 8.6–10.5)
CHLORIDE SERPL-SCNC: 98 MMOL/L (ref 98–107)
CO2 BLDA-SCNC: 39.5 MMOL/L (ref 22–29)
CO2 SERPL-SCNC: 30.9 MMOL/L (ref 22–29)
CREAT SERPL-MCNC: 0.91 MG/DL (ref 0.76–1.27)
DEPRECATED RDW RBC AUTO: 63.7 FL (ref 37–54)
EGFRCR SERPLBLD CKD-EPI 2021: 91.8 ML/MIN/1.73
EOSINOPHIL # BLD AUTO: 0.32 10*3/MM3 (ref 0–0.4)
EOSINOPHIL NFR BLD AUTO: 2.9 % (ref 0.3–6.2)
ERYTHROCYTE [DISTWIDTH] IN BLOOD BY AUTOMATED COUNT: 17 % (ref 12.3–15.4)
FLUAV SUBTYP SPEC NAA+PROBE: NOT DETECTED
FLUBV RNA NPH QL NAA+NON-PROBE: NOT DETECTED
GEN 5 1HR TROPONIN T REFLEX: 50 NG/L
GLUCOSE SERPL-MCNC: 138 MG/DL (ref 65–99)
HADV DNA SPEC NAA+PROBE: NOT DETECTED
HCO3 BLDA-SCNC: 37.8 MMOL/L (ref 21–28)
HCOV 229E RNA SPEC QL NAA+PROBE: NOT DETECTED
HCOV HKU1 RNA SPEC QL NAA+PROBE: NOT DETECTED
HCOV NL63 RNA SPEC QL NAA+PROBE: NOT DETECTED
HCOV OC43 RNA SPEC QL NAA+PROBE: NOT DETECTED
HCT VFR BLD AUTO: 40.2 % (ref 37.5–51)
HEMODILUTION: NO
HGB BLD-MCNC: 11.9 G/DL (ref 13–17.7)
HMPV RNA NPH QL NAA+NON-PROBE: NOT DETECTED
HPIV1 RNA ISLT QL NAA+PROBE: NOT DETECTED
HPIV2 RNA SPEC QL NAA+PROBE: NOT DETECTED
HPIV3 RNA NPH QL NAA+PROBE: NOT DETECTED
HPIV4 P GENE NPH QL NAA+PROBE: NOT DETECTED
IMM GRANULOCYTES # BLD AUTO: 0.08 10*3/MM3 (ref 0–0.05)
IMM GRANULOCYTES NFR BLD AUTO: 0.7 % (ref 0–0.5)
INHALED O2 CONCENTRATION: 28 %
LYMPHOCYTES # BLD AUTO: 1.31 10*3/MM3 (ref 0.7–3.1)
LYMPHOCYTES NFR BLD AUTO: 11.7 % (ref 19.6–45.3)
M PNEUMO IGG SER IA-ACNC: NOT DETECTED
MCH RBC QN AUTO: 30.5 PG (ref 26.6–33)
MCHC RBC AUTO-ENTMCNC: 29.6 G/DL (ref 31.5–35.7)
MCV RBC AUTO: 103.1 FL (ref 79–97)
MODALITY: ABNORMAL
MONOCYTES # BLD AUTO: 0.87 10*3/MM3 (ref 0.1–0.9)
MONOCYTES NFR BLD AUTO: 7.8 % (ref 5–12)
NEUTROPHILS NFR BLD AUTO: 76.6 % (ref 42.7–76)
NEUTROPHILS NFR BLD AUTO: 8.61 10*3/MM3 (ref 1.7–7)
NRBC BLD AUTO-RTO: 0 /100 WBC (ref 0–0.2)
NT-PROBNP SERPL-MCNC: 846 PG/ML (ref 0–900)
PCO2 BLDA: 53.7 MM HG (ref 35–48)
PH BLDA: 7.46 PH UNITS (ref 7.35–7.45)
PLATELET # BLD AUTO: 226 10*3/MM3 (ref 140–450)
PMV BLD AUTO: 8.6 FL (ref 6–12)
PO2 BLD: 198 MM[HG] (ref 0–500)
PO2 BLDA: 55.5 MM HG (ref 83–108)
POTASSIUM SERPL-SCNC: 4 MMOL/L (ref 3.5–5.2)
RBC # BLD AUTO: 3.9 10*6/MM3 (ref 4.14–5.8)
RHINOVIRUS RNA SPEC NAA+PROBE: NOT DETECTED
RSV RNA NPH QL NAA+NON-PROBE: NOT DETECTED
SAO2 % BLDCOA: 89.1 % (ref 94–98)
SARS-COV-2 RNA RESP QL NAA+PROBE: NOT DETECTED
SODIUM SERPL-SCNC: 141 MMOL/L (ref 136–145)
TROPONIN T % DELTA: 2
TROPONIN T NUMERIC DELTA: 1 NG/L
TROPONIN T SERPL HS-MCNC: 49 NG/L
WBC NRBC COR # BLD AUTO: 11.22 10*3/MM3 (ref 3.4–10.8)

## 2025-08-06 PROCEDURE — 25010000002 CEFTRIAXONE PER 250 MG: Performed by: STUDENT IN AN ORGANIZED HEALTH CARE EDUCATION/TRAINING PROGRAM

## 2025-08-06 PROCEDURE — 84484 ASSAY OF TROPONIN QUANT: CPT | Performed by: EMERGENCY MEDICINE

## 2025-08-06 PROCEDURE — 0202U NFCT DS 22 TRGT SARS-COV-2: CPT | Performed by: EMERGENCY MEDICINE

## 2025-08-06 PROCEDURE — 83880 ASSAY OF NATRIURETIC PEPTIDE: CPT | Performed by: EMERGENCY MEDICINE

## 2025-08-06 PROCEDURE — 80048 BASIC METABOLIC PNL TOTAL CA: CPT | Performed by: EMERGENCY MEDICINE

## 2025-08-06 PROCEDURE — 71045 X-RAY EXAM CHEST 1 VIEW: CPT

## 2025-08-06 PROCEDURE — 94640 AIRWAY INHALATION TREATMENT: CPT

## 2025-08-06 PROCEDURE — 25010000002 FUROSEMIDE PER 20 MG: Performed by: STUDENT IN AN ORGANIZED HEALTH CARE EDUCATION/TRAINING PROGRAM

## 2025-08-06 PROCEDURE — 85025 COMPLETE CBC W/AUTO DIFF WBC: CPT | Performed by: EMERGENCY MEDICINE

## 2025-08-06 PROCEDURE — 82803 BLOOD GASES ANY COMBINATION: CPT | Performed by: EMERGENCY MEDICINE

## 2025-08-06 PROCEDURE — 94799 UNLISTED PULMONARY SVC/PX: CPT

## 2025-08-06 PROCEDURE — 93005 ELECTROCARDIOGRAM TRACING: CPT | Performed by: EMERGENCY MEDICINE

## 2025-08-06 PROCEDURE — 36600 WITHDRAWAL OF ARTERIAL BLOOD: CPT | Performed by: EMERGENCY MEDICINE

## 2025-08-06 PROCEDURE — 99285 EMERGENCY DEPT VISIT HI MDM: CPT

## 2025-08-06 PROCEDURE — 36415 COLL VENOUS BLD VENIPUNCTURE: CPT

## 2025-08-06 RX ORDER — BISACODYL 5 MG/1
5 TABLET, DELAYED RELEASE ORAL DAILY PRN
Status: DISCONTINUED | OUTPATIENT
Start: 2025-08-06 | End: 2025-08-13 | Stop reason: HOSPADM

## 2025-08-06 RX ORDER — SODIUM CHLORIDE 0.9 % (FLUSH) 0.9 %
10 SYRINGE (ML) INJECTION EVERY 12 HOURS SCHEDULED
Status: DISCONTINUED | OUTPATIENT
Start: 2025-08-06 | End: 2025-08-13 | Stop reason: HOSPADM

## 2025-08-06 RX ORDER — AMOXICILLIN 250 MG
2 CAPSULE ORAL 2 TIMES DAILY PRN
Status: DISCONTINUED | OUTPATIENT
Start: 2025-08-06 | End: 2025-08-13 | Stop reason: HOSPADM

## 2025-08-06 RX ORDER — NITROGLYCERIN 0.4 MG/1
0.4 TABLET SUBLINGUAL
Status: DISCONTINUED | OUTPATIENT
Start: 2025-08-06 | End: 2025-08-13 | Stop reason: HOSPADM

## 2025-08-06 RX ORDER — IPRATROPIUM BROMIDE AND ALBUTEROL SULFATE 2.5; .5 MG/3ML; MG/3ML
3 SOLUTION RESPIRATORY (INHALATION) ONCE
Status: COMPLETED | OUTPATIENT
Start: 2025-08-06 | End: 2025-08-06

## 2025-08-06 RX ORDER — ACETAMINOPHEN 325 MG/1
650 TABLET ORAL ONCE
Status: COMPLETED | OUTPATIENT
Start: 2025-08-06 | End: 2025-08-06

## 2025-08-06 RX ORDER — BISACODYL 10 MG
10 SUPPOSITORY, RECTAL RECTAL DAILY PRN
Status: DISCONTINUED | OUTPATIENT
Start: 2025-08-06 | End: 2025-08-13 | Stop reason: HOSPADM

## 2025-08-06 RX ORDER — FUROSEMIDE 10 MG/ML
40 INJECTION INTRAMUSCULAR; INTRAVENOUS EVERY 12 HOURS
Status: DISCONTINUED | OUTPATIENT
Start: 2025-08-07 | End: 2025-08-08

## 2025-08-06 RX ORDER — FUROSEMIDE 10 MG/ML
80 INJECTION INTRAMUSCULAR; INTRAVENOUS ONCE
Status: COMPLETED | OUTPATIENT
Start: 2025-08-06 | End: 2025-08-06

## 2025-08-06 RX ORDER — POLYETHYLENE GLYCOL 3350 17 G/17G
17 POWDER, FOR SOLUTION ORAL DAILY PRN
Status: DISCONTINUED | OUTPATIENT
Start: 2025-08-06 | End: 2025-08-13 | Stop reason: HOSPADM

## 2025-08-06 RX ORDER — SODIUM CHLORIDE 9 MG/ML
40 INJECTION, SOLUTION INTRAVENOUS AS NEEDED
Status: DISCONTINUED | OUTPATIENT
Start: 2025-08-06 | End: 2025-08-10

## 2025-08-06 RX ORDER — IPRATROPIUM BROMIDE AND ALBUTEROL SULFATE 2.5; .5 MG/3ML; MG/3ML
3 SOLUTION RESPIRATORY (INHALATION)
Status: DISCONTINUED | OUTPATIENT
Start: 2025-08-06 | End: 2025-08-07

## 2025-08-06 RX ORDER — BUDESONIDE 0.5 MG/2ML
0.5 INHALANT ORAL
Status: DISCONTINUED | OUTPATIENT
Start: 2025-08-06 | End: 2025-08-13 | Stop reason: HOSPADM

## 2025-08-06 RX ORDER — SODIUM CHLORIDE 0.9 % (FLUSH) 0.9 %
10 SYRINGE (ML) INJECTION AS NEEDED
Status: DISCONTINUED | OUTPATIENT
Start: 2025-08-06 | End: 2025-08-10

## 2025-08-06 RX ADMIN — IPRATROPIUM BROMIDE AND ALBUTEROL SULFATE 3 ML: .5; 3 SOLUTION RESPIRATORY (INHALATION) at 19:32

## 2025-08-06 RX ADMIN — Medication 10 ML: at 22:00

## 2025-08-06 RX ADMIN — ACETAMINOPHEN 650 MG: 325 TABLET ORAL at 22:52

## 2025-08-06 RX ADMIN — SODIUM CHLORIDE 2000 MG: 900 INJECTION INTRAVENOUS at 23:50

## 2025-08-06 RX ADMIN — FUROSEMIDE 80 MG: 10 INJECTION, SOLUTION INTRAMUSCULAR; INTRAVENOUS at 23:51

## 2025-08-07 LAB
ALBUMIN SERPL-MCNC: 3.5 G/DL (ref 3.5–5.2)
ALBUMIN/GLOB SERPL: 1.3 G/DL
ALP SERPL-CCNC: 59 U/L (ref 39–117)
ALT SERPL W P-5'-P-CCNC: 36 U/L (ref 1–41)
ANION GAP SERPL CALCULATED.3IONS-SCNC: 11.6 MMOL/L (ref 5–15)
AST SERPL-CCNC: 17 U/L (ref 1–40)
BASOPHILS # BLD AUTO: 0.04 10*3/MM3 (ref 0–0.2)
BASOPHILS NFR BLD AUTO: 0.4 % (ref 0–1.5)
BILIRUB SERPL-MCNC: 0.4 MG/DL (ref 0–1.2)
BILIRUB UR QL STRIP: NEGATIVE
BUN SERPL-MCNC: 12.2 MG/DL (ref 8–23)
BUN/CREAT SERPL: 14.4 (ref 7–25)
CALCIUM SPEC-SCNC: 10.1 MG/DL (ref 8.6–10.5)
CHLORIDE SERPL-SCNC: 97 MMOL/L (ref 98–107)
CLARITY UR: CLEAR
CO2 SERPL-SCNC: 33.4 MMOL/L (ref 22–29)
COLOR UR: YELLOW
CREAT SERPL-MCNC: 0.85 MG/DL (ref 0.76–1.27)
D DIMER PPP FEU-MCNC: <0.27 MCGFEU/ML (ref 0–0.68)
DEPRECATED RDW RBC AUTO: 64.2 FL (ref 37–54)
EGFRCR SERPLBLD CKD-EPI 2021: 94.6 ML/MIN/1.73
EOSINOPHIL # BLD AUTO: 0.39 10*3/MM3 (ref 0–0.4)
EOSINOPHIL NFR BLD AUTO: 3.5 % (ref 0.3–6.2)
ERYTHROCYTE [DISTWIDTH] IN BLOOD BY AUTOMATED COUNT: 16.8 % (ref 12.3–15.4)
GLOBULIN UR ELPH-MCNC: 2.8 GM/DL
GLUCOSE BLDC GLUCOMTR-MCNC: 262 MG/DL (ref 70–105)
GLUCOSE SERPL-MCNC: 168 MG/DL (ref 65–99)
GLUCOSE UR STRIP-MCNC: NEGATIVE MG/DL
HCT VFR BLD AUTO: 39.9 % (ref 37.5–51)
HGB BLD-MCNC: 11.8 G/DL (ref 13–17.7)
HGB UR QL STRIP.AUTO: NEGATIVE
IMM GRANULOCYTES # BLD AUTO: 0.08 10*3/MM3 (ref 0–0.05)
IMM GRANULOCYTES NFR BLD AUTO: 0.7 % (ref 0–0.5)
KETONES UR QL STRIP: NEGATIVE
LEUKOCYTE ESTERASE UR QL STRIP.AUTO: NEGATIVE
LYMPHOCYTES # BLD AUTO: 1.58 10*3/MM3 (ref 0.7–3.1)
LYMPHOCYTES NFR BLD AUTO: 14.1 % (ref 19.6–45.3)
Lab: ABNORMAL
MAGNESIUM SERPL-MCNC: 1.7 MG/DL (ref 1.6–2.4)
MCH RBC QN AUTO: 30.3 PG (ref 26.6–33)
MCHC RBC AUTO-ENTMCNC: 29.6 G/DL (ref 31.5–35.7)
MCV RBC AUTO: 102.6 FL (ref 79–97)
MONOCYTES # BLD AUTO: 0.95 10*3/MM3 (ref 0.1–0.9)
MONOCYTES NFR BLD AUTO: 8.5 % (ref 5–12)
NEUTROPHILS NFR BLD AUTO: 72.8 % (ref 42.7–76)
NEUTROPHILS NFR BLD AUTO: 8.19 10*3/MM3 (ref 1.7–7)
NITRITE UR QL STRIP: NEGATIVE
NRBC BLD AUTO-RTO: 0 /100 WBC (ref 0–0.2)
PH UR STRIP.AUTO: 7.5 [PH] (ref 5–8)
PHOSPHATE SERPL-MCNC: 3 MG/DL (ref 2.5–4.5)
PLATELET # BLD AUTO: 207 10*3/MM3 (ref 140–450)
PMV BLD AUTO: 8.6 FL (ref 6–12)
POTASSIUM SERPL-SCNC: 3.3 MMOL/L (ref 3.5–5.2)
POTASSIUM SERPL-SCNC: 5.1 MMOL/L (ref 3.5–5.2)
PROT SERPL-MCNC: 6.3 G/DL (ref 6–8.5)
PROT UR QL STRIP: NEGATIVE
QT INTERVAL: 300 MS
QTC INTERVAL: 435 MS
RBC # BLD AUTO: 3.89 10*6/MM3 (ref 4.14–5.8)
SODIUM SERPL-SCNC: 142 MMOL/L (ref 136–145)
SP GR UR STRIP: 1.01 (ref 1–1.03)
UROBILINOGEN UR QL STRIP: NORMAL
WBC NRBC COR # BLD AUTO: 11.23 10*3/MM3 (ref 3.4–10.8)

## 2025-08-07 PROCEDURE — 97162 PT EVAL MOD COMPLEX 30 MIN: CPT

## 2025-08-07 PROCEDURE — 94799 UNLISTED PULMONARY SVC/PX: CPT

## 2025-08-07 PROCEDURE — 84132 ASSAY OF SERUM POTASSIUM: CPT | Performed by: STUDENT IN AN ORGANIZED HEALTH CARE EDUCATION/TRAINING PROGRAM

## 2025-08-07 PROCEDURE — 94761 N-INVAS EAR/PLS OXIMETRY MLT: CPT

## 2025-08-07 PROCEDURE — 81003 URINALYSIS AUTO W/O SCOPE: CPT | Performed by: STUDENT IN AN ORGANIZED HEALTH CARE EDUCATION/TRAINING PROGRAM

## 2025-08-07 PROCEDURE — 84100 ASSAY OF PHOSPHORUS: CPT | Performed by: STUDENT IN AN ORGANIZED HEALTH CARE EDUCATION/TRAINING PROGRAM

## 2025-08-07 PROCEDURE — 85025 COMPLETE CBC W/AUTO DIFF WBC: CPT | Performed by: STUDENT IN AN ORGANIZED HEALTH CARE EDUCATION/TRAINING PROGRAM

## 2025-08-07 PROCEDURE — 63710000001 PREDNISONE PER 1 MG: Performed by: STUDENT IN AN ORGANIZED HEALTH CARE EDUCATION/TRAINING PROGRAM

## 2025-08-07 PROCEDURE — 94664 DEMO&/EVAL PT USE INHALER: CPT

## 2025-08-07 PROCEDURE — 83735 ASSAY OF MAGNESIUM: CPT | Performed by: STUDENT IN AN ORGANIZED HEALTH CARE EDUCATION/TRAINING PROGRAM

## 2025-08-07 PROCEDURE — 97166 OT EVAL MOD COMPLEX 45 MIN: CPT

## 2025-08-07 PROCEDURE — 25010000002 CEFTRIAXONE PER 250 MG: Performed by: STUDENT IN AN ORGANIZED HEALTH CARE EDUCATION/TRAINING PROGRAM

## 2025-08-07 PROCEDURE — 80053 COMPREHEN METABOLIC PANEL: CPT | Performed by: STUDENT IN AN ORGANIZED HEALTH CARE EDUCATION/TRAINING PROGRAM

## 2025-08-07 PROCEDURE — 25010000002 FUROSEMIDE PER 20 MG: Performed by: STUDENT IN AN ORGANIZED HEALTH CARE EDUCATION/TRAINING PROGRAM

## 2025-08-07 PROCEDURE — 85379 FIBRIN DEGRADATION QUANT: CPT | Performed by: STUDENT IN AN ORGANIZED HEALTH CARE EDUCATION/TRAINING PROGRAM

## 2025-08-07 PROCEDURE — 25010000002 ENOXAPARIN PER 10 MG: Performed by: STUDENT IN AN ORGANIZED HEALTH CARE EDUCATION/TRAINING PROGRAM

## 2025-08-07 PROCEDURE — 82948 REAGENT STRIP/BLOOD GLUCOSE: CPT

## 2025-08-07 RX ORDER — ROSUVASTATIN CALCIUM 10 MG/1
20 TABLET, COATED ORAL DAILY
Status: DISCONTINUED | OUTPATIENT
Start: 2025-08-07 | End: 2025-08-13 | Stop reason: HOSPADM

## 2025-08-07 RX ORDER — ECHINACEA PURPUREA EXTRACT 125 MG
2 TABLET ORAL
Status: DISCONTINUED | OUTPATIENT
Start: 2025-08-07 | End: 2025-08-13 | Stop reason: HOSPADM

## 2025-08-07 RX ORDER — INSULIN GLARGINE 100 [IU]/ML
12 INJECTION, SOLUTION SUBCUTANEOUS NIGHTLY
Status: DISCONTINUED | OUTPATIENT
Start: 2025-08-07 | End: 2025-08-10

## 2025-08-07 RX ORDER — DILTIAZEM HYDROCHLORIDE 240 MG/1
240 CAPSULE, COATED, EXTENDED RELEASE ORAL DAILY
Status: DISCONTINUED | OUTPATIENT
Start: 2025-08-07 | End: 2025-08-13 | Stop reason: HOSPADM

## 2025-08-07 RX ORDER — ASPIRIN 81 MG/1
81 TABLET ORAL
Status: DISCONTINUED | OUTPATIENT
Start: 2025-08-07 | End: 2025-08-13 | Stop reason: HOSPADM

## 2025-08-07 RX ORDER — METOLAZONE 2.5 MG/1
2.5 TABLET ORAL EVERY OTHER DAY
Status: DISCONTINUED | OUTPATIENT
Start: 2025-08-07 | End: 2025-08-07

## 2025-08-07 RX ORDER — PREDNISONE 20 MG/1
40 TABLET ORAL
Status: DISCONTINUED | OUTPATIENT
Start: 2025-08-07 | End: 2025-08-10

## 2025-08-07 RX ORDER — ENOXAPARIN SODIUM 100 MG/ML
40 INJECTION SUBCUTANEOUS EVERY 12 HOURS SCHEDULED
Status: DISCONTINUED | OUTPATIENT
Start: 2025-08-07 | End: 2025-08-13 | Stop reason: HOSPADM

## 2025-08-07 RX ORDER — POTASSIUM CHLORIDE 1500 MG/1
40 TABLET, EXTENDED RELEASE ORAL EVERY 4 HOURS
Status: COMPLETED | OUTPATIENT
Start: 2025-08-07 | End: 2025-08-07

## 2025-08-07 RX ORDER — CLOPIDOGREL BISULFATE 75 MG/1
75 TABLET ORAL DAILY
Status: DISCONTINUED | OUTPATIENT
Start: 2025-08-07 | End: 2025-08-13 | Stop reason: HOSPADM

## 2025-08-07 RX ORDER — AZITHROMYCIN 250 MG/1
500 TABLET, FILM COATED ORAL
Status: DISCONTINUED | OUTPATIENT
Start: 2025-08-07 | End: 2025-08-07

## 2025-08-07 RX ADMIN — CLOPIDOGREL BISULFATE 75 MG: 75 TABLET, FILM COATED ORAL at 08:51

## 2025-08-07 RX ADMIN — POTASSIUM CHLORIDE 40 MEQ: 1500 TABLET, EXTENDED RELEASE ORAL at 08:51

## 2025-08-07 RX ADMIN — Medication 10 ML: at 08:52

## 2025-08-07 RX ADMIN — ASPIRIN 81 MG: 81 TABLET, COATED ORAL at 08:52

## 2025-08-07 RX ADMIN — FUROSEMIDE 40 MG: 10 INJECTION, SOLUTION INTRAMUSCULAR; INTRAVENOUS at 08:51

## 2025-08-07 RX ADMIN — Medication 10 ML: at 23:51

## 2025-08-07 RX ADMIN — ENOXAPARIN SODIUM 40 MG: 100 INJECTION SUBCUTANEOUS at 23:51

## 2025-08-07 RX ADMIN — IPRATROPIUM BROMIDE 0.5 MG: 0.5 SOLUTION RESPIRATORY (INHALATION) at 18:23

## 2025-08-07 RX ADMIN — POTASSIUM CHLORIDE 40 MEQ: 1500 TABLET, EXTENDED RELEASE ORAL at 05:19

## 2025-08-07 RX ADMIN — DILTIAZEM HYDROCHLORIDE 240 MG: 240 CAPSULE, EXTENDED RELEASE ORAL at 08:51

## 2025-08-07 RX ADMIN — PREDNISONE 40 MG: 20 TABLET ORAL at 08:52

## 2025-08-07 RX ADMIN — BUDESONIDE 0.5 MG: 0.5 SUSPENSION RESPIRATORY (INHALATION) at 18:23

## 2025-08-07 RX ADMIN — ROSUVASTATIN CALCIUM 20 MG: 10 TABLET, FILM COATED ORAL at 08:51

## 2025-08-07 RX ADMIN — BUDESONIDE 0.5 MG: 0.5 SUSPENSION RESPIRATORY (INHALATION) at 07:25

## 2025-08-07 RX ADMIN — IPRATROPIUM BROMIDE 0.5 MG: 0.5 SOLUTION RESPIRATORY (INHALATION) at 15:30

## 2025-08-07 RX ADMIN — INSULIN GLARGINE-YFGN 12 UNITS: 100 INJECTION, SOLUTION SUBCUTANEOUS at 23:51

## 2025-08-07 RX ADMIN — INSULIN GLARGINE-YFGN 12 UNITS: 100 INJECTION, SOLUTION SUBCUTANEOUS at 04:08

## 2025-08-07 RX ADMIN — ENOXAPARIN SODIUM 40 MG: 100 INJECTION SUBCUTANEOUS at 08:52

## 2025-08-07 RX ADMIN — SODIUM CHLORIDE 2000 MG: 900 INJECTION INTRAVENOUS at 23:51

## 2025-08-08 ENCOUNTER — APPOINTMENT (OUTPATIENT)
Dept: CARDIOLOGY | Facility: HOSPITAL | Age: 70
DRG: 291 | End: 2025-08-08
Payer: MEDICARE

## 2025-08-08 LAB
ANION GAP SERPL CALCULATED.3IONS-SCNC: 11.8 MMOL/L (ref 5–15)
BH CV UPPER VENOUS LEFT INTERNAL JUGULAR AUGMENT: NORMAL
BH CV UPPER VENOUS LEFT INTERNAL JUGULAR COMPRESS: NORMAL
BH CV UPPER VENOUS LEFT INTERNAL JUGULAR PHASIC: NORMAL
BH CV UPPER VENOUS LEFT INTERNAL JUGULAR SPONT: NORMAL
BH CV UPPER VENOUS LEFT SUBCLAVIAN AUGMENT: NORMAL
BH CV UPPER VENOUS LEFT SUBCLAVIAN COMPRESS: NORMAL
BH CV UPPER VENOUS LEFT SUBCLAVIAN PHASIC: NORMAL
BH CV UPPER VENOUS LEFT SUBCLAVIAN SPONT: NORMAL
BH CV UPPER VENOUS RIGHT AXILLARY AUGMENT: NORMAL
BH CV UPPER VENOUS RIGHT AXILLARY COMPRESS: NORMAL
BH CV UPPER VENOUS RIGHT AXILLARY PHASIC: NORMAL
BH CV UPPER VENOUS RIGHT AXILLARY SPONT: NORMAL
BH CV UPPER VENOUS RIGHT BASILIC FOREARM COMPRESS: NORMAL
BH CV UPPER VENOUS RIGHT BASILIC UPPER COMPRESS: NORMAL
BH CV UPPER VENOUS RIGHT BRACHIAL COMPRESS: NORMAL
BH CV UPPER VENOUS RIGHT CEPHALIC FOREARM COLOR: 1
BH CV UPPER VENOUS RIGHT CEPHALIC FOREARM COMPRESS: NORMAL
BH CV UPPER VENOUS RIGHT CEPHALIC FOREARM THROMBUS: NORMAL
BH CV UPPER VENOUS RIGHT CEPHALIC UPPER COMPRESS: NORMAL
BH CV UPPER VENOUS RIGHT INTERNAL JUGULAR AUGMENT: NORMAL
BH CV UPPER VENOUS RIGHT INTERNAL JUGULAR COMPRESS: NORMAL
BH CV UPPER VENOUS RIGHT INTERNAL JUGULAR PHASIC: NORMAL
BH CV UPPER VENOUS RIGHT INTERNAL JUGULAR SPONT: NORMAL
BH CV UPPER VENOUS RIGHT RADIAL COMPRESS: NORMAL
BH CV UPPER VENOUS RIGHT SUBCLAVIAN AUGMENT: NORMAL
BH CV UPPER VENOUS RIGHT SUBCLAVIAN COMPRESS: NORMAL
BH CV UPPER VENOUS RIGHT SUBCLAVIAN PHASIC: NORMAL
BH CV UPPER VENOUS RIGHT SUBCLAVIAN SPONT: NORMAL
BH CV UPPER VENOUS RIGHT ULNAR COMPRESS: NORMAL
BUN SERPL-MCNC: 20.6 MG/DL (ref 8–23)
BUN/CREAT SERPL: 18.2 (ref 7–25)
CALCIUM SPEC-SCNC: 9.9 MG/DL (ref 8.6–10.5)
CHLORIDE SERPL-SCNC: 94 MMOL/L (ref 98–107)
CO2 SERPL-SCNC: 32.2 MMOL/L (ref 22–29)
CREAT SERPL-MCNC: 1.13 MG/DL (ref 0.76–1.27)
DEPRECATED RDW RBC AUTO: 61.1 FL (ref 37–54)
EGFRCR SERPLBLD CKD-EPI 2021: 70.8 ML/MIN/1.73
ERYTHROCYTE [DISTWIDTH] IN BLOOD BY AUTOMATED COUNT: 16.4 % (ref 12.3–15.4)
GLUCOSE BLDC GLUCOMTR-MCNC: 145 MG/DL (ref 70–105)
GLUCOSE BLDC GLUCOMTR-MCNC: 237 MG/DL (ref 70–105)
GLUCOSE BLDC GLUCOMTR-MCNC: 345 MG/DL (ref 70–105)
GLUCOSE BLDC GLUCOMTR-MCNC: 397 MG/DL (ref 70–105)
GLUCOSE SERPL-MCNC: 291 MG/DL (ref 65–99)
HCT VFR BLD AUTO: 40.4 % (ref 37.5–51)
HGB BLD-MCNC: 12.2 G/DL (ref 13–17.7)
MCH RBC QN AUTO: 30.4 PG (ref 26.6–33)
MCHC RBC AUTO-ENTMCNC: 30.2 G/DL (ref 31.5–35.7)
MCV RBC AUTO: 100.7 FL (ref 79–97)
PLATELET # BLD AUTO: 260 10*3/MM3 (ref 140–450)
PMV BLD AUTO: 9 FL (ref 6–12)
POTASSIUM SERPL-SCNC: 4.7 MMOL/L (ref 3.5–5.2)
RBC # BLD AUTO: 4.01 10*6/MM3 (ref 4.14–5.8)
SODIUM SERPL-SCNC: 138 MMOL/L (ref 136–145)
WBC NRBC COR # BLD AUTO: 11.82 10*3/MM3 (ref 3.4–10.8)

## 2025-08-08 PROCEDURE — 63710000001 INSULIN LISPRO (HUMAN) PER 5 UNITS: Performed by: STUDENT IN AN ORGANIZED HEALTH CARE EDUCATION/TRAINING PROGRAM

## 2025-08-08 PROCEDURE — 94664 DEMO&/EVAL PT USE INHALER: CPT

## 2025-08-08 PROCEDURE — 25010000002 CEFTRIAXONE PER 250 MG: Performed by: STUDENT IN AN ORGANIZED HEALTH CARE EDUCATION/TRAINING PROGRAM

## 2025-08-08 PROCEDURE — 94799 UNLISTED PULMONARY SVC/PX: CPT

## 2025-08-08 PROCEDURE — 82948 REAGENT STRIP/BLOOD GLUCOSE: CPT | Performed by: NURSE PRACTITIONER

## 2025-08-08 PROCEDURE — 63710000001 INSULIN GLARGINE PER 5 UNITS: Performed by: STUDENT IN AN ORGANIZED HEALTH CARE EDUCATION/TRAINING PROGRAM

## 2025-08-08 PROCEDURE — 93971 EXTREMITY STUDY: CPT | Performed by: SURGERY

## 2025-08-08 PROCEDURE — 82948 REAGENT STRIP/BLOOD GLUCOSE: CPT

## 2025-08-08 PROCEDURE — 94761 N-INVAS EAR/PLS OXIMETRY MLT: CPT

## 2025-08-08 PROCEDURE — 63710000001 PREDNISONE PER 1 MG: Performed by: STUDENT IN AN ORGANIZED HEALTH CARE EDUCATION/TRAINING PROGRAM

## 2025-08-08 PROCEDURE — 93971 EXTREMITY STUDY: CPT

## 2025-08-08 PROCEDURE — 25010000002 ENOXAPARIN PER 10 MG: Performed by: STUDENT IN AN ORGANIZED HEALTH CARE EDUCATION/TRAINING PROGRAM

## 2025-08-08 PROCEDURE — 25010000002 FUROSEMIDE PER 20 MG: Performed by: STUDENT IN AN ORGANIZED HEALTH CARE EDUCATION/TRAINING PROGRAM

## 2025-08-08 PROCEDURE — 94762 N-INVAS EAR/PLS OXIMTRY CONT: CPT

## 2025-08-08 PROCEDURE — 85027 COMPLETE CBC AUTOMATED: CPT | Performed by: STUDENT IN AN ORGANIZED HEALTH CARE EDUCATION/TRAINING PROGRAM

## 2025-08-08 PROCEDURE — 80048 BASIC METABOLIC PNL TOTAL CA: CPT | Performed by: STUDENT IN AN ORGANIZED HEALTH CARE EDUCATION/TRAINING PROGRAM

## 2025-08-08 RX ORDER — INSULIN LISPRO 100 [IU]/ML
2-9 INJECTION, SOLUTION INTRAVENOUS; SUBCUTANEOUS
Status: DISCONTINUED | OUTPATIENT
Start: 2025-08-08 | End: 2025-08-09

## 2025-08-08 RX ORDER — NICOTINE POLACRILEX 4 MG
15 LOZENGE BUCCAL
Status: DISCONTINUED | OUTPATIENT
Start: 2025-08-08 | End: 2025-08-10 | Stop reason: SDUPTHER

## 2025-08-08 RX ORDER — FUROSEMIDE 10 MG/ML
40 INJECTION INTRAMUSCULAR; INTRAVENOUS EVERY 12 HOURS
Status: DISCONTINUED | OUTPATIENT
Start: 2025-08-08 | End: 2025-08-13 | Stop reason: HOSPADM

## 2025-08-08 RX ORDER — ACETAMINOPHEN 325 MG/1
650 TABLET ORAL EVERY 6 HOURS PRN
Status: DISCONTINUED | OUTPATIENT
Start: 2025-08-08 | End: 2025-08-13 | Stop reason: HOSPADM

## 2025-08-08 RX ORDER — DEXTROSE MONOHYDRATE 25 G/50ML
25 INJECTION, SOLUTION INTRAVENOUS
Status: DISCONTINUED | OUTPATIENT
Start: 2025-08-08 | End: 2025-08-13 | Stop reason: HOSPADM

## 2025-08-08 RX ORDER — IBUPROFEN 600 MG/1
1 TABLET ORAL
Status: DISCONTINUED | OUTPATIENT
Start: 2025-08-08 | End: 2025-08-10

## 2025-08-08 RX ADMIN — BUDESONIDE 0.5 MG: 0.5 SUSPENSION RESPIRATORY (INHALATION) at 07:44

## 2025-08-08 RX ADMIN — BUDESONIDE 0.5 MG: 0.5 SUSPENSION RESPIRATORY (INHALATION) at 19:31

## 2025-08-08 RX ADMIN — PREDNISONE 40 MG: 20 TABLET ORAL at 09:30

## 2025-08-08 RX ADMIN — ASPIRIN 81 MG: 81 TABLET, COATED ORAL at 09:30

## 2025-08-08 RX ADMIN — IPRATROPIUM BROMIDE 0.5 MG: 0.5 SOLUTION RESPIRATORY (INHALATION) at 19:27

## 2025-08-08 RX ADMIN — INSULIN LISPRO 7 UNITS: 100 INJECTION, SOLUTION INTRAVENOUS; SUBCUTANEOUS at 20:56

## 2025-08-08 RX ADMIN — INSULIN GLARGINE-YFGN 12 UNITS: 100 INJECTION, SOLUTION SUBCUTANEOUS at 20:57

## 2025-08-08 RX ADMIN — INSULIN LISPRO 8 UNITS: 100 INJECTION, SOLUTION INTRAVENOUS; SUBCUTANEOUS at 16:54

## 2025-08-08 RX ADMIN — Medication 10 ML: at 21:11

## 2025-08-08 RX ADMIN — ENOXAPARIN SODIUM 40 MG: 100 INJECTION SUBCUTANEOUS at 20:56

## 2025-08-08 RX ADMIN — IPRATROPIUM BROMIDE 0.5 MG: 0.5 SOLUTION RESPIRATORY (INHALATION) at 07:49

## 2025-08-08 RX ADMIN — IPRATROPIUM BROMIDE 0.5 MG: 0.5 SOLUTION RESPIRATORY (INHALATION) at 16:00

## 2025-08-08 RX ADMIN — SODIUM CHLORIDE 2000 MG: 900 INJECTION INTRAVENOUS at 23:27

## 2025-08-08 RX ADMIN — ENOXAPARIN SODIUM 40 MG: 100 INJECTION SUBCUTANEOUS at 09:30

## 2025-08-08 RX ADMIN — ACETAMINOPHEN 650 MG: 325 TABLET ORAL at 16:54

## 2025-08-08 RX ADMIN — DILTIAZEM HYDROCHLORIDE 240 MG: 240 CAPSULE, EXTENDED RELEASE ORAL at 09:30

## 2025-08-08 RX ADMIN — CLOPIDOGREL BISULFATE 75 MG: 75 TABLET, FILM COATED ORAL at 09:29

## 2025-08-08 RX ADMIN — FUROSEMIDE 40 MG: 10 INJECTION, SOLUTION INTRAMUSCULAR; INTRAVENOUS at 12:17

## 2025-08-08 RX ADMIN — Medication 12.5 MG: at 21:07

## 2025-08-08 RX ADMIN — ROSUVASTATIN CALCIUM 20 MG: 10 TABLET, FILM COATED ORAL at 09:30

## 2025-08-08 RX ADMIN — Medication 12.5 MG: at 09:30

## 2025-08-08 RX ADMIN — Medication 10 ML: at 09:30

## 2025-08-09 LAB
ANION GAP SERPL CALCULATED.3IONS-SCNC: 13.4 MMOL/L (ref 5–15)
BUN SERPL-MCNC: 26.5 MG/DL (ref 8–23)
BUN/CREAT SERPL: 23 (ref 7–25)
CALCIUM SPEC-SCNC: 8.9 MG/DL (ref 8.6–10.5)
CHLORIDE SERPL-SCNC: 97 MMOL/L (ref 98–107)
CO2 SERPL-SCNC: 28.6 MMOL/L (ref 22–29)
CREAT SERPL-MCNC: 1.15 MG/DL (ref 0.76–1.27)
DEPRECATED RDW RBC AUTO: 59.9 FL (ref 37–54)
EGFRCR SERPLBLD CKD-EPI 2021: 69.3 ML/MIN/1.73
ERYTHROCYTE [DISTWIDTH] IN BLOOD BY AUTOMATED COUNT: 16.1 % (ref 12.3–15.4)
GLUCOSE BLDC GLUCOMTR-MCNC: 149 MG/DL (ref 70–105)
GLUCOSE BLDC GLUCOMTR-MCNC: 242 MG/DL (ref 70–105)
GLUCOSE BLDC GLUCOMTR-MCNC: 264 MG/DL (ref 70–105)
GLUCOSE BLDC GLUCOMTR-MCNC: 481 MG/DL (ref 70–105)
GLUCOSE BLDC GLUCOMTR-MCNC: 492 MG/DL (ref 70–105)
GLUCOSE SERPL-MCNC: 161 MG/DL (ref 65–99)
HCT VFR BLD AUTO: 37.6 % (ref 37.5–51)
HGB BLD-MCNC: 11.3 G/DL (ref 13–17.7)
Lab: ABNORMAL
MCH RBC QN AUTO: 30.1 PG (ref 26.6–33)
MCHC RBC AUTO-ENTMCNC: 30.1 G/DL (ref 31.5–35.7)
MCV RBC AUTO: 100 FL (ref 79–97)
PLATELET # BLD AUTO: 315 10*3/MM3 (ref 140–450)
PMV BLD AUTO: 8.8 FL (ref 6–12)
POTASSIUM SERPL-SCNC: 4.1 MMOL/L (ref 3.5–5.2)
RBC # BLD AUTO: 3.76 10*6/MM3 (ref 4.14–5.8)
SODIUM SERPL-SCNC: 139 MMOL/L (ref 136–145)
WBC NRBC COR # BLD AUTO: 12.41 10*3/MM3 (ref 3.4–10.8)

## 2025-08-09 PROCEDURE — 94664 DEMO&/EVAL PT USE INHALER: CPT

## 2025-08-09 PROCEDURE — 94799 UNLISTED PULMONARY SVC/PX: CPT

## 2025-08-09 PROCEDURE — 63710000001 INSULIN LISPRO (HUMAN) PER 5 UNITS: Performed by: STUDENT IN AN ORGANIZED HEALTH CARE EDUCATION/TRAINING PROGRAM

## 2025-08-09 PROCEDURE — 63710000001 PREDNISONE PER 1 MG: Performed by: STUDENT IN AN ORGANIZED HEALTH CARE EDUCATION/TRAINING PROGRAM

## 2025-08-09 PROCEDURE — 85027 COMPLETE CBC AUTOMATED: CPT | Performed by: STUDENT IN AN ORGANIZED HEALTH CARE EDUCATION/TRAINING PROGRAM

## 2025-08-09 PROCEDURE — 82948 REAGENT STRIP/BLOOD GLUCOSE: CPT

## 2025-08-09 PROCEDURE — 25010000002 ENOXAPARIN PER 10 MG: Performed by: STUDENT IN AN ORGANIZED HEALTH CARE EDUCATION/TRAINING PROGRAM

## 2025-08-09 PROCEDURE — 94761 N-INVAS EAR/PLS OXIMETRY MLT: CPT

## 2025-08-09 PROCEDURE — 80048 BASIC METABOLIC PNL TOTAL CA: CPT | Performed by: STUDENT IN AN ORGANIZED HEALTH CARE EDUCATION/TRAINING PROGRAM

## 2025-08-09 PROCEDURE — 82948 REAGENT STRIP/BLOOD GLUCOSE: CPT | Performed by: NURSE PRACTITIONER

## 2025-08-09 PROCEDURE — 25010000002 FUROSEMIDE PER 20 MG: Performed by: STUDENT IN AN ORGANIZED HEALTH CARE EDUCATION/TRAINING PROGRAM

## 2025-08-09 PROCEDURE — 25010000002 CEFTRIAXONE PER 250 MG: Performed by: STUDENT IN AN ORGANIZED HEALTH CARE EDUCATION/TRAINING PROGRAM

## 2025-08-09 RX ORDER — INSULIN LISPRO 100 [IU]/ML
10 INJECTION, SOLUTION INTRAVENOUS; SUBCUTANEOUS ONCE
Status: COMPLETED | OUTPATIENT
Start: 2025-08-09 | End: 2025-08-09

## 2025-08-09 RX ORDER — IBUPROFEN 600 MG/1
1 TABLET ORAL
Status: DISCONTINUED | OUTPATIENT
Start: 2025-08-09 | End: 2025-08-09

## 2025-08-09 RX ORDER — DEXTROSE MONOHYDRATE 25 G/50ML
25 INJECTION, SOLUTION INTRAVENOUS
Status: DISCONTINUED | OUTPATIENT
Start: 2025-08-09 | End: 2025-08-09

## 2025-08-09 RX ORDER — INSULIN LISPRO 100 [IU]/ML
2-9 INJECTION, SOLUTION INTRAVENOUS; SUBCUTANEOUS
Status: DISCONTINUED | OUTPATIENT
Start: 2025-08-09 | End: 2025-08-10

## 2025-08-09 RX ORDER — METOLAZONE 2.5 MG/1
2.5 TABLET ORAL EVERY OTHER DAY
Status: DISCONTINUED | OUTPATIENT
Start: 2025-08-09 | End: 2025-08-13 | Stop reason: HOSPADM

## 2025-08-09 RX ORDER — NICOTINE POLACRILEX 4 MG
15 LOZENGE BUCCAL
Status: DISCONTINUED | OUTPATIENT
Start: 2025-08-09 | End: 2025-08-09

## 2025-08-09 RX ADMIN — IPRATROPIUM BROMIDE 0.5 MG: 0.5 SOLUTION RESPIRATORY (INHALATION) at 14:31

## 2025-08-09 RX ADMIN — IPRATROPIUM BROMIDE 0.5 MG: 0.5 SOLUTION RESPIRATORY (INHALATION) at 10:04

## 2025-08-09 RX ADMIN — ENOXAPARIN SODIUM 40 MG: 100 INJECTION SUBCUTANEOUS at 22:06

## 2025-08-09 RX ADMIN — ASPIRIN 81 MG: 81 TABLET, COATED ORAL at 08:21

## 2025-08-09 RX ADMIN — CLOPIDOGREL BISULFATE 75 MG: 75 TABLET, FILM COATED ORAL at 08:22

## 2025-08-09 RX ADMIN — SODIUM CHLORIDE 2000 MG: 900 INJECTION INTRAVENOUS at 23:27

## 2025-08-09 RX ADMIN — Medication 12.5 MG: at 08:22

## 2025-08-09 RX ADMIN — Medication 10 ML: at 22:06

## 2025-08-09 RX ADMIN — PREDNISONE 40 MG: 20 TABLET ORAL at 08:21

## 2025-08-09 RX ADMIN — INSULIN LISPRO 9 UNITS: 100 INJECTION, SOLUTION INTRAVENOUS; SUBCUTANEOUS at 18:20

## 2025-08-09 RX ADMIN — DILTIAZEM HYDROCHLORIDE 240 MG: 240 CAPSULE, EXTENDED RELEASE ORAL at 08:21

## 2025-08-09 RX ADMIN — FUROSEMIDE 40 MG: 10 INJECTION, SOLUTION INTRAMUSCULAR; INTRAVENOUS at 00:38

## 2025-08-09 RX ADMIN — IPRATROPIUM BROMIDE 0.5 MG: 0.5 SOLUTION RESPIRATORY (INHALATION) at 06:36

## 2025-08-09 RX ADMIN — INSULIN LISPRO 9 UNITS: 100 INJECTION, SOLUTION INTRAVENOUS; SUBCUTANEOUS at 17:08

## 2025-08-09 RX ADMIN — ENOXAPARIN SODIUM 40 MG: 100 INJECTION SUBCUTANEOUS at 08:21

## 2025-08-09 RX ADMIN — BUDESONIDE 0.5 MG: 0.5 SUSPENSION RESPIRATORY (INHALATION) at 06:40

## 2025-08-09 RX ADMIN — INSULIN LISPRO 10 UNITS: 100 INJECTION, SOLUTION INTRAVENOUS; SUBCUTANEOUS at 18:20

## 2025-08-09 RX ADMIN — INSULIN LISPRO 6 UNITS: 100 INJECTION, SOLUTION INTRAVENOUS; SUBCUTANEOUS at 11:44

## 2025-08-09 RX ADMIN — IPRATROPIUM BROMIDE 0.5 MG: 0.5 SOLUTION RESPIRATORY (INHALATION) at 19:15

## 2025-08-09 RX ADMIN — BUDESONIDE 0.5 MG: 0.5 SUSPENSION RESPIRATORY (INHALATION) at 19:20

## 2025-08-09 RX ADMIN — FUROSEMIDE 40 MG: 10 INJECTION, SOLUTION INTRAMUSCULAR; INTRAVENOUS at 11:45

## 2025-08-09 RX ADMIN — ROSUVASTATIN CALCIUM 20 MG: 10 TABLET, FILM COATED ORAL at 08:21

## 2025-08-09 RX ADMIN — INSULIN GLARGINE-YFGN 12 UNITS: 100 INJECTION, SOLUTION SUBCUTANEOUS at 22:06

## 2025-08-09 RX ADMIN — Medication 10 ML: at 08:22

## 2025-08-09 RX ADMIN — INSULIN LISPRO 4 UNITS: 100 INJECTION, SOLUTION INTRAVENOUS; SUBCUTANEOUS at 22:06

## 2025-08-09 RX ADMIN — METOLAZONE 2.5 MG: 2.5 TABLET ORAL at 11:44

## 2025-08-09 RX ADMIN — Medication 12.5 MG: at 22:07

## 2025-08-10 LAB
ANION GAP SERPL CALCULATED.3IONS-SCNC: 10.2 MMOL/L (ref 5–15)
BUN SERPL-MCNC: 25.5 MG/DL (ref 8–23)
BUN/CREAT SERPL: 24.1 (ref 7–25)
CALCIUM SPEC-SCNC: 9.6 MG/DL (ref 8.6–10.5)
CHLORIDE SERPL-SCNC: 94 MMOL/L (ref 98–107)
CO2 SERPL-SCNC: 35.8 MMOL/L (ref 22–29)
CREAT SERPL-MCNC: 1.06 MG/DL (ref 0.76–1.27)
DEPRECATED RDW RBC AUTO: 58.1 FL (ref 37–54)
EGFRCR SERPLBLD CKD-EPI 2021: 76.4 ML/MIN/1.73
ERYTHROCYTE [DISTWIDTH] IN BLOOD BY AUTOMATED COUNT: 15.9 % (ref 12.3–15.4)
GLUCOSE BLDC GLUCOMTR-MCNC: 178 MG/DL (ref 70–105)
GLUCOSE BLDC GLUCOMTR-MCNC: 407 MG/DL (ref 70–105)
GLUCOSE BLDC GLUCOMTR-MCNC: 505 MG/DL (ref 70–105)
GLUCOSE BLDC GLUCOMTR-MCNC: 542 MG/DL (ref 70–105)
GLUCOSE SERPL-MCNC: 134 MG/DL (ref 65–99)
HCT VFR BLD AUTO: 40.7 % (ref 37.5–51)
HGB BLD-MCNC: 12.3 G/DL (ref 13–17.7)
Lab: ABNORMAL
MCH RBC QN AUTO: 30.1 PG (ref 26.6–33)
MCHC RBC AUTO-ENTMCNC: 30.2 G/DL (ref 31.5–35.7)
MCV RBC AUTO: 99.5 FL (ref 79–97)
PLATELET # BLD AUTO: 347 10*3/MM3 (ref 140–450)
PMV BLD AUTO: 8.6 FL (ref 6–12)
POTASSIUM SERPL-SCNC: 3.7 MMOL/L (ref 3.5–5.2)
RBC # BLD AUTO: 4.09 10*6/MM3 (ref 4.14–5.8)
SODIUM SERPL-SCNC: 140 MMOL/L (ref 136–145)
WBC NRBC COR # BLD AUTO: 12.39 10*3/MM3 (ref 3.4–10.8)

## 2025-08-10 PROCEDURE — 94799 UNLISTED PULMONARY SVC/PX: CPT

## 2025-08-10 PROCEDURE — 82948 REAGENT STRIP/BLOOD GLUCOSE: CPT

## 2025-08-10 PROCEDURE — 82948 REAGENT STRIP/BLOOD GLUCOSE: CPT | Performed by: STUDENT IN AN ORGANIZED HEALTH CARE EDUCATION/TRAINING PROGRAM

## 2025-08-10 PROCEDURE — 63710000001 INSULIN LISPRO (HUMAN) PER 5 UNITS: Performed by: STUDENT IN AN ORGANIZED HEALTH CARE EDUCATION/TRAINING PROGRAM

## 2025-08-10 PROCEDURE — 63710000001 PREDNISONE PER 1 MG: Performed by: STUDENT IN AN ORGANIZED HEALTH CARE EDUCATION/TRAINING PROGRAM

## 2025-08-10 PROCEDURE — 85027 COMPLETE CBC AUTOMATED: CPT | Performed by: STUDENT IN AN ORGANIZED HEALTH CARE EDUCATION/TRAINING PROGRAM

## 2025-08-10 PROCEDURE — 25010000002 FUROSEMIDE PER 20 MG: Performed by: STUDENT IN AN ORGANIZED HEALTH CARE EDUCATION/TRAINING PROGRAM

## 2025-08-10 PROCEDURE — 25010000002 ENOXAPARIN PER 10 MG: Performed by: STUDENT IN AN ORGANIZED HEALTH CARE EDUCATION/TRAINING PROGRAM

## 2025-08-10 PROCEDURE — 80048 BASIC METABOLIC PNL TOTAL CA: CPT | Performed by: STUDENT IN AN ORGANIZED HEALTH CARE EDUCATION/TRAINING PROGRAM

## 2025-08-10 PROCEDURE — 94761 N-INVAS EAR/PLS OXIMETRY MLT: CPT

## 2025-08-10 PROCEDURE — 94664 DEMO&/EVAL PT USE INHALER: CPT

## 2025-08-10 RX ORDER — DEXTROSE MONOHYDRATE, SODIUM CHLORIDE, AND POTASSIUM CHLORIDE 50; 1.49; 9 G/1000ML; G/1000ML; G/1000ML
125 INJECTION, SOLUTION INTRAVENOUS CONTINUOUS PRN
Status: DISCONTINUED | OUTPATIENT
Start: 2025-08-10 | End: 2025-08-10

## 2025-08-10 RX ORDER — SODIUM CHLORIDE AND POTASSIUM CHLORIDE 150; 900 MG/100ML; MG/100ML
200 INJECTION, SOLUTION INTRAVENOUS CONTINUOUS PRN
Status: DISCONTINUED | OUTPATIENT
Start: 2025-08-10 | End: 2025-08-10

## 2025-08-10 RX ORDER — INSULIN LISPRO 100 [IU]/ML
1-200 INJECTION, SOLUTION INTRAVENOUS; SUBCUTANEOUS AS NEEDED
Status: DISCONTINUED | OUTPATIENT
Start: 2025-08-10 | End: 2025-08-13 | Stop reason: HOSPADM

## 2025-08-10 RX ORDER — SODIUM CHLORIDE 0.9 % (FLUSH) 0.9 %
10 SYRINGE (ML) INJECTION AS NEEDED
Status: DISCONTINUED | OUTPATIENT
Start: 2025-08-10 | End: 2025-08-10

## 2025-08-10 RX ORDER — PREDNISONE 20 MG/1
20 TABLET ORAL
Status: DISCONTINUED | OUTPATIENT
Start: 2025-08-11 | End: 2025-08-12

## 2025-08-10 RX ORDER — NICOTINE POLACRILEX 4 MG
15 LOZENGE BUCCAL
Status: DISCONTINUED | OUTPATIENT
Start: 2025-08-10 | End: 2025-08-10

## 2025-08-10 RX ORDER — DEXTROSE MONOHYDRATE AND SODIUM CHLORIDE 5; .45 G/100ML; G/100ML
125 INJECTION, SOLUTION INTRAVENOUS CONTINUOUS PRN
Status: DISCONTINUED | OUTPATIENT
Start: 2025-08-10 | End: 2025-08-10

## 2025-08-10 RX ORDER — DEXTROSE MONOHYDRATE 25 G/50ML
10-50 INJECTION, SOLUTION INTRAVENOUS
Status: DISCONTINUED | OUTPATIENT
Start: 2025-08-10 | End: 2025-08-13 | Stop reason: HOSPADM

## 2025-08-10 RX ORDER — SODIUM CHLORIDE 450 MG/100ML
200 INJECTION, SOLUTION INTRAVENOUS CONTINUOUS PRN
Status: DISCONTINUED | OUTPATIENT
Start: 2025-08-10 | End: 2025-08-10

## 2025-08-10 RX ORDER — SODIUM CHLORIDE 9 MG/ML
40 INJECTION, SOLUTION INTRAVENOUS AS NEEDED
Status: DISCONTINUED | OUTPATIENT
Start: 2025-08-10 | End: 2025-08-10

## 2025-08-10 RX ORDER — DEXTROSE MONOHYDRATE 25 G/50ML
10-50 INJECTION, SOLUTION INTRAVENOUS
Status: DISCONTINUED | OUTPATIENT
Start: 2025-08-10 | End: 2025-08-10

## 2025-08-10 RX ORDER — SODIUM CHLORIDE 9 MG/ML
200 INJECTION, SOLUTION INTRAVENOUS CONTINUOUS PRN
Status: DISCONTINUED | OUTPATIENT
Start: 2025-08-10 | End: 2025-08-10

## 2025-08-10 RX ORDER — IBUPROFEN 600 MG/1
1 TABLET ORAL
Status: DISCONTINUED | OUTPATIENT
Start: 2025-08-10 | End: 2025-08-13 | Stop reason: HOSPADM

## 2025-08-10 RX ORDER — INSULIN LISPRO 100 [IU]/ML
1-200 INJECTION, SOLUTION INTRAVENOUS; SUBCUTANEOUS
Status: DISCONTINUED | OUTPATIENT
Start: 2025-08-10 | End: 2025-08-13 | Stop reason: HOSPADM

## 2025-08-10 RX ORDER — SODIUM CHLORIDE 0.9 % (FLUSH) 0.9 %
10 SYRINGE (ML) INJECTION EVERY 12 HOURS SCHEDULED
Status: DISCONTINUED | OUTPATIENT
Start: 2025-08-10 | End: 2025-08-10

## 2025-08-10 RX ORDER — NICOTINE POLACRILEX 4 MG
15 LOZENGE BUCCAL
Status: DISCONTINUED | OUTPATIENT
Start: 2025-08-10 | End: 2025-08-13 | Stop reason: HOSPADM

## 2025-08-10 RX ORDER — DEXTROSE MONOHYDRATE, SODIUM CHLORIDE, AND POTASSIUM CHLORIDE 50; 2.98; 9 G/1000ML; G/1000ML; G/1000ML
125 INJECTION, SOLUTION INTRAVENOUS CONTINUOUS PRN
Status: DISCONTINUED | OUTPATIENT
Start: 2025-08-10 | End: 2025-08-10

## 2025-08-10 RX ORDER — DEXTROSE MONOHYDRATE AND SODIUM CHLORIDE 5; .9 G/100ML; G/100ML
125 INJECTION, SOLUTION INTRAVENOUS CONTINUOUS PRN
Status: DISCONTINUED | OUTPATIENT
Start: 2025-08-10 | End: 2025-08-10

## 2025-08-10 RX ORDER — DEXTROSE MONOHYDRATE, SODIUM CHLORIDE, AND POTASSIUM CHLORIDE 50; 2.98; 4.5 G/1000ML; G/1000ML; G/1000ML
125 INJECTION, SOLUTION INTRAVENOUS CONTINUOUS PRN
Status: DISCONTINUED | OUTPATIENT
Start: 2025-08-10 | End: 2025-08-10

## 2025-08-10 RX ORDER — SODIUM CHLORIDE AND POTASSIUM CHLORIDE 300; 900 MG/100ML; MG/100ML
200 INJECTION, SOLUTION INTRAVENOUS CONTINUOUS PRN
Status: DISCONTINUED | OUTPATIENT
Start: 2025-08-10 | End: 2025-08-10

## 2025-08-10 RX ORDER — DEXTROSE MONOHYDRATE, SODIUM CHLORIDE, AND POTASSIUM CHLORIDE 50; 1.49; 4.5 G/1000ML; G/1000ML; G/1000ML
125 INJECTION, SOLUTION INTRAVENOUS CONTINUOUS PRN
Status: DISCONTINUED | OUTPATIENT
Start: 2025-08-10 | End: 2025-08-10

## 2025-08-10 RX ORDER — SODIUM CHLORIDE AND POTASSIUM CHLORIDE 150; 450 MG/100ML; MG/100ML
200 INJECTION, SOLUTION INTRAVENOUS CONTINUOUS PRN
Status: DISCONTINUED | OUTPATIENT
Start: 2025-08-10 | End: 2025-08-10

## 2025-08-10 RX ORDER — IBUPROFEN 600 MG/1
1 TABLET ORAL
Status: DISCONTINUED | OUTPATIENT
Start: 2025-08-10 | End: 2025-08-10

## 2025-08-10 RX ADMIN — ASPIRIN 81 MG: 81 TABLET, COATED ORAL at 08:19

## 2025-08-10 RX ADMIN — DILTIAZEM HYDROCHLORIDE 240 MG: 240 CAPSULE, EXTENDED RELEASE ORAL at 08:19

## 2025-08-10 RX ADMIN — BUDESONIDE 0.5 MG: 0.5 SUSPENSION RESPIRATORY (INHALATION) at 09:10

## 2025-08-10 RX ADMIN — Medication 12.5 MG: at 08:19

## 2025-08-10 RX ADMIN — ENOXAPARIN SODIUM 40 MG: 100 INJECTION SUBCUTANEOUS at 20:38

## 2025-08-10 RX ADMIN — INSULIN LISPRO 2 UNITS: 100 INJECTION, SOLUTION INTRAVENOUS; SUBCUTANEOUS at 11:45

## 2025-08-10 RX ADMIN — CLOPIDOGREL BISULFATE 75 MG: 75 TABLET, FILM COATED ORAL at 08:19

## 2025-08-10 RX ADMIN — IPRATROPIUM BROMIDE 0.5 MG: 0.5 SOLUTION RESPIRATORY (INHALATION) at 15:34

## 2025-08-10 RX ADMIN — INSULIN LISPRO 9 UNITS: 100 INJECTION, SOLUTION INTRAVENOUS; SUBCUTANEOUS at 08:19

## 2025-08-10 RX ADMIN — FUROSEMIDE 40 MG: 10 INJECTION, SOLUTION INTRAMUSCULAR; INTRAVENOUS at 00:10

## 2025-08-10 RX ADMIN — INSULIN GLARGINE-YFGN 12 UNITS: 100 INJECTION, SOLUTION SUBCUTANEOUS at 20:39

## 2025-08-10 RX ADMIN — IPRATROPIUM BROMIDE 0.5 MG: 0.5 SOLUTION RESPIRATORY (INHALATION) at 09:10

## 2025-08-10 RX ADMIN — BUDESONIDE 0.5 MG: 0.5 SUSPENSION RESPIRATORY (INHALATION) at 19:51

## 2025-08-10 RX ADMIN — Medication 10 ML: at 20:41

## 2025-08-10 RX ADMIN — FUROSEMIDE 40 MG: 10 INJECTION, SOLUTION INTRAMUSCULAR; INTRAVENOUS at 11:45

## 2025-08-10 RX ADMIN — IPRATROPIUM BROMIDE 0.5 MG: 0.5 SOLUTION RESPIRATORY (INHALATION) at 19:47

## 2025-08-10 RX ADMIN — PREDNISONE 40 MG: 20 TABLET ORAL at 08:19

## 2025-08-10 RX ADMIN — Medication 10 ML: at 08:42

## 2025-08-10 RX ADMIN — INSULIN LISPRO 9 UNITS: 100 INJECTION, SOLUTION INTRAVENOUS; SUBCUTANEOUS at 17:59

## 2025-08-10 RX ADMIN — INSULIN LISPRO 9 UNITS: 100 INJECTION, SOLUTION INTRAVENOUS; SUBCUTANEOUS at 20:39

## 2025-08-10 RX ADMIN — ENOXAPARIN SODIUM 40 MG: 100 INJECTION SUBCUTANEOUS at 08:19

## 2025-08-10 RX ADMIN — INSULIN GLARGINE-YFGN 10 UNITS: 100 INJECTION, SOLUTION SUBCUTANEOUS at 08:49

## 2025-08-10 RX ADMIN — ROSUVASTATIN CALCIUM 20 MG: 10 TABLET, FILM COATED ORAL at 08:19

## 2025-08-11 LAB
ANION GAP SERPL CALCULATED.3IONS-SCNC: 11.6 MMOL/L (ref 5–15)
BUN SERPL-MCNC: 29.2 MG/DL (ref 8–23)
BUN/CREAT SERPL: 25.4 (ref 7–25)
CALCIUM SPEC-SCNC: 9 MG/DL (ref 8.6–10.5)
CHLORIDE SERPL-SCNC: 93 MMOL/L (ref 98–107)
CO2 SERPL-SCNC: 35.4 MMOL/L (ref 22–29)
CREAT SERPL-MCNC: 1.15 MG/DL (ref 0.76–1.27)
DEPRECATED RDW RBC AUTO: 56.4 FL (ref 37–54)
EGFRCR SERPLBLD CKD-EPI 2021: 69.3 ML/MIN/1.73
ERYTHROCYTE [DISTWIDTH] IN BLOOD BY AUTOMATED COUNT: 15.7 % (ref 12.3–15.4)
GLUCOSE BLDC GLUCOMTR-MCNC: 207 MG/DL (ref 70–105)
GLUCOSE BLDC GLUCOMTR-MCNC: 228 MG/DL (ref 70–105)
GLUCOSE BLDC GLUCOMTR-MCNC: 314 MG/DL (ref 70–105)
GLUCOSE BLDC GLUCOMTR-MCNC: 355 MG/DL (ref 70–105)
GLUCOSE BLDC GLUCOMTR-MCNC: 417 MG/DL (ref 70–105)
GLUCOSE BLDC GLUCOMTR-MCNC: 508 MG/DL (ref 70–105)
GLUCOSE SERPL-MCNC: 187 MG/DL (ref 65–99)
HCT VFR BLD AUTO: 40.3 % (ref 37.5–51)
HGB BLD-MCNC: 12.6 G/DL (ref 13–17.7)
Lab: ABNORMAL
Lab: ABNORMAL
MCH RBC QN AUTO: 30.4 PG (ref 26.6–33)
MCHC RBC AUTO-ENTMCNC: 31.3 G/DL (ref 31.5–35.7)
MCV RBC AUTO: 97.1 FL (ref 79–97)
PLATELET # BLD AUTO: 333 10*3/MM3 (ref 140–450)
PMV BLD AUTO: 8.8 FL (ref 6–12)
POTASSIUM SERPL-SCNC: 4 MMOL/L (ref 3.5–5.2)
RBC # BLD AUTO: 4.15 10*6/MM3 (ref 4.14–5.8)
SODIUM SERPL-SCNC: 140 MMOL/L (ref 136–145)
WBC NRBC COR # BLD AUTO: 11.77 10*3/MM3 (ref 3.4–10.8)

## 2025-08-11 PROCEDURE — 85027 COMPLETE CBC AUTOMATED: CPT | Performed by: STUDENT IN AN ORGANIZED HEALTH CARE EDUCATION/TRAINING PROGRAM

## 2025-08-11 PROCEDURE — 82948 REAGENT STRIP/BLOOD GLUCOSE: CPT | Performed by: STUDENT IN AN ORGANIZED HEALTH CARE EDUCATION/TRAINING PROGRAM

## 2025-08-11 PROCEDURE — 94761 N-INVAS EAR/PLS OXIMETRY MLT: CPT

## 2025-08-11 PROCEDURE — 82948 REAGENT STRIP/BLOOD GLUCOSE: CPT

## 2025-08-11 PROCEDURE — 94799 UNLISTED PULMONARY SVC/PX: CPT

## 2025-08-11 PROCEDURE — 80048 BASIC METABOLIC PNL TOTAL CA: CPT | Performed by: STUDENT IN AN ORGANIZED HEALTH CARE EDUCATION/TRAINING PROGRAM

## 2025-08-11 PROCEDURE — 63710000001 INSULIN LISPRO (HUMAN) PER 5 UNITS: Performed by: STUDENT IN AN ORGANIZED HEALTH CARE EDUCATION/TRAINING PROGRAM

## 2025-08-11 PROCEDURE — 94664 DEMO&/EVAL PT USE INHALER: CPT

## 2025-08-11 PROCEDURE — 25010000002 ENOXAPARIN PER 10 MG: Performed by: STUDENT IN AN ORGANIZED HEALTH CARE EDUCATION/TRAINING PROGRAM

## 2025-08-11 PROCEDURE — 25010000002 FUROSEMIDE PER 20 MG: Performed by: STUDENT IN AN ORGANIZED HEALTH CARE EDUCATION/TRAINING PROGRAM

## 2025-08-11 PROCEDURE — 25010000002 CEFTRIAXONE PER 250 MG: Performed by: STUDENT IN AN ORGANIZED HEALTH CARE EDUCATION/TRAINING PROGRAM

## 2025-08-11 PROCEDURE — 63710000001 PREDNISONE PER 1 MG: Performed by: INTERNAL MEDICINE

## 2025-08-11 RX ADMIN — SODIUM CHLORIDE 2000 MG: 900 INJECTION INTRAVENOUS at 00:00

## 2025-08-11 RX ADMIN — PREDNISONE 20 MG: 20 TABLET ORAL at 09:02

## 2025-08-11 RX ADMIN — INSULIN LISPRO 10 UNITS: 100 INJECTION, SOLUTION INTRAVENOUS; SUBCUTANEOUS at 20:57

## 2025-08-11 RX ADMIN — ENOXAPARIN SODIUM 40 MG: 100 INJECTION SUBCUTANEOUS at 09:03

## 2025-08-11 RX ADMIN — BUDESONIDE 0.5 MG: 0.5 SUSPENSION RESPIRATORY (INHALATION) at 19:29

## 2025-08-11 RX ADMIN — INSULIN LISPRO 19 UNITS: 100 INJECTION, SOLUTION INTRAVENOUS; SUBCUTANEOUS at 17:26

## 2025-08-11 RX ADMIN — IPRATROPIUM BROMIDE 0.5 MG: 0.5 SOLUTION RESPIRATORY (INHALATION) at 15:33

## 2025-08-11 RX ADMIN — FUROSEMIDE 40 MG: 10 INJECTION, SOLUTION INTRAMUSCULAR; INTRAVENOUS at 00:00

## 2025-08-11 RX ADMIN — INSULIN LISPRO 11 UNITS: 100 INJECTION, SOLUTION INTRAVENOUS; SUBCUTANEOUS at 12:13

## 2025-08-11 RX ADMIN — IPRATROPIUM BROMIDE 0.5 MG: 0.5 SOLUTION RESPIRATORY (INHALATION) at 19:22

## 2025-08-11 RX ADMIN — FUROSEMIDE 40 MG: 10 INJECTION, SOLUTION INTRAMUSCULAR; INTRAVENOUS at 12:14

## 2025-08-11 RX ADMIN — Medication 10 ML: at 20:58

## 2025-08-11 RX ADMIN — Medication 12.5 MG: at 09:02

## 2025-08-11 RX ADMIN — Medication 12.5 MG: at 20:57

## 2025-08-11 RX ADMIN — ROSUVASTATIN CALCIUM 20 MG: 10 TABLET, FILM COATED ORAL at 09:06

## 2025-08-11 RX ADMIN — ASPIRIN 81 MG: 81 TABLET, COATED ORAL at 09:02

## 2025-08-11 RX ADMIN — DILTIAZEM HYDROCHLORIDE 240 MG: 240 CAPSULE, EXTENDED RELEASE ORAL at 09:02

## 2025-08-11 RX ADMIN — BUDESONIDE 0.5 MG: 0.5 SUSPENSION RESPIRATORY (INHALATION) at 07:45

## 2025-08-11 RX ADMIN — INSULIN LISPRO 13 UNITS: 100 INJECTION, SOLUTION INTRAVENOUS; SUBCUTANEOUS at 09:03

## 2025-08-11 RX ADMIN — IPRATROPIUM BROMIDE 0.5 MG: 0.5 SOLUTION RESPIRATORY (INHALATION) at 07:45

## 2025-08-11 RX ADMIN — Medication 10 ML: at 09:04

## 2025-08-11 RX ADMIN — IPRATROPIUM BROMIDE 0.5 MG: 0.5 SOLUTION RESPIRATORY (INHALATION) at 11:48

## 2025-08-11 RX ADMIN — METOLAZONE 2.5 MG: 2.5 TABLET ORAL at 09:02

## 2025-08-11 RX ADMIN — CLOPIDOGREL BISULFATE 75 MG: 75 TABLET, FILM COATED ORAL at 09:02

## 2025-08-11 RX ADMIN — ENOXAPARIN SODIUM 40 MG: 100 INJECTION SUBCUTANEOUS at 20:58

## 2025-08-11 RX ADMIN — INSULIN GLARGINE-YFGN 32 UNITS: 100 INJECTION, SOLUTION SUBCUTANEOUS at 20:57

## 2025-08-12 LAB
ALBUMIN SERPL-MCNC: 3 G/DL (ref 3.5–5.2)
ALBUMIN/GLOB SERPL: 1.4 G/DL
ALP SERPL-CCNC: 46 U/L (ref 39–117)
ALT SERPL W P-5'-P-CCNC: 34 U/L (ref 1–41)
ANION GAP SERPL CALCULATED.3IONS-SCNC: 10.1 MMOL/L (ref 5–15)
AST SERPL-CCNC: 22 U/L (ref 1–40)
BILIRUB SERPL-MCNC: 0.2 MG/DL (ref 0–1.2)
BUN SERPL-MCNC: 25.2 MG/DL (ref 8–23)
BUN/CREAT SERPL: 34.1 (ref 7–25)
CALCIUM SPEC-SCNC: 6.9 MG/DL (ref 8.6–10.5)
CHLORIDE SERPL-SCNC: 103 MMOL/L (ref 98–107)
CO2 SERPL-SCNC: 28.9 MMOL/L (ref 22–29)
CREAT SERPL-MCNC: 0.74 MG/DL (ref 0.76–1.27)
DEPRECATED RDW RBC AUTO: 56.2 FL (ref 37–54)
EGFRCR SERPLBLD CKD-EPI 2021: 98.7 ML/MIN/1.73
ERYTHROCYTE [DISTWIDTH] IN BLOOD BY AUTOMATED COUNT: 15.7 % (ref 12.3–15.4)
GLOBULIN UR ELPH-MCNC: 2.2 GM/DL
GLUCOSE BLDC GLUCOMTR-MCNC: 216 MG/DL (ref 70–105)
GLUCOSE BLDC GLUCOMTR-MCNC: 281 MG/DL (ref 70–105)
GLUCOSE BLDC GLUCOMTR-MCNC: 322 MG/DL (ref 70–105)
GLUCOSE BLDC GLUCOMTR-MCNC: 333 MG/DL (ref 70–105)
GLUCOSE SERPL-MCNC: 141 MG/DL (ref 65–99)
HCT VFR BLD AUTO: 36.9 % (ref 37.5–51)
HGB BLD-MCNC: 11.5 G/DL (ref 13–17.7)
MCH RBC QN AUTO: 30.2 PG (ref 26.6–33)
MCHC RBC AUTO-ENTMCNC: 31.2 G/DL (ref 31.5–35.7)
MCV RBC AUTO: 96.9 FL (ref 79–97)
PLATELET # BLD AUTO: 313 10*3/MM3 (ref 140–450)
PMV BLD AUTO: 8.8 FL (ref 6–12)
POTASSIUM SERPL-SCNC: 3 MMOL/L (ref 3.5–5.2)
PROT SERPL-MCNC: 5.2 G/DL (ref 6–8.5)
RBC # BLD AUTO: 3.81 10*6/MM3 (ref 4.14–5.8)
SODIUM SERPL-SCNC: 142 MMOL/L (ref 136–145)
WBC NRBC COR # BLD AUTO: 12.34 10*3/MM3 (ref 3.4–10.8)

## 2025-08-12 PROCEDURE — 94799 UNLISTED PULMONARY SVC/PX: CPT

## 2025-08-12 PROCEDURE — 82948 REAGENT STRIP/BLOOD GLUCOSE: CPT

## 2025-08-12 PROCEDURE — 94664 DEMO&/EVAL PT USE INHALER: CPT

## 2025-08-12 PROCEDURE — 80053 COMPREHEN METABOLIC PANEL: CPT | Performed by: INTERNAL MEDICINE

## 2025-08-12 PROCEDURE — 85027 COMPLETE CBC AUTOMATED: CPT | Performed by: STUDENT IN AN ORGANIZED HEALTH CARE EDUCATION/TRAINING PROGRAM

## 2025-08-12 PROCEDURE — 97530 THERAPEUTIC ACTIVITIES: CPT

## 2025-08-12 PROCEDURE — 25010000002 FUROSEMIDE PER 20 MG: Performed by: STUDENT IN AN ORGANIZED HEALTH CARE EDUCATION/TRAINING PROGRAM

## 2025-08-12 PROCEDURE — 25010000002 ENOXAPARIN PER 10 MG: Performed by: STUDENT IN AN ORGANIZED HEALTH CARE EDUCATION/TRAINING PROGRAM

## 2025-08-12 PROCEDURE — 97116 GAIT TRAINING THERAPY: CPT

## 2025-08-12 PROCEDURE — 97535 SELF CARE MNGMENT TRAINING: CPT | Performed by: OCCUPATIONAL THERAPIST

## 2025-08-12 PROCEDURE — 63710000001 INSULIN GLARGINE PER 5 UNITS: Performed by: STUDENT IN AN ORGANIZED HEALTH CARE EDUCATION/TRAINING PROGRAM

## 2025-08-12 PROCEDURE — 63710000001 PREDNISONE PER 1 MG: Performed by: INTERNAL MEDICINE

## 2025-08-12 PROCEDURE — 97530 THERAPEUTIC ACTIVITIES: CPT | Performed by: OCCUPATIONAL THERAPIST

## 2025-08-12 PROCEDURE — 63710000001 INSULIN LISPRO (HUMAN) PER 5 UNITS: Performed by: STUDENT IN AN ORGANIZED HEALTH CARE EDUCATION/TRAINING PROGRAM

## 2025-08-12 RX ORDER — PREDNISONE 20 MG/1
20 TABLET ORAL 2 TIMES DAILY WITH MEALS
Status: DISCONTINUED | OUTPATIENT
Start: 2025-08-12 | End: 2025-08-13 | Stop reason: HOSPADM

## 2025-08-12 RX ORDER — DOXYCYCLINE 100 MG/1
100 CAPSULE ORAL EVERY 12 HOURS SCHEDULED
Status: DISCONTINUED | OUTPATIENT
Start: 2025-08-12 | End: 2025-08-13 | Stop reason: HOSPADM

## 2025-08-12 RX ADMIN — ROSUVASTATIN CALCIUM 20 MG: 10 TABLET, FILM COATED ORAL at 08:37

## 2025-08-12 RX ADMIN — BUDESONIDE 0.5 MG: 0.5 SUSPENSION RESPIRATORY (INHALATION) at 07:43

## 2025-08-12 RX ADMIN — PREDNISONE 20 MG: 20 TABLET ORAL at 17:10

## 2025-08-12 RX ADMIN — INSULIN LISPRO 20 UNITS: 100 INJECTION, SOLUTION INTRAVENOUS; SUBCUTANEOUS at 11:53

## 2025-08-12 RX ADMIN — FUROSEMIDE 40 MG: 10 INJECTION, SOLUTION INTRAMUSCULAR; INTRAVENOUS at 23:53

## 2025-08-12 RX ADMIN — CLOPIDOGREL BISULFATE 75 MG: 75 TABLET, FILM COATED ORAL at 08:37

## 2025-08-12 RX ADMIN — DOXYCYCLINE 100 MG: 100 CAPSULE ORAL at 21:07

## 2025-08-12 RX ADMIN — BUDESONIDE 0.5 MG: 0.5 SUSPENSION RESPIRATORY (INHALATION) at 20:16

## 2025-08-12 RX ADMIN — Medication 10 ML: at 08:37

## 2025-08-12 RX ADMIN — FUROSEMIDE 40 MG: 10 INJECTION, SOLUTION INTRAMUSCULAR; INTRAVENOUS at 00:39

## 2025-08-12 RX ADMIN — Medication 12.5 MG: at 21:10

## 2025-08-12 RX ADMIN — INSULIN GLARGINE-YFGN 35 UNITS: 100 INJECTION, SOLUTION SUBCUTANEOUS at 21:07

## 2025-08-12 RX ADMIN — FUROSEMIDE 40 MG: 10 INJECTION, SOLUTION INTRAMUSCULAR; INTRAVENOUS at 11:53

## 2025-08-12 RX ADMIN — IPRATROPIUM BROMIDE 0.5 MG: 0.5 SOLUTION RESPIRATORY (INHALATION) at 20:21

## 2025-08-12 RX ADMIN — DILTIAZEM HYDROCHLORIDE 240 MG: 240 CAPSULE, EXTENDED RELEASE ORAL at 08:36

## 2025-08-12 RX ADMIN — ENOXAPARIN SODIUM 40 MG: 100 INJECTION SUBCUTANEOUS at 08:36

## 2025-08-12 RX ADMIN — ENOXAPARIN SODIUM 40 MG: 100 INJECTION SUBCUTANEOUS at 21:07

## 2025-08-12 RX ADMIN — IPRATROPIUM BROMIDE 0.5 MG: 0.5 SOLUTION RESPIRATORY (INHALATION) at 12:26

## 2025-08-12 RX ADMIN — INSULIN LISPRO 23 UNITS: 100 INJECTION, SOLUTION INTRAVENOUS; SUBCUTANEOUS at 17:10

## 2025-08-12 RX ADMIN — ASPIRIN 81 MG: 81 TABLET, COATED ORAL at 08:37

## 2025-08-12 RX ADMIN — IPRATROPIUM BROMIDE 0.5 MG: 0.5 SOLUTION RESPIRATORY (INHALATION) at 07:38

## 2025-08-12 RX ADMIN — PREDNISONE 20 MG: 20 TABLET ORAL at 08:36

## 2025-08-12 RX ADMIN — Medication 10 ML: at 21:07

## 2025-08-12 RX ADMIN — IPRATROPIUM BROMIDE 0.5 MG: 0.5 SOLUTION RESPIRATORY (INHALATION) at 15:22

## 2025-08-12 RX ADMIN — Medication 12.5 MG: at 08:37

## 2025-08-12 RX ADMIN — INSULIN LISPRO 17 UNITS: 100 INJECTION, SOLUTION INTRAVENOUS; SUBCUTANEOUS at 08:36

## 2025-08-12 RX ADMIN — INSULIN LISPRO 8 UNITS: 100 INJECTION, SOLUTION INTRAVENOUS; SUBCUTANEOUS at 21:07

## 2025-08-13 ENCOUNTER — READMISSION MANAGEMENT (OUTPATIENT)
Dept: CALL CENTER | Facility: HOSPITAL | Age: 70
End: 2025-08-13
Payer: MEDICARE

## 2025-08-13 VITALS
SYSTOLIC BLOOD PRESSURE: 121 MMHG | WEIGHT: 281.75 LBS | HEIGHT: 70 IN | OXYGEN SATURATION: 97 % | TEMPERATURE: 98.2 F | DIASTOLIC BLOOD PRESSURE: 82 MMHG | HEART RATE: 81 BPM | BODY MASS INDEX: 40.34 KG/M2 | RESPIRATION RATE: 20 BRPM

## 2025-08-13 LAB
ALBUMIN SERPL-MCNC: 3.9 G/DL (ref 3.5–5.2)
ALBUMIN/GLOB SERPL: 1.4 G/DL
ALP SERPL-CCNC: 62 U/L (ref 39–117)
ALT SERPL W P-5'-P-CCNC: 47 U/L (ref 1–41)
ANION GAP SERPL CALCULATED.3IONS-SCNC: 10.1 MMOL/L (ref 5–15)
AST SERPL-CCNC: 19 U/L (ref 1–40)
BILIRUB SERPL-MCNC: 0.2 MG/DL (ref 0–1.2)
BUN SERPL-MCNC: 31 MG/DL (ref 8–23)
BUN/CREAT SERPL: 32.3 (ref 7–25)
CALCIUM SPEC-SCNC: 9.5 MG/DL (ref 8.6–10.5)
CHLORIDE SERPL-SCNC: 92 MMOL/L (ref 98–107)
CO2 SERPL-SCNC: 35.9 MMOL/L (ref 22–29)
CREAT SERPL-MCNC: 0.96 MG/DL (ref 0.76–1.27)
DEPRECATED RDW RBC AUTO: 55.8 FL (ref 37–54)
EGFRCR SERPLBLD CKD-EPI 2021: 86.1 ML/MIN/1.73
ERYTHROCYTE [DISTWIDTH] IN BLOOD BY AUTOMATED COUNT: 15.4 % (ref 12.3–15.4)
GLOBULIN UR ELPH-MCNC: 2.8 GM/DL
GLUCOSE BLDC GLUCOMTR-MCNC: 273 MG/DL (ref 70–105)
GLUCOSE BLDC GLUCOMTR-MCNC: 279 MG/DL (ref 70–105)
GLUCOSE BLDC GLUCOMTR-MCNC: 387 MG/DL (ref 70–105)
GLUCOSE SERPL-MCNC: 317 MG/DL (ref 65–99)
HCT VFR BLD AUTO: 39.1 % (ref 37.5–51)
HGB BLD-MCNC: 12.1 G/DL (ref 13–17.7)
Lab: ABNORMAL
MCH RBC QN AUTO: 30.3 PG (ref 26.6–33)
MCHC RBC AUTO-ENTMCNC: 30.9 G/DL (ref 31.5–35.7)
MCV RBC AUTO: 97.8 FL (ref 79–97)
PLATELET # BLD AUTO: 341 10*3/MM3 (ref 140–450)
PMV BLD AUTO: 9.2 FL (ref 6–12)
POTASSIUM SERPL-SCNC: 4 MMOL/L (ref 3.5–5.2)
PROT SERPL-MCNC: 6.7 G/DL (ref 6–8.5)
RBC # BLD AUTO: 4 10*6/MM3 (ref 4.14–5.8)
SODIUM SERPL-SCNC: 138 MMOL/L (ref 136–145)
WBC NRBC COR # BLD AUTO: 11.33 10*3/MM3 (ref 3.4–10.8)

## 2025-08-13 PROCEDURE — 80053 COMPREHEN METABOLIC PANEL: CPT | Performed by: INTERNAL MEDICINE

## 2025-08-13 PROCEDURE — 82948 REAGENT STRIP/BLOOD GLUCOSE: CPT

## 2025-08-13 PROCEDURE — 25010000002 FUROSEMIDE PER 20 MG: Performed by: STUDENT IN AN ORGANIZED HEALTH CARE EDUCATION/TRAINING PROGRAM

## 2025-08-13 PROCEDURE — 94664 DEMO&/EVAL PT USE INHALER: CPT

## 2025-08-13 PROCEDURE — 94761 N-INVAS EAR/PLS OXIMETRY MLT: CPT

## 2025-08-13 PROCEDURE — 25010000002 ENOXAPARIN PER 10 MG: Performed by: STUDENT IN AN ORGANIZED HEALTH CARE EDUCATION/TRAINING PROGRAM

## 2025-08-13 PROCEDURE — 82948 REAGENT STRIP/BLOOD GLUCOSE: CPT | Performed by: STUDENT IN AN ORGANIZED HEALTH CARE EDUCATION/TRAINING PROGRAM

## 2025-08-13 PROCEDURE — 94799 UNLISTED PULMONARY SVC/PX: CPT

## 2025-08-13 PROCEDURE — 63710000001 INSULIN LISPRO (HUMAN) PER 5 UNITS: Performed by: STUDENT IN AN ORGANIZED HEALTH CARE EDUCATION/TRAINING PROGRAM

## 2025-08-13 PROCEDURE — 63710000001 PREDNISONE PER 1 MG: Performed by: INTERNAL MEDICINE

## 2025-08-13 RX ORDER — DOXYCYCLINE 100 MG/1
100 CAPSULE ORAL EVERY 12 HOURS SCHEDULED
Qty: 8 CAPSULE | Refills: 0 | Status: SHIPPED | OUTPATIENT
Start: 2025-08-13 | End: 2025-08-13

## 2025-08-13 RX ORDER — DOXYCYCLINE 100 MG/1
100 CAPSULE ORAL EVERY 12 HOURS SCHEDULED
Qty: 8 CAPSULE | Refills: 0 | Status: SHIPPED | OUTPATIENT
Start: 2025-08-13 | End: 2025-08-17

## 2025-08-13 RX ORDER — PREDNISONE 20 MG/1
20 TABLET ORAL 2 TIMES DAILY WITH MEALS
Qty: 30 TABLET | Refills: 0 | Status: SHIPPED | OUTPATIENT
Start: 2025-08-13 | End: 2025-08-13

## 2025-08-13 RX ORDER — PREDNISONE 20 MG/1
20 TABLET ORAL 2 TIMES DAILY WITH MEALS
Qty: 30 TABLET | Refills: 0 | Status: SHIPPED | OUTPATIENT
Start: 2025-08-13 | End: 2025-08-28

## 2025-08-13 RX ADMIN — CLOPIDOGREL BISULFATE 75 MG: 75 TABLET, FILM COATED ORAL at 08:34

## 2025-08-13 RX ADMIN — DILTIAZEM HYDROCHLORIDE 240 MG: 240 CAPSULE, EXTENDED RELEASE ORAL at 08:34

## 2025-08-13 RX ADMIN — IPRATROPIUM BROMIDE 0.5 MG: 0.5 SOLUTION RESPIRATORY (INHALATION) at 11:47

## 2025-08-13 RX ADMIN — IPRATROPIUM BROMIDE 0.5 MG: 0.5 SOLUTION RESPIRATORY (INHALATION) at 07:37

## 2025-08-13 RX ADMIN — INSULIN LISPRO 25 UNITS: 100 INJECTION, SOLUTION INTRAVENOUS; SUBCUTANEOUS at 08:35

## 2025-08-13 RX ADMIN — ENOXAPARIN SODIUM 40 MG: 100 INJECTION SUBCUTANEOUS at 08:35

## 2025-08-13 RX ADMIN — Medication 12.5 MG: at 08:34

## 2025-08-13 RX ADMIN — ASPIRIN 81 MG: 81 TABLET, COATED ORAL at 08:34

## 2025-08-13 RX ADMIN — DOXYCYCLINE 100 MG: 100 CAPSULE ORAL at 08:34

## 2025-08-13 RX ADMIN — FUROSEMIDE 40 MG: 10 INJECTION, SOLUTION INTRAMUSCULAR; INTRAVENOUS at 13:22

## 2025-08-13 RX ADMIN — PREDNISONE 20 MG: 20 TABLET ORAL at 08:35

## 2025-08-13 RX ADMIN — ROSUVASTATIN CALCIUM 20 MG: 10 TABLET, FILM COATED ORAL at 08:35

## 2025-08-13 RX ADMIN — INSULIN LISPRO 25 UNITS: 100 INJECTION, SOLUTION INTRAVENOUS; SUBCUTANEOUS at 13:22

## 2025-08-13 RX ADMIN — METOLAZONE 2.5 MG: 2.5 TABLET ORAL at 08:34

## 2025-08-13 RX ADMIN — Medication 10 ML: at 08:35

## 2025-08-13 RX ADMIN — BUDESONIDE 0.5 MG: 0.5 SUSPENSION RESPIRATORY (INHALATION) at 07:37

## 2025-08-14 ENCOUNTER — READMISSION MANAGEMENT (OUTPATIENT)
Dept: CALL CENTER | Facility: HOSPITAL | Age: 70
End: 2025-08-14
Payer: MEDICARE

## 2025-08-21 ENCOUNTER — READMISSION MANAGEMENT (OUTPATIENT)
Dept: CALL CENTER | Facility: HOSPITAL | Age: 70
End: 2025-08-21
Payer: MEDICARE

## 2025-08-25 ENCOUNTER — READMISSION MANAGEMENT (OUTPATIENT)
Dept: CALL CENTER | Facility: HOSPITAL | Age: 70
End: 2025-08-25
Payer: MEDICARE